# Patient Record
Sex: FEMALE | Race: BLACK OR AFRICAN AMERICAN | NOT HISPANIC OR LATINO | URBAN - METROPOLITAN AREA
[De-identification: names, ages, dates, MRNs, and addresses within clinical notes are randomized per-mention and may not be internally consistent; named-entity substitution may affect disease eponyms.]

---

## 2019-12-11 ENCOUNTER — INPATIENT (INPATIENT)
Facility: HOSPITAL | Age: 74
LOS: 4 days | Discharge: HOME | End: 2019-12-16
Attending: LEGAL MEDICINE | Admitting: LEGAL MEDICINE
Payer: MEDICARE

## 2019-12-11 VITALS
RESPIRATION RATE: 16 BRPM | TEMPERATURE: 98 F | OXYGEN SATURATION: 100 % | SYSTOLIC BLOOD PRESSURE: 197 MMHG | DIASTOLIC BLOOD PRESSURE: 82 MMHG | HEART RATE: 95 BPM

## 2019-12-11 LAB
ALBUMIN SERPL ELPH-MCNC: 3.8 G/DL — SIGNIFICANT CHANGE UP (ref 3.3–5)
ALP SERPL-CCNC: 62 U/L — SIGNIFICANT CHANGE UP (ref 40–120)
ALT FLD-CCNC: 15 U/L — SIGNIFICANT CHANGE UP (ref 4–33)
ANION GAP SERPL CALC-SCNC: 14 MMO/L — SIGNIFICANT CHANGE UP (ref 7–14)
ANION GAP SERPL CALC-SCNC: 17 MMO/L — HIGH (ref 7–14)
APPEARANCE UR: SIGNIFICANT CHANGE UP
AST SERPL-CCNC: 16 U/L — SIGNIFICANT CHANGE UP (ref 4–32)
BACTERIA # UR AUTO: SIGNIFICANT CHANGE UP
BASOPHILS # BLD AUTO: 0.02 K/UL — SIGNIFICANT CHANGE UP (ref 0–0.2)
BASOPHILS NFR BLD AUTO: 0.3 % — SIGNIFICANT CHANGE UP (ref 0–2)
BILIRUB SERPL-MCNC: 0.3 MG/DL — SIGNIFICANT CHANGE UP (ref 0.2–1.2)
BILIRUB UR-MCNC: NEGATIVE — SIGNIFICANT CHANGE UP
BLOOD UR QL VISUAL: SIGNIFICANT CHANGE UP
BUN SERPL-MCNC: 16 MG/DL — SIGNIFICANT CHANGE UP (ref 7–23)
BUN SERPL-MCNC: 16 MG/DL — SIGNIFICANT CHANGE UP (ref 7–23)
CALCIUM SERPL-MCNC: 9.2 MG/DL — SIGNIFICANT CHANGE UP (ref 8.4–10.5)
CALCIUM SERPL-MCNC: 9.3 MG/DL — SIGNIFICANT CHANGE UP (ref 8.4–10.5)
CHLORIDE SERPL-SCNC: 76 MMOL/L — LOW (ref 98–107)
CHLORIDE SERPL-SCNC: 77 MMOL/L — LOW (ref 98–107)
CO2 SERPL-SCNC: 20 MMOL/L — LOW (ref 22–31)
CO2 SERPL-SCNC: 21 MMOL/L — LOW (ref 22–31)
COLOR SPEC: SIGNIFICANT CHANGE UP
CREAT ?TM UR-MCNC: 49.7 MG/DL — SIGNIFICANT CHANGE UP
CREAT SERPL-MCNC: 0.98 MG/DL — SIGNIFICANT CHANGE UP (ref 0.5–1.3)
CREAT SERPL-MCNC: 0.99 MG/DL — SIGNIFICANT CHANGE UP (ref 0.5–1.3)
EOSINOPHIL # BLD AUTO: 0.2 K/UL — SIGNIFICANT CHANGE UP (ref 0–0.5)
EOSINOPHIL NFR BLD AUTO: 3.1 % — SIGNIFICANT CHANGE UP (ref 0–6)
GLUCOSE SERPL-MCNC: 221 MG/DL — HIGH (ref 70–99)
GLUCOSE SERPL-MCNC: 224 MG/DL — HIGH (ref 70–99)
GLUCOSE UR-MCNC: 200 — HIGH
HCT VFR BLD CALC: 36 % — SIGNIFICANT CHANGE UP (ref 34.5–45)
HGB BLD-MCNC: 12.1 G/DL — SIGNIFICANT CHANGE UP (ref 11.5–15.5)
HYALINE CASTS # UR AUTO: NEGATIVE — SIGNIFICANT CHANGE UP
IMM GRANULOCYTES NFR BLD AUTO: 0.5 % — SIGNIFICANT CHANGE UP (ref 0–1.5)
KETONES UR-MCNC: SIGNIFICANT CHANGE UP
LEUKOCYTE ESTERASE UR-ACNC: NEGATIVE — SIGNIFICANT CHANGE UP
LYMPHOCYTES # BLD AUTO: 0.94 K/UL — LOW (ref 1–3.3)
LYMPHOCYTES # BLD AUTO: 14.5 % — SIGNIFICANT CHANGE UP (ref 13–44)
MCHC RBC-ENTMCNC: 26.2 PG — LOW (ref 27–34)
MCHC RBC-ENTMCNC: 33.6 % — SIGNIFICANT CHANGE UP (ref 32–36)
MCV RBC AUTO: 77.9 FL — LOW (ref 80–100)
MONOCYTES # BLD AUTO: 0.6 K/UL — SIGNIFICANT CHANGE UP (ref 0–0.9)
MONOCYTES NFR BLD AUTO: 9.3 % — SIGNIFICANT CHANGE UP (ref 2–14)
NEUTROPHILS # BLD AUTO: 4.69 K/UL — SIGNIFICANT CHANGE UP (ref 1.8–7.4)
NEUTROPHILS NFR BLD AUTO: 72.3 % — SIGNIFICANT CHANGE UP (ref 43–77)
NITRITE UR-MCNC: NEGATIVE — SIGNIFICANT CHANGE UP
NRBC # FLD: 0 K/UL — SIGNIFICANT CHANGE UP (ref 0–0)
OSMOLALITY SERPL: 251 MOSMO/KG — LOW (ref 275–295)
OSMOLALITY UR: 265 MOSMO/KG — SIGNIFICANT CHANGE UP (ref 50–1200)
PH UR: 6 — SIGNIFICANT CHANGE UP (ref 5–8)
PLATELET # BLD AUTO: 304 K/UL — SIGNIFICANT CHANGE UP (ref 150–400)
PMV BLD: 9.1 FL — SIGNIFICANT CHANGE UP (ref 7–13)
POTASSIUM SERPL-MCNC: 5 MMOL/L — SIGNIFICANT CHANGE UP (ref 3.5–5.3)
POTASSIUM SERPL-MCNC: SIGNIFICANT CHANGE UP MMOL/L (ref 3.5–5.3)
POTASSIUM SERPL-SCNC: 5 MMOL/L — SIGNIFICANT CHANGE UP (ref 3.5–5.3)
POTASSIUM SERPL-SCNC: SIGNIFICANT CHANGE UP MMOL/L (ref 3.5–5.3)
PROT SERPL-MCNC: 7.8 G/DL — SIGNIFICANT CHANGE UP (ref 6–8.3)
PROT UR-MCNC: 200 — HIGH
RBC # BLD: 4.62 M/UL — SIGNIFICANT CHANGE UP (ref 3.8–5.2)
RBC # FLD: 12 % — SIGNIFICANT CHANGE UP (ref 10.3–14.5)
RBC CASTS # UR COMP ASSIST: HIGH (ref 0–?)
SODIUM SERPL-SCNC: 111 MMOL/L — CRITICAL LOW (ref 135–145)
SODIUM SERPL-SCNC: 114 MMOL/L — CRITICAL LOW (ref 135–145)
SODIUM UR-SCNC: 47 MMOL/L — SIGNIFICANT CHANGE UP
SP GR SPEC: 1.01 — SIGNIFICANT CHANGE UP (ref 1–1.04)
SQUAMOUS # UR AUTO: SIGNIFICANT CHANGE UP
TROPONIN T, HIGH SENSITIVITY: 11 NG/L — SIGNIFICANT CHANGE UP (ref ?–14)
TROPONIN T, HIGH SENSITIVITY: 13 NG/L — SIGNIFICANT CHANGE UP (ref ?–14)
TSH SERPL-MCNC: 2.05 UIU/ML — SIGNIFICANT CHANGE UP (ref 0.27–4.2)
UROBILINOGEN FLD QL: NORMAL — SIGNIFICANT CHANGE UP
WBC # BLD: 6.48 K/UL — SIGNIFICANT CHANGE UP (ref 3.8–10.5)
WBC # FLD AUTO: 6.48 K/UL — SIGNIFICANT CHANGE UP (ref 3.8–10.5)
WBC UR QL: SIGNIFICANT CHANGE UP (ref 0–?)

## 2019-12-11 PROCEDURE — 71045 X-RAY EXAM CHEST 1 VIEW: CPT | Mod: 26

## 2019-12-11 RX ORDER — SODIUM CHLORIDE 9 MG/ML
1000 INJECTION, SOLUTION INTRAVENOUS ONCE
Refills: 0 | Status: DISCONTINUED | OUTPATIENT
Start: 2019-12-11 | End: 2019-12-11

## 2019-12-11 RX ORDER — LOSARTAN POTASSIUM 100 MG/1
100 TABLET, FILM COATED ORAL ONCE
Refills: 0 | Status: COMPLETED | OUTPATIENT
Start: 2019-12-11 | End: 2019-12-11

## 2019-12-11 RX ORDER — LABETALOL HCL 100 MG
10 TABLET ORAL ONCE
Refills: 0 | Status: COMPLETED | OUTPATIENT
Start: 2019-12-11 | End: 2019-12-11

## 2019-12-11 RX ORDER — LOSARTAN POTASSIUM 100 MG/1
100 TABLET, FILM COATED ORAL DAILY
Refills: 0 | Status: DISCONTINUED | OUTPATIENT
Start: 2019-12-11 | End: 2019-12-11

## 2019-12-11 RX ORDER — SODIUM CHLORIDE 9 MG/ML
500 INJECTION, SOLUTION INTRAVENOUS ONCE
Refills: 0 | Status: COMPLETED | OUTPATIENT
Start: 2019-12-11 | End: 2019-12-11

## 2019-12-11 RX ORDER — SODIUM CHLORIDE 5 G/100ML
100 INJECTION, SOLUTION INTRAVENOUS
Refills: 0 | Status: DISCONTINUED | OUTPATIENT
Start: 2019-12-11 | End: 2019-12-12

## 2019-12-11 RX ADMIN — SODIUM CHLORIDE 500 MILLILITER(S): 9 INJECTION, SOLUTION INTRAVENOUS at 00:00

## 2019-12-11 RX ADMIN — SODIUM CHLORIDE 25 MILLILITER(S): 5 INJECTION, SOLUTION INTRAVENOUS at 23:32

## 2019-12-11 RX ADMIN — LOSARTAN POTASSIUM 100 MILLIGRAM(S): 100 TABLET, FILM COATED ORAL at 19:22

## 2019-12-11 RX ADMIN — SODIUM CHLORIDE 500 MILLILITER(S): 9 INJECTION, SOLUTION INTRAVENOUS at 20:40

## 2019-12-11 RX ADMIN — Medication 10 MILLIGRAM(S): at 22:57

## 2019-12-11 NOTE — ED PROVIDER NOTE - PHYSICAL EXAMINATION
GENERAL: A&Ox3, non-toxic appearing, no acute distress  HEENT: NCAT, EOMI, oral mucosa moist, normal conjunctiva  RESP: CTAB, no respiratory distress, no wheezes/rhonchi/rales, speaking in full sentences  CV: tachycardic, irregularly irregular, no murmurs/rubs/gallops, no peripheral edema  ABDOMEN: soft, non-tender, non-distended, no guarding, no CVA tenderness  MSK: no visible deformities  NEURO: no focal sensory or motor deficits, normal CN exam, BARAJAS, 5/5 strength in all extremities  SKIN: warm, normal color, well perfused, no rash  PSYCH: normal affect    -Sukh Stout, PGY-1

## 2019-12-11 NOTE — ED PROVIDER NOTE - OBJECTIVE STATEMENT
74 year old female PMH HTN, HLD, NIDDM, BIBEMS w/ daughter for syncopal episode. Daughter states patient was standing in the kitchen, felt lightheaded and called out for help. Daughter ran in and caught patient, lowered her to the ground, no trauma. Patient had approx 5 minute loss of consciousness, no convulsions, no tongue biting, no urinary incontinence. Patient had spontaneous return to baseline, followed by episodes of NBNB vomiting. Patient currently denies any complaints. Episode not a/w cp, sob, or diaphoresis. Patient states she felt well prior to episode. Patient does not have cardiologist, last stress test "many years ago" per daughter. Patient denies fever, chills, headache, visual changes, cp, sob, abd pain, nausea, vomiting, diarrhea, constipation, dysuria, weakness, or numbness/tingling.

## 2019-12-11 NOTE — ED PROVIDER NOTE - NS ED ROS FT
GENERAL: no fever, no chills  EYES: no change in vision  HEENT: no trouble swallowing or speaking  CARDIAC: no chest pain, no palpitations   PULMONARY: no cough, no shortness of breath, no wheezing  GI: no abdominal pain, +nausea (resolved), +vomiting, no diarrhea, no constipation  : no changes in urination, no dysuria, no frequency  SKIN: no rashes  NEURO: no headache, no numbness, no weakness, +syncope  MSK: no joint pain, no muscle pain, no back pain, no calf pain     -Sukh Stout, PGY-1

## 2019-12-11 NOTE — ED PROVIDER NOTE - CLINICAL SUMMARY MEDICAL DECISION MAKING FREE TEXT BOX
Sukh Stout, PGY1: 74 year old female p/w daughter s/p witnessed syncopal episode lasting ~5 min, no head trauma, spontaneous return to baseline w/ vomiting (resolved). Patient hypertensive and tachycardic in ER, NAD, appears comfortable. EKG showing 1st deg AV block, unsure if new. DDx: arrhythmia, ACS, electrolyte, vasovagal vs cardiogenic syncope, doubt infection, doubt dissection. Plan for labs w/ trop, CXR, reassess. Patient TBA to tele for syncope evaluation.

## 2019-12-11 NOTE — ED PROVIDER NOTE - CARE PLAN
Principal Discharge DX:	Syncope  Secondary Diagnosis:	1st degree AV block Principal Discharge DX:	Syncope  Secondary Diagnosis:	1st degree AV block  Secondary Diagnosis:	Hyponatremia  Secondary Diagnosis:	Diarrhea

## 2019-12-11 NOTE — CONSULT NOTE ADULT - SUBJECTIVE AND OBJECTIVE BOX
HPI: 75 YO F with MHx of HTN, HLD, DMII who presents after syncopal episodes. CHIEF COMPLAINT: Syncope    HPI:73 YO F with Mhx of HTN, HLD, and DMII who presents after a syncopal episode. Per the daughters at bedside, pt is A&Ox3 at baseline and had been living alone until one month ago when she started to have some short term memory difficulty, family could not further elaborate. For the past month, the pt has been staying at the daughters house who states that she has been having intermittent diarrhea for the past 2 weeks, but denied any fever, chills, abd pain, or recent antibiotics. She has also had decreased appetitive a/w 15-20 lbs weight loss over the past month, and for the past week she has only eaten oatmeal and lettuce. This morning, she was walking in the house and started to feel light headed, and suddenly lost consciousness. She was caught by the daughter who states that the pt did not hit her head, but she was unresponsive for 5 minutes. During that time, she called 911,and a neighbor who administered orange juice (pt was still unconscious) and started CPR (unknown is pt ever lost a pulse). Daughter denies any shaking during the episode but states that the pt was extremely confused for 30 minutes after the event, but states that she is better now, although still not baseline. Pt denies any chest pain, palpitations, shortness of breath, headache, anxiety. She did feel nauseous earlier, which is now improved. Pt denies any sick contacts, cough, recent travel, or similar episodes.      PAST MEDICAL & SURGICAL HISTORY:  Hypercholesterolemia  Hypertension  Diabetes mellitus  No significant past surgical history      FAMILY HISTORY:  DMII  CVA    SOCIAL HISTORY:  Smoking: Denies  EtOH Use: Denies  Marital Status:     Allergies:   PCN  Peanuts          HOME MEDICATIONS:  Losartan  Aspirin  Glimepiride  Metformin       REVIEW OF SYSTEMS:  Constitutional: 15-20lbs weight loss  Eyes: denies visual complaints  ENT: denies rhinorrhea, change in vision, or change in hearing  CV: denies chest pain, palpitations, PND, ROS[place]HAL  Resp: denies shortness of breath or cough  GI:intermittent diarrhea without fever or abd pain  :denies  MSK:denies complaints  Integumentary:deneis abnormal rashes    OBJECTIVE:  ICU Vital Signs Last 24 Hrs  T(C): 36.6 (11 Dec 2019 23:24), Max: 36.8 (11 Dec 2019 16:46)  T(F): 97.9 (11 Dec 2019 23:24), Max: 98.2 (11 Dec 2019 16:46)  HR: 86 (11 Dec 2019 23:24) (86 - 99)  BP: 169/77 (11 Dec 2019 23:24) (169/77 - 229/112)  BP(mean): --  ABP: --  ABP(mean): --  RR: 15 (11 Dec 2019 23:24) (13 - 19)  SpO2: 100% (11 Dec 2019 23:24) (99% - 100%)        CAPILLARY BLOOD GLUCOSE      POCT Blood Glucose.: 235 mg/dL (11 Dec 2019 16:49)      PHYSICAL EXAM:  General: laying supine, no acute distress  HEENT: NCAT, PERRL  Lymph Nodes: NO LAD  Neck: Supple, no rigidity.   Respiratory: CTAB, no wheezing, rales,or rhonchi  Cardiovascular: normal S1, S2 RRR, no M/R/G  Abdomen: Soft, nontender, nondistended.   Extremities: Trace LE edema. no tremors  Skin: No abnormal rashes  Neurological:A&Ox2 (person and place, but not time). no focal deficits  Psychiatry: calm appearing    HOSPITAL MEDICATIONS:  MEDICATIONS  (STANDING):  sodium chloride 3%. 100 milliLiter(s) (25 mL/Hr) IV Continuous <Continuous>    MEDICATIONS  (PRN):      LABS:                        12.1   6.48  )-----------( 304      ( 11 Dec 2019 18:24 )             36.0     12-11    111<LL>  |  76<L>  |  16  ----------------------------<  221<H>  Test not performed   SPECIMEN SEVERELY HEMOLYZED   |  21<L>  |  0.99    Ca    9.3      11 Dec 2019 19:29    TPro  7.8  /  Alb  3.8  /  TBili  0.3  /  DBili  x   /  AST  16  /  ALT  15  /  AlkPhos  62  12-      Urinalysis Basic - ( 11 Dec 2019 19:29 )    Color: LIGHT YELLOW / Appearance: Lt TURBID / S.010 / pH: 6.0  Gluc: 200 / Ketone: SMALL  / Bili: NEGATIVE / Urobili: NORMAL   Blood: SMALL / Protein: 200 / Nitrite: NEGATIVE   Leuk Esterase: NEGATIVE / RBC: 6-10 / WBC 3-5   Sq Epi: FEW / Non Sq Epi: x / Bacteria: FEW            MICROBIOLOGY:     RADIOLOGY:  [ ] Reviewed and interpreted by me    EKG: Sinus rhythm with 1st degree AV block

## 2019-12-11 NOTE — ED PROVIDER NOTE - ATTENDING CONTRIBUTION TO CARE
I have personally performed a face to face bedside history and physical examination of this patient. I have discussed the history, examination, review of systems, assessment and plan of management with the resident. I have reviewed the electronic medical record and amended it to reflect my history, review of systems, physical exam, assessment and plan.    74 year old female PMH HTN, HLD, NIDDM, BIBEMS w/ daughter for syncopal episode.  VSS AF.  Exam non-toxic appearing, well perfused extremities, lungs clear, heart sounds nl, no le swelling.  Plan for ekg, cxr, labs, likely admit.

## 2019-12-11 NOTE — ED ADULT NURSE NOTE - OBJECTIVE STATEMENT
Received pt in room 12, pt A&Ox2 to self and location, respirations even and unlabored b/l. Pt brought in by daughter for syncopal episode approx for 5 min, reports pt did not fall, she caught pt before falling. Per daughter, pt was vomiting en route on hospital on ambulance, 4mg of zofran given by EMS. Per daughter, pt appears confused since the syncopal episode. Pt c/o lightheadedness. Denies h/a, n/v at this time. IVL 18g Angiocath noted on left AC placed by EMS, no infiltration noted, +blood return. Sinus rhythm on cardiac monitor. Pt hypertensive, per daughter, believes pt took BP meds today. Daughter at bedside. Awaiting MD moreira. Will continue to monitor.

## 2019-12-11 NOTE — CONSULT NOTE ADULT - ATTENDING COMMENTS
Syncope in the setting of diarrhea and hyponatremia, likely hypovolemic  serum and urine osm, urine lytes, uric acid, TSH  serial BMPq4h  consider ct head

## 2019-12-11 NOTE — CONSULT NOTE ADULT - ASSESSMENT
73 YO F With MHx of HTN, HLD, DMII, who presents with syncope found to be hyponatremia with encephalopathy.    #Neuro  -A&Ox2 likely in the setting of hyponatremia, no FND  -Baseline A&Ox3, has been having short term memory loss but fully oriented until today, prior to her syncopal episode.  -hyponatremia as below      #CV  -Hypertensive s/p losartan 100mg in the ED  -labetalol 10mg IVP now.   -monitor BP    #resp  -No issues    #Endo  -Check AM cortisol  -Check TSh    #Renal  Electrolyte abnormalities  --Hyponatremia likely 2/2 Dehydration Vs Tea and Toast Phenomenon Vs SIADH  -3% NaCl now, then repeat BMP  -monitor q4h BMP    #GI  -Intermittent Diarrhea  -No other active issues    # ID  -Low suspicion for infection    Miah Fernandez, Internal Medicine  PGY-2, [phone], [phone] 75 YO F With MHx of HTN, HLD, DMII, who presents with syncope found to be hyponatremic with encephalopathy.    #Neuro  -syncope, can be 2/2 orthostasis Vs vasovagal Vs seizure in the setting of hyponatremia  -A&Ox2 likely in the setting of hyponatremia, no FND  -Baseline A&Ox3, has been having short term memory loss but fully oriented until today, prior to her syncopal episode.  -hyponatremia management as below  -If does not improve with correction of sodium,  would pursue imaging and neuro consult      #CV  -syncope can be 2/2 orthostatics Vs other cardiac cause  -orthostatics  -monitor on tele  -TTE  --HTN  -Hypertensive s/p losartan 100mg in the ED  -labetalol 10mg IVP now.   -monitor BP    #resp  -No issues    #Endo  -Check AM cortisol  -Check TSh    #Renal  Electrolyte abnormalities  --Hyponatremia likely 2/2 hypovolemia 2/2 Dehydration 2/2 diarrhea + decreased PO intake Vs Tea and Toast Phenomenon Vs less likely SIADH  -3% NaCl now, then repeat BMP  -monitor q4h BMP    #GI  -Nonspecific Intermittent Diarrhea  -Weight loss of 15-20 lbs in the past month likely in the setting of dehydration and decreased PO in take. Pt UTD with colonoscopy, stopped mammograms and pap smear 3 years, but prior were wnl.     # ID  -Low suspicion for infection    Miah Fernandez, Internal Medicine  PGY-2, [phone], [phone]

## 2019-12-11 NOTE — ED ADULT TRIAGE NOTE - CHIEF COMPLAINT QUOTE
A&Ox2 person place, from home for syncopal episode as per daughter pt became light headed and dizzy nausea and multiple episodes of vomit, pt was given 4mg of zofran by EmS, 18 g right AC daughter caught her, as per daughter pt has been confused since PMH HTN, DM FS in ambay 235 Fit MD Mendez called, no code stroke called

## 2019-12-11 NOTE — ED PROVIDER NOTE - PROGRESS NOTE DETAILS
JONATHON (FIT DOCTOR)- called by triage RN for stroke eval for this patient.  75yo F with h/o HTN, DM, no prior Sz, takes no etoh or blood thinners, BIBEMS for syncopal episode after which pt vomited multiple times and was mute, confused, staring into space, has progressively been more vocal and oriented.  Had mild shaking prior to episode per daughter, no tongue bitting or bowel/bladder incontinence.  At this time patient is AAOx3, glucose not low, extremities 5/5 motor, symmetric smile.  Will not call stroke note. ?sz with post ictal confusion vs syncope. Will go to main ED for further Eval Patient resting comfortable. Sodium s/p hypertonic improved to 120. Discussed with MICU with recs to saline lock and medicine admission

## 2019-12-12 DIAGNOSIS — R19.7 DIARRHEA, UNSPECIFIED: ICD-10-CM

## 2019-12-12 DIAGNOSIS — E11.9 TYPE 2 DIABETES MELLITUS WITHOUT COMPLICATIONS: ICD-10-CM

## 2019-12-12 DIAGNOSIS — R55 SYNCOPE AND COLLAPSE: ICD-10-CM

## 2019-12-12 DIAGNOSIS — E87.1 HYPO-OSMOLALITY AND HYPONATREMIA: ICD-10-CM

## 2019-12-12 DIAGNOSIS — Z29.9 ENCOUNTER FOR PROPHYLACTIC MEASURES, UNSPECIFIED: ICD-10-CM

## 2019-12-12 DIAGNOSIS — I10 ESSENTIAL (PRIMARY) HYPERTENSION: ICD-10-CM

## 2019-12-12 DIAGNOSIS — Z02.9 ENCOUNTER FOR ADMINISTRATIVE EXAMINATIONS, UNSPECIFIED: ICD-10-CM

## 2019-12-12 LAB
ANION GAP SERPL CALC-SCNC: 13 MMO/L — SIGNIFICANT CHANGE UP (ref 7–14)
ANION GAP SERPL CALC-SCNC: 14 MMO/L — SIGNIFICANT CHANGE UP (ref 7–14)
ANION GAP SERPL CALC-SCNC: 15 MMO/L — HIGH (ref 7–14)
BUN SERPL-MCNC: 13 MG/DL — SIGNIFICANT CHANGE UP (ref 7–23)
BUN SERPL-MCNC: 14 MG/DL — SIGNIFICANT CHANGE UP (ref 7–23)
BUN SERPL-MCNC: 17 MG/DL — SIGNIFICANT CHANGE UP (ref 7–23)
CALCIUM SERPL-MCNC: 9 MG/DL — SIGNIFICANT CHANGE UP (ref 8.4–10.5)
CALCIUM SERPL-MCNC: 9.4 MG/DL — SIGNIFICANT CHANGE UP (ref 8.4–10.5)
CALCIUM SERPL-MCNC: 9.4 MG/DL — SIGNIFICANT CHANGE UP (ref 8.4–10.5)
CHLORIDE SERPL-SCNC: 85 MMOL/L — LOW (ref 98–107)
CHLORIDE SERPL-SCNC: 86 MMOL/L — LOW (ref 98–107)
CHLORIDE SERPL-SCNC: 88 MMOL/L — LOW (ref 98–107)
CHLORIDE UR-SCNC: 28 MMOL/L — SIGNIFICANT CHANGE UP
CO2 SERPL-SCNC: 20 MMOL/L — LOW (ref 22–31)
CO2 SERPL-SCNC: 23 MMOL/L — SIGNIFICANT CHANGE UP (ref 22–31)
CO2 SERPL-SCNC: 24 MMOL/L — SIGNIFICANT CHANGE UP (ref 22–31)
CORTIS SERPL-MCNC: 13.1 UG/DL — SIGNIFICANT CHANGE UP (ref 2.7–18.4)
CREAT SERPL-MCNC: 0.94 MG/DL — SIGNIFICANT CHANGE UP (ref 0.5–1.3)
CREAT SERPL-MCNC: 0.99 MG/DL — SIGNIFICANT CHANGE UP (ref 0.5–1.3)
CREAT SERPL-MCNC: 1 MG/DL — SIGNIFICANT CHANGE UP (ref 0.5–1.3)
GLUCOSE BLDC GLUCOMTR-MCNC: 364 MG/DL — HIGH (ref 70–99)
GLUCOSE BLDC GLUCOMTR-MCNC: 396 MG/DL — HIGH (ref 70–99)
GLUCOSE SERPL-MCNC: 125 MG/DL — HIGH (ref 70–99)
GLUCOSE SERPL-MCNC: 146 MG/DL — HIGH (ref 70–99)
GLUCOSE SERPL-MCNC: 297 MG/DL — HIGH (ref 70–99)
MAGNESIUM SERPL-MCNC: 1.7 MG/DL — SIGNIFICANT CHANGE UP (ref 1.6–2.6)
MAGNESIUM SERPL-MCNC: 1.8 MG/DL — SIGNIFICANT CHANGE UP (ref 1.6–2.6)
OSMOLALITY UR: 158 MOSMO/KG — SIGNIFICANT CHANGE UP (ref 50–1200)
OSMOLALITY UR: 273 MOSMO/KG — SIGNIFICANT CHANGE UP (ref 50–1200)
PHOSPHATE SERPL-MCNC: 3.6 MG/DL — SIGNIFICANT CHANGE UP (ref 2.5–4.5)
PHOSPHATE SERPL-MCNC: 3.9 MG/DL — SIGNIFICANT CHANGE UP (ref 2.5–4.5)
POTASSIUM SERPL-MCNC: 4.4 MMOL/L — SIGNIFICANT CHANGE UP (ref 3.5–5.3)
POTASSIUM SERPL-MCNC: 4.4 MMOL/L — SIGNIFICANT CHANGE UP (ref 3.5–5.3)
POTASSIUM SERPL-MCNC: 4.6 MMOL/L — SIGNIFICANT CHANGE UP (ref 3.5–5.3)
POTASSIUM SERPL-SCNC: 4.4 MMOL/L — SIGNIFICANT CHANGE UP (ref 3.5–5.3)
POTASSIUM SERPL-SCNC: 4.4 MMOL/L — SIGNIFICANT CHANGE UP (ref 3.5–5.3)
POTASSIUM SERPL-SCNC: 4.6 MMOL/L — SIGNIFICANT CHANGE UP (ref 3.5–5.3)
SODIUM SERPL-SCNC: 120 MMOL/L — CRITICAL LOW (ref 135–145)
SODIUM SERPL-SCNC: 123 MMOL/L — LOW (ref 135–145)
SODIUM SERPL-SCNC: 125 MMOL/L — LOW (ref 135–145)
SODIUM UR-SCNC: 36 MMOL/L — SIGNIFICANT CHANGE UP
SODIUM UR-SCNC: 38 MMOL/L — SIGNIFICANT CHANGE UP
T4 FREE SERPL-MCNC: 1.53 NG/DL — SIGNIFICANT CHANGE UP (ref 0.9–1.8)
TSH SERPL-MCNC: 2.22 UIU/ML — SIGNIFICANT CHANGE UP (ref 0.27–4.2)
TSH SERPL-MCNC: 2.69 UIU/ML — SIGNIFICANT CHANGE UP (ref 0.27–4.2)

## 2019-12-12 PROCEDURE — 99223 1ST HOSP IP/OBS HIGH 75: CPT

## 2019-12-12 PROCEDURE — 70450 CT HEAD/BRAIN W/O DYE: CPT | Mod: 26

## 2019-12-12 RX ORDER — DEXTROSE 50 % IN WATER 50 %
25 SYRINGE (ML) INTRAVENOUS ONCE
Refills: 0 | Status: DISCONTINUED | OUTPATIENT
Start: 2019-12-12 | End: 2019-12-16

## 2019-12-12 RX ORDER — SODIUM CHLORIDE 9 MG/ML
1000 INJECTION, SOLUTION INTRAVENOUS
Refills: 0 | Status: DISCONTINUED | OUTPATIENT
Start: 2019-12-12 | End: 2019-12-16

## 2019-12-12 RX ORDER — GLUCAGON INJECTION, SOLUTION 0.5 MG/.1ML
1 INJECTION, SOLUTION SUBCUTANEOUS ONCE
Refills: 0 | Status: DISCONTINUED | OUTPATIENT
Start: 2019-12-12 | End: 2019-12-16

## 2019-12-12 RX ORDER — DEXTROSE 50 % IN WATER 50 %
15 SYRINGE (ML) INTRAVENOUS ONCE
Refills: 0 | Status: DISCONTINUED | OUTPATIENT
Start: 2019-12-12 | End: 2019-12-16

## 2019-12-12 RX ORDER — DEXTROSE 50 % IN WATER 50 %
12.5 SYRINGE (ML) INTRAVENOUS ONCE
Refills: 0 | Status: DISCONTINUED | OUTPATIENT
Start: 2019-12-12 | End: 2019-12-16

## 2019-12-12 RX ORDER — LABETALOL HCL 100 MG
5 TABLET ORAL ONCE
Refills: 0 | Status: COMPLETED | OUTPATIENT
Start: 2019-12-12 | End: 2019-12-12

## 2019-12-12 RX ORDER — SODIUM CHLORIDE 9 MG/ML
1000 INJECTION, SOLUTION INTRAVENOUS
Refills: 0 | Status: DISCONTINUED | OUTPATIENT
Start: 2019-12-12 | End: 2019-12-13

## 2019-12-12 RX ORDER — INSULIN LISPRO 100/ML
VIAL (ML) SUBCUTANEOUS
Refills: 0 | Status: DISCONTINUED | OUTPATIENT
Start: 2019-12-12 | End: 2019-12-16

## 2019-12-12 RX ORDER — INSULIN LISPRO 100/ML
VIAL (ML) SUBCUTANEOUS AT BEDTIME
Refills: 0 | Status: DISCONTINUED | OUTPATIENT
Start: 2019-12-12 | End: 2019-12-16

## 2019-12-12 RX ADMIN — Medication 3: at 22:31

## 2019-12-12 RX ADMIN — SODIUM CHLORIDE 20 MILLILITER(S): 9 INJECTION, SOLUTION INTRAVENOUS at 19:33

## 2019-12-12 RX ADMIN — Medication 5: at 19:45

## 2019-12-12 RX ADMIN — Medication 0: at 07:43

## 2019-12-12 RX ADMIN — Medication 2: at 13:43

## 2019-12-12 RX ADMIN — SODIUM CHLORIDE 100 MILLILITER(S): 5 INJECTION, SOLUTION INTRAVENOUS at 03:20

## 2019-12-12 RX ADMIN — Medication 5 MILLIGRAM(S): at 05:30

## 2019-12-12 NOTE — ED ADULT NURSE REASSESSMENT NOTE - NS ED NURSE REASSESS COMMENT FT1
Patient resting, cardiac monitoring continues, HR in 80's - appears to be in a regular rhythm, sodium chloride 3% infusing, repeat BMP due after infusion is complete, respirations even unlabored

## 2019-12-12 NOTE — H&P ADULT - NSHPLABSRESULTS_GEN_ALL_CORE
(11 @ 18:24)                      12.1  6.48 )-----------( 304                 36.0    Neutrophils = 4.69 (72.3%)  Lymphocytes = 0.94 (14.5%)  Eosinophils = 0.20 (3.1%)  Basophils = 0.02 (0.3%)  Monocytes = 0.60 (9.3%)  Bands = --%        120<LL>  |  85<L>  |  14  ----------------------------<  146<H>  4.4   |  20<L>  |  0.94    Ca    9.0      12 Dec 2019 03:30    TPro  7.8  /  Alb  3.8  /  TBili  0.3  /  DBili  x   /  AST  16  /  ALT  15  /  AlkPhos  62  12-11    Urinalysis Basic - ( 11 Dec 2019 19:29 )    Color: LIGHT YELLOW / Appearance: Lt TURBID / S.010 / pH: 6.0  Gluc: 200 / Ketone: SMALL  / Bili: NEGATIVE / Urobili: NORMAL   Blood: SMALL / Protein: 200 / Nitrite: NEGATIVE   Leuk Esterase: NEGATIVE / RBC: 6-10 / WBC 3-5   Sq Epi: FEW / Non Sq Epi: x / Bacteria: FEW

## 2019-12-12 NOTE — H&P ADULT - PROBLEM SELECTOR PLAN 6
- Awaiting head CT. If no acute pathology, will start HSQ.  - Fall precautions, aspiration precautions.

## 2019-12-12 NOTE — CONSULT NOTE ADULT - SUBJECTIVE AND OBJECTIVE BOX
[facility]   MD Jessica Wilson D.O. Fatima Sheikh, D.O. Ruoru Wong, PA    From 7 AM - 5 PM:  OFFICE: [phone]  Dr. Schulz cell: [phone]  Dr. Escalona cell: [phone]  Dr. Leal cell: [phone]  IVY Christy cell: [phone]    From 5 PM - 7 AM: Answering Service: [phone]      -- INITIAL RENAL CONSULT NOTE  --------------------------------------------------------------------------------  HPI:75 YO F with Mhx of HTN, HLD, and DMII who admitted after a syncopal episode. Pt admitted with seizure/ syncope. Nephrology consulted for hyponatremia. Pt admitted with low serum sodium to 114. Pt given LR 500cc and hypertonic saline 100cc x 1 with rising serum sodium at present. Pt seen and examined in the ER. Awake, alert and responsive. States she has been eating normally. Denies any nausea/vomiting/diarrhea/ new medications. States she drinks 4 bottles of water daily but has not in the last two days (today and yesterday).       PAST HISTORY  --------------------------------------------------------------------------------  PAST MEDICAL & SURGICAL HISTORY:  Hypercholesterolemia  Hypertension  Diabetes mellitus  No significant past surgical history    FAMILY HISTORY:  No pertinent family history in first degree relatives    PAST SOCIAL HISTORY:    ALLERGIES & MEDICATIONS  --------------------------------------------------------------------------------  Allergies    peanuts (Short breath; Hives)  penicillin (Short breath; Hives)    Intolerances      Standing Inpatient Medications  dextrose 5%. 1000 milliLiter(s) IV Continuous <Continuous>  dextrose 50% Injectable 12.5 Gram(s) IV Push once  dextrose 50% Injectable 25 Gram(s) IV Push once  dextrose 50% Injectable 25 Gram(s) IV Push once  insulin lispro (HumaLOG) corrective regimen sliding scale   SubCutaneous three times a day before meals  insulin lispro (HumaLOG) corrective regimen sliding scale   SubCutaneous at bedtime    PRN Inpatient Medications  dextrose 40% Gel 15 Gram(s) Oral once PRN  glucagon  Injectable 1 milliGRAM(s) IntraMuscular once PRN      REVIEW OF SYSTEMS  --------------------------------------------------------------------------------  Gen: No fevers/chills  Skin: No rashes  Respiratory: No dyspnea, cough  CV: No chest pain  GI: No abdominal pain, diarrhea, constipation, nausea, vomiting  : No dysuria, hematuria  MSK: No  edema    All other systems were reviewed and are negative, except as noted.    VITALS/PHYSICAL EXAM  --------------------------------------------------------------------------------  T(C): 36.6 (12-12-19 @ 15:05), Max: 37 (12-12-19 @ 11:40)  HR: 91 (12-12-19 @ 15:05) (75 - 99)  BP: 160/54 (12-12-19 @ 15:05) (153/69 - 229/112)  RR: 16 (12-12-19 @ 15:05) (13 - 19)  SpO2: 100% (12-12-19 @ 15:05) (98% - 100%)  Wt(kg): --    Physical Exam:  	Gen: NAD  	HEENT: MMM  	Pulm: CTA B/L  	CV: S1S2  	Abd: Soft, +BS   	Ext: No LE edema B/L  	Neuro: Awake, no tremor  	Skin: Warm and dry    LABS/STUDIES  --------------------------------------------------------------------------------              12.1   6.48  >-----------<  304      [12-11-19 @ 18:24]              36.0     123  |  86  |  13  ----------------------------<  125      [12-12-19 @ 06:45]  4.4   |  23  |  0.99        Ca     9.4     [12-12-19 @ 06:45]      Mg     1.7     [12-12-19 @ 06:45]      Phos  3.9     [12-12-19 @ 06:45]    TPro  7.8  /  Alb  3.8  /  TBili  0.3  /  DBili  x   /  AST  16  /  ALT  15  /  AlkPhos  62  [12-11-19 @ 18:24]    Serum Osmolality 251      [12-11-19 @ 19:29]    Creatinine Trend:  SCr 0.99 [12-12 @ 06:45]  SCr 0.94 [12-12 @ 03:30]  SCr 0.99 [12-11 @ 19:29]  SCr 0.98 [12-11 @ 18:24]    Urinalysis - [12-11-19 @ 19:29]      Color LIGHT YELLOW / Appearance Lt TURBID / SG 1.010 / pH 6.0      Gluc 200 / Ketone SMALL  / Bili NEGATIVE / Urobili NORMAL       Blood SMALL / Protein 200 / Leuk Est NEGATIVE / Nitrite NEGATIVE      RBC 6-10 / WBC 3-5 / Hyaline NEGATIVE / Gran  / Sq Epi FEW / Non Sq Epi  / Bacteria FEW    Urine Creatinine 49.70      [12-11-19 @ 19:29]  Urine Sodium 38      [12-12-19 @ 09:15]  Urine Chloride 28      [12-12-19 @ 09:15]  Urine Osmolality 158      [12-12-19 @ 09:15]    TSH 2.69      [12-12-19 @ 06:45]

## 2019-12-12 NOTE — H&P ADULT - PROBLEM SELECTOR PLAN 7
1.  Name of PCP: Dr. Middleton  2.  PCP Contacted on Admission: [ ] Y    [x] N    3.  PCP contacted at Discharge: [ ] Y    [ ] N    [ ] N/A  4.  Post-Discharge Appointment Date and Location:  5.  Summary of Handoff given to PCP:

## 2019-12-12 NOTE — H&P ADULT - PROBLEM SELECTOR PROBLEM 4
Type 2 diabetes mellitus without complication, without long-term current use of insulin Diarrhea, unspecified type

## 2019-12-12 NOTE — H&P ADULT - HISTORY OF PRESENT ILLNESS
73yo F with Mhx of HTN, HLD, and DMII who presents after a syncopal episode. Per the daughters at bedside, pt is A&Ox3 at baseline and had been living alone until one month ago when she started to have some short term memory difficulty, family could not further elaborate. For the past month, the pt has been staying at the daughters house who states that she has been having intermittent diarrhea for the past 2 weeks, but denied any fever, chills, abd pain, or recent antibiotics. She has also had decreased appetitive a/w 15-20 lbs weight loss over the past month, and for the past week she has only eaten oatmeal and lettuce. This morning, she was walking in the house and started to feel light headed, and suddenly lost consciousness. She was caught by the daughter who states that the pt did not hit her head, but she was unresponsive for 5 minutes. During that time, she called 911,and a neighbor who administered orange juice (pt was still unconscious) and started CPR (unknown is pt ever lost a pulse). Daughter denies any shaking during the episode but states that the pt was extremely confused for 30 minutes after the event, but states that she is better now, although still not baseline. Pt denies any chest pain, palpitations, shortness of breath, headache, anxiety. She did feel nauseous earlier, which is now improved. Pt denies any sick contacts, cough, recent travel, or similar episodes.

## 2019-12-12 NOTE — CONSULT NOTE ADULT - ASSESSMENT
73 YO F with Mhx of HTN, HLD, and DMII who admitted after a syncopal episode. Pt admitted with seizure/ syncope. Nephrology consulted for hyponatremia.     Hyponatremia  Severe--likely Acute (however unknown), Symptomatic with seizure/ syncope   Urine osm consistent with polydipsia  Pt admitted with low serum sodium to 114. Pt given LR 500cc x1 in ER.  Serum sodium dropped to 111  and hypertonic saline 100cc x 1 was administered   -->now with rising serum sodium at present.   Last serum sodium at 6am was 123  Awaiting current serum sodium repeat   Maximum rise of 10meq in 24 hours is advised given we do not known the acuity of this hyponatremia   If serum sodium continues to rise on repeat, can start D5W @ 20cc x 5 hours.   Please call with any questions   Must continue BMP Q4-6 hours today  MICU eval noted.   TSH at goal  Check AM cortisol. Pt states shes had colonoscopy in the past, no mammogram. Non-smoker. Malignancy work up as per age.   Check serum uric acid in AM and repeat urine osm and urine Na   Monitor intake and output  Avoid overcorrection

## 2019-12-12 NOTE — H&P ADULT - ASSESSMENT
73yo F with Mhx of HTN, HLD, and DMII who presents after a syncopal episode. Likely 2/2 to hyponatremia.

## 2019-12-12 NOTE — PROGRESS NOTE ADULT - SUBJECTIVE AND OBJECTIVE BOX
MD note-Patient seen by Hospitalist today.H and P,MICU Eval and Renal consult noted.I will f/u from tomorrow.

## 2019-12-12 NOTE — H&P ADULT - PROBLEM SELECTOR PLAN 1
- Patient presented with LOC, also with confusion afterwards, currently slightly encephalopathic with LE weakness bilaterally (symmetrical). Likely had neurological event, constellation of sx suggestive of seizure. Also need to r/o cardiac event and CVA.  - Ordered for head CT stat. Will need to consult neuro in the am.  - Monitor on tele. Unlikely cardiac event as patient has neg sx, neg trop and baseline EKG.  - Check orthostatic BP.  - Dysphagia screening.

## 2019-12-12 NOTE — H&P ADULT - PROBLEM SELECTOR PLAN 3
- BP elevated on admission.  - Awaiting head CT. If no CVA suspected per neuro and negative CT, will resume home meds and control BP. Goal SBP~150.

## 2019-12-12 NOTE — H&P ADULT - NSHPPHYSICALEXAM_GEN_ALL_CORE
T(C): 36.8 (12-12-19 @ 03:40), Max: 36.8 (12-11-19 @ 16:46)  HR: 77 (12-12-19 @ 03:40) (77 - 99)  BP: 194/62 (12-12-19 @ 03:40) (169/77 - 229/112)  RR: 15 (12-12-19 @ 03:40) (13 - 19)  SpO2: 100% (12-12-19 @ 03:40) (99% - 100%)  Wt(kg): --  GENERAL: NAD, well-developed  HEAD:  Atraumatic, Normocephalic  EYES: EOMI, PERRLA, conjunctiva and sclera clear  NECK: Supple, No JVD  CHEST/LUNG: Clear to auscultation bilaterally; No wheeze  HEART: Regular rate and rhythm; No murmurs, rubs, or gallops  ABDOMEN: Soft, Nontender, Nondistended; Bowel sounds present  EXTREMITIES:  2+ Peripheral Pulses, No clubbing, cyanosis, or edema  PSYCH: AAOx2-3  NEUROLOGY: non-focal  SKIN: No rashes or lesions T(C): 36.8 (12-12-19 @ 03:40), Max: 36.8 (12-11-19 @ 16:46)  HR: 77 (12-12-19 @ 03:40) (77 - 99)  BP: 194/62 (12-12-19 @ 03:40) (169/77 - 229/112)  RR: 15 (12-12-19 @ 03:40) (13 - 19)  SpO2: 100% (12-12-19 @ 03:40) (99% - 100%)  Wt(kg): --  GENERAL: NAD, well-developed  HEAD:  Atraumatic, Normocephalic  EYES: EOMI, PERRLA, conjunctiva and sclera clear  NECK: Supple, No JVD  CHEST/LUNG: Clear to auscultation bilaterally; No wheeze  HEART: Regular rate and rhythm; No murmurs, rubs, or gallops  ABDOMEN: Soft, Nontender, Nondistended; Bowel sounds present  EXTREMITIES:  2+ Peripheral Pulses, No clubbing, cyanosis, or edema  PSYCH: AAOx2-3  NEUROLOGY: LE 4/5 strength bilaterally symmetrical.  SKIN: No rashes or lesions

## 2019-12-12 NOTE — H&P ADULT - NSHPREVIEWOFSYSTEMS_GEN_ALL_CORE
CONSTITUTIONAL: weakness, no f/c  EYES/ENT: No visual changes;  No vertigo or throat pain   NECK: No pain or stiffness  RESPIRATORY: No cough, wheezing, hemoptysis; No shortness of breath  CARDIOVASCULAR: No chest pain or palpitations  GASTROINTESTINAL: No abdominal or epigastric pain. No nausea, vomiting, or hematemesis; No diarrhea or constipation. No melena or hematochezia.  GENITOURINARY: No dysuria, frequency or hematuria  NEUROLOGICAL: No numbness or weakness  SKIN: No itching, burning, rashes, or lesions   PSYCH: No Depression, no anxiety  All other review of systems is negative unless indicated above.

## 2019-12-12 NOTE — CONSULT NOTE ADULT - SUBJECTIVE AND OBJECTIVE BOX
[facility] Neurological [facility]        Patient is a 74y old  Female who presents with a chief complaint of LOC, hyponatremia (12 Dec 2019 19:09)    Excerpt from H&P,"  73yo F with Mhx of HTN, HLD, and DMII who presents after a syncopal episode. Per the daughters at bedside, pt is A&Ox3 at baseline and had been living alone until one month ago when she started to have some short term memory difficulty, family could not further elaborate. For the past month, the pt has been staying at the daughters house who states that she has been having intermittent diarrhea for the past 2 weeks, but denied any fever, chills, abd pain, or recent antibiotics. She has also had decreased appetitive a/w 15-20 lbs weight loss over the past month, and for the past week she has only eaten oatmeal and lettuce. This morning, she was walking in the house and started to feel light headed, and suddenly lost consciousness. She was caught by the daughter who states that the pt did not hit her head, but she was unresponsive for 5 minutes. During that time, she called 911,and a neighbor who administered orange juice (pt was still unconscious) and started CPR (unknown is pt ever lost a pulse). Daughter denies any shaking during the episode but states that the pt was extremely confused for 30 minutes after the event, but states that she is better now, although still not baseline. Pt denies any chest pain, palpitations, shortness of breath, headache, anxiety. She did feel nauseous earlier, which is now improved. Pt denies any sick contacts, cough, recent travel, or similar episodes.  Pt denies any prolonged post ictal period or tongue laceration.            *****PAST MEDICAL / Surgical  HISTORY:  PAST MEDICAL & SURGICAL HISTORY:  Hypercholesterolemia  Hypertension  Diabetes mellitus  No significant past surgical history           *****FAMILY HISTORY:  FAMILY HISTORY:  No pertinent family history in first degree relatives           *****SOCIAL HISTORY:  Alcohol: None  Smoking: None         *****ALLERGIES:   Allergies    peanuts (Short breath; Hives)  penicillin (Short breath; Hives)    Intolerances             *****MEDICATIONS: current medication reviewed and documented.   MEDICATIONS  (STANDING):  dextrose 5%. 1000 milliLiter(s) (50 mL/Hr) IV Continuous <Continuous>  dextrose 50% Injectable 12.5 Gram(s) IV Push once  dextrose 50% Injectable 25 Gram(s) IV Push once  dextrose 50% Injectable 25 Gram(s) IV Push once  insulin lispro (HumaLOG) corrective regimen sliding scale   SubCutaneous three times a day before meals  insulin lispro (HumaLOG) corrective regimen sliding scale   SubCutaneous at bedtime    MEDICATIONS  (PRN):  dextrose 40% Gel 15 Gram(s) Oral once PRN Blood Glucose LESS THAN 70 milliGRAM(s)/deciliter  glucagon  Injectable 1 milliGRAM(s) IntraMuscular once PRN Glucose LESS THAN 70 milligrams/deciliter           *****REVIEW OF SYSTEM:  GEN: no fever, no chills, no pain  RESP: no SOB, no cough, no sputum  CVS: no chest pain, no palpitations, no edema  GI: no abdominal pain, no nausea, no vomiting, no constipation, no diarrhea  : no dysurea, no frequency, no hematurea  Neuro: no headache, no dizziness  PSYCH: no anxiety, no depression  Derm : no itching, no rash         *****VITAL SIGNS:  T(C): 36.8 (19 @ 06:00), Max: 37 (19 @ 11:40)  HR: 82 (19 @ 06:00) (75 - 110)  BP: 166/77 (19 @ 06:00) (142/78 - 166/77)  RR: 16 (19 @ 06:00) (14 - 18)  SpO2: 100% (19 @ 06:00) (99% - 100%)  Wt(kg): --           *****PHYSICAL EXAM:   Alert oriented x 3   Attention comprehension are fair. Able to name, repeat, read without any difficulty.   Able to follow 3 step commands.     EOMI fundi not visualized,  VFF to confrontration  No facial asymmetry   Tongue is midline   Palate elevates symmetrically   Moving all 4 ext symmetrically no pronator drift   Reflexes are symmetric throughout   sensation is grossly symmetric  Gait : not assessed.  B/L down going toes               *****LAB AND IMAGIN.4   4.19  )-----------( 306      ( 13 Dec 2019 07:50 )             34.3               12-13    126<L>  |  95<L>  |  24<H>  ----------------------------<  225<H>  4.8   |  20<L>  |  0.98    Ca    9.0      13 Dec 2019 07:50  Phos  3.5     12-13  Mg     1.7     -    Hemoglobin A1C, Whole Blood (..19 @ 06:00)    Hemoglobin A1C, Whole Blood: 8.8: High Risk (prediabetic)    5.7 - 6.4 %  Diabetic, diagnostic           > 6.5 %  ADA diabetic treatment goal    < 7.0 %    HbA1C values may not accurately reflect mean blood glucose  in patients with Hb variants.  Suggest clinical correlation. %    TPro  7.8  /  Alb  3.8  /  TBili  0.3  /  DBili  x   /  AST  16  /  ALT  15  /  AlkPhos  62  12-                            Urinalysis Basic - ( 11 Dec 2019 19:29 )    Color: LIGHT YELLOW / Appearance: Lt TURBID / S.010 / pH: 6.0  Gluc: 200 / Ketone: SMALL  / Bili: NEGATIVE / Urobili: NORMAL   Blood: SMALL / Protein: 200 / Nitrite: NEGATIVE   Leuk Esterase: NEGATIVE / RBC: 6-10 / WBC 3-5   Sq Epi: FEW / Non Sq Epi: x / Bacteria: FEW        [All pertinent recent Imaging reports reviewed]         *****A S S E S S M E N T   A N D   P L A N :   73yo F with Mhx of HTN, HLD, and DMII who presents after a syncopal episode. Per the daughters at bedside, pt is A&Ox3 at baseline and had been living alone until one month ago when she started to have some short term memory difficulty, family could not further elaborate. For the past month, the pt has been staying at the [place] house who states that she has been having intermittent diarrhea for the past 2 weeks, but denied any fever, chills, abd pain, or recent antibiotics. She has also had decreased appetitive a/w 15-20 lbs weight loss over the past month, and for the past week she has only eaten oatmeal and lettuce. This morning, she was walking in the house and started to feel light headed, and suddenly lost consciousness. She was caught by the daughter who states that the pt did not hit her head, but she was unresponsive for 5 minutes. During that time, she called 911,and a neighbor who administered orange juice (pt was still unconscious) and started CPR (unknown is pt ever lost a pulse). Daughter denies any shaking during the episode but states that the pt was extremely confused for 30 minutes after the event, but states that she is better now, although still not baseline. Pt denies any chest pain, palpitations, shortness of breath, headache, anxiety. She did feel nauseous earlier, which is now improved. Pt denies any sick contacts, cough, recent travel, or similar episodes.  Pt denies any prolonged post ictal period or tongue laceration.     Problem/Recommendations 1: syncope of unclear etiology   since the syncope was brief and there was no prolonged post ictal period doubt seizures   hyponatremia   ck am cortisol         Problem/Recommendations 2: cognitive impairment underlying dementia.        ___________________________  Will follow with you.  Thank you,  Alice Kerr MD  Diplomate of the American Board of Neurology and Psychiatry.  Diplomate of the American Board of Vascular Neurology.   [facility] ([facility]), [facility]   Ph: [phone]    Differential diagnosis and plan of care discussed with patient after the evaluation.   Advanced care planning options discussed.   Pain assessed and judicious use of narcotics when appropriate was discussed.  Importance of Fall prevention discussed.  Counseling on Smoking and Alcohol cessation was offered when appropriate.  Counseling on Diet, exercise, and medication compliance was done.   83 minutes spent on the total encounter;  more than 50 % of the visit was spent on counseling  and or coordinating care by the attending physician.    Thank you for allowing me to participate in the care of this edel patient. Please do not hesitate to call me if you have any questions.     This and subsequent notes were partially created using voice recognition software and will  inherently be subject to errors including those of syntax and sound alike substitutions which may escape proofreading. In such instances original meaning may be extrapolated by contextual derivation.

## 2019-12-12 NOTE — H&P ADULT - PROBLEM SELECTOR PLAN 2
- Likely 2/2 poor po intake and diarrhea.  - S/p hypertonic saline and currently Na 120 from 111.  - Will only trend Na level in the next 24h. Goal correction rate ~ 0.5/hour.

## 2019-12-12 NOTE — H&P ADULT - PROBLEM SELECTOR PLAN 4
- Unclear etiology. No recent abx use. No f/c.  - check urine culture. C. diff pcr. - Chronic diarrhea with nclear etiology. Likely contributing to patient's reported weight loss. No recent abx use. No f/c. Unlikely C. diff.  - check stool culture. GI pcr.

## 2019-12-13 DIAGNOSIS — E78.00 PURE HYPERCHOLESTEROLEMIA, UNSPECIFIED: ICD-10-CM

## 2019-12-13 DIAGNOSIS — I10 ESSENTIAL (PRIMARY) HYPERTENSION: ICD-10-CM

## 2019-12-13 LAB
ANION GAP SERPL CALC-SCNC: 11 MMO/L — SIGNIFICANT CHANGE UP (ref 7–14)
ANION GAP SERPL CALC-SCNC: 12 MMO/L — SIGNIFICANT CHANGE UP (ref 7–14)
ANION GAP SERPL CALC-SCNC: 13 MMO/L — SIGNIFICANT CHANGE UP (ref 7–14)
ANION GAP SERPL CALC-SCNC: 16 MMO/L — HIGH (ref 7–14)
B-OH-BUTYR SERPL-SCNC: < 0 MMOL/L — LOW (ref 0–0.4)
BUN SERPL-MCNC: 22 MG/DL — SIGNIFICANT CHANGE UP (ref 7–23)
BUN SERPL-MCNC: 24 MG/DL — HIGH (ref 7–23)
BUN SERPL-MCNC: 27 MG/DL — HIGH (ref 7–23)
BUN SERPL-MCNC: 36 MG/DL — HIGH (ref 7–23)
CALCIUM SERPL-MCNC: 8.9 MG/DL — SIGNIFICANT CHANGE UP (ref 8.4–10.5)
CALCIUM SERPL-MCNC: 9 MG/DL — SIGNIFICANT CHANGE UP (ref 8.4–10.5)
CALCIUM SERPL-MCNC: 9.2 MG/DL — SIGNIFICANT CHANGE UP (ref 8.4–10.5)
CALCIUM SERPL-MCNC: 9.5 MG/DL — SIGNIFICANT CHANGE UP (ref 8.4–10.5)
CHLORIDE SERPL-SCNC: 90 MMOL/L — LOW (ref 98–107)
CHLORIDE SERPL-SCNC: 92 MMOL/L — LOW (ref 98–107)
CHLORIDE SERPL-SCNC: 94 MMOL/L — LOW (ref 98–107)
CHLORIDE SERPL-SCNC: 95 MMOL/L — LOW (ref 98–107)
CK MB BLD-MCNC: 3.12 NG/ML — SIGNIFICANT CHANGE UP (ref 1–4.7)
CK MB BLD-MCNC: SIGNIFICANT CHANGE UP (ref 0–2.5)
CK SERPL-CCNC: 124 U/L — SIGNIFICANT CHANGE UP (ref 25–170)
CO2 SERPL-SCNC: 19 MMOL/L — LOW (ref 22–31)
CO2 SERPL-SCNC: 20 MMOL/L — LOW (ref 22–31)
CO2 SERPL-SCNC: 22 MMOL/L — SIGNIFICANT CHANGE UP (ref 22–31)
CO2 SERPL-SCNC: 25 MMOL/L — SIGNIFICANT CHANGE UP (ref 22–31)
CORTIS SERPL-MCNC: 13.3 UG/DL — SIGNIFICANT CHANGE UP (ref 2.7–18.4)
CREAT SERPL-MCNC: 0.98 MG/DL — SIGNIFICANT CHANGE UP (ref 0.5–1.3)
CREAT SERPL-MCNC: 1.18 MG/DL — SIGNIFICANT CHANGE UP (ref 0.5–1.3)
CREAT SERPL-MCNC: 1.2 MG/DL — SIGNIFICANT CHANGE UP (ref 0.5–1.3)
CREAT SERPL-MCNC: 1.45 MG/DL — HIGH (ref 0.5–1.3)
GLUCOSE BLDC GLUCOMTR-MCNC: 164 MG/DL — HIGH (ref 70–99)
GLUCOSE BLDC GLUCOMTR-MCNC: 195 MG/DL — HIGH (ref 70–99)
GLUCOSE BLDC GLUCOMTR-MCNC: 250 MG/DL — HIGH (ref 70–99)
GLUCOSE BLDC GLUCOMTR-MCNC: 366 MG/DL — HIGH (ref 70–99)
GLUCOSE SERPL-MCNC: 225 MG/DL — HIGH (ref 70–99)
GLUCOSE SERPL-MCNC: 262 MG/DL — HIGH (ref 70–99)
GLUCOSE SERPL-MCNC: 320 MG/DL — HIGH (ref 70–99)
GLUCOSE SERPL-MCNC: 350 MG/DL — HIGH (ref 70–99)
HBA1C BLD-MCNC: 8.8 % — HIGH (ref 4–5.6)
HCT VFR BLD CALC: 34.3 % — LOW (ref 34.5–45)
HGB BLD-MCNC: 11.4 G/DL — LOW (ref 11.5–15.5)
LACTATE SERPL-SCNC: 2.4 MMOL/L — HIGH (ref 0.5–2)
MAGNESIUM SERPL-MCNC: 1.7 MG/DL — SIGNIFICANT CHANGE UP (ref 1.6–2.6)
MAGNESIUM SERPL-MCNC: 1.8 MG/DL — SIGNIFICANT CHANGE UP (ref 1.6–2.6)
MCHC RBC-ENTMCNC: 27 PG — SIGNIFICANT CHANGE UP (ref 27–34)
MCHC RBC-ENTMCNC: 33.2 % — SIGNIFICANT CHANGE UP (ref 32–36)
MCV RBC AUTO: 81.3 FL — SIGNIFICANT CHANGE UP (ref 80–100)
NRBC # FLD: 0 K/UL — SIGNIFICANT CHANGE UP (ref 0–0)
PHOSPHATE SERPL-MCNC: 3.3 MG/DL — SIGNIFICANT CHANGE UP (ref 2.5–4.5)
PHOSPHATE SERPL-MCNC: 3.5 MG/DL — SIGNIFICANT CHANGE UP (ref 2.5–4.5)
PHOSPHATE SERPL-MCNC: 4 MG/DL — SIGNIFICANT CHANGE UP (ref 2.5–4.5)
PHOSPHATE SERPL-MCNC: 4.1 MG/DL — SIGNIFICANT CHANGE UP (ref 2.5–4.5)
PLATELET # BLD AUTO: 306 K/UL — SIGNIFICANT CHANGE UP (ref 150–400)
PMV BLD: 9.6 FL — SIGNIFICANT CHANGE UP (ref 7–13)
POTASSIUM SERPL-MCNC: 4.8 MMOL/L — SIGNIFICANT CHANGE UP (ref 3.5–5.3)
POTASSIUM SERPL-MCNC: 4.9 MMOL/L — SIGNIFICANT CHANGE UP (ref 3.5–5.3)
POTASSIUM SERPL-SCNC: 4.8 MMOL/L — SIGNIFICANT CHANGE UP (ref 3.5–5.3)
POTASSIUM SERPL-SCNC: 4.9 MMOL/L — SIGNIFICANT CHANGE UP (ref 3.5–5.3)
RBC # BLD: 4.22 M/UL — SIGNIFICANT CHANGE UP (ref 3.8–5.2)
RBC # FLD: 12.4 % — SIGNIFICANT CHANGE UP (ref 10.3–14.5)
SODIUM SERPL-SCNC: 125 MMOL/L — LOW (ref 135–145)
SODIUM SERPL-SCNC: 126 MMOL/L — LOW (ref 135–145)
SODIUM SERPL-SCNC: 128 MMOL/L — LOW (ref 135–145)
SODIUM SERPL-SCNC: 130 MMOL/L — LOW (ref 135–145)
URATE SERPL-MCNC: 5.6 MG/DL — SIGNIFICANT CHANGE UP (ref 2.5–7)
WBC # BLD: 4.19 K/UL — SIGNIFICANT CHANGE UP (ref 3.8–10.5)
WBC # FLD AUTO: 4.19 K/UL — SIGNIFICANT CHANGE UP (ref 3.8–10.5)

## 2019-12-13 PROCEDURE — 99223 1ST HOSP IP/OBS HIGH 75: CPT

## 2019-12-13 RX ORDER — LOSARTAN POTASSIUM 100 MG/1
1 TABLET, FILM COATED ORAL
Qty: 0 | Refills: 0 | DISCHARGE

## 2019-12-13 RX ORDER — INSULIN LISPRO 100/ML
2 VIAL (ML) SUBCUTANEOUS
Refills: 0 | Status: DISCONTINUED | OUTPATIENT
Start: 2019-12-13 | End: 2019-12-13

## 2019-12-13 RX ORDER — SODIUM CHLORIDE 9 MG/ML
1000 INJECTION, SOLUTION INTRAVENOUS
Refills: 0 | Status: DISCONTINUED | OUTPATIENT
Start: 2019-12-13 | End: 2019-12-16

## 2019-12-13 RX ORDER — INSULIN LISPRO 100/ML
2 VIAL (ML) SUBCUTANEOUS
Refills: 0 | Status: DISCONTINUED | OUTPATIENT
Start: 2019-12-13 | End: 2019-12-15

## 2019-12-13 RX ORDER — LOSARTAN POTASSIUM 100 MG/1
100 TABLET, FILM COATED ORAL DAILY
Refills: 0 | Status: DISCONTINUED | OUTPATIENT
Start: 2019-12-13 | End: 2019-12-16

## 2019-12-13 RX ORDER — RISPERIDONE 4 MG/1
1 TABLET ORAL DAILY
Refills: 0 | Status: DISCONTINUED | OUTPATIENT
Start: 2019-12-13 | End: 2019-12-16

## 2019-12-13 RX ORDER — INSULIN GLARGINE 100 [IU]/ML
7 INJECTION, SOLUTION SUBCUTANEOUS AT BEDTIME
Refills: 0 | Status: DISCONTINUED | OUTPATIENT
Start: 2019-12-13 | End: 2019-12-13

## 2019-12-13 RX ADMIN — LOSARTAN POTASSIUM 100 MILLIGRAM(S): 100 TABLET, FILM COATED ORAL at 17:27

## 2019-12-13 RX ADMIN — Medication 5: at 13:08

## 2019-12-13 RX ADMIN — Medication 1: at 09:16

## 2019-12-13 RX ADMIN — RISPERIDONE 1 MILLIGRAM(S): 4 TABLET ORAL at 17:27

## 2019-12-13 RX ADMIN — Medication 1: at 17:28

## 2019-12-13 NOTE — CHART NOTE - NSCHARTNOTEFT_GEN_A_CORE
Patient noted with persistent hyperglycemia.  Endocrinology's recommendations appreciated.  Patient started on consistent carbohydrate diet, however fingersticks still elevated.  Will start patient on low dose lantus 7 units SQ qhs.  Will continue ISS.  Will continue to monitor fingersticks.  Discussed with attending MD Dr. Hahn. Patient noted with persistent hyperglycemia.  Endocrinology's recommendations appreciated.  Patient started on consistent carbohydrate diet, however fingersticks still elevated.  Will start patient on low dose lantus 7 units SQ qhs.  Will continue ISS.  Will continue to monitor fingersticks.  Discussed with attending MD Dr. Hahn.    PA addendum 17:25: pre-dinner fingerstick checked and is 164.  Discussed with endocrine Dr. Rosenberg on call.  Recommended to start patient on humalog 2 units TID premeals and to continue ISS as ordered.  No need for lantus at this time.  Will continue to monitor fingersticks.

## 2019-12-13 NOTE — PROGRESS NOTE ADULT - SUBJECTIVE AND OBJECTIVE BOX
[facility]      Seen earlier today, and examined.  - Today, patient is without complaints.           *****MEDICATIONS: Current medication reviewed and documented.    MEDICATIONS  (STANDING):  dextrose 5%. 1000 milliLiter(s) (50 mL/Hr) IV Continuous <Continuous>  dextrose 50% Injectable 12.5 Gram(s) IV Push once  dextrose 50% Injectable 25 Gram(s) IV Push once  dextrose 50% Injectable 25 Gram(s) IV Push once  insulin lispro (HumaLOG) corrective regimen sliding scale   SubCutaneous three times a day before meals  insulin lispro (HumaLOG) corrective regimen sliding scale   SubCutaneous at bedtime  losartan 100 milliGRAM(s) Oral daily  risperiDONE   Tablet 1 milliGRAM(s) Oral daily    MEDICATIONS  (PRN):  dextrose 40% Gel 15 Gram(s) Oral once PRN Blood Glucose LESS THAN 70 milliGRAM(s)/deciliter  glucagon  Injectable 1 milliGRAM(s) IntraMuscular once PRN Glucose LESS THAN 70 milligrams/deciliter          ***** VITAL SIGNS:  T(F): 98.2 (19 @ 06:00), Max: 98.2 (19 @ 06:00)  HR: 82 (19 @ 06:00) (80 - 110)  BP: 166/77 (19 @ 06:00) (142/78 - 166/77)  RR: 16 (19 @ 06:00) (16 - 18)  SpO2: 100% (19 @ 06:00) (100% - 100%)  Wt(kg): --  ,   I&O's Summary           *****PHYSICAL EXAM:   alert oriented x 2 did not know the date    attention comprehension are limited.   couldnt tell me the name of the current president. Did know the previous president   able to follow 1 step commands   unable to give me her address      EOmi fundi not visualized   no nystagmus VFF to confrontation  Tongue is midline  Palate elevates symmetrically   Moving all 4 ext spontaneously no drift appreciated    Gait not assessed.            *****LAB AND IMAGIN.4   4.19  )-----------( 306      ( 13 Dec 2019 07:50 )             34.3               12-13    126<L>  |  95<L>  |  24<H>  ----------------------------<  225<H>  4.8   |  20<L>  |  0.98    Ca    9.0      13 Dec 2019 07:50  Phos  3.5     12-13  Mg     1.7     12-13    TPro  7.8  /  Alb  3.8  /  TBili  0.3  /  DBili  x   /  AST  16  /  ALT  15  /  AlkPhos  62  12-11           CARDIAC MARKERS ( 11 Dec 2019 18:24 )  x     / x     / 124 u/L / 3.12 ng/mL / x                  Urinalysis Basic - ( 11 Dec 2019 19:29 )    Color: LIGHT YELLOW / Appearance: Lt TURBID / S.010 / pH: 6.0  Gluc: 200 / Ketone: SMALL  / Bili: NEGATIVE / Urobili: NORMAL   Blood: SMALL / Protein: 200 / Nitrite: NEGATIVE   Leuk Esterase: NEGATIVE / RBC: 6-10 / WBC 3-5   Sq Epi: FEW / Non Sq Epi: x / Bacteria: FEW      [All pertinent recent Imaging/Reports reviewed]           *****A S S E S S M E N T   A N D   P L A N :   73yo F with Mhx of HTN, HLD, and DMII who presents after a syncopal episode. Per the daughters at bedside, pt is A&Ox3 at baseline and had been living alone until one month ago when she started to have some short term memory difficulty, family could not further elaborate. For the past month, the pt has been staying at the daughters house who states that she has been having intermittent diarrhea for the past 2 weeks, but denied any fever, chills, abd pain, or recent antibiotics. She has also had decreased appetitive a/w 15-20 lbs weight loss over the past month, and for the past week she has only eaten oatmeal and lettuce. This morning, she was walking in the house and started to feel light headed, and suddenly lost consciousness. She was caught by the daughter who states that the pt did not hit her head, but she was unresponsive for 5 minutes. During that time, she called 911,and a neighbor who administered orange juice (pt was still unconscious) and started CPR (unknown is pt ever lost a pulse). Daughter denies any shaking during the episode but states that the pt was extremely confused for 30 minutes after the event, but states that she is better now, although still not baseline. Pt denies any chest pain, palpitations, shortness of breath, headache, anxiety. She did feel nauseous earlier, which is now improved. Pt denies any sick contacts, cough, recent travel, or similar episodes.  Pt denies any prolonged post ictal period or tongue laceration.     Problem/Recommendations 1: syncope of unclear etiology   since the syncope was brief and there was no prolonged post ictal period doubt seizures   hyponatremia of unclear etiology   am cortisol wnl  cardiac w/u underway   ck orthostatics         Problem/Recommendations 2: cognitive impairment underlying dementia per daughter evident over the last few months.   continue reorient  avoid sleep cycle dysregulation                Thank you for allowing me to participate in the care of this patient. Please do not hesitate to call me if you have any  questions.        ________________  Alice Kerr MD  [facility]  [phone]      33 minutes spent on total encounter; more than 50 % of the visit was  spent counseling about plan of care, compliance to diet/exercise and medication regimen and or  coordinating care by the attending physician.      It is advised that stroke patients follow up with SATHISH Silveira @ [phone] in 1- 2 weeks.   Others please follow up with Dr. Michael Nissenbaum [phone]

## 2019-12-13 NOTE — CONSULT NOTE ADULT - ASSESSMENT
Patient is a 75 yo woman with uncontrolled T2DM, HTN, HLD here for syncopal episode. Endocrine asked to consult for type 2 diabetes

## 2019-12-13 NOTE — CHART NOTE - NSCHARTNOTEFT_GEN_A_CORE
FS in 300s at lunch,   -humalog 2 units tidac and continue humalog low dose sliding scale tidac and qhs.

## 2019-12-13 NOTE — PROGRESS NOTE ADULT - PROBLEM SELECTOR PLAN 4
- Chronic diarrhea with nclear etiology. Likely contributing to patient's reported weight loss. No recent abx use. No f/c. Unlikely C. diff.  - check stool culture. GI pcr.

## 2019-12-13 NOTE — CONSULT NOTE ADULT - SUBJECTIVE AND OBJECTIVE BOX
Patient is a 73 yo woman with uncontrolled T2DM (A1c of 8.8%), HTN, HLD admitted for a syncopal episode.  Daughter Katie is at bedside with patient's permission providing collateral history. The patient has a history of "borderline" diabetes for about 3 years.  Recently, September 2019, A1c got a little bit worse.  She was initially on metformin and then in September, metformin was optimized to a dose of 1000 mg BID, started on Januvia 25 mg and glimepiride 1 mg.  The patient checks sugars only once a day, usually in the morning.  In the morning, sugars range from about 80-90.  On occasion, she will check in the afternoon and values range from 140 but there are sometimes 200s. States she gets symptoms of hypoglycemia when sugars are in the 80s. She will treat this with orange juice.   Diabetes is managed by primary care doctor, she does not have an endocrinologist   Breakfast: oatmeal or cheerios  Lunch: protein and some vegetables  Dinner: same  Over the last few days, her appetite has been low  Goes for a dilated eye exam annually with no known complications. Has an appointment coming up in January  +self reported neuropathy  Lives with daughter    PAST MEDICAL & SURGICAL HISTORY:  Hypercholesterolemia  Hypertension  Diabetes mellitus  ? mild cognitive impairment    No significant past surgical history    FAMILY HISTORY:  No pertinent family history in first degree relatives    Social History:  Never a smoker  No alcohol  No recreational drugs    Outpatient Medications:  Metformin 1000 mg BID  Januvia 25 mg  Glimepiride 1 mg  Losartan-HCTZ  Risperidone  Aspirin 81 mg    MEDICATIONS  (STANDING):  dextrose 5%. 1000 milliLiter(s) (50 mL/Hr) IV Continuous <Continuous>  dextrose 50% Injectable 12.5 Gram(s) IV Push once  dextrose 50% Injectable 25 Gram(s) IV Push once  dextrose 50% Injectable 25 Gram(s) IV Push once  insulin lispro (HumaLOG) corrective regimen sliding scale   SubCutaneous three times a day before meals  insulin lispro (HumaLOG) corrective regimen sliding scale   SubCutaneous at bedtime    MEDICATIONS  (PRN):  dextrose 40% Gel 15 Gram(s) Oral once PRN Blood Glucose LESS THAN 70 milliGRAM(s)/deciliter  glucagon  Injectable 1 milliGRAM(s) IntraMuscular once PRN Glucose LESS THAN 70 milligrams/deciliter      Allergies  peanuts (Short breath; Hives)  penicillin (Short breath; Hives)    Review of Systems:  Constitutional: No fever  Eyes: No blurry vision  Neuro: No tremors; + syncope (see HPI above)  HEENT: No pain  Cardiovascular: No chest pain, palpitations  Respiratory: No SOB, no cough  GI: No nausea, vomiting, abdominal pain  : No dysuria  Skin: no rash; dry feet  Psych: no depression  Endocrine: no polyuria, polydipsia  ALL OTHER SYSTEMS REVIEWED AND NEGATIVE  UNABLE TO OBTAIN    PHYSICAL EXAM:  VITALS: T(C): 36.8 (12-13-19 @ 06:00)  T(F): 98.2 (12-13-19 @ 06:00), Max: 98.6 (12-12-19 @ 11:40)  HR: 82 (12-13-19 @ 06:00) (75 - 110)  BP: 166/77 (12-13-19 @ 06:00) (142/78 - 166/77)  RR:  (14 - 18)  SpO2:  (99% - 100%)  Wt(kg): --  GENERAL: frail, NAD, well-groomed, well-developed  EYES: No proptosis, no lid lag, anicteric  HEENT:  Atraumatic, Normocephalic, moist mucous membranes  THYROID: Normal size, no palpable nodules  RESPIRATORY: Clear to auscultation bilaterally; No rales, rhonchi, wheezing, or rubs  CARDIOVASCULAR: Regular rate and rhythm; No murmurs; no peripheral edema  GI: Soft, nontender, non distended, normal bowel sounds  SKIN: Dry, intact, No rashes or lesions; no foot ulcers  MUSCULOSKELETAL: Moves all extremities  PSYCH: reactive affect, normal mood      POCT Blood Glucose.: 195 mg/dL (12-13-19 @ 08:57)  Humalog 1  POCT Blood Glucose.: 364 mg/dL (12-12-19 @ 22:07)  Humalog 3  POCT Blood Glucose.: 396 mg/dL (12-12-19 @ 19:37)  Humalog 5  POCT Blood Glucose.: 235 mg/dL (12-12-19 @ 13:36)  Humalog 2  POCT Blood Glucose.: 113 mg/dL (12-12-19 @ 07:27)  POCT Blood Glucose.: 235 mg/dL (12-11-19 @ 16:49)                        11.4   4.19  )-----------( 306      ( 13 Dec 2019 07:50 )             34.3       12-13    126<L>  |  95<L>  |  24<H>  ----------------------------<  225<H>  4.8   |  20<L>  |  0.98    EGFR if : 65  EGFR if non : 56    Ca    9.0      12-13  Mg     1.7     12-13  Phos  3.5     12-13    TPro  7.8  /  Alb  3.8  /  TBili  0.3  /  DBili  x   /  AST  16  /  ALT  15  /  AlkPhos  62  12-11      Thyroid Function Tests:  12-12 @ 06:45 TSH 2.69   12-12 @ 03:30 TSH 2.22 FreeT4 1.53 T3 -- Anti TPO -- Anti Thyroglobulin Ab -- TSI --    Hemoglobin A1C, Whole Blood: 8.8 % <H> [4.0 - 5.6] (12-13-19 @ 06:00)      Radiology:     PROCEDURE DATE:  Dec 12 2019     INTERPRETATION:  History: Syncope, seizure.    Description: A noncontrast head CT was performed. Axial images were   performed from the skull base to the vertex with coronal/sagittal   reconstructions.    There is no evidence for acute infarct, acute hemorrhage, mass effect,   calvarial fracture, or hydrocephalus.    Mild patchy hypodensity without mass effect is noted in the   periventricular white matter which most likely represents chronic   microvascular ischemic changes given the patient's age.    Cerebral volume loss is present with secondary proportional prominence of   the sulci and ventricles.    The right frontal sinus and right sphenoid sinus are extensively   opacified consistent with sinusitis. The paranasal sinuses are only   partially covered on this study. Consider eventual follow-up sinus CT for   further evaluation.    IMPRESSION:    No acute intracranial pathology is noted. If the patient has new and   persistent symptoms, consider short interval follow-up head CT or brain   MRI follow-up if there are no MRI contraindications.    The right frontal sinus and right sphenoid sinus are extensively   opacified consistent with sinusitis. The paranasal sinuses are only   partially covered on this study. Consider eventual follow-up sinus CT for   further evaluation.        OLIVIER GRAY M.D., ATTENDING RADIOLOGIST  This document has been electronically signed. Dec 12 2019  9:08AM

## 2019-12-13 NOTE — PROGRESS NOTE ADULT - SUBJECTIVE AND OBJECTIVE BOX
JOSE SAGASTUME  74y  Female      Patient is a 74y old  Female who presents with a chief complaint of LOC, hyponatremia (13 Dec 2019 17:18)  Patient seen and examined.chart reviewed.Admitted with Hyponatremia,syncope.feels better.no sob,no cp,no fever,no cough    REVIEW OF SYSTEMS:  neg      INTERVAL HPI/OVERNIGHT EVENTS:  T(C): 36.7 (19 @ 15:24), Max: 36.8 (19 @ 06:00)  HR: 87 (19 @ 17:25) (78 - 96)  BP: 163/72 (19 @ 17:25) (141/76 - 166/77)  RR: 17 (19 @ 17:25) (16 - 18)  SpO2: 100% (19 @ 17:25) (99% - 100%)  Wt(kg): --  I&O's Summary    13 Dec 2019 07:01  -  13 Dec 2019 19:09  --------------------------------------------------------  IN: 960 mL / OUT: 0 mL / NET: 960 mL      T(C): 36.7 (19 @ 15:24), Max: 36.8 (19 @ 06:00)  HR: 87 (19 @ 17:25) (78 - 96)  BP: 163/72 (19 @ 17:25) (141/76 - 166/77)  RR: 17 (19 @ 17:25) (16 - 18)  SpO2: 100% (19 @ 17:25) (99% - 100%)  Wt(kg): --Vital Signs Last 24 Hrs  T(C): 36.7 (13 Dec 2019 15:24), Max: 36.8 (13 Dec 2019 06:00)  T(F): 98.1 (13 Dec 2019 15:24), Max: 98.2 (13 Dec 2019 06:00)  HR: 87 (13 Dec 2019 17:25) (78 - 96)  BP: 163/72 (13 Dec 2019 17:25) (141/76 - 166/77)  BP(mean): --  RR: 17 (13 Dec 2019 17:25) (16 - 18)  SpO2: 100% (13 Dec 2019 17:25) (99% - 100%)    LABS:                        11.4   4.19  )-----------( 306      ( 13 Dec 2019 07:50 )             34.3     12-13    130<L>  |  92<L>  |  22  ----------------------------<  350<H>  4.8   |  25  |  1.18    Ca    9.5      13 Dec 2019 14:44  Phos  4.0       Mg     1.7             Urinalysis Basic - ( 11 Dec 2019 19:29 )    Color: LIGHT YELLOW / Appearance: Lt TURBID / S.010 / pH: 6.0  Gluc: 200 / Ketone: SMALL  / Bili: NEGATIVE / Urobili: NORMAL   Blood: SMALL / Protein: 200 / Nitrite: NEGATIVE   Leuk Esterase: NEGATIVE / RBC: 6-10 / WBC 3-5   Sq Epi: FEW / Non Sq Epi: x / Bacteria: FEW      CAPILLARY BLOOD GLUCOSE      POCT Blood Glucose.: 164 mg/dL (13 Dec 2019 17:22)  POCT Blood Glucose.: 366 mg/dL (13 Dec 2019 12:45)  POCT Blood Glucose.: 195 mg/dL (13 Dec 2019 08:57)  POCT Blood Glucose.: 364 mg/dL (12 Dec 2019 22:07)  POCT Blood Glucose.: 396 mg/dL (12 Dec 2019 19:37)        Urinalysis Basic - ( 11 Dec 2019 19:29 )    Color: LIGHT YELLOW / Appearance: Lt TURBID / S.010 / pH: 6.0  Gluc: 200 / Ketone: SMALL  / Bili: NEGATIVE / Urobili: NORMAL   Blood: SMALL / Protein: 200 / Nitrite: NEGATIVE   Leuk Esterase: NEGATIVE / RBC: 6-10 / WBC 3-5   Sq Epi: FEW / Non Sq Epi: x / Bacteria: FEW        PAST MEDICAL & SURGICAL HISTORY:  Hypercholesterolemia  Hypertension  Diabetes mellitus  No significant past surgical history      MEDICATIONS  (STANDING):  dextrose 5%. 1000 milliLiter(s) (50 mL/Hr) IV Continuous <Continuous>  dextrose 5%. 1000 milliLiter(s) (30 mL/Hr) IV Continuous <Continuous>  dextrose 50% Injectable 12.5 Gram(s) IV Push once  dextrose 50% Injectable 25 Gram(s) IV Push once  dextrose 50% Injectable 25 Gram(s) IV Push once  insulin lispro (HumaLOG) corrective regimen sliding scale   SubCutaneous three times a day before meals  insulin lispro (HumaLOG) corrective regimen sliding scale   SubCutaneous at bedtime  insulin lispro Injectable (HumaLOG) 2 Unit(s) SubCutaneous three times a day before meals  losartan 100 milliGRAM(s) Oral daily  risperiDONE   Tablet 1 milliGRAM(s) Oral daily    MEDICATIONS  (PRN):  dextrose 40% Gel 15 Gram(s) Oral once PRN Blood Glucose LESS THAN 70 milliGRAM(s)/deciliter  glucagon  Injectable 1 milliGRAM(s) IntraMuscular once PRN Glucose LESS THAN 70 milligrams/deciliter        RADIOLOGY & ADDITIONAL TESTS:    Imaging Personally Reviewed:  [ ] YES  [ ] NO    Consultant(s) Notes Reviewed:  [x ] YES  [ ] NO    PHYSICAL EXAM:  GENERAL: NAD, well-groomed, well-developed  HEAD:  Atraumatic, Normocephalic  EYES: EOMI, PERRLA, conjunctiva and sclera clear  ENMT: No tonsillar erythema, exudates, or enlargement; Moist mucous membranes, Good dentition, No lesions  NECK: Supple, No JVD, Normal thyroid  NERVOUS SYSTEM:  Alert & Oriented X3, Good concentration; Motor Strength 5/5 B/L upper and lower extremities; DTRs 2+ intact and symmetric  CHEST/LUNG: Clear to percussion bilaterally; No rales, rhonchi, wheezing, or rubs  HEART: Regular rate and rhythm; No murmurs, rubs, or gallops  ABDOMEN: Soft, Nontender, Nondistended; Bowel sounds present  EXTREMITIES:  2+ Peripheral Pulses, No clubbing, cyanosis, or edema  LYMPH: No lymphadenopathy noted  SKIN: No rashes or lesions    Care Discussed with Consultants/Other Providers [ ] YES  [ ] NO      Code Status: [] Full Code [] DNR [] DNI [] Goals of Care:   Disposition: [] ICU [] Stroke Unit [] RCU []PCU []Floor [] Discharge Home         KITA HahnFACP

## 2019-12-13 NOTE — PROGRESS NOTE ADULT - SUBJECTIVE AND OBJECTIVE BOX
[facility] [place] [facility]   MD Jessica Wilson D.O. Fatima Sheikh, D.O. Ruoru Wong, PA    From 7 AM - 5 PM:  OFFICE: [phone]  Dr. Schulz cell: [phone]  Dr. Escalona cell: [phone]  Dr. Leal cell: [phone]  IVY Christy cell: [phone]    From 5 PM - 7 AM: Answering Service: [phone]      RENAL FOLLOW UP NOTE  --------------------------------------------------------------------------------  HPI: Pt seen and examined at bedside.   Denies SOB, chest pain     PAST HISTORY  --------------------------------------------------------------------------------  No significant changes to PMH, PSH, FHx, SHx, unless otherwise noted    ALLERGIES & MEDICATIONS  --------------------------------------------------------------------------------  Allergies    peanuts (Short breath; Hives)  penicillin (Short breath; Hives)    Intolerances      Standing Inpatient Medications  dextrose 5%. 1000 milliLiter(s) IV Continuous <Continuous>  dextrose 5%. 1000 milliLiter(s) IV Continuous <Continuous>  dextrose 50% Injectable 12.5 Gram(s) IV Push once  dextrose 50% Injectable 25 Gram(s) IV Push once  dextrose 50% Injectable 25 Gram(s) IV Push once  insulin glargine Injectable (LANTUS) 7 Unit(s) SubCutaneous at bedtime  insulin lispro (HumaLOG) corrective regimen sliding scale   SubCutaneous three times a day before meals  insulin lispro (HumaLOG) corrective regimen sliding scale   SubCutaneous at bedtime  losartan 100 milliGRAM(s) Oral daily  risperiDONE   Tablet 1 milliGRAM(s) Oral daily    PRN Inpatient Medications  dextrose 40% Gel 15 Gram(s) Oral once PRN  glucagon  Injectable 1 milliGRAM(s) IntraMuscular once PRN      REVIEW OF SYSTEMS  --------------------------------------------------------------------------------  General: no fever  CVS: no chest pain  RESP: no sob, no cough  ABD: no abdominal pain  : no dysuria,  MSK: no edema     VITALS/PHYSICAL EXAM  --------------------------------------------------------------------------------  T(C): 36.7 (12-13-19 @ 15:24), Max: 36.8 (12-13-19 @ 06:00)  HR: 78 (12-13-19 @ 15:24) (78 - 110)  BP: 141/76 (12-13-19 @ 15:24) (141/76 - 166/77)  RR: 17 (12-13-19 @ 15:24) (16 - 18)  SpO2: 99% (12-13-19 @ 15:24) (99% - 100%)  Wt(kg): --  Height (cm): 152.4 (12-12-19 @ 18:18)  Weight (kg): 52.4 (12-12-19 @ 18:18)  BMI (kg/m2): 22.6 (12-12-19 @ 18:18)  BSA (m2): 1.48 (12-12-19 @ 18:18)      Physical Exam:  	Gen: NAD  	HEENT: MMM  	Pulm: CTA B/L  	CV: S1S2  	Abd: Soft, +BS  	Ext: No LE edema B/L                      Neuro: Awake   	Skin: Warm and Dry   	Vascular access:    LABS/STUDIES  --------------------------------------------------------------------------------              11.4   4.19  >-----------<  306      [12-13-19 @ 07:50]              34.3     130  |  92  |  22  ----------------------------<  350      [12-13-19 @ 14:44]  4.8   |  25  |  1.18        Ca     9.5     [12-13-19 @ 14:44]      Mg     1.7     [12-13-19 @ 14:44]      Phos  4.0     [12-13-19 @ 14:44]    TPro  7.8  /  Alb  3.8  /  TBili  0.3  /  DBili  x   /  AST  16  /  ALT  15  /  AlkPhos  62  [12-11-19 @ 18:24]      Uric acid 5.6      [12-13-19 @ 06:00]        [12-11-19 @ 18:24]  Serum Osmolality 251      [12-11-19 @ 19:29]    Creatinine Trend:  SCr 1.18 [12-13 @ 14:44]  SCr 0.98 [12-13 @ 07:50]  SCr 1.20 [12-13 @ 00:20]  SCr 1.00 [12-12 @ 15:26]  SCr 0.99 [12-12 @ 06:45]    Urinalysis - [12-11-19 @ 19:29]      Color LIGHT YELLOW / Appearance Lt TURBID / SG 1.010 / pH 6.0      Gluc 200 / Ketone SMALL  / Bili NEGATIVE / Urobili NORMAL       Blood SMALL / Protein 200 / Leuk Est NEGATIVE / Nitrite NEGATIVE      RBC 6-10 / WBC 3-5 / Hyaline NEGATIVE / Gran  / Sq Epi FEW / Non Sq Epi  / Bacteria FEW    Urine Creatinine 49.70      [12-11-19 @ 19:29]  Urine Sodium 36      [12-12-19 @ 20:45]  Urine Chloride 28      [12-12-19 @ 09:15]  Urine Osmolality 273      [12-12-19 @ 20:45]    HbA1c 8.8      [12-13-19 @ 06:00]  TSH 2.69      [12-12-19 @ 06:45]

## 2019-12-13 NOTE — PROGRESS NOTE ADULT - PROBLEM SELECTOR PLAN 1
-likely sec.to Hyponatremia.transient.no postictal findings/seen by Neurology.check  orthostatic BP.

## 2019-12-13 NOTE — PROGRESS NOTE ADULT - ASSESSMENT
73 YO F with Mhx of HTN, HLD, and DMII who admitted after a syncopal episode. Pt admitted with seizure/ syncope. Nephrology consulted for hyponatremia.     Hyponatremia  Severe--likely Acute (however unknown), Symptomatic with seizure/ syncope   Urine osm consistent with polydipsia and in the setting of HCTZ   Pt admitted with low serum sodium to 114. Pt given LR 500cc x1 in ER then  Serum sodium dropped to 111  and hypertonic saline 100cc x 1 was administered   -->SNa gradually rising  Administered D5W at low rate to maintain at 125 overnight.   Last serum sodium was 130   D5W @ 30cc x 5 hours overnight as pt will likely continue to self correct given improved urine osm today   Maximum rise of 6-10meq in 24 hours is advised given we do not known the acuity of this hyponatremia   Please call with any questions   Must continue BMP Q6 hours today  MICU eval noted.   TSH at goal  AM cortisol at goal. Pt states shes had colonoscopy in the past, no mammogram. Non-smoker. Malignancy work up as per age.   Uric acid not low   Monitor intake and output  Continue to HOLD HCTZ going forward   Avoid overcorrection     HTN  Hold HCTZ  Can restart losartan   BP improving today

## 2019-12-14 DIAGNOSIS — E11.65 TYPE 2 DIABETES MELLITUS WITH HYPERGLYCEMIA: ICD-10-CM

## 2019-12-14 LAB
ANION GAP SERPL CALC-SCNC: 12 MMO/L — SIGNIFICANT CHANGE UP (ref 7–14)
ANION GAP SERPL CALC-SCNC: 15 MMO/L — HIGH (ref 7–14)
BUN SERPL-MCNC: 33 MG/DL — HIGH (ref 7–23)
BUN SERPL-MCNC: 35 MG/DL — HIGH (ref 7–23)
CALCIUM SERPL-MCNC: 9 MG/DL — SIGNIFICANT CHANGE UP (ref 8.4–10.5)
CALCIUM SERPL-MCNC: 9.3 MG/DL — SIGNIFICANT CHANGE UP (ref 8.4–10.5)
CHLORIDE SERPL-SCNC: 96 MMOL/L — LOW (ref 98–107)
CHLORIDE SERPL-SCNC: 97 MMOL/L — LOW (ref 98–107)
CHOLEST SERPL-MCNC: 197 MG/DL — SIGNIFICANT CHANGE UP (ref 120–199)
CO2 SERPL-SCNC: 20 MMOL/L — LOW (ref 22–31)
CO2 SERPL-SCNC: 21 MMOL/L — LOW (ref 22–31)
CREAT SERPL-MCNC: 1.16 MG/DL — SIGNIFICANT CHANGE UP (ref 0.5–1.3)
CREAT SERPL-MCNC: 1.19 MG/DL — SIGNIFICANT CHANGE UP (ref 0.5–1.3)
GLUCOSE BLDC GLUCOMTR-MCNC: 222 MG/DL — HIGH (ref 70–99)
GLUCOSE BLDC GLUCOMTR-MCNC: 247 MG/DL — HIGH (ref 70–99)
GLUCOSE BLDC GLUCOMTR-MCNC: 254 MG/DL — HIGH (ref 70–99)
GLUCOSE BLDC GLUCOMTR-MCNC: 278 MG/DL — HIGH (ref 70–99)
GLUCOSE SERPL-MCNC: 225 MG/DL — HIGH (ref 70–99)
GLUCOSE SERPL-MCNC: 306 MG/DL — HIGH (ref 70–99)
HCT VFR BLD CALC: 32.8 % — LOW (ref 34.5–45)
HDLC SERPL-MCNC: 41 MG/DL — LOW (ref 45–65)
HGB BLD-MCNC: 10.5 G/DL — LOW (ref 11.5–15.5)
LACTATE SERPL-SCNC: 1.8 MMOL/L — SIGNIFICANT CHANGE UP (ref 0.5–2)
LIPID PNL WITH DIRECT LDL SERPL: 143 MG/DL — SIGNIFICANT CHANGE UP
MAGNESIUM SERPL-MCNC: 1.8 MG/DL — SIGNIFICANT CHANGE UP (ref 1.6–2.6)
MCHC RBC-ENTMCNC: 25.9 PG — LOW (ref 27–34)
MCHC RBC-ENTMCNC: 32 % — SIGNIFICANT CHANGE UP (ref 32–36)
MCV RBC AUTO: 80.8 FL — SIGNIFICANT CHANGE UP (ref 80–100)
NRBC # FLD: 0 K/UL — SIGNIFICANT CHANGE UP (ref 0–0)
PLATELET # BLD AUTO: 310 K/UL — SIGNIFICANT CHANGE UP (ref 150–400)
PMV BLD: 9.3 FL — SIGNIFICANT CHANGE UP (ref 7–13)
POTASSIUM SERPL-MCNC: 4.8 MMOL/L — SIGNIFICANT CHANGE UP (ref 3.5–5.3)
POTASSIUM SERPL-MCNC: 5.2 MMOL/L — SIGNIFICANT CHANGE UP (ref 3.5–5.3)
POTASSIUM SERPL-SCNC: 4.8 MMOL/L — SIGNIFICANT CHANGE UP (ref 3.5–5.3)
POTASSIUM SERPL-SCNC: 5.2 MMOL/L — SIGNIFICANT CHANGE UP (ref 3.5–5.3)
RBC # BLD: 4.06 M/UL — SIGNIFICANT CHANGE UP (ref 3.8–5.2)
RBC # FLD: 12.5 % — SIGNIFICANT CHANGE UP (ref 10.3–14.5)
SODIUM SERPL-SCNC: 129 MMOL/L — LOW (ref 135–145)
SODIUM SERPL-SCNC: 132 MMOL/L — LOW (ref 135–145)
TRIGL SERPL-MCNC: 130 MG/DL — SIGNIFICANT CHANGE UP (ref 10–149)
WBC # BLD: 5.29 K/UL — SIGNIFICANT CHANGE UP (ref 3.8–10.5)
WBC # FLD AUTO: 5.29 K/UL — SIGNIFICANT CHANGE UP (ref 3.8–10.5)

## 2019-12-14 PROCEDURE — 93010 ELECTROCARDIOGRAM REPORT: CPT

## 2019-12-14 PROCEDURE — 99232 SBSQ HOSP IP/OBS MODERATE 35: CPT

## 2019-12-14 RX ORDER — INSULIN GLARGINE 100 [IU]/ML
5 INJECTION, SOLUTION SUBCUTANEOUS AT BEDTIME
Refills: 0 | Status: DISCONTINUED | OUTPATIENT
Start: 2019-12-14 | End: 2019-12-15

## 2019-12-14 RX ORDER — AMLODIPINE BESYLATE 2.5 MG/1
5 TABLET ORAL DAILY
Refills: 0 | Status: DISCONTINUED | OUTPATIENT
Start: 2019-12-14 | End: 2019-12-16

## 2019-12-14 RX ADMIN — Medication 2 UNIT(S): at 12:30

## 2019-12-14 RX ADMIN — Medication 2 UNIT(S): at 08:41

## 2019-12-14 RX ADMIN — INSULIN GLARGINE 5 UNIT(S): 100 INJECTION, SOLUTION SUBCUTANEOUS at 23:04

## 2019-12-14 RX ADMIN — Medication 0: at 22:42

## 2019-12-14 RX ADMIN — Medication 3: at 17:52

## 2019-12-14 RX ADMIN — RISPERIDONE 1 MILLIGRAM(S): 4 TABLET ORAL at 12:23

## 2019-12-14 RX ADMIN — Medication 3: at 12:30

## 2019-12-14 RX ADMIN — Medication 2 UNIT(S): at 17:53

## 2019-12-14 RX ADMIN — LOSARTAN POTASSIUM 100 MILLIGRAM(S): 100 TABLET, FILM COATED ORAL at 05:36

## 2019-12-14 RX ADMIN — Medication 2: at 08:41

## 2019-12-14 NOTE — PROGRESS NOTE ADULT - ASSESSMENT
Patient is a 73 yo woman with uncontrolled T2DM, HTN, HLD here for syncopal episode. Endocrine asked to consult for type 2 diabetes    1. DM 2 with hyperglycemia without long term current use of insulin   -Patient's A1c is 8.8%. Though we are not aiming for tight glycemic control in elderly patients, the A1c goal is 7-8% without hypoglycemic or hyperglycemic excursions  -BG target while inpatient approximately 100-180 mg/dl, currently above target. Patient now off dextrose IVF (stopped yesterday), however fasting BG still elevated to 225 mg/dl on serum, 222 mg/dl before breakfast  -Recommend initiating low dose basal insulin, Lantus 5 units SQ qHS (starting tonight)  -Continue Humalog 2 units SQ TID before meals (Hold if NPO/not eating meal)  -Continue Humalog LOW dose correctional scale before meals, Humalog LOW dose correction at bedtime   -Continue consistent carbohydrate diet; registered dietitian consult pending  -Check BG before meals and bedtime     Discharge Plan:  -Anticipate resuming Metformin 1000 mg PO BID (if GFR/LFT stable), and resume Januvia 25 mg PO daily. STOP Glimepiride. Can consider adding Prandin 0.5-1 mg PO TID with meals (based on fingersticks)   -Per patient daughter, patient has endocrinologist outpatient, cannot remember name at this time. Can follow with previous endocrine or if desired can follow up with [facility], [address] [place] [address], [address], [facility] [address], [phone].

## 2019-12-14 NOTE — PROGRESS NOTE ADULT - SUBJECTIVE AND OBJECTIVE BOX
JOSE SAGASTUME  74y  Female      Patient is a 74y old  Female who presents with a chief complaint of LOC, hyponatremia (14 Dec 2019 13:56)  Patient feels better.no sob,no cp,no cough,no fever,no headaches.eating.Na is improving    REVIEW OF SYSTEMS:  neg  INTERVAL HPI/OVERNIGHT EVENTS:  T(C): 36.9 (12-14-19 @ 15:18), Max: 37.1 (12-14-19 @ 05:32)  HR: 91 (12-14-19 @ 15:18) (68 - 98)  BP: 140/80 (12-14-19 @ 15:18) (140/80 - 181/81)  RR: 19 (12-14-19 @ 15:18) (17 - 20)  SpO2: 100% (12-14-19 @ 15:18) (98% - 100%)  Wt(kg): --  I&O's Summary    13 Dec 2019 07:01  -  14 Dec 2019 07:00  --------------------------------------------------------  IN: 960 mL / OUT: 400 mL / NET: 560 mL    14 Dec 2019 07:01  -  14 Dec 2019 16:36  --------------------------------------------------------  IN: 0 mL / OUT: 800 mL / NET: -800 mL      T(C): 36.9 (12-14-19 @ 15:18), Max: 37.1 (12-14-19 @ 05:32)  HR: 91 (12-14-19 @ 15:18) (68 - 98)  BP: 140/80 (12-14-19 @ 15:18) (140/80 - 181/81)  RR: 19 (12-14-19 @ 15:18) (17 - 20)  SpO2: 100% (12-14-19 @ 15:18) (98% - 100%)  Wt(kg): --Vital Signs Last 24 Hrs  T(C): 36.9 (14 Dec 2019 15:18), Max: 37.1 (14 Dec 2019 05:32)  T(F): 98.5 (14 Dec 2019 15:18), Max: 98.8 (14 Dec 2019 05:32)  HR: 91 (14 Dec 2019 15:18) (68 - 98)  BP: 140/80 (14 Dec 2019 15:18) (140/80 - 181/81)  BP(mean): --  RR: 19 (14 Dec 2019 15:18) (17 - 20)  SpO2: 100% (14 Dec 2019 15:18) (98% - 100%)    LABS:                        10.5   5.29  )-----------( 310      ( 14 Dec 2019 03:50 )             32.8     12-14    129<L>  |  97<L>  |  35<H>  ----------------------------<  225<H>  4.8   |  20<L>  |  1.19    Ca    9.0      14 Dec 2019 03:50  Phos  4.1     12-13  Mg     1.8     12-14          CAPILLARY BLOOD GLUCOSE      POCT Blood Glucose.: 278 mg/dL (14 Dec 2019 12:19)  POCT Blood Glucose.: 222 mg/dL (14 Dec 2019 08:28)  POCT Blood Glucose.: 250 mg/dL (13 Dec 2019 22:14)  POCT Blood Glucose.: 164 mg/dL (13 Dec 2019 17:22)            PAST MEDICAL & SURGICAL HISTORY:  Hypercholesterolemia  Hypertension  Diabetes mellitus  No significant past surgical history      MEDICATIONS  (STANDING):  dextrose 5%. 1000 milliLiter(s) (50 mL/Hr) IV Continuous <Continuous>  dextrose 5%. 1000 milliLiter(s) (30 mL/Hr) IV Continuous <Continuous>  dextrose 50% Injectable 12.5 Gram(s) IV Push once  dextrose 50% Injectable 25 Gram(s) IV Push once  dextrose 50% Injectable 25 Gram(s) IV Push once  insulin glargine Injectable (LANTUS) 5 Unit(s) SubCutaneous at bedtime  insulin lispro (HumaLOG) corrective regimen sliding scale   SubCutaneous three times a day before meals  insulin lispro (HumaLOG) corrective regimen sliding scale   SubCutaneous at bedtime  insulin lispro Injectable (HumaLOG) 2 Unit(s) SubCutaneous three times a day before meals  losartan 100 milliGRAM(s) Oral daily  risperiDONE   Tablet 1 milliGRAM(s) Oral daily    MEDICATIONS  (PRN):  dextrose 40% Gel 15 Gram(s) Oral once PRN Blood Glucose LESS THAN 70 milliGRAM(s)/deciliter  glucagon  Injectable 1 milliGRAM(s) IntraMuscular once PRN Glucose LESS THAN 70 milligrams/deciliter        RADIOLOGY & ADDITIONAL TESTS:    Imaging Personally Reviewed:  [ ] YES  [ ] NO    Consultant(s) Notes Reviewed:  [x ] YES  [ ] NO    PHYSICAL EXAM:  GENERAL: NAD, well-groomed, well-developed  HEAD:  Atraumatic, Normocephalic  EYES: EOMI, PERRLA, conjunctiva and sclera clear  ENMT: No tonsillar erythema, exudates, or enlargement; Moist mucous membranes, Good dentition, No lesions  NECK: Supple, No JVD, Normal thyroid  NERVOUS SYSTEM:  Alert & Oriented X3, nonfocal  CHEST/LUNG: Clear to percussion bilaterally; No rales, rhonchi, wheezing, or rubs  HEART: Regular rate and rhythm; No murmurs, rubs, or gallops  ABDOMEN: Soft, Nontender, Nondistended; Bowel sounds present  EXTREMITIES:  2+ Peripheral Pulses, No clubbing, cyanosis, or edema  LYMPH: No lymphadenopathy noted  SKIN: No rashes or lesions    Care Discussed with Consultants/Other Providers [ ] YES  [ ] NO      Code Status: [] Full Code [] DNR [] DNI [] Goals of Care:   Disposition: [] ICU [] Stroke Unit [] RCU []PCU []Floor [] Discharge Home         SYLVIA HahnP

## 2019-12-14 NOTE — PROGRESS NOTE ADULT - PROBLEM SELECTOR PLAN 5
- Hold po agent.  - SSI  - FSG QID.STARTED ON lANTUS 5 units HS plus coverage  -FS improving.hold off on Lantus

## 2019-12-14 NOTE — PROGRESS NOTE ADULT - SUBJECTIVE AND OBJECTIVE BOX
Chief Complaint: DM 2    History: Patient seen at bedside with daughter present, who provided collateral information and some interpretation to Filipino Creole. Patient communicating in both English and Creole. Patient reports she is feeling well, eating meals. Denies complaints. BG and insulin use reviewed, patient trending above target today with last  mg/dl.     MEDICATIONS  (STANDING):  dextrose 5%. 1000 milliLiter(s) (50 mL/Hr) IV Continuous <Continuous>  dextrose 5%. 1000 milliLiter(s) (30 mL/Hr) IV Continuous <Continuous>  dextrose 50% Injectable 12.5 Gram(s) IV Push once  dextrose 50% Injectable 25 Gram(s) IV Push once  dextrose 50% Injectable 25 Gram(s) IV Push once  insulin lispro (HumaLOG) corrective regimen sliding scale   SubCutaneous three times a day before meals  insulin lispro (HumaLOG) corrective regimen sliding scale   SubCutaneous at bedtime  insulin lispro Injectable (HumaLOG) 2 Unit(s) SubCutaneous three times a day before meals  losartan 100 milliGRAM(s) Oral daily  risperiDONE   Tablet 1 milliGRAM(s) Oral daily    MEDICATIONS  (PRN):  dextrose 40% Gel 15 Gram(s) Oral once PRN Blood Glucose LESS THAN 70 milliGRAM(s)/deciliter  glucagon  Injectable 1 milliGRAM(s) IntraMuscular once PRN Glucose LESS THAN 70 milligrams/deciliter    Allergies  peanuts (Short breath; Hives)  penicillin (Short breath; Hives)    Review of Systems:  HEENT: No pain  Cardiovascular: No chest pain  Respiratory: No SOB  GI: No nausea, vomiting    PHYSICAL EXAM:  VITALS: T(C): 36.5 (12-14-19 @ 06:25)  T(F): 97.7 (12-14-19 @ 06:25), Max: 98.8 (12-14-19 @ 05:32)  HR: 68 (12-14-19 @ 06:25) (68 - 98)  BP: 173/77 (12-14-19 @ 06:25) (141/76 - 181/81)  RR:  (17 - 20)  SpO2:  (98% - 100%)  Wt(kg): --  GENERAL: NAD  EYES: No proptosis, anicteric  HEENT:  Atraumatic, Normocephalic, moist mucous membranes  RESPIRATORY: unlabored respirations     CAPILLARY BLOOD GLUCOSE    POCT Blood Glucose.: 278 mg/dL (14 Dec 2019 12:19)  POCT Blood Glucose.: 222 mg/dL (14 Dec 2019 08:28)  POCT Blood Glucose.: 250 mg/dL (13 Dec 2019 22:14)  POCT Blood Glucose.: 164 mg/dL (13 Dec 2019 17:22)      12-14    129<L>  |  97<L>  |  35<H>  ----------------------------<  225<H>  4.8   |  20<L>  |  1.19    EGFR if : 52  EGFR if non : 45    Ca    9.0      12-14  Mg     1.8     12-14  Phos  4.1     12-13    TPro  7.8  /  Alb  3.8  /  TBili  0.3  /  DBili  x   /  AST  16  /  ALT  15  /  AlkPhos  62  12-11        Thyroid Function Tests:  12-12 @ 06:45 TSH 2.69 FreeT4 -- T3 -- Anti TPO -- Anti Thyroglobulin Ab -- TSI --  12-12 @ 03:30 TSH 2.22 FreeT4 1.53 T3 -- Anti TPO -- Anti Thyroglobulin Ab -- TSI --      Hemoglobin A1C, Whole Blood: 8.8 % <H> [4.0 - 5.6] (12-13-19 @ 06:00)

## 2019-12-14 NOTE — PROGRESS NOTE ADULT - SUBJECTIVE AND OBJECTIVE BOX
[facility] [place] [facility]   MD Jessica Wilson D.O. Fatima Sheikh, D.O. Ruoru Wong, PA    From 7 AM - 5 PM:  OFFICE: [phone]  Dr. Schulz cell: [phone]  Dr. Escalona cell: [phone]  Dr. Leal cell: [phone]  IVY Christy cell: [phone]    From 5 PM - 7 AM: Answering Service: [phone]        RENAL FOLLOW UP NOTE  --------------------------------------------------------------------------------  HPI: Pt seen and examined. Has no complaints today. Denies any cough, SOB, F, chills, N/V, abd pain, urinary symptoms.        PAST HISTORY  --------------------------------------------------------------------------------  No significant changes to PMH, PSH, FHx, SHx, unless otherwise noted    ALLERGIES & MEDICATIONS  --------------------------------------------------------------------------------  Allergies    peanuts (Short breath; Hives)  penicillin (Short breath; Hives)    Intolerances      Standing Inpatient Medications  dextrose 5%. 1000 milliLiter(s) IV Continuous <Continuous>  dextrose 5%. 1000 milliLiter(s) IV Continuous <Continuous>  dextrose 50% Injectable 12.5 Gram(s) IV Push once  dextrose 50% Injectable 25 Gram(s) IV Push once  dextrose 50% Injectable 25 Gram(s) IV Push once  insulin glargine Injectable (LANTUS) 5 Unit(s) SubCutaneous at bedtime  insulin lispro (HumaLOG) corrective regimen sliding scale   SubCutaneous three times a day before meals  insulin lispro (HumaLOG) corrective regimen sliding scale   SubCutaneous at bedtime  insulin lispro Injectable (HumaLOG) 2 Unit(s) SubCutaneous three times a day before meals  losartan 100 milliGRAM(s) Oral daily  risperiDONE   Tablet 1 milliGRAM(s) Oral daily    PRN Inpatient Medications  dextrose 40% Gel 15 Gram(s) Oral once PRN  glucagon  Injectable 1 milliGRAM(s) IntraMuscular once PRN      REVIEW OF SYSTEMS  --------------------------------------------------------------------------------  General: no fever  CVS: no chest pain  RESP: no sob, no cough  ABD: no abdominal pain  : no dysuria,  MSK: no edema     VITALS/PHYSICAL EXAM  --------------------------------------------------------------------------------  T(C): 36.9 (12-14-19 @ 15:18), Max: 37.1 (12-14-19 @ 05:32)  HR: 91 (12-14-19 @ 15:18) (68 - 98)  BP: 140/80 (12-14-19 @ 15:18) (140/80 - 181/81)  RR: 19 (12-14-19 @ 15:18) (17 - 20)  SpO2: 100% (12-14-19 @ 15:18) (98% - 100%)  Wt(kg): --        12-13-19 @ 07:01  -  12-14-19 @ 07:00  --------------------------------------------------------  IN: 960 mL / OUT: 400 mL / NET: 560 mL    12-14-19 @ 07:01  -  12-14-19 @ 18:42  --------------------------------------------------------  IN: 0 mL / OUT: 800 mL / NET: -800 mL      Physical Exam:  	Gen: NAD  	HEENT: MMM  	Pulm: CTA B/L  	CV: S1S2  	Abd: Soft, +BS  	Ext: No LE edema B/L              Neuro: Awake   	Skin: Warm and Dry       LABS/STUDIES  --------------------------------------------------------------------------------              10.5   5.29  >-----------<  310      [12-14-19 @ 03:50]              32.8     132  |  96  |  33  ----------------------------<  306      [12-14-19 @ 16:35]  5.2   |  21  |  1.16        Ca     9.3     [12-14-19 @ 16:35]      Mg     1.8     [12-14-19 @ 03:50]      Phos  4.1     [12-13-19 @ 22:40]          Uric acid 5.6      [12-13-19 @ 06:00]    Creatinine Trend:  SCr 1.16 [12-14 @ 16:35]  SCr 1.19 [12-14 @ 03:50]  SCr 1.45 [12-13 @ 22:40]  SCr 1.18 [12-13 @ 14:44]  SCr 0.98 [12-13 @ 07:50]    Urinalysis - [12-11-19 @ 19:29]      Color LIGHT YELLOW / Appearance Lt TURBID / SG 1.010 / pH 6.0      Gluc 200 / Ketone SMALL  / Bili NEGATIVE / Urobili NORMAL       Blood SMALL / Protein 200 / Leuk Est NEGATIVE / Nitrite NEGATIVE      RBC 6-10 / WBC 3-5 / Hyaline NEGATIVE / Gran  / Sq Epi FEW / Non Sq Epi  / Bacteria FEW    Urine Creatinine 49.70      [12-11-19 @ 19:29]  Urine Sodium 36      [12-12-19 @ 20:45]  Urine Chloride 28      [12-12-19 @ 09:15]  Urine Osmolality 273      [12-12-19 @ 20:45]    HbA1c 8.8      [12-13-19 @ 06:00]  TSH 2.69      [12-12-19 @ 06:45]  Lipid: chol 197, , HDL 41,       [12-14-19 @ 03:50]

## 2019-12-14 NOTE — PROGRESS NOTE ADULT - ASSESSMENT
75 YO F with Mhx of HTN, HLD, and DMII who admitted after a syncopal episode. Pt admitted with seizure/ syncope. Nephrology consulted for hyponatremia.     Hyponatremia  Severe--likely Acute (however unknown), Symptomatic with seizure/ syncope   Urine osm consistent with polydipsia and in the setting of HCTZ   TSH at goal  AM cortisol at goal. Pt states shes had colonoscopy in the past, no mammogram. Non-smoker. Malignancy work up as per age.   Uric acid not low     Pt admitted with low serum sodium to 114. Pt given LR 500cc x1 in ER then  Serum sodium dropped to 111  and hypertonic saline 100cc x 1 was administered   -->SNa gradually rising    corrected Na today is 132  no need for D5W drip anymore    Maximum rise of 6-8 meq in 24 hours    continue BMP Q8 hours today  Monitor intake and output  Continue to HOLD HCTZ going forward   Avoid overcorrection     HTN  Hold HCTZ  losartan restarted yesterday  BP improving today     Acidosis  - likely from hyperglycemia  - tighter glycemic control  - repeat BMP and VBG shannon AM  - if persistent may need w/u to r/o RTA

## 2019-12-14 NOTE — CONSULT NOTE ADULT - SUBJECTIVE AND OBJECTIVE BOX
James Coker MD  Interventional Cardiology / Endovascular Specialist  [place] Office : [address] [place] [address] [place] [address]  Tel:   [place] Office : [address] [facility] [place] [address]  Tel: [phone]  Cell : [phone]      HISTORY OF PRESENTING ILLNESS:    75yo F with Mhx of HTN, HLD, and DMII who presents after a syncopal episode. Per the daughters at bedside, pt is A&Ox3 at baseline and had been living alone until one month ago when she started to have some short term memory difficulty, family could not further elaborate. For the past month, the pt has been staying at the daughters house who states that she has been having intermittent diarrhea for the past 2 weeks, but denied any fever, chills, abd pain, or recent antibiotics. She has also had decreased appetitive a/w 15-20 lbs weight loss over the past month, and for the past week she has only eaten oatmeal and lettuce. This morning, she was walking in the house and started to feel light headed, and suddenly lost consciousness. She was caught by the daughter who states that the pt did not hit her head, but she was unresponsive for 5 minutes. During that time, she called 911,and a neighbor who administered orange juice (pt was still unconscious) and started CPR (unknown is pt ever lost a pulse). Daughter denies any shaking during the episode but states that the pt was extremely confused for 30 minutes after the event, but states that she is better now, although still not baseline. Pt denies any chest pain, palpitations, shortness of breath, headache, anxiety. She did feel nauseous earlier, which is now improved. Pt denies any sick contacts, cough, recent travel, or similar episodes.    PAST MEDICAL & SURGICAL HISTORY:  Hypercholesterolemia  Hypertension  Diabetes mellitus  No significant past surgical history      SOCIAL HISTORY: Substance Use (street drugs): ( x ) never used  (  ) other:    FAMILY HISTORY:  No pertinent family history in first degree relatives      REVIEW OF SYSTEMS:  CONSTITUTIONAL: No fever, weight loss, or fatigue  EYES: No eye pain, visual disturbances, or discharge  ENMT:  No difficulty hearing, tinnitus, vertigo; No sinus or throat pain  BREASTS: No pain, masses, or nipple discharge  GASTROINTESTINAL: No abdominal or epigastric pain. No nausea, vomiting, or hematemesis; No diarrhea or constipation. No melena or hematochezia.  GENITOURINARY: No dysuria, frequency, hematuria, or incontinence  NEUROLOGICAL: No headaches, memory loss, loss of strength, numbness, or tremors  ENDOCRINE: No heat or cold intolerance; No hair loss  MUSCULOSKELETAL: No joint pain or swelling; No muscle, back, or extremity pain  PSYCHIATRIC: No depression, anxiety, mood swings, or difficulty sleeping  HEME/LYMPH: No easy bruising, or bleeding gums  All others negative    MEDICATIONS:  losartan 100 milliGRAM(s) Oral daily        risperiDONE   Tablet 1 milliGRAM(s) Oral daily      dextrose 40% Gel 15 Gram(s) Oral once PRN  dextrose 50% Injectable 12.5 Gram(s) IV Push once  dextrose 50% Injectable 25 Gram(s) IV Push once  dextrose 50% Injectable 25 Gram(s) IV Push once  glucagon  Injectable 1 milliGRAM(s) IntraMuscular once PRN  insulin glargine Injectable (LANTUS) 5 Unit(s) SubCutaneous at bedtime  insulin lispro (HumaLOG) corrective regimen sliding scale   SubCutaneous three times a day before meals  insulin lispro (HumaLOG) corrective regimen sliding scale   SubCutaneous at bedtime  insulin lispro Injectable (HumaLOG) 2 Unit(s) SubCutaneous three times a day before meals    dextrose 5%. 1000 milliLiter(s) IV Continuous <Continuous>  dextrose 5%. 1000 milliLiter(s) IV Continuous <Continuous>      FAMILY HISTORY:  No pertinent family history in first degree relatives        Allergies    peanuts (Short breath; Hives)  penicillin (Short breath; Hives)    Intolerances    	      PHYSICAL EXAM:  T(C): 36.9 (12-14-19 @ 15:18), Max: 37.1 (12-14-19 @ 05:32)  HR: 91 (12-14-19 @ 15:18) (68 - 98)  BP: 140/80 (12-14-19 @ 15:18) (140/80 - 181/81)  RR: 19 (12-14-19 @ 15:18) (17 - 20)  SpO2: 100% (12-14-19 @ 15:18) (98% - 100%)  Wt(kg): --  I&O's Summary    13 Dec 2019 07:01  -  14 Dec 2019 07:00  --------------------------------------------------------  IN: 960 mL / OUT: 400 mL / NET: 560 mL    14 Dec 2019 07:01  -  14 Dec 2019 16:22  --------------------------------------------------------  IN: 0 mL / OUT: 800 mL / NET: -800 mL        GENERAL: NAD, well-groomed, well-developed  EYES: EOMI, PERRLA, conjunctiva and sclera clear  ENMT: No tonsillar erythema, exudates, or enlargement; Moist mucous membranes, Good dentition, No lesions  Cardiovascular: Normal S1 S2, No JVD, No murmurs, No edema  Respiratory: Lungs clear to auscultation	  Gastrointestinal:  Soft, Non-tender, + BS	  Extremities: Normal range of motion, No clubbing, cyanosis or edema  LYMPH: No lymphadenopathy noted  NERVOUS SYSTEM:  Alert & Oriented X3, Good concentration; Motor Strength 5/5 B/L upper and lower extremities; DTRs 2+ intact and symmetric      LABS:	 	    CARDIAC MARKERS:                                  10.5   5.29  )-----------( 310      ( 14 Dec 2019 03:50 )             32.8     12-14    129<L>  |  97<L>  |  35<H>  ----------------------------<  225<H>  4.8   |  20<L>  |  1.19    Ca    9.0      14 Dec 2019 03:50  Phos  4.1     12-13  Mg     1.8     12-14      proBNP:   Lipid Profile:   HgA1c:   TSH:     Consultant(s) Notes Reviewed:  [x ] YES  [ ] NO    Care Discussed with Consultants/Other Providers [ x] YES  [ ] NO    Imaging Personally Reviewed independently:  [x] YES  [ ] NO    All labs, radiologic studies, vitals, orders and medications list reviewed. Patient is seen and examined at bedside. Case discussed with medical team.    ASSESSMENT/PLAN:

## 2019-12-14 NOTE — CONSULT NOTE ADULT - ASSESSMENT
EKG SR 1st degree AV block (long) (might have gotten labetalol in the ED)    Tele : SR Blocked PAC;s     Echo pending     Assessment and Plan     1) Syncope CT head ok, Echo pending repeat 12 lead today ,     2) Hyponatremia :improving renal on board    3) HTN: C/w Losartan and add amlodipine 5 mg po daily

## 2019-12-14 NOTE — PROGRESS NOTE ADULT - PROBLEM SELECTOR PLAN 1
-likely sec.to Hyponatremia.transient.no postictal findings/seen by Neurology.check  orthostatic BP.  Cardiology consult.ECHO

## 2019-12-15 LAB
ANION GAP SERPL CALC-SCNC: 11 MMO/L — SIGNIFICANT CHANGE UP (ref 7–14)
ANION GAP SERPL CALC-SCNC: 12 MMO/L — SIGNIFICANT CHANGE UP (ref 7–14)
ANION GAP SERPL CALC-SCNC: 14 MMO/L — SIGNIFICANT CHANGE UP (ref 7–14)
BASE EXCESS BLDV CALC-SCNC: -0.7 MMOL/L — SIGNIFICANT CHANGE UP
BUN SERPL-MCNC: 29 MG/DL — HIGH (ref 7–23)
BUN SERPL-MCNC: 31 MG/DL — HIGH (ref 7–23)
BUN SERPL-MCNC: 38 MG/DL — HIGH (ref 7–23)
CALCIUM SERPL-MCNC: 10.2 MG/DL — SIGNIFICANT CHANGE UP (ref 8.4–10.5)
CALCIUM SERPL-MCNC: 9.5 MG/DL — SIGNIFICANT CHANGE UP (ref 8.4–10.5)
CALCIUM SERPL-MCNC: 9.6 MG/DL — SIGNIFICANT CHANGE UP (ref 8.4–10.5)
CHLORIDE SERPL-SCNC: 96 MMOL/L — LOW (ref 98–107)
CHLORIDE SERPL-SCNC: 96 MMOL/L — LOW (ref 98–107)
CHLORIDE SERPL-SCNC: 99 MMOL/L — SIGNIFICANT CHANGE UP (ref 98–107)
CO2 SERPL-SCNC: 21 MMOL/L — LOW (ref 22–31)
CO2 SERPL-SCNC: 22 MMOL/L — SIGNIFICANT CHANGE UP (ref 22–31)
CO2 SERPL-SCNC: 27 MMOL/L — SIGNIFICANT CHANGE UP (ref 22–31)
CREAT SERPL-MCNC: 1 MG/DL — SIGNIFICANT CHANGE UP (ref 0.5–1.3)
CREAT SERPL-MCNC: 1.17 MG/DL — SIGNIFICANT CHANGE UP (ref 0.5–1.3)
CREAT SERPL-MCNC: 1.18 MG/DL — SIGNIFICANT CHANGE UP (ref 0.5–1.3)
GAS PNL BLDV: 136 MMOL/L — SIGNIFICANT CHANGE UP (ref 136–146)
GLUCOSE BLDC GLUCOMTR-MCNC: 202 MG/DL — HIGH (ref 70–99)
GLUCOSE BLDC GLUCOMTR-MCNC: 237 MG/DL — HIGH (ref 70–99)
GLUCOSE BLDC GLUCOMTR-MCNC: 250 MG/DL — HIGH (ref 70–99)
GLUCOSE BLDC GLUCOMTR-MCNC: 277 MG/DL — HIGH (ref 70–99)
GLUCOSE BLDC GLUCOMTR-MCNC: 292 MG/DL — HIGH (ref 70–99)
GLUCOSE BLDV-MCNC: 200 MG/DL — HIGH (ref 70–99)
GLUCOSE SERPL-MCNC: 227 MG/DL — HIGH (ref 70–99)
GLUCOSE SERPL-MCNC: 228 MG/DL — HIGH (ref 70–99)
GLUCOSE SERPL-MCNC: 236 MG/DL — HIGH (ref 70–99)
HCO3 BLDV-SCNC: 24 MMOL/L — SIGNIFICANT CHANGE UP (ref 20–27)
HCT VFR BLD CALC: 36.5 % — SIGNIFICANT CHANGE UP (ref 34.5–45)
HCT VFR BLDV CALC: 35.9 % — SIGNIFICANT CHANGE UP (ref 34.5–45)
HGB BLD-MCNC: 11.7 G/DL — SIGNIFICANT CHANGE UP (ref 11.5–15.5)
HGB BLDV-MCNC: 11.7 G/DL — SIGNIFICANT CHANGE UP (ref 11.5–15.5)
MAGNESIUM SERPL-MCNC: 1.9 MG/DL — SIGNIFICANT CHANGE UP (ref 1.6–2.6)
MAGNESIUM SERPL-MCNC: 2 MG/DL — SIGNIFICANT CHANGE UP (ref 1.6–2.6)
MCHC RBC-ENTMCNC: 26.8 PG — LOW (ref 27–34)
MCHC RBC-ENTMCNC: 32.1 % — SIGNIFICANT CHANGE UP (ref 32–36)
MCV RBC AUTO: 83.7 FL — SIGNIFICANT CHANGE UP (ref 80–100)
NRBC # FLD: 0 K/UL — SIGNIFICANT CHANGE UP (ref 0–0)
PCO2 BLDV: 44 MMHG — SIGNIFICANT CHANGE UP (ref 41–51)
PH BLDV: 7.36 PH — SIGNIFICANT CHANGE UP (ref 7.32–7.43)
PLATELET # BLD AUTO: 325 K/UL — SIGNIFICANT CHANGE UP (ref 150–400)
PMV BLD: 9.7 FL — SIGNIFICANT CHANGE UP (ref 7–13)
PO2 BLDV: 117 MMHG — HIGH (ref 35–40)
POTASSIUM BLDV-SCNC: 4.2 MMOL/L — SIGNIFICANT CHANGE UP (ref 3.4–4.5)
POTASSIUM SERPL-MCNC: 4.7 MMOL/L — SIGNIFICANT CHANGE UP (ref 3.5–5.3)
POTASSIUM SERPL-MCNC: 5 MMOL/L — SIGNIFICANT CHANGE UP (ref 3.5–5.3)
POTASSIUM SERPL-MCNC: 5 MMOL/L — SIGNIFICANT CHANGE UP (ref 3.5–5.3)
POTASSIUM SERPL-SCNC: 4.7 MMOL/L — SIGNIFICANT CHANGE UP (ref 3.5–5.3)
POTASSIUM SERPL-SCNC: 5 MMOL/L — SIGNIFICANT CHANGE UP (ref 3.5–5.3)
POTASSIUM SERPL-SCNC: 5 MMOL/L — SIGNIFICANT CHANGE UP (ref 3.5–5.3)
RBC # BLD: 4.36 M/UL — SIGNIFICANT CHANGE UP (ref 3.8–5.2)
RBC # FLD: 12.7 % — SIGNIFICANT CHANGE UP (ref 10.3–14.5)
SAO2 % BLDV: 98.2 % — HIGH (ref 60–85)
SODIUM SERPL-SCNC: 129 MMOL/L — LOW (ref 135–145)
SODIUM SERPL-SCNC: 134 MMOL/L — LOW (ref 135–145)
SODIUM SERPL-SCNC: 135 MMOL/L — SIGNIFICANT CHANGE UP (ref 135–145)
WBC # BLD: 5.45 K/UL — SIGNIFICANT CHANGE UP (ref 3.8–10.5)
WBC # FLD AUTO: 5.45 K/UL — SIGNIFICANT CHANGE UP (ref 3.8–10.5)

## 2019-12-15 PROCEDURE — 99232 SBSQ HOSP IP/OBS MODERATE 35: CPT

## 2019-12-15 RX ORDER — INSULIN LISPRO 100/ML
4 VIAL (ML) SUBCUTANEOUS
Refills: 0 | Status: DISCONTINUED | OUTPATIENT
Start: 2019-12-15 | End: 2019-12-16

## 2019-12-15 RX ORDER — HEPARIN SODIUM 5000 [USP'U]/ML
5000 INJECTION INTRAVENOUS; SUBCUTANEOUS EVERY 12 HOURS
Refills: 0 | Status: DISCONTINUED | OUTPATIENT
Start: 2019-12-15 | End: 2019-12-16

## 2019-12-15 RX ORDER — INSULIN GLARGINE 100 [IU]/ML
10 INJECTION, SOLUTION SUBCUTANEOUS AT BEDTIME
Refills: 0 | Status: DISCONTINUED | OUTPATIENT
Start: 2019-12-15 | End: 2019-12-16

## 2019-12-15 RX ADMIN — Medication 2: at 09:03

## 2019-12-15 RX ADMIN — INSULIN GLARGINE 10 UNIT(S): 100 INJECTION, SOLUTION SUBCUTANEOUS at 22:15

## 2019-12-15 RX ADMIN — Medication 1: at 22:15

## 2019-12-15 RX ADMIN — LOSARTAN POTASSIUM 100 MILLIGRAM(S): 100 TABLET, FILM COATED ORAL at 06:08

## 2019-12-15 RX ADMIN — AMLODIPINE BESYLATE 5 MILLIGRAM(S): 2.5 TABLET ORAL at 21:29

## 2019-12-15 RX ADMIN — Medication 3: at 13:02

## 2019-12-15 RX ADMIN — RISPERIDONE 1 MILLIGRAM(S): 4 TABLET ORAL at 13:04

## 2019-12-15 RX ADMIN — Medication 2 UNIT(S): at 13:03

## 2019-12-15 RX ADMIN — Medication 2: at 18:17

## 2019-12-15 RX ADMIN — Medication 2 UNIT(S): at 09:03

## 2019-12-15 RX ADMIN — Medication 4 UNIT(S): at 18:17

## 2019-12-15 NOTE — PROGRESS NOTE ADULT - ASSESSMENT
EKG SR 1st degree AV block     Tele : SR Blocked PAC;s     Echo pending     Assessment and Plan     1) Syncope CT head ok, Echo pending     2) Hyponatremia :improving renal on board    3) HTN: C/w Losartan and amlodipine increase to 10 mg po daily

## 2019-12-15 NOTE — PROGRESS NOTE ADULT - SUBJECTIVE AND OBJECTIVE BOX
Chief Complaint: DM 2 with hyperglycemia    History: Patient seen at bedside with son present. Son provided collateral information and translation to [place] Creole. Patient communicating in English and Creole at time of visit. Reports she is feeling fine, eating meals. Denies s/s of hypoglycemia. BG and insulin use reviewed, patient trending above target today in spite of insulin increases yesterday.    MEDICATIONS  (STANDING):  amLODIPine   Tablet 5 milliGRAM(s) Oral daily  dextrose 5%. 1000 milliLiter(s) (50 mL/Hr) IV Continuous <Continuous>  dextrose 5%. 1000 milliLiter(s) (30 mL/Hr) IV Continuous <Continuous>  dextrose 50% Injectable 12.5 Gram(s) IV Push once  dextrose 50% Injectable 25 Gram(s) IV Push once  dextrose 50% Injectable 25 Gram(s) IV Push once  insulin glargine Injectable (LANTUS) 5 Unit(s) SubCutaneous at bedtime  insulin lispro (HumaLOG) corrective regimen sliding scale   SubCutaneous three times a day before meals  insulin lispro (HumaLOG) corrective regimen sliding scale   SubCutaneous at bedtime  insulin lispro Injectable (HumaLOG) 2 Unit(s) SubCutaneous three times a day before meals  losartan 100 milliGRAM(s) Oral daily  risperiDONE   Tablet 1 milliGRAM(s) Oral daily    MEDICATIONS  (PRN):  dextrose 40% Gel 15 Gram(s) Oral once PRN Blood Glucose LESS THAN 70 milliGRAM(s)/deciliter  glucagon  Injectable 1 milliGRAM(s) IntraMuscular once PRN Glucose LESS THAN 70 milligrams/deciliter      Allergies  peanuts (Short breath; Hives)  penicillin (Short breath; Hives)      Review of Systems:  HEENT: No pain  Cardiovascular: No chest pain  Respiratory: No SOB  GI: No nausea, vomiting    PHYSICAL EXAM:  VITALS: T(C): 36.8 (12-15-19 @ 06:07)  T(F): 98.2 (12-15-19 @ 06:07), Max: 98.5 (12-14-19 @ 15:18)  HR: 79 (12-15-19 @ 06:07) (79 - 101)  BP: 159/60 (12-15-19 @ 06:07) (140/80 - 167/69)  RR:  (18 - 20)  SpO2:  (100% - 100%)  Wt(kg): --  GENERAL: NAD  EYES: No proptosis, anicteric  HEENT:  Atraumatic, Normocephalic, moist mucous membranes  RESPIRATORY: unlabored respirations     CAPILLARY BLOOD GLUCOSE    POCT Blood Glucose.: 292 mg/dL (15 Dec 2019 12:42)  POCT Blood Glucose.: 250 mg/dL (15 Dec 2019 08:33)  POCT Blood Glucose.: 247 mg/dL (14 Dec 2019 22:12)  POCT Blood Glucose.: 254 mg/dL (14 Dec 2019 17:43)      12-15    134<L>  |  99  |  31<H>  ----------------------------<  236<H>  5.0   |  21<L>  |  1.00    EGFR if : 64  EGFR if non : 55    Ca    9.5      12-15  Mg     1.9     12-15  Phos  4.1     12-13      Thyroid Function Tests:  12-12 @ 06:45 TSH 2.69 FreeT4 -- T3 -- Anti TPO -- Anti Thyroglobulin Ab -- TSI --  12-12 @ 03:30 TSH 2.22 FreeT4 1.53 T3 -- Anti TPO -- Anti Thyroglobulin Ab -- TSI --      Hemoglobin A1C, Whole Blood: 8.8 % <H> [4.0 - 5.6] (12-13-19 @ 06:00)

## 2019-12-15 NOTE — PROGRESS NOTE ADULT - PROBLEM SELECTOR PLAN 1
-likely sec.to Hyponatremia.transient.no postictal findings/seen by Neurology.check  orthostatic BP.  Cardiology consult appreciated..ECHO

## 2019-12-15 NOTE — DIETITIAN INITIAL EVALUATION ADULT. - PERTINENT MEDS FT
MEDICATIONS  (STANDING):  amLODIPine   Tablet 5 milliGRAM(s) Oral daily  dextrose 5%. 1000 milliLiter(s) (50 mL/Hr) IV Continuous <Continuous>  dextrose 5%. 1000 milliLiter(s) (30 mL/Hr) IV Continuous <Continuous>  dextrose 50% Injectable 12.5 Gram(s) IV Push once  dextrose 50% Injectable 25 Gram(s) IV Push once  dextrose 50% Injectable 25 Gram(s) IV Push once  insulin glargine Injectable (LANTUS) 5 Unit(s) SubCutaneous at bedtime  insulin lispro (HumaLOG) corrective regimen sliding scale   SubCutaneous three times a day before meals  insulin lispro (HumaLOG) corrective regimen sliding scale   SubCutaneous at bedtime  insulin lispro Injectable (HumaLOG) 2 Unit(s) SubCutaneous three times a day before meals  losartan 100 milliGRAM(s) Oral daily  risperiDONE   Tablet 1 milliGRAM(s) Oral daily    MEDICATIONS  (PRN):  dextrose 40% Gel 15 Gram(s) Oral once PRN Blood Glucose LESS THAN 70 milliGRAM(s)/deciliter  glucagon  Injectable 1 milliGRAM(s) IntraMuscular once PRN Glucose LESS THAN 70 milligrams/deciliter

## 2019-12-15 NOTE — DIETITIAN INITIAL EVALUATION ADULT. - ADD RECOMMEND
1. Monitor weights, labs, BM's, skin integrity, PO intake/tolerance. 2. Please Encourage po intake, assist with meals and menu selections, provide alternatives PRN. 3. May consider Glucerna Therapeutic Nutrition Shake if PO trend decreased <75% at meals.  4. Reinforce diet education as needed.

## 2019-12-15 NOTE — PROGRESS NOTE ADULT - ATTENDING COMMENTS
-d/w son at bedside -severe protein caloreic malnutrition-dietary f/u supplement.  -d/w son at bedside

## 2019-12-15 NOTE — PROGRESS NOTE ADULT - SUBJECTIVE AND OBJECTIVE BOX
JOSE SAGASTUME  74y  Female      Patient is a 74y old  Female who presents with a chief complaint of LOC, hyponatremia (15 Dec 2019 13:06)  Patient feels better.FS still running high.no sob,no cp,no fever,no cough    REVIEW OF SYSTEMS:  as above      INTERVAL HPI/OVERNIGHT EVENTS:  T(C): 36.8 (12-15-19 @ 06:07), Max: 36.8 (12-15-19 @ 06:07)  HR: 79 (12-15-19 @ 06:07) (79 - 101)  BP: 159/60 (12-15-19 @ 06:07) (154/69 - 167/69)  RR: 18 (12-15-19 @ 06:07) (18 - 20)  SpO2: 100% (12-15-19 @ 06:07) (100% - 100%)  Wt(kg): --  I&O's Summary    14 Dec 2019 07:01  -  15 Dec 2019 07:00  --------------------------------------------------------  IN: 100 mL / OUT: 1000 mL / NET: -900 mL      T(C): 36.8 (12-15-19 @ 06:07), Max: 36.8 (12-15-19 @ 06:07)  HR: 79 (12-15-19 @ 06:07) (79 - 101)  BP: 159/60 (12-15-19 @ 06:07) (154/69 - 167/69)  RR: 18 (12-15-19 @ 06:07) (18 - 20)  SpO2: 100% (12-15-19 @ 06:07) (100% - 100%)  Wt(kg): --Vital Signs Last 24 Hrs  T(C): 36.8 (15 Dec 2019 06:07), Max: 36.8 (15 Dec 2019 06:07)  T(F): 98.2 (15 Dec 2019 06:07), Max: 98.2 (15 Dec 2019 06:07)  HR: 79 (15 Dec 2019 06:07) (79 - 101)  BP: 159/60 (15 Dec 2019 06:07) (154/69 - 167/69)  BP(mean): --  RR: 18 (15 Dec 2019 06:07) (18 - 20)  SpO2: 100% (15 Dec 2019 06:07) (100% - 100%)    LABS:                        11.7   5.45  )-----------( 325      ( 15 Dec 2019 05:30 )             36.5     12-15    134<L>  |  99  |  31<H>  ----------------------------<  236<H>  5.0   |  21<L>  |  1.00    Ca    9.5      15 Dec 2019 05:30  Phos  4.1     12-13  Mg     1.9     12-15          CAPILLARY BLOOD GLUCOSE      POCT Blood Glucose.: 292 mg/dL (15 Dec 2019 12:42)  POCT Blood Glucose.: 250 mg/dL (15 Dec 2019 08:33)  POCT Blood Glucose.: 247 mg/dL (14 Dec 2019 22:12)  POCT Blood Glucose.: 254 mg/dL (14 Dec 2019 17:43)            PAST MEDICAL & SURGICAL HISTORY:  Hypercholesterolemia  Hypertension  Diabetes mellitus  No significant past surgical history      MEDICATIONS  (STANDING):  amLODIPine   Tablet 5 milliGRAM(s) Oral daily  dextrose 5%. 1000 milliLiter(s) (50 mL/Hr) IV Continuous <Continuous>  dextrose 5%. 1000 milliLiter(s) (30 mL/Hr) IV Continuous <Continuous>  dextrose 50% Injectable 12.5 Gram(s) IV Push once  dextrose 50% Injectable 25 Gram(s) IV Push once  dextrose 50% Injectable 25 Gram(s) IV Push once  insulin glargine Injectable (LANTUS) 10 Unit(s) SubCutaneous at bedtime  insulin lispro (HumaLOG) corrective regimen sliding scale   SubCutaneous three times a day before meals  insulin lispro (HumaLOG) corrective regimen sliding scale   SubCutaneous at bedtime  insulin lispro Injectable (HumaLOG) 4 Unit(s) SubCutaneous three times a day before meals  losartan 100 milliGRAM(s) Oral daily  risperiDONE   Tablet 1 milliGRAM(s) Oral daily    MEDICATIONS  (PRN):  dextrose 40% Gel 15 Gram(s) Oral once PRN Blood Glucose LESS THAN 70 milliGRAM(s)/deciliter  glucagon  Injectable 1 milliGRAM(s) IntraMuscular once PRN Glucose LESS THAN 70 milligrams/deciliter        RADIOLOGY & ADDITIONAL TESTS:    Imaging Personally Reviewed:  [ ] YES  [ ] NO    Consultant(s) Notes Reviewed:  [x ] YES  [ ] NO    PHYSICAL EXAM:  GENERAL: NAD, well-groomed, well-developed  HEAD:  Atraumatic, Normocephalic  EYES: EOMI, PERRLA, conjunctiva and sclera clear  ENMT: No tonsillar erythema, exudates, or enlargement; Moist mucous membranes, Good dentition, No lesions  NECK: Supple, No JVD, Normal thyroid  NERVOUS SYSTEM:  Alert & Oriented X3, Good concentration; Motor Strength 5/5 B/L upper and lower extremities; DTRs 2+ intact and symmetric  CHEST/LUNG: Clear to percussion bilaterally; No rales, rhonchi, wheezing, or rubs  HEART: Regular rate and rhythm; No murmurs, rubs, or gallops  ABDOMEN: Soft, Nontender, Nondistended; Bowel sounds present  EXTREMITIES:  2+ Peripheral Pulses, No clubbing, cyanosis, or edema  LYMPH: No lymphadenopathy noted  SKIN: No rashes or lesions    Care Discussed with Consultants/Other Providers [x ] YES  [ ] NO      Code Status: [] Full Code [] DNR [] DNI [] Goals of Care:   Disposition: [] ICU [] Stroke Unit [] RCU []PCU []Floor [] Discharge Home         SYLVIA HahnP

## 2019-12-15 NOTE — PROGRESS NOTE ADULT - SUBJECTIVE AND OBJECTIVE BOX
James Coker MD  Interventional Cardiology / Endovascular Specialist  [place] Office : [address] [place] [address] [place] [address]  Tel:   [place] Office : [address] [facility] [place] [address]  Tel: [phone]  Cell : [phone]    HISTORY OF PRESENTING ILLNESS:    75yo F with Mhx of HTN, HLD, and DMII who presents after a syncopal episode  	  MEDICATIONS:  amLODIPine   Tablet 5 milliGRAM(s) Oral daily  heparin  Injectable 5000 Unit(s) SubCutaneous every 12 hours  losartan 100 milliGRAM(s) Oral daily  risperiDONE   Tablet 1 milliGRAM(s) Oral daily  dextrose 40% Gel 15 Gram(s) Oral once PRN  dextrose 50% Injectable 12.5 Gram(s) IV Push once  dextrose 50% Injectable 25 Gram(s) IV Push once  dextrose 50% Injectable 25 Gram(s) IV Push once  glucagon  Injectable 1 milliGRAM(s) IntraMuscular once PRN  insulin glargine Injectable (LANTUS) 10 Unit(s) SubCutaneous at bedtime  insulin lispro (HumaLOG) corrective regimen sliding scale   SubCutaneous three times a day before meals  insulin lispro (HumaLOG) corrective regimen sliding scale   SubCutaneous at bedtime  insulin lispro Injectable (HumaLOG) 4 Unit(s) SubCutaneous three times a day before meals  dextrose 5%. 1000 milliLiter(s) IV Continuous <Continuous>  dextrose 5%. 1000 milliLiter(s) IV Continuous <Continuous>      PAST MEDICAL/SURGICAL HISTORY  PAST MEDICAL & SURGICAL HISTORY:  Hypercholesterolemia  Hypertension  Diabetes mellitus  No significant past surgical history      SOCIAL HISTORY: Substance Use (street drugs): ( x ) never used  (  ) other:    FAMILY HISTORY:  No pertinent family history in first degree relatives      REVIEW OF SYSTEMS:  CONSTITUTIONAL: No fever, weight loss, or fatigue  EYES: No eye pain, visual disturbances, or discharge  ENMT:  No difficulty hearing, tinnitus, vertigo; No sinus or throat pain  BREASTS: No pain, masses, or nipple discharge  GASTROINTESTINAL: No abdominal or epigastric pain. No nausea, vomiting, or hematemesis; No diarrhea or constipation. No melena or hematochezia.  GENITOURINARY: No dysuria, frequency, hematuria, or incontinence  NEUROLOGICAL: No headaches, memory loss, loss of strength, numbness, or tremors  ENDOCRINE: No heat or cold intolerance; No hair loss  MUSCULOSKELETAL: No joint pain or swelling; No muscle, back, or extremity pain  PSYCHIATRIC: No depression, anxiety, mood swings, or difficulty sleeping  HEME/LYMPH: No easy bruising, or bleeding gums  All others negative    PHYSICAL EXAM:  T(C): 36.7 (12-15-19 @ 11:59), Max: 36.8 (12-15-19 @ 06:07)  HR: 80 (12-15-19 @ 11:59) (79 - 98)  BP: 151/65 (12-15-19 @ 11:59) (151/65 - 166/68)  RR: 17 (12-15-19 @ 11:59) (17 - 18)  SpO2: 99% (12-15-19 @ 11:59) (99% - 100%)  Wt(kg): --  I&O's Summary    14 Dec 2019 07:01  -  15 Dec 2019 07:00  --------------------------------------------------------  IN: 100 mL / OUT: 1000 mL / NET: -900 mL      GENERAL: NAD, well-groomed, well-developed  EYES: EOMI, PERRLA, conjunctiva and sclera clear  ENMT: No tonsillar erythema, exudates, or enlargement; Moist mucous membranes, Good dentition, No lesions  Cardiovascular: Normal S1 S2, No JVD, No murmurs, No edema  Respiratory: Lungs clear to auscultation	  Gastrointestinal:  Soft, Non-tender, + BS	  Extremities: Normal range of motion, No clubbing, cyanosis or edema  LYMPH: No lymphadenopathy noted  NERVOUS SYSTEM:  Alert & Oriented X3, Good concentration; Motor Strength 5/5 B/L upper and lower extremities; DTRs 2+ intact and symmetric                        11.7   5.45  )-----------( 325      ( 15 Dec 2019 05:30 )             36.5     12-15    135  |  96<L>  |  29<H>  ----------------------------<  228<H>  4.7   |  27  |  1.18    Ca    10.2      15 Dec 2019 15:31  Phos  4.1     12-13  Mg     1.9     12-15      proBNP:   Lipid Profile:   HgA1c:   TSH:     Consultant(s) Notes Reviewed:  [x ] YES  [ ] NO    Care Discussed with Consultants/Other Providers [ x] YES  [ ] NO    Imaging Personally Reviewed independently:  [x] YES  [ ] NO    All labs, radiologic studies, vitals, orders and medications list reviewed. Patient is seen and examined at bedside. Case discussed with medical team.

## 2019-12-15 NOTE — DIETITIAN INITIAL EVALUATION ADULT. - OTHER INFO
Patient is a 73 y/o female with a past medical Hx inclusive of HTN, HLD, and DM2 who presents after a syncopal episode, likely 2/2 to hyponatremia. Patient's daughter reports that patient has been having intermittent diarrhea for the past 2 weeks prior to admission. Daughter also reports that patient had decreased appetite a/w 15-20 lbs weight loss over the past month, and for the past week patient has only eaten oatmeal and lettuce.     RD met with patient today. Patient speaks both English and Creole. Per patient, appetite has been fair-poor for the past month, but current appetite has been improved with good PO intake today at breakfast. Patient has not been drinking any oral nutrition supplement before. Patient states that no more diarrhea at this time, no N/V, no chewing/swallowing problems at meals. Patient is allergic to peanuts. RD discussed diet education with patient on DASH diet, Consistent Carbohydrate diet, and Fluid restriction of 1000ml/d, patient verbalized understanding. RD to reinforce diet education with patient as needed.     Reported wt loss of 15-20# from UBW ~130#. Current wt 115#, severe wt loss of 15#/11.5% over the past month likely related to decreased po intake. No physical signs of body fat loss or muscle mass loss at this time. Patient declined oral nutrition supplement Glucerna Shake at this time as she "has a better appetite now". Will monitor PO intake and reassess.

## 2019-12-15 NOTE — PROGRESS NOTE ADULT - SUBJECTIVE AND OBJECTIVE BOX
[facility]   MD Jessica Wilson D.O. Fatima Sheikh, D.O. Ruoru Wong, PA    From 7 AM - 5 PM:  OFFICE: [phone]  Dr. Schulz cell: [phone]  Dr. Escalona cell: [phone]  Dr. Leal cell: [phone]  IVY Christy cell: [phone]    From 5 PM - 7 AM: Answering Service: [phone]        RENAL FOLLOW UP NOTE  --------------------------------------------------------------------------------  HPI: Pt seen and examined. Has no complaints today. Denies any cough, SOB, F, chills, N/V, abd pain, urinary symptoms.        PAST HISTORY  --------------------------------------------------------------------------------  No significant changes to PMH, PSH, FHx, SHx, unless otherwise noted    ALLERGIES & MEDICATIONS  --------------------------------------------------------------------------------  Allergies    peanuts (Short breath; Hives)  penicillin (Short breath; Hives)    Intolerances      Standing Inpatient Medications  amLODIPine   Tablet 5 milliGRAM(s) Oral daily  dextrose 5%. 1000 milliLiter(s) IV Continuous <Continuous>  dextrose 5%. 1000 milliLiter(s) IV Continuous <Continuous>  dextrose 50% Injectable 12.5 Gram(s) IV Push once  dextrose 50% Injectable 25 Gram(s) IV Push once  dextrose 50% Injectable 25 Gram(s) IV Push once  heparin  Injectable 5000 Unit(s) SubCutaneous every 12 hours  insulin glargine Injectable (LANTUS) 10 Unit(s) SubCutaneous at bedtime  insulin lispro (HumaLOG) corrective regimen sliding scale   SubCutaneous three times a day before meals  insulin lispro (HumaLOG) corrective regimen sliding scale   SubCutaneous at bedtime  insulin lispro Injectable (HumaLOG) 4 Unit(s) SubCutaneous three times a day before meals  losartan 100 milliGRAM(s) Oral daily  risperiDONE   Tablet 1 milliGRAM(s) Oral daily    PRN Inpatient Medications  dextrose 40% Gel 15 Gram(s) Oral once PRN  glucagon  Injectable 1 milliGRAM(s) IntraMuscular once PRN      REVIEW OF SYSTEMS  --------------------------------------------------------------------------------  General: no fever  CVS: no chest pain  RESP: no sob, no cough  ABD: no abdominal pain  : no dysuria,  MSK: no edema     VITALS/PHYSICAL EXAM  --------------------------------------------------------------------------------  T(C): 36.8 (12-15-19 @ 06:07), Max: 36.8 (12-15-19 @ 06:07)  HR: 79 (12-15-19 @ 06:07) (79 - 101)  BP: 159/60 (12-15-19 @ 06:07) (154/69 - 167/69)  RR: 18 (12-15-19 @ 06:07) (18 - 20)  SpO2: 100% (12-15-19 @ 06:07) (100% - 100%)  Wt(kg): --        12-14-19 @ 07:01  -  12-15-19 @ 07:00  --------------------------------------------------------  IN: 100 mL / OUT: 1000 mL / NET: -900 mL      Physical Exam:  	Gen: NAD  	HEENT: MMM  	Pulm: CTA B/L  	CV: S1S2  	Abd: Soft, +BS  	Ext: No LE edema B/L              Neuro: Awake   	Skin: Warm and Dry   	    LABS/STUDIES  --------------------------------------------------------------------------------              11.7   5.45  >-----------<  325      [12-15-19 @ 05:30]              36.5     135  |  96  |  29  ----------------------------<  228      [12-15-19 @ 15:31]  4.7   |  27  |  1.18        Ca     10.2     [12-15-19 @ 15:31]      Mg     1.9     [12-15-19 @ 05:30]      Phos  4.1     [12-13-19 @ 22:40]            Creatinine Trend:  SCr 1.18 [12-15 @ 15:31]  SCr 1.00 [12-15 @ 05:30]  SCr 1.17 [12-15 @ 00:30]  SCr 1.16 [12-14 @ 16:35]  SCr 1.19 [12-14 @ 03:50]    Urinalysis - [12-11-19 @ 19:29]      Color LIGHT YELLOW / Appearance Lt TURBID / SG 1.010 / pH 6.0      Gluc 200 / Ketone SMALL  / Bili NEGATIVE / Urobili NORMAL       Blood SMALL / Protein 200 / Leuk Est NEGATIVE / Nitrite NEGATIVE      RBC 6-10 / WBC 3-5 / Hyaline NEGATIVE / Gran  / Sq Epi FEW / Non Sq Epi  / Bacteria FEW    Urine Creatinine 49.70      [12-11-19 @ 19:29]  Urine Sodium 36      [12-12-19 @ 20:45]  Urine Chloride 28      [12-12-19 @ 09:15]  Urine Osmolality 273      [12-12-19 @ 20:45]    HbA1c 8.8      [12-13-19 @ 06:00]  TSH 2.69      [12-12-19 @ 06:45]  Lipid: chol 197, , HDL 41,       [12-14-19 @ 03:50]

## 2019-12-15 NOTE — PROGRESS NOTE ADULT - ASSESSMENT
75 YO F with Mhx of HTN, HLD, and DMII who admitted after a syncopal episode. Pt admitted with seizure/ syncope. Nephrology consulted for hyponatremia.     Hyponatremia  Severe--likely Acute (however unknown), Symptomatic with seizure/ syncope   Urine osm consistent with polydipsia and in the setting of HCTZ   TSH at goal  AM cortisol at goal. Pt states shes had colonoscopy in the past, no mammogram. Non-smoker. Malignancy work up as per age.   Uric acid not low     Pt admitted with low serum sodium to 114. Pt given LR 500cc x1 in ER then  Serum sodium dropped to 111  and hypertonic saline 100cc x 1 was administered   -->SNa gradually rising    Na continues to improve today    tighter glycemic control    Maximum rise of 6-8 meq in 24 hours    continue BMP Q8-12 hours today  Monitor intake and output  Continue to HOLD HCTZ going forward   Avoid overcorrection     HTN  Hold HCTZ  losartan restarted 12/13; amlodipine started 12/14  BP improving today     Acidosis  - likely from hyperglycemia  - tighter glycemic control  - resolved w/ improved glycemic control

## 2019-12-15 NOTE — DIETITIAN INITIAL EVALUATION ADULT. - PERTINENT LABORATORY DATA
12-15 Na134 mmol/L<L> Glu 236 mg/dL<H> K+ 5.0 mmol/L Cr  1.00 mg/dL BUN 31 mg/dL<H> 12-13 Phos 4.1 mg/dL 12-11 Alb 3.8 g/dL 12-13 [id number] 8.8 %<H> 12-14 Chol 197 mg/dL  mg/dL HDL 41 mg/dL<L> Trig 130 mg/dL

## 2019-12-16 ENCOUNTER — TRANSCRIPTION ENCOUNTER (OUTPATIENT)
Age: 74
End: 2019-12-16

## 2019-12-16 VITALS
RESPIRATION RATE: 16 BRPM | TEMPERATURE: 99 F | DIASTOLIC BLOOD PRESSURE: 62 MMHG | SYSTOLIC BLOOD PRESSURE: 141 MMHG | OXYGEN SATURATION: 98 % | HEART RATE: 98 BPM

## 2019-12-16 LAB
ANION GAP SERPL CALC-SCNC: 11 MMO/L — SIGNIFICANT CHANGE UP (ref 7–14)
BUN SERPL-MCNC: 32 MG/DL — HIGH (ref 7–23)
CALCIUM SERPL-MCNC: 9.6 MG/DL — SIGNIFICANT CHANGE UP (ref 8.4–10.5)
CHLORIDE SERPL-SCNC: 100 MMOL/L — SIGNIFICANT CHANGE UP (ref 98–107)
CO2 SERPL-SCNC: 24 MMOL/L — SIGNIFICANT CHANGE UP (ref 22–31)
CREAT SERPL-MCNC: 1.05 MG/DL — SIGNIFICANT CHANGE UP (ref 0.5–1.3)
GLUCOSE BLDC GLUCOMTR-MCNC: 220 MG/DL — HIGH (ref 70–99)
GLUCOSE BLDC GLUCOMTR-MCNC: 222 MG/DL — HIGH (ref 70–99)
GLUCOSE BLDC GLUCOMTR-MCNC: 425 MG/DL — HIGH (ref 70–99)
GLUCOSE BLDC GLUCOMTR-MCNC: 425 MG/DL — HIGH (ref 70–99)
GLUCOSE BLDC GLUCOMTR-MCNC: 428 MG/DL — HIGH (ref 70–99)
GLUCOSE SERPL-MCNC: 259 MG/DL — HIGH (ref 70–99)
HCT VFR BLD CALC: 35.5 % — SIGNIFICANT CHANGE UP (ref 34.5–45)
HGB BLD-MCNC: 11.2 G/DL — LOW (ref 11.5–15.5)
MAGNESIUM SERPL-MCNC: 1.9 MG/DL — SIGNIFICANT CHANGE UP (ref 1.6–2.6)
MCHC RBC-ENTMCNC: 26.2 PG — LOW (ref 27–34)
MCHC RBC-ENTMCNC: 31.5 % — LOW (ref 32–36)
MCV RBC AUTO: 83.1 FL — SIGNIFICANT CHANGE UP (ref 80–100)
NRBC # FLD: 0 K/UL — SIGNIFICANT CHANGE UP (ref 0–0)
PLATELET # BLD AUTO: 350 K/UL — SIGNIFICANT CHANGE UP (ref 150–400)
PMV BLD: 9.8 FL — SIGNIFICANT CHANGE UP (ref 7–13)
POTASSIUM SERPL-MCNC: 4.5 MMOL/L — SIGNIFICANT CHANGE UP (ref 3.5–5.3)
POTASSIUM SERPL-SCNC: 4.5 MMOL/L — SIGNIFICANT CHANGE UP (ref 3.5–5.3)
RBC # BLD: 4.27 M/UL — SIGNIFICANT CHANGE UP (ref 3.8–5.2)
RBC # FLD: 13 % — SIGNIFICANT CHANGE UP (ref 10.3–14.5)
SODIUM SERPL-SCNC: 135 MMOL/L — SIGNIFICANT CHANGE UP (ref 135–145)
WBC # BLD: 7.18 K/UL — SIGNIFICANT CHANGE UP (ref 3.8–10.5)
WBC # FLD AUTO: 7.18 K/UL — SIGNIFICANT CHANGE UP (ref 3.8–10.5)

## 2019-12-16 PROCEDURE — 99232 SBSQ HOSP IP/OBS MODERATE 35: CPT

## 2019-12-16 PROCEDURE — 93306 TTE W/DOPPLER COMPLETE: CPT | Mod: 26

## 2019-12-16 RX ORDER — LOSARTAN/HYDROCHLOROTHIAZIDE 100MG-25MG
1 TABLET ORAL
Qty: 0 | Refills: 0 | DISCHARGE

## 2019-12-16 RX ORDER — INSULIN LISPRO 100/ML
7 VIAL (ML) SUBCUTANEOUS
Refills: 0 | Status: DISCONTINUED | OUTPATIENT
Start: 2019-12-16 | End: 2019-12-16

## 2019-12-16 RX ORDER — GLIMEPIRIDE 1 MG
1 TABLET ORAL
Qty: 0 | Refills: 0 | DISCHARGE

## 2019-12-16 RX ORDER — AMLODIPINE BESYLATE 2.5 MG/1
1 TABLET ORAL
Qty: 30 | Refills: 0
Start: 2019-12-16 | End: 2020-01-14

## 2019-12-16 RX ORDER — LOSARTAN POTASSIUM 100 MG/1
1 TABLET, FILM COATED ORAL
Qty: 30 | Refills: 0
Start: 2019-12-16 | End: 2020-01-14

## 2019-12-16 RX ORDER — REPAGLINIDE 1 MG/1
1 TABLET ORAL
Qty: 90 | Refills: 0
Start: 2019-12-16 | End: 2020-01-14

## 2019-12-16 RX ORDER — INSULIN GLARGINE 100 [IU]/ML
15 INJECTION, SOLUTION SUBCUTANEOUS AT BEDTIME
Refills: 0 | Status: DISCONTINUED | OUTPATIENT
Start: 2019-12-16 | End: 2019-12-16

## 2019-12-16 RX ADMIN — HEPARIN SODIUM 5000 UNIT(S): 5000 INJECTION INTRAVENOUS; SUBCUTANEOUS at 06:20

## 2019-12-16 RX ADMIN — Medication 2: at 08:56

## 2019-12-16 RX ADMIN — Medication 4 UNIT(S): at 08:55

## 2019-12-16 RX ADMIN — Medication 2: at 17:47

## 2019-12-16 RX ADMIN — Medication 4 UNIT(S): at 13:13

## 2019-12-16 RX ADMIN — LOSARTAN POTASSIUM 100 MILLIGRAM(S): 100 TABLET, FILM COATED ORAL at 06:20

## 2019-12-16 RX ADMIN — Medication 7 UNIT(S): at 17:47

## 2019-12-16 RX ADMIN — Medication 6: at 13:13

## 2019-12-16 RX ADMIN — RISPERIDONE 1 MILLIGRAM(S): 4 TABLET ORAL at 13:45

## 2019-12-16 NOTE — PROGRESS NOTE ADULT - SUBJECTIVE AND OBJECTIVE BOX
Chief Complaint: f/u DM2    History:  Patient seen at bedside with her daughter present. Eating well now with increased appetite, no abdominal pain, nausea, vomiting. Ate all of her breakfast this morning, and BG sina to > 400 at lunch today.     At home, she is on Metformin 1 gram BID, Januvia 100 mg daily, and Glimepiride 1 mg daily.     MEDICATIONS  (STANDING):  amLODIPine   Tablet 5 milliGRAM(s) Oral daily  dextrose 5%. 1000 milliLiter(s) (50 mL/Hr) IV Continuous <Continuous>  dextrose 5%. 1000 milliLiter(s) (30 mL/Hr) IV Continuous <Continuous>  dextrose 50% Injectable 12.5 Gram(s) IV Push once  dextrose 50% Injectable 25 Gram(s) IV Push once  dextrose 50% Injectable 25 Gram(s) IV Push once  heparin  Injectable 5000 Unit(s) SubCutaneous every 12 hours  insulin glargine Injectable (LANTUS) 10 Unit(s) SubCutaneous at bedtime  insulin lispro (HumaLOG) corrective regimen sliding scale   SubCutaneous three times a day before meals  insulin lispro (HumaLOG) corrective regimen sliding scale   SubCutaneous at bedtime  insulin lispro Injectable (HumaLOG) 4 Unit(s) SubCutaneous three times a day before meals  losartan 100 milliGRAM(s) Oral daily  risperiDONE   Tablet 1 milliGRAM(s) Oral daily    MEDICATIONS  (PRN):  dextrose 40% Gel 15 Gram(s) Oral once PRN Blood Glucose LESS THAN 70 milliGRAM(s)/deciliter  glucagon  Injectable 1 milliGRAM(s) IntraMuscular once PRN Glucose LESS THAN 70 milligrams/deciliter      Allergies  peanuts (Short breath; Hives)  penicillin (Short breath; Hives)      PHYSICAL EXAM:  VITALS: T(C): 37.2 (12-16-19 @ 15:23)  T(F): 98.9 (12-16-19 @ 15:23), Max: 99 (12-16-19 @ 06:18)  HR: 98 (12-16-19 @ 15:23) (93 - 100)  BP: 141/62 (12-16-19 @ 15:23) (141/62 - 174/55)  RR:  (16 - 18)  SpO2:  (98% - 99%)  Wt(kg): --  GENERAL: NAD, well-developed  RESPIRATORY: Clear to auscultation bilaterally  CARDIOVASCULAR: Regular rate and rhythm; No murmurs  GI: Soft, nontender, non distended  SKIN: Dry, intact    POCT Blood Glucose.: 428 mg/dL (12-16-19 @ 13:11) H 4, H 6  POCT Blood Glucose.: 425 mg/dL (12-16-19 @ 12:53)   POCT Blood Glucose.: 425 mg/dL (12-16-19 @ 12:49)  POCT Blood Glucose.: 220 mg/dL (12-16-19 @ 08:50) H 4, H 2  POCT Blood Glucose.: 237 mg/dL (12-15-19 @ 23:27) L 10, H 1  POCT Blood Glucose.: 277 mg/dL (12-15-19 @ 21:57)  POCT Blood Glucose.: 202 mg/dL (12-15-19 @ 17:46) H 4, H 2  POCT Blood Glucose.: 292 mg/dL (12-15-19 @ 12:42)  POCT Blood Glucose.: 250 mg/dL (12-15-19 @ 08:33)  POCT Blood Glucose.: 247 mg/dL (12-14-19 @ 22:12)  POCT Blood Glucose.: 254 mg/dL (12-14-19 @ 17:43)  POCT Blood Glucose.: 278 mg/dL (12-14-19 @ 12:19)  POCT Blood Glucose.: 222 mg/dL (12-14-19 @ 08:28)  POCT Blood Glucose.: 250 mg/dL (12-13-19 @ 22:14)  POCT Blood Glucose.: 164 mg/dL (12-13-19 @ 17:22)      12-16    135  |  100  |  32<H>  ----------------------------<  259<H>  4.5   |  24  |  1.05    EGFR if : 60  EGFR if non : 52    Ca    9.6      12-16  Mg     1.9     12-16  Phos  4.1     12-13            Thyroid Function Tests:  12-12 @ 06:45 TSH 2.69 FreeT4 -- T3 -- Anti TPO -- Anti Thyroglobulin Ab -- TSI --  12-12 @ 03:30 TSH 2.22 FreeT4 1.53 T3 -- Anti TPO -- Anti Thyroglobulin Ab -- TSI --      Hemoglobin A1C, Whole Blood: 8.8 % <H> [4.0 - 5.6] (12-13-19 @ 06:00)

## 2019-12-16 NOTE — PROGRESS NOTE ADULT - REASON FOR ADMISSION
LOC, hyponatremia

## 2019-12-16 NOTE — PROGRESS NOTE ADULT - ATTENDING COMMENTS
-severe protein caloreic malnutrition-dietary f/u supplement.  -d/w Daughter at bedside  -stable for d/c home today.f/u with PCP in one week.

## 2019-12-16 NOTE — PROGRESS NOTE ADULT - PROBLEM SELECTOR PROBLEM 1
Syncope
Type 2 diabetes mellitus with hyperglycemia, without long-term current use of insulin

## 2019-12-16 NOTE — DISCHARGE NOTE PROVIDER - NSDCCPCAREPLAN_GEN_ALL_CORE_FT
PRINCIPAL DISCHARGE DIAGNOSIS  Diagnosis: Syncope  Assessment and Plan of Treatment: Your syncopal episode likely happened because of your low sodium levels which is now normalized.      SECONDARY DISCHARGE DIAGNOSES  Diagnosis: DM (diabetes mellitus)  Assessment and Plan of Treatment: Stop taking glimepiridel. Continue metformin and januvia. Prandin was added before meals. You can follow up with the [facility], [address] [place] [address], [address], [place] [address], [phone]    Diagnosis: HTN (hypertension)  Assessment and Plan of Treatment: Your Hydrochlorathiazide was stopped due to your low sodium levels. This has been a combination medication with losartan. Losartan has now been prescribed separately. You were also started amlodipine for tighter blood pressure control.    Diagnosis: Hyponatremia  Assessment and Plan of Treatment: Your sodium level on admission was very low and it has slowly increased and it is now within normal limits. Please follow up with your PCP within one week of discharge.    Diagnosis: Diarrhea  Assessment and Plan of Treatment:     Diagnosis: 1st degree AV block  Assessment and Plan of Treatment:

## 2019-12-16 NOTE — PROGRESS NOTE ADULT - SUBJECTIVE AND OBJECTIVE BOX
James Coker MD  Interventional Cardiology / Endovascular Specialist  [place] Office : [address] [place] [address] [place]. [address]  Tel:   [place] Office : [address] [facility] [place] [address]  Tel: [phone]  Cell : [phone]    HISTORY OF PRESENTING ILLNESS:    73yo F with Mhx of HTN, HLD, and DMII who presents after a syncopal episode	    MEDICATIONS:  amLODIPine   Tablet 5 milliGRAM(s) Oral daily  heparin  Injectable 5000 Unit(s) SubCutaneous every 12 hours  losartan 100 milliGRAM(s) Oral daily  risperiDONE   Tablet 1 milliGRAM(s) Oral daily  dextrose 40% Gel 15 Gram(s) Oral once PRN  dextrose 50% Injectable 12.5 Gram(s) IV Push once  dextrose 50% Injectable 25 Gram(s) IV Push once  dextrose 50% Injectable 25 Gram(s) IV Push once  glucagon  Injectable 1 milliGRAM(s) IntraMuscular once PRN  insulin glargine Injectable (LANTUS) 15 Unit(s) SubCutaneous at bedtime  insulin lispro (HumaLOG) corrective regimen sliding scale   SubCutaneous three times a day before meals  insulin lispro (HumaLOG) corrective regimen sliding scale   SubCutaneous at bedtime  insulin lispro Injectable (HumaLOG) 7 Unit(s) SubCutaneous three times a day before meals  dextrose 5%. 1000 milliLiter(s) IV Continuous <Continuous>  dextrose 5%. 1000 milliLiter(s) IV Continuous <Continuous>      PAST MEDICAL/SURGICAL HISTORY  PAST MEDICAL & SURGICAL HISTORY:  Hypercholesterolemia  Hypertension  Diabetes mellitus  No significant past surgical history      SOCIAL HISTORY: Substance Use (street drugs): ( x ) never used  (  ) other:    FAMILY HISTORY:  No pertinent family history in first degree relatives      REVIEW OF SYSTEMS:  CONSTITUTIONAL: No fever, weight loss, or fatigue  EYES: No eye pain, visual disturbances, or discharge  ENMT:  No difficulty hearing, tinnitus, vertigo; No sinus or throat pain  BREASTS: No pain, masses, or nipple discharge  GASTROINTESTINAL: No abdominal or epigastric pain. No nausea, vomiting, or hematemesis; No diarrhea or constipation. No melena or hematochezia.  GENITOURINARY: No dysuria, frequency, hematuria, or incontinence  NEUROLOGICAL: No headaches, memory loss, loss of strength, numbness, or tremors  ENDOCRINE: No heat or cold intolerance; No hair loss  MUSCULOSKELETAL: No joint pain or swelling; No muscle, back, or extremity pain  PSYCHIATRIC: No depression, anxiety, mood swings, or difficulty sleeping  HEME/LYMPH: No easy bruising, or bleeding gums  All others negative    PHYSICAL EXAM:  T(C): 37.2 (12-16-19 @ 15:23), Max: 37.2 (12-16-19 @ 06:18)  HR: 98 (12-16-19 @ 15:23) (98 - 100)  BP: 141/62 (12-16-19 @ 15:23) (141/62 - 146/61)  RR: 16 (12-16-19 @ 15:23) (16 - 16)  SpO2: 98% (12-16-19 @ 15:23) (98% - 99%)  Wt(kg): --  I&O's Summary    16 Dec 2019 07:01  -  16 Dec 2019 23:50  --------------------------------------------------------  IN: 620 mL / OUT: 600 mL / NET: 20 mL      GENERAL: NAD, well-groomed, well-developed  EYES: EOMI, PERRLA, conjunctiva and sclera clear  ENMT: No tonsillar erythema, exudates, or enlargement; Moist mucous membranes, Good dentition, No lesions  Cardiovascular: Normal S1 S2, No JVD, No murmurs, No edema  Respiratory: Lungs clear to auscultation	  Gastrointestinal:  Soft, Non-tender, + BS	  Extremities: Normal range of motion, No clubbing, cyanosis or edema  LYMPH: No lymphadenopathy noted                                    11.2   7.18  )-----------( 350      ( 16 Dec 2019 07:20 )             35.5     12-16    135  |  100  |  32<H>  ----------------------------<  259<H>  4.5   |  24  |  1.05    Ca    9.6      16 Dec 2019 07:20  Mg     1.9     12-16      proBNP:   Lipid Profile:   HgA1c:   TSH:     Consultant(s) Notes Reviewed:  [x ] YES  [ ] NO    Care Discussed with Consultants/Other Providers [ x] YES  [ ] NO    Imaging Personally Reviewed independently:  [x] YES  [ ] NO    All labs, radiologic studies, vitals, orders and medications list reviewed. Patient is seen and examined at bedside. Case discussed with medical team.

## 2019-12-16 NOTE — PROGRESS NOTE ADULT - SUBJECTIVE AND OBJECTIVE BOX
James Coker MD  Interventional Cardiology / Endovascular Specialist  [place] Office : [address] [place] [address] [place]. [address]  Tel:   [place] Office : [address] [facility] [place] [address]  Tel: [phone]  Cell : [phone]    HISTORY OF PRESENTING ILLNESS:    73yo F with Mhx of HTN, HLD, and DMII who presents after a syncopal episode  	  MEDICATIONS:  amLODIPine   Tablet 5 milliGRAM(s) Oral daily  heparin  Injectable 5000 Unit(s) SubCutaneous every 12 hours  losartan 100 milliGRAM(s) Oral daily  risperiDONE   Tablet 1 milliGRAM(s) Oral daily  dextrose 40% Gel 15 Gram(s) Oral once PRN  dextrose 50% Injectable 12.5 Gram(s) IV Push once  dextrose 50% Injectable 25 Gram(s) IV Push once  dextrose 50% Injectable 25 Gram(s) IV Push once  glucagon  Injectable 1 milliGRAM(s) IntraMuscular once PRN  insulin glargine Injectable (LANTUS) 15 Unit(s) SubCutaneous at bedtime  insulin lispro (HumaLOG) corrective regimen sliding scale   SubCutaneous three times a day before meals  insulin lispro (HumaLOG) corrective regimen sliding scale   SubCutaneous at bedtime  insulin lispro Injectable (HumaLOG) 7 Unit(s) SubCutaneous three times a day before meals    dextrose 5%. 1000 milliLiter(s) IV Continuous <Continuous>  dextrose 5%. 1000 milliLiter(s) IV Continuous <Continuous>      PAST MEDICAL/SURGICAL HISTORY  PAST MEDICAL & SURGICAL HISTORY:  Hypercholesterolemia  Hypertension  Diabetes mellitus  No significant past surgical history      SOCIAL HISTORY: Substance Use (street drugs): ( x ) never used  (  ) other:    FAMILY HISTORY:  No pertinent family history in first degree relatives      REVIEW OF SYSTEMS:  CONSTITUTIONAL: No fever, weight loss, or fatigue  EYES: No eye pain, visual disturbances, or discharge  ENMT:  No difficulty hearing, tinnitus, vertigo; No sinus or throat pain  BREASTS: No pain, masses, or nipple discharge  GASTROINTESTINAL: No abdominal or epigastric pain. No nausea, vomiting, or hematemesis; No diarrhea or constipation. No melena or hematochezia.  GENITOURINARY: No dysuria, frequency, hematuria, or incontinence  NEUROLOGICAL: No headaches, memory loss, loss of strength, numbness, or tremors  ENDOCRINE: No heat or cold intolerance; No hair loss  MUSCULOSKELETAL: No joint pain or swelling; No muscle, back, or extremity pain  PSYCHIATRIC: No depression, anxiety, mood swings, or difficulty sleeping  HEME/LYMPH: No easy bruising, or bleeding gums  All others negative    PHYSICAL EXAM:  T(C): 37.2 (12-16-19 @ 15:23), Max: 37.2 (12-16-19 @ 06:18)  HR: 98 (12-16-19 @ 15:23) (93 - 100)  BP: 141/62 (12-16-19 @ 15:23) (141/62 - 174/55)  RR: 16 (12-16-19 @ 15:23) (16 - 18)  SpO2: 98% (12-16-19 @ 15:23) (98% - 99%)  Wt(kg): --  I&O's Summary    16 Dec 2019 07:01  -  16 Dec 2019 15:46  --------------------------------------------------------  IN: 620 mL / OUT: 600 mL / NET: 20 mL        GENERAL: NAD, well-groomed, well-developed  EYES: EOMI, PERRLA, conjunctiva and sclera clear  ENMT: No tonsillar erythema, exudates, or enlargement; Moist mucous membranes, Good dentition, No lesions  Cardiovascular: Normal S1 S2, No JVD, No murmurs, No edema  Respiratory: Lungs clear to auscultation	  Gastrointestinal:  Soft, Non-tender, + BS	  Extremities: Normal range of motion, No clubbing, cyanosis or edema  LYMPH: No lymphadenopathy noted  NERVOUS SYSTEM:  Alert & Oriented X3, Good concentration; Motor Strength 5/5 B/L upper and lower extremities; DTRs 2+ intact and symmetric                              11.2   7.18  )-----------( 350      ( 16 Dec 2019 07:20 )             35.5     12-16    135  |  100  |  32<H>  ----------------------------<  259<H>  4.5   |  24  |  1.05    Ca    9.6      16 Dec 2019 07:20  Mg     1.9     12-16      proBNP:   Lipid Profile:   HgA1c:   TSH:     Consultant(s) Notes Reviewed:  [x ] YES  [ ] NO    Care Discussed with Consultants/Other Providers [ x] YES  [ ] NO    Imaging Personally Reviewed independently:  [x] YES  [ ] NO    All labs, radiologic studies, vitals, orders and medications list reviewed. Patient is seen and examined at bedside. Case discussed with medical team.

## 2019-12-16 NOTE — PROGRESS NOTE ADULT - SUBJECTIVE AND OBJECTIVE BOX
[facility]   MD MICHAEL STOUT, DO BRENDA ARROYO, IVY ARGUELLES    TEL:  OFFICE: [phone]  DR FLORES CELL: [phone]  DR. HUSSEIN CELL: [phone]  DR. ARROYO CELL: [phone]  WESTLEY DAVE CELL: [phone]        Patient is a 74y old  Female who presents with a chief complaint of LOC, hyponatremia (15 Dec 2019 21:19)      Patient seen and examined at bedside. No chest pain/sob    VITALS:  T(F): 99 (12-16-19 @ 06:18), Max: 99 (12-16-19 @ 06:18)  HR: 100 (12-16-19 @ 06:18)  BP: 146/61 (12-16-19 @ 06:18)  RR: 16 (12-16-19 @ 06:18)  SpO2: 99% (12-16-19 @ 06:18)  Wt(kg): --    12-16 @ 07:01  -  12-16 @ 10:55  --------------------------------------------------------  IN: 0 mL / OUT: 600 mL / NET: -600 mL          PHYSICAL EXAM:  Constitutional: NAD  Neck: No JVD  Respiratory: CTAB, no wheezes, rales or rhonchi  Cardiovascular: S1, S2, RRR  Gastrointestinal: BS+, soft, NT/ND  Extremities: No peripheral edema    Hospital Medications:   MEDICATIONS  (STANDING):  amLODIPine   Tablet 5 milliGRAM(s) Oral daily  dextrose 5%. 1000 milliLiter(s) (50 mL/Hr) IV Continuous <Continuous>  dextrose 5%. 1000 milliLiter(s) (30 mL/Hr) IV Continuous <Continuous>  dextrose 50% Injectable 12.5 Gram(s) IV Push once  dextrose 50% Injectable 25 Gram(s) IV Push once  dextrose 50% Injectable 25 Gram(s) IV Push once  heparin  Injectable 5000 Unit(s) SubCutaneous every 12 hours  insulin glargine Injectable (LANTUS) 10 Unit(s) SubCutaneous at bedtime  insulin lispro (HumaLOG) corrective regimen sliding scale   SubCutaneous three times a day before meals  insulin lispro (HumaLOG) corrective regimen sliding scale   SubCutaneous at bedtime  insulin lispro Injectable (HumaLOG) 4 Unit(s) SubCutaneous three times a day before meals  losartan 100 milliGRAM(s) Oral daily  risperiDONE   Tablet 1 milliGRAM(s) Oral daily      LABS:  12-16    135  |  100  |  32<H>  ----------------------------<  259<H>  4.5   |  24  |  1.05    Ca    9.6      16 Dec 2019 07:20  Mg     1.9     12-16      Creatinine Trend: 1.05 <--, 1.18 <--, 1.00 <--, 1.17 <--, 1.16 <--, 1.19 <--, 1.45 <--, 1.18 <--, 0.98 <--, 1.20 <--, 1.00 <--, 0.99 <--, 0.94 <--, 0.99 <--, 0.98 <--                                11.2   7.18  )-----------( 350      ( 16 Dec 2019 07:20 )             35.5     Urine Studies:  Urinalysis - [12-11-19 @ 19:29]      Color LIGHT YELLOW / Appearance Lt TURBID / SG 1.010 / pH 6.0      Gluc 200 / Ketone SMALL  / Bili NEGATIVE / Urobili NORMAL       Blood SMALL / Protein 200 / Leuk Est NEGATIVE / Nitrite NEGATIVE      RBC 6-10 / WBC 3-5 / Hyaline NEGATIVE / Gran  / Sq Epi FEW / Non Sq Epi  / Bacteria FEW    Urine Creatinine 49.70      [12-11-19 @ 19:29]  Urine Sodium 36      [12-12-19 @ 20:45]  Urine Chloride 28      [12-12-19 @ 09:15]  Urine Osmolality 273      [12-12-19 @ 20:45]    HbA1c 8.8      [12-13-19 @ 06:00]  TSH 2.69      [12-12-19 @ 06:45]  Lipid: chol 197, , HDL 41,       [12-14-19 @ 03:50]        RADIOLOGY & ADDITIONAL STUDIES:

## 2019-12-16 NOTE — PROGRESS NOTE ADULT - ASSESSMENT
73 YO F with Mhx of HTN, HLD, and DMII who admitted after a syncopal episode. Pt admitted with seizure/ syncope. Nephrology consulted for hyponatremia.     Hyponatremia  Severe--likely Acute, Symptomatic with seizure/ syncope   Urine osm consistent with polydipsia and in the setting of HCTZ   TSH at goal  AM cortisol at goal. Pt states shes had colonoscopy in the past, no mammogram. Non-smoker. Malignancy work up as per age.   Uric acid not low     Pt admitted with low serum sodium to 114. Pt given LR 500cc x1 in ER then  Serum sodium dropped to 111  and hypertonic saline 100cc x 1 was administered   -->SNa improved today  monitor na daily  Continue to HOLD HCTZ going forward   Avoid overcorrection     HTN  Hold HCTZ  losartan restarted 12/13; amlodipine started 12/14  BP improving today     Acidosis  - likely from hyperglycemia  - tighter glycemic control  - resolved w/ improved glycemic control 73 YO F with Mhx of HTN, HLD, and DMII who admitted after a syncopal episode. Pt admitted with seizure/ syncope. Nephrology consulted for hyponatremia.     Hyponatremia  Severe--likely Acute, Symptomatic with seizure/ syncope   Urine osm consistent with polydipsia and in the setting of HCTZ   TSH at goal  AM cortisol at goal. Pt states shes had colonoscopy in the past, no mammogram. Non-smoker. Malignancy work up as per age.   Uric acid not low     Pt admitted with low serum sodium to 114. Pt given LR 500cc x1 in ER then  Serum sodium dropped to 111  and hypertonic saline 100cc x 1 was administered   -->SNa improved daily  monitor na daily  Continue to HOLD HCTZ going forward     HTN  Hold HCTZ  losartan restarted 12/13; amlodipine started 12/14  BP improving today     Acidosis  - likely from hyperglycemia  - tighter glycemic control  - resolved w/ improved glycemic control

## 2019-12-16 NOTE — DISCHARGE NOTE PROVIDER - NSDCMRMEDTOKEN_GEN_ALL_CORE_FT
aspirin 81 mg oral delayed release tablet: 1 tab(s) orally once a day  Januvia 25 mg oral tablet: 1 tab(s) orally once a day  losartan 100 mg oral tablet: 1 tab(s) orally once a day  metFORMIN 1000 mg oral tablet: 1 tab(s) orally 2 times a day  Norvasc 10 mg oral tablet: 1 tab(s) orally once a day   Prandin 1 mg oral tablet: 1 tab(s) orally 3 times a day (before meals)   risperiDONE 1 mg oral tablet: 1 milligram(s) orally once a day

## 2019-12-16 NOTE — PROGRESS NOTE ADULT - ASSESSMENT
75yo F with Mhx of HTN, HLD, and DMII who presents after a syncopal episode. Likely 2/2 to hyponatremia.

## 2019-12-16 NOTE — PROGRESS NOTE ADULT - ASSESSMENT
EKG SR 1st degree AV block     Tele : SR Blocked PAC;s     Echo: < from: TTE with Doppler (w/Cont) (12.16.19 @ 10:39) >  1.Mitral annular calcification, otherwise normal mitral  valve. Minimal mitral regurgitation.  2. Normal left ventricular internal dimensions and wall  thicknesses.  3. Endocardium not well visualized; grossly normal left  ventricular systolic function.  Endocardial visualization  enhanced with intravenous injection of echo contrast  (Definity).  4. The right ventricle is not well visualized; grossly  normal right ventricular systolic function.    < end of copied text >      Assessment and Plan     1) Syncope CT head ok, Echo with normal LV , syncope likely 2/2 Hyponatremia     2) Hyponatremia :improving renal on board    3) HTN: C/w Losartan and amlodipine increase to 10 mg po daily

## 2019-12-16 NOTE — DISCHARGE NOTE PROVIDER - HOSPITAL COURSE
73yo F with Mhx of HTN, HLD, and DMII who presents after a syncopal episode. Likely 2/2 to hyponatremia.    Sodium level on admission was 111 pt s/p hypertonic saline, sodium increased to 120. Urine osm consistent with polydipsia. Throughout the hospital course sodium level closely monitored and slowly increased till sodium level normalized. A CT head and echocardiogram was obtained which showed grossly unremarkable studies. 73yo F with Mhx of HTN, HLD, and DMII who presents after a syncopal episode. Likely 2/2 to hyponatremia.    Sodium level on admission was 111 pt s/p hypertonic saline, sodium increased to 120. Urine osm consistent with polydipsia. Throughout the hospital course sodium level closely monitored and slowly increased till sodium level normalized. A CT head and echocardiogram was obtained which showed grossly unremarkable studies. Discussed with Dr. Hahn-pt is medically stable for discharge home.

## 2019-12-16 NOTE — PROGRESS NOTE ADULT - PROBLEM SELECTOR PLAN 1
-likely sec.to Hyponatremia.transient.no postictal findings/seen by Neurology.check  orthostatic BP.  Cardiology consult appreciated..ECHO-nl LV Function

## 2019-12-16 NOTE — DISCHARGE NOTE NURSING/CASE MANAGEMENT/SOCIAL WORK - PATIENT PORTAL LINK FT
You can access the FollowMyHealth Patient Portal offered by [facility] by registering at the following website: [email or web address]. By joining [facility]’s FollowMyHealth portal, you will also be able to view your health information using other applications (apps) compatible with our system.

## 2019-12-16 NOTE — PROGRESS NOTE ADULT - ASSESSMENT
Patient is a 75 yo woman with uncontrolled T2DM, HTN, HLD here for syncopal episode. Endocrine asked to consult for type 2 diabetes    1. DM 2 with hyperglycemia without long term current use of insulin   -Patient's A1c is 8.8%. Though we are not aiming for tight glycemic control in elderly patients, the A1c goal is <8% without hypoglycemic or hyperglycemic excursions  - BG now in the 400's  -Will increase Lantus to 15 units SQ qHS  -Increase Humalog to 7 units SQ TID before meals (Hold if NPO/not eating meal)   -Continue Humalog LOW dose correctional scale before meals, Humalog LOW dose correction at bedtime   -Continue consistent carbohydrate diet; registered dietitian consult done  -Check BG before meals and bedtime     Discharge Plan:  -Anticipate resuming Metformin 1000 mg PO BID (if GFR/LFT stable), and resume Januvia 100 mg PO daily. STOP Glimepiride. Can also discharge on Prandin 1-2 mg before meals, final dose to be determined.  -Ensure patient has prescription for glucometer (per insurance), glucose test strips, lancets and alcohol swabs  -Daughter and son request follow up with [facility], [address] [place] [address], [address], [facility] [address], [phone]. Appt to follow.

## 2019-12-16 NOTE — PROGRESS NOTE ADULT - SUBJECTIVE AND OBJECTIVE BOX
JOSE SAGASTUME  74y  Female      Patient is a 74y old  Female who presents with a chief complaint of LOC, hyponatremia (16 Dec 2019 15:59)  feels fine.no sob,no cp,no fever,no cough    REVIEW OF SYSTEMS:  neg    INTERVAL HPI/OVERNIGHT EVENTS:  T(C): 37.2 (12-16-19 @ 15:23), Max: 37.2 (12-16-19 @ 06:18)  HR: 98 (12-16-19 @ 15:23) (93 - 100)  BP: 141/62 (12-16-19 @ 15:23) (141/62 - 174/55)  RR: 16 (12-16-19 @ 15:23) (16 - 18)  SpO2: 98% (12-16-19 @ 15:23) (98% - 99%)  Wt(kg): --  I&O's Summary    16 Dec 2019 07:01  -  16 Dec 2019 20:18  --------------------------------------------------------  IN: 620 mL / OUT: 600 mL / NET: 20 mL      T(C): 37.2 (12-16-19 @ 15:23), Max: 37.2 (12-16-19 @ 06:18)  HR: 98 (12-16-19 @ 15:23) (93 - 100)  BP: 141/62 (12-16-19 @ 15:23) (141/62 - 174/55)  RR: 16 (12-16-19 @ 15:23) (16 - 18)  SpO2: 98% (12-16-19 @ 15:23) (98% - 99%)  Wt(kg): --Vital Signs Last 24 Hrs  T(C): 37.2 (16 Dec 2019 15:23), Max: 37.2 (16 Dec 2019 06:18)  T(F): 98.9 (16 Dec 2019 15:23), Max: 99 (16 Dec 2019 06:18)  HR: 98 (16 Dec 2019 15:23) (93 - 100)  BP: 141/62 (16 Dec 2019 15:23) (141/62 - 174/55)  BP(mean): --  RR: 16 (16 Dec 2019 15:23) (16 - 18)  SpO2: 98% (16 Dec 2019 15:23) (98% - 99%)    LABS:                        11.2   7.18  )-----------( 350      ( 16 Dec 2019 07:20 )             35.5     12-16    135  |  100  |  32<H>  ----------------------------<  259<H>  4.5   |  24  |  1.05    Ca    9.6      16 Dec 2019 07:20  Mg     1.9     12-16          CAPILLARY BLOOD GLUCOSE      POCT Blood Glucose.: 222 mg/dL (16 Dec 2019 17:43)  POCT Blood Glucose.: 428 mg/dL (16 Dec 2019 13:11)  POCT Blood Glucose.: 425 mg/dL (16 Dec 2019 12:53)  POCT Blood Glucose.: 425 mg/dL (16 Dec 2019 12:49)  POCT Blood Glucose.: 220 mg/dL (16 Dec 2019 08:50)  POCT Blood Glucose.: 237 mg/dL (15 Dec 2019 23:27)  POCT Blood Glucose.: 277 mg/dL (15 Dec 2019 21:57)            PAST MEDICAL & SURGICAL HISTORY:  Hypercholesterolemia  Hypertension  Diabetes mellitus  No significant past surgical history      MEDICATIONS  (STANDING):  amLODIPine   Tablet 5 milliGRAM(s) Oral daily  dextrose 5%. 1000 milliLiter(s) (50 mL/Hr) IV Continuous <Continuous>  dextrose 5%. 1000 milliLiter(s) (30 mL/Hr) IV Continuous <Continuous>  dextrose 50% Injectable 12.5 Gram(s) IV Push once  dextrose 50% Injectable 25 Gram(s) IV Push once  dextrose 50% Injectable 25 Gram(s) IV Push once  heparin  Injectable 5000 Unit(s) SubCutaneous every 12 hours  insulin glargine Injectable (LANTUS) 15 Unit(s) SubCutaneous at bedtime  insulin lispro (HumaLOG) corrective regimen sliding scale   SubCutaneous three times a day before meals  insulin lispro (HumaLOG) corrective regimen sliding scale   SubCutaneous at bedtime  insulin lispro Injectable (HumaLOG) 7 Unit(s) SubCutaneous three times a day before meals  losartan 100 milliGRAM(s) Oral daily  risperiDONE   Tablet 1 milliGRAM(s) Oral daily    MEDICATIONS  (PRN):  dextrose 40% Gel 15 Gram(s) Oral once PRN Blood Glucose LESS THAN 70 milliGRAM(s)/deciliter  glucagon  Injectable 1 milliGRAM(s) IntraMuscular once PRN Glucose LESS THAN 70 milligrams/deciliter        RADIOLOGY & ADDITIONAL TESTS:    Imaging Personally Reviewed:  [ ] YES  [ ] NO    Consultant(s) Notes Reviewed:  [x ] YES  [ ] NO    PHYSICAL EXAM:  GENERAL: NAD, well-groomed, well-developed  HEAD:  Atraumatic, Normocephalic  EYES: EOMI, PERRLA, conjunctiva and sclera clear  ENMT: No tonsillar erythema, exudates, or enlargement; Moist mucous membranes, Good dentition, No lesions  NECK: Supple, No JVD, Normal thyroid  NERVOUS SYSTEM:  Alert & Oriented X3, Good concentration; Motor Strength 5/5 B/L upper and lower extremities; DTRs 2+ intact and symmetric  CHEST/LUNG: Clear to percussion bilaterally; No rales, rhonchi, wheezing, or rubs  HEART: Regular rate and rhythm; No murmurs, rubs, or gallops  ABDOMEN: Soft, Nontender, Nondistended; Bowel sounds present  EXTREMITIES:  2+ Peripheral Pulses, No clubbing, cyanosis, or edema  LYMPH: No lymphadenopathy noted  SKIN: No rashes or lesions    Care Discussed with Consultants/Other Providers [ ] YES  [ ] NO      Code Status: [] Full Code [] DNR [] DNI [] Goals of Care:   Disposition: [] ICU [] Stroke Unit [] RCU []PCU []Floor [] Discharge Home         KITA HahnFACP

## 2020-04-14 ENCOUNTER — INPATIENT (INPATIENT)
Facility: HOSPITAL | Age: 75
LOS: 6 days | Discharge: HOME HEALTH | End: 2020-04-21
Attending: LEGAL MEDICINE | Admitting: LEGAL MEDICINE
Payer: MEDICARE

## 2020-04-14 VITALS
DIASTOLIC BLOOD PRESSURE: 78 MMHG | SYSTOLIC BLOOD PRESSURE: 178 MMHG | RESPIRATION RATE: 25 BRPM | TEMPERATURE: 100 F | HEART RATE: 125 BPM | OXYGEN SATURATION: 70 %

## 2020-04-14 DIAGNOSIS — E11.9 TYPE 2 DIABETES MELLITUS WITHOUT COMPLICATIONS: ICD-10-CM

## 2020-04-14 DIAGNOSIS — E78.00 PURE HYPERCHOLESTEROLEMIA, UNSPECIFIED: ICD-10-CM

## 2020-04-14 DIAGNOSIS — F32.9 MAJOR DEPRESSIVE DISORDER, SINGLE EPISODE, UNSPECIFIED: ICD-10-CM

## 2020-04-14 DIAGNOSIS — A41.9 SEPSIS, UNSPECIFIED ORGANISM: ICD-10-CM

## 2020-04-14 DIAGNOSIS — N17.9 ACUTE KIDNEY FAILURE, UNSPECIFIED: ICD-10-CM

## 2020-04-14 DIAGNOSIS — E87.1 HYPO-OSMOLALITY AND HYPONATREMIA: ICD-10-CM

## 2020-04-14 DIAGNOSIS — Z71.89 OTHER SPECIFIED COUNSELING: ICD-10-CM

## 2020-04-14 DIAGNOSIS — Z29.9 ENCOUNTER FOR PROPHYLACTIC MEASURES, UNSPECIFIED: ICD-10-CM

## 2020-04-14 DIAGNOSIS — J96.91 RESPIRATORY FAILURE, UNSPECIFIED WITH HYPOXIA: ICD-10-CM

## 2020-04-14 DIAGNOSIS — R68.89 OTHER GENERAL SYMPTOMS AND SIGNS: ICD-10-CM

## 2020-04-14 DIAGNOSIS — I10 ESSENTIAL (PRIMARY) HYPERTENSION: ICD-10-CM

## 2020-04-14 LAB
ALBUMIN SERPL ELPH-MCNC: 3.1 G/DL — LOW (ref 3.3–5)
ALP SERPL-CCNC: 51 U/L — SIGNIFICANT CHANGE UP (ref 40–120)
ALT FLD-CCNC: 26 U/L — SIGNIFICANT CHANGE UP (ref 4–33)
ANION GAP SERPL CALC-SCNC: 15 MMO/L — HIGH (ref 7–14)
ANION GAP SERPL CALC-SCNC: 17 MMO/L — HIGH (ref 7–14)
ANISOCYTOSIS BLD QL: SLIGHT — SIGNIFICANT CHANGE UP
AST SERPL-CCNC: 54 U/L — HIGH (ref 4–32)
BASE EXCESS BLDV CALC-SCNC: -2 MMOL/L — SIGNIFICANT CHANGE UP
BASOPHILS # BLD AUTO: 0.01 K/UL — SIGNIFICANT CHANGE UP (ref 0–0.2)
BASOPHILS NFR BLD AUTO: 0.1 % — SIGNIFICANT CHANGE UP (ref 0–2)
BASOPHILS NFR SPEC: 0 % — SIGNIFICANT CHANGE UP (ref 0–2)
BILIRUB SERPL-MCNC: 0.3 MG/DL — SIGNIFICANT CHANGE UP (ref 0.2–1.2)
BLASTS # FLD: 0 % — SIGNIFICANT CHANGE UP (ref 0–0)
BLOOD GAS VENOUS - CREATININE: 1.84 MG/DL — HIGH (ref 0.5–1.3)
BUN SERPL-MCNC: 21 MG/DL — SIGNIFICANT CHANGE UP (ref 7–23)
BUN SERPL-MCNC: 22 MG/DL — SIGNIFICANT CHANGE UP (ref 7–23)
CALCIUM SERPL-MCNC: 8.3 MG/DL — LOW (ref 8.4–10.5)
CALCIUM SERPL-MCNC: 8.7 MG/DL — SIGNIFICANT CHANGE UP (ref 8.4–10.5)
CHLORIDE BLDV-SCNC: 97 MMOL/L — SIGNIFICANT CHANGE UP (ref 96–108)
CHLORIDE SERPL-SCNC: 90 MMOL/L — LOW (ref 98–107)
CHLORIDE SERPL-SCNC: 92 MMOL/L — LOW (ref 98–107)
CO2 SERPL-SCNC: 19 MMOL/L — LOW (ref 22–31)
CO2 SERPL-SCNC: 21 MMOL/L — LOW (ref 22–31)
CREAT SERPL-MCNC: 1.64 MG/DL — HIGH (ref 0.5–1.3)
CREAT SERPL-MCNC: 1.69 MG/DL — HIGH (ref 0.5–1.3)
CRP SERPL-MCNC: 201 MG/L — HIGH
D DIMER BLD IA.RAPID-MCNC: 662 NG/ML — SIGNIFICANT CHANGE UP
EOSINOPHIL # BLD AUTO: 0 K/UL — SIGNIFICANT CHANGE UP (ref 0–0.5)
EOSINOPHIL NFR BLD AUTO: 0 % — SIGNIFICANT CHANGE UP (ref 0–6)
EOSINOPHIL NFR FLD: 0 % — SIGNIFICANT CHANGE UP (ref 0–6)
FERRITIN SERPL-MCNC: 351.1 NG/ML — HIGH (ref 15–150)
GAS PNL BLDV: 129 MMOL/L — LOW (ref 136–146)
GIANT PLATELETS BLD QL SMEAR: PRESENT — SIGNIFICANT CHANGE UP
GLUCOSE BLDC GLUCOMTR-MCNC: 188 MG/DL — HIGH (ref 70–99)
GLUCOSE BLDC GLUCOMTR-MCNC: 193 MG/DL — HIGH (ref 70–99)
GLUCOSE BLDV-MCNC: 252 MG/DL — HIGH (ref 70–99)
GLUCOSE SERPL-MCNC: 190 MG/DL — HIGH (ref 70–99)
GLUCOSE SERPL-MCNC: 248 MG/DL — HIGH (ref 70–99)
HCO3 BLDV-SCNC: 22 MMOL/L — SIGNIFICANT CHANGE UP (ref 20–27)
HCT VFR BLD CALC: 30.1 % — LOW (ref 34.5–45)
HCT VFR BLDV CALC: 33.2 % — LOW (ref 34.5–45)
HGB BLD-MCNC: 10.1 G/DL — LOW (ref 11.5–15.5)
HGB BLDV-MCNC: 10.8 G/DL — LOW (ref 11.5–15.5)
IMM GRANULOCYTES NFR BLD AUTO: 0.8 % — SIGNIFICANT CHANGE UP (ref 0–1.5)
LACTATE BLDV-MCNC: 2.8 MMOL/L — HIGH (ref 0.5–2)
LDH SERPL L TO P-CCNC: 481 U/L — HIGH (ref 135–225)
LYMPHOCYTES # BLD AUTO: 0.88 K/UL — LOW (ref 1–3.3)
LYMPHOCYTES # BLD AUTO: 11.3 % — LOW (ref 13–44)
LYMPHOCYTES NFR SPEC AUTO: 7.8 % — LOW (ref 13–44)
MAGNESIUM SERPL-MCNC: 1.4 MG/DL — LOW (ref 1.6–2.6)
MCHC RBC-ENTMCNC: 26.6 PG — LOW (ref 27–34)
MCHC RBC-ENTMCNC: 33.6 % — SIGNIFICANT CHANGE UP (ref 32–36)
MCV RBC AUTO: 79.2 FL — LOW (ref 80–100)
METAMYELOCYTES # FLD: 0 % — SIGNIFICANT CHANGE UP (ref 0–1)
MICROCYTES BLD QL: SLIGHT — SIGNIFICANT CHANGE UP
MONOCYTES # BLD AUTO: 0.36 K/UL — SIGNIFICANT CHANGE UP (ref 0–0.9)
MONOCYTES NFR BLD AUTO: 4.6 % — SIGNIFICANT CHANGE UP (ref 2–14)
MONOCYTES NFR BLD: 0.9 % — LOW (ref 2–9)
MYELOCYTES NFR BLD: 0 % — SIGNIFICANT CHANGE UP (ref 0–0)
NEUTROPHIL AB SER-ACNC: 86.9 % — HIGH (ref 43–77)
NEUTROPHILS # BLD AUTO: 6.47 K/UL — SIGNIFICANT CHANGE UP (ref 1.8–7.4)
NEUTROPHILS NFR BLD AUTO: 83.2 % — HIGH (ref 43–77)
NEUTS BAND # BLD: 3.5 % — SIGNIFICANT CHANGE UP (ref 0–6)
NRBC # FLD: 0 K/UL — SIGNIFICANT CHANGE UP (ref 0–0)
OSMOLALITY SERPL: 278 MOSMO/KG — SIGNIFICANT CHANGE UP (ref 275–295)
OTHER - HEMATOLOGY %: 0 — SIGNIFICANT CHANGE UP
OVALOCYTES BLD QL SMEAR: SLIGHT — SIGNIFICANT CHANGE UP
PCO2 BLDV: 34 MMHG — LOW (ref 41–51)
PH BLDV: 7.42 PH — SIGNIFICANT CHANGE UP (ref 7.32–7.43)
PHOSPHATE SERPL-MCNC: 3.1 MG/DL — SIGNIFICANT CHANGE UP (ref 2.5–4.5)
PLATELET # BLD AUTO: 303 K/UL — SIGNIFICANT CHANGE UP (ref 150–400)
PLATELET COUNT - ESTIMATE: NORMAL — SIGNIFICANT CHANGE UP
PMV BLD: 10.9 FL — SIGNIFICANT CHANGE UP (ref 7–13)
PO2 BLDV: 34 MMHG — LOW (ref 35–40)
POIKILOCYTOSIS BLD QL AUTO: SLIGHT — SIGNIFICANT CHANGE UP
POLYCHROMASIA BLD QL SMEAR: SLIGHT — SIGNIFICANT CHANGE UP
POTASSIUM BLDV-SCNC: 4.3 MMOL/L — SIGNIFICANT CHANGE UP (ref 3.4–4.5)
POTASSIUM SERPL-MCNC: 4.5 MMOL/L — SIGNIFICANT CHANGE UP (ref 3.5–5.3)
POTASSIUM SERPL-MCNC: 4.6 MMOL/L — SIGNIFICANT CHANGE UP (ref 3.5–5.3)
POTASSIUM SERPL-SCNC: 4.5 MMOL/L — SIGNIFICANT CHANGE UP (ref 3.5–5.3)
POTASSIUM SERPL-SCNC: 4.6 MMOL/L — SIGNIFICANT CHANGE UP (ref 3.5–5.3)
PROCALCITONIN SERPL-MCNC: 0.62 NG/ML — HIGH (ref 0.02–0.1)
PROMYELOCYTES # FLD: 0 % — SIGNIFICANT CHANGE UP (ref 0–0)
PROT SERPL-MCNC: 7.4 G/DL — SIGNIFICANT CHANGE UP (ref 6–8.3)
RBC # BLD: 3.8 M/UL — SIGNIFICANT CHANGE UP (ref 3.8–5.2)
RBC # FLD: 14 % — SIGNIFICANT CHANGE UP (ref 10.3–14.5)
REVIEW TO FOLLOW: YES — SIGNIFICANT CHANGE UP
SAO2 % BLDV: 61 % — SIGNIFICANT CHANGE UP (ref 60–85)
SARS-COV-2 RNA SPEC QL NAA+PROBE: DETECTED
SODIUM SERPL-SCNC: 126 MMOL/L — LOW (ref 135–145)
SODIUM SERPL-SCNC: 128 MMOL/L — LOW (ref 135–145)
TARGETS BLD QL SMEAR: SLIGHT — SIGNIFICANT CHANGE UP
VARIANT LYMPHS # BLD: 0.9 % — SIGNIFICANT CHANGE UP
WBC # BLD: 7.78 K/UL — SIGNIFICANT CHANGE UP (ref 3.8–10.5)
WBC # FLD AUTO: 7.78 K/UL — SIGNIFICANT CHANGE UP (ref 3.8–10.5)

## 2020-04-14 PROCEDURE — 71045 X-RAY EXAM CHEST 1 VIEW: CPT | Mod: 26

## 2020-04-14 PROCEDURE — 99222 1ST HOSP IP/OBS MODERATE 55: CPT | Mod: AI

## 2020-04-14 RX ORDER — ACETAMINOPHEN 500 MG
650 TABLET ORAL ONCE
Refills: 0 | Status: COMPLETED | OUTPATIENT
Start: 2020-04-14 | End: 2020-04-14

## 2020-04-14 RX ORDER — HYDROXYCHLOROQUINE SULFATE 200 MG
800 TABLET ORAL EVERY 24 HOURS
Refills: 0 | Status: COMPLETED | OUTPATIENT
Start: 2020-04-14 | End: 2020-04-14

## 2020-04-14 RX ORDER — DEXTROSE 50 % IN WATER 50 %
15 SYRINGE (ML) INTRAVENOUS ONCE
Refills: 0 | Status: DISCONTINUED | OUTPATIENT
Start: 2020-04-14 | End: 2020-04-21

## 2020-04-14 RX ORDER — RISPERIDONE 4 MG/1
1 TABLET ORAL DAILY
Refills: 0 | Status: DISCONTINUED | OUTPATIENT
Start: 2020-04-14 | End: 2020-04-21

## 2020-04-14 RX ORDER — HYDROXYCHLOROQUINE SULFATE 200 MG
TABLET ORAL
Refills: 0 | Status: COMPLETED | OUTPATIENT
Start: 2020-04-14 | End: 2020-04-18

## 2020-04-14 RX ORDER — AMLODIPINE BESYLATE 2.5 MG/1
10 TABLET ORAL DAILY
Refills: 0 | Status: DISCONTINUED | OUTPATIENT
Start: 2020-04-14 | End: 2020-04-21

## 2020-04-14 RX ORDER — DEXTROSE 50 % IN WATER 50 %
25 SYRINGE (ML) INTRAVENOUS ONCE
Refills: 0 | Status: DISCONTINUED | OUTPATIENT
Start: 2020-04-14 | End: 2020-04-21

## 2020-04-14 RX ORDER — PANTOPRAZOLE SODIUM 20 MG/1
40 TABLET, DELAYED RELEASE ORAL
Refills: 0 | Status: DISCONTINUED | OUTPATIENT
Start: 2020-04-14 | End: 2020-04-21

## 2020-04-14 RX ORDER — ENOXAPARIN SODIUM 100 MG/ML
40 INJECTION SUBCUTANEOUS DAILY
Refills: 0 | Status: DISCONTINUED | OUTPATIENT
Start: 2020-04-14 | End: 2020-04-21

## 2020-04-14 RX ORDER — HYDROXYCHLOROQUINE SULFATE 200 MG
400 TABLET ORAL EVERY 24 HOURS
Refills: 0 | Status: COMPLETED | OUTPATIENT
Start: 2020-04-15 | End: 2020-04-18

## 2020-04-14 RX ORDER — ASPIRIN/CALCIUM CARB/MAGNESIUM 324 MG
81 TABLET ORAL DAILY
Refills: 0 | Status: DISCONTINUED | OUTPATIENT
Start: 2020-04-14 | End: 2020-04-21

## 2020-04-14 RX ORDER — DEXTROSE 50 % IN WATER 50 %
12.5 SYRINGE (ML) INTRAVENOUS ONCE
Refills: 0 | Status: DISCONTINUED | OUTPATIENT
Start: 2020-04-14 | End: 2020-04-21

## 2020-04-14 RX ORDER — MAGNESIUM SULFATE 500 MG/ML
1 VIAL (ML) INJECTION ONCE
Refills: 0 | Status: COMPLETED | OUTPATIENT
Start: 2020-04-14 | End: 2020-04-14

## 2020-04-14 RX ORDER — GLUCAGON INJECTION, SOLUTION 0.5 MG/.1ML
1 INJECTION, SOLUTION SUBCUTANEOUS ONCE
Refills: 0 | Status: DISCONTINUED | OUTPATIENT
Start: 2020-04-14 | End: 2020-04-21

## 2020-04-14 RX ORDER — INSULIN LISPRO 100/ML
VIAL (ML) SUBCUTANEOUS
Refills: 0 | Status: DISCONTINUED | OUTPATIENT
Start: 2020-04-14 | End: 2020-04-16

## 2020-04-14 RX ORDER — SODIUM CHLORIDE 9 MG/ML
1000 INJECTION, SOLUTION INTRAVENOUS
Refills: 0 | Status: DISCONTINUED | OUTPATIENT
Start: 2020-04-14 | End: 2020-04-21

## 2020-04-14 RX ORDER — ACETAMINOPHEN 500 MG
650 TABLET ORAL EVERY 6 HOURS
Refills: 0 | Status: DISCONTINUED | OUTPATIENT
Start: 2020-04-14 | End: 2020-04-21

## 2020-04-14 RX ADMIN — Medication 81 MILLIGRAM(S): at 21:02

## 2020-04-14 RX ADMIN — Medication 650 MILLIGRAM(S): at 12:40

## 2020-04-14 RX ADMIN — Medication 100 GRAM(S): at 22:33

## 2020-04-14 RX ADMIN — Medication 40 MILLIGRAM(S): at 18:40

## 2020-04-14 RX ADMIN — Medication 1: at 18:10

## 2020-04-14 RX ADMIN — RISPERIDONE 1 MILLIGRAM(S): 4 TABLET ORAL at 21:02

## 2020-04-14 RX ADMIN — Medication 800 MILLIGRAM(S): at 18:09

## 2020-04-14 RX ADMIN — Medication 650 MILLIGRAM(S): at 18:09

## 2020-04-14 NOTE — ED ADULT NURSE NOTE - OBJECTIVE STATEMENT
74-year-old female oriented to self presents to ED from home complaining of SOB and diarrhea.  Per EMS, patient's 02 sat was 70% on RA when they arrived.  In ED, patient placed on nasal cannula, saturation 90% and breathing labored, change to NRB 15L 02 sat 98%, RR 26.  Brown diarrhea noted, skin intact.  Bilateral pitting edema also noted. 74-year-old female oriented to self presents to ED from home complaining of SOB and diarrhea.  Per EMS, patient's 02 sat was 70% on RA when they arrived.  In ED, patient placed on nasal cannula, saturation 90% and breathing labored, change to NRB 15L 02 sat 98%, RR 26.  Brown diarrhea noted, skin intact.  Bilateral pitting edema also noted.  Daughter Beba [phone]

## 2020-04-14 NOTE — H&P ADULT - PROBLEM SELECTOR PLAN 10
Discussed pts advance directives including CODE STATUS  with pts daughter and son Avinash   As per son, they never had this  discussion before and would want to think about it

## 2020-04-14 NOTE — ED PROVIDER NOTE - OBJECTIVE STATEMENT
74F PMH HTN, DM2 presenting to the ED with low oxygen sats upon triage. Pt confused on exam, spoke to pts daughter on the phone (Nelsy Witt [phone]) who states that pt had some nonbloody diarrhea on Thursday and was feverish on Sunday/Monday, pt woke up confused this morning. Pt lives with daughter. Daughter denies hx of HF, denies sxs cough/sob/cp/abd pain/ n/v. 74F PMH HTN, DM2 presenting to the ED with low oxygen sats upon triage. Pt confused on exam, spoke to pts daughter on the phone (Nelsy Witt [phone]) who states that pt had some nonbloody diarrhea on Thursday and was feverish on Sunday/Monday, pt woke up confused this morning. Pt lives with daughter. Daughter denies hx of HF, denies sxs cough/sob/cp/abd pain/ n/v.  Pt speaks creole, daughter states she does understand english and is normally interactive and independent, walks with walker at baseline.

## 2020-04-14 NOTE — H&P ADULT - PROBLEM SELECTOR PLAN 3
suspect hypovolemic hyponatremia due to poor PO intake and episode of diarrhea  Encourage PO   holding off IVF as pt with hypoxia  FU Serum/urine Osm/Urine lytes

## 2020-04-14 NOTE — H&P ADULT - PROBLEM SELECTOR PLAN 1
likely secondary to COVID   Chest Xray with Bilateral pneumonia most compatible atypical viral/COVID 19 pneumonia  Inflammatory markers elevated   Will start  Solumedrol 40 mg BID   Pt was saturating 70%  on room air, 99% on  NRB   Titrate supplemental oxygen to maintain sats of 94% and above likely secondary to COVID   Chest Xray with Bilateral pneumonia most compatible atypical viral/COVID 19 pneumonia  Inflammatory markers elevated   Will start  Solumedrol 40 mg BID   Will start  Plaquenil 800mg daily, followed by 400mg daily x 5 days ( QTC- 387ms)   Pt was saturating 70%  on room air, 99% on  NRB   Titrate supplemental oxygen to maintain sats of 94% and above

## 2020-04-14 NOTE — ED PROVIDER NOTE - CLINICAL SUMMARY MEDICAL DECISION MAKING FREE TEXT BOX
74F p/w confusion and low sats in the context of infx sxs for the past 5 days. On 98% sats on NRB, 90% on 5L NC tachypnic to low 30s. Suspicious for Covid with possible HF element given lower ext fely pitting edema. Will do labs, CXR, EKG. Admit. 74F p/w confusion and low sats in the context of infx sxs for the past 5 days. On 98% sats on NRB, 90% on 5L NC tachypnic to low 30s. Suspicious for Covid with possible HF element given lower ext fely pitting edema. Will do labs, CXR, EKG. Admit.    Nik: pt presents with hypoxia and confusion, chest film c/w covid.  pt tachypneic, lungs with crackles, mild edema lower extremites

## 2020-04-14 NOTE — H&P ADULT - NSHPLABSRESULTS_GEN_ALL_CORE
Chest Xray with Bilateral pneumonia most compatible atypical viral/COVID 19 pneumonia Chest Xray with Bilateral pneumonia most compatible atypical viral/COVID 19 pneumonia  EKG- 1st degree HB , PA interval of 240ms, QTC - 387ms

## 2020-04-14 NOTE — ED PROVIDER NOTE - PHYSICAL EXAMINATION
GENERAL: not toxic appearing, belly breathing  HEENT: normal nonicteric conjunctiva  CARDIAC: tachy to 110s rate and regular rhythm, normal S1 and S2, no appreciable murmurs  PULM: bilateral diffuse crackles worse in lower lung fields, sats 90% on 5L NC up to 98% on NRB, tachypnic to low 30s  GI: abdomen nondistended, soft, nontender  NEURO: alert and oriented x 2, moving all extremities  MSK: mild bilateral pitting edema to mid tib  SKIN: no visible rashes, dry, well-perfused

## 2020-04-14 NOTE — ED PROVIDER NOTE - ATTENDING CONTRIBUTION TO CARE
I have supervised and agree with the above resident history, physical and plan with the noted differences.    I performed a history and physical exam of the patient and discussed their management with the resident and /or advanced care provider. I reviewed the resident and /or ACP's note and agree with the documented findings and plan of care. My medical decison making and observations are found above.    Nik: pt presents with hypoxia and confusion, chest film c/w covid.  pt tachypneic, lungs with crackles, mild edema lower extremites

## 2020-04-14 NOTE — H&P ADULT - HISTORY OF PRESENT ILLNESS
Per current Hospital medicine emergency protocol, in an effort to reduce COVID exposures and conserve PPE for necessary encounters, H&P above is obtained from chart review without direct patient contact.    75yo F with Mhx of HTN, HLD, and DMII, Depression, presenting to the ED with SOB.   Collateral obtained from daughter Nelsy- As per daughter, pt had an episode of diarrhea  4 days back which resolved spontaneously   Fever of 100.2 last night, broke with Tylenol. Pt continued to have another episode of fever during the night   This AM, pt was very weak, was not able to walk and, was  dehydrated and confused. Decreased PO appetite  Daughter reports symptoms similar to previous admission in December 2019  when she was admitted for low sodium  Functional status- lives with daughter. Walks without device.Speaks creole, daughter states she does understand English and is normally interactive and independent, walks with  walker at baseline.  In the ED, vitals were significant for  oxygen satruation of 70% on room air and pt was placed on NRB with improvement in O2 sats to 98% 73yo F with Mhx of HTN, HLD, and DMII, Depression, presenting to the ED with SOB.   Collateral obtained from daughter Nelsy- As per daughter, pt had an episode of diarrhea  4 days back which resolved spontaneously   Fever of 100.2 last night, broke with Tylenol. Pt continued to have another episode of fever during the night   This AM, pt was very weak, was not able to walk and, was  dehydrated and confused. Decreased PO appetite  Daughter reports symptoms similar to previous admission in December 2019  when she was admitted for low sodium  Functional status- lives with daughter. Walks without device. Speaks creole, daughter states she  does understand English and is normally interactive and independent, walks with walker at baseline.  In the ED, vitals were significant for  oxygen satruation of 70% on room air and pt was placed on NRB with improvement in O2 sats to 98%

## 2020-04-14 NOTE — ED ADULT NURSE REASSESSMENT NOTE - NS ED NURSE REASSESS COMMENT FT1
Daughter Beba called provided information. Last night Pt was at baseline ambulating and A&Ox4. This morning she was confused and unable to ambulate. Reports the Pt has hx DM and HTN. States in December the patient had a similar episode and was hyponatremic and dehydrated

## 2020-04-14 NOTE — H&P ADULT - PROBLEM SELECTOR PLAN 4
suspect hypovolemic hyponatremia due to poor PO intake and episode of diarrhea  Encourage PO   holding off IVF as pt with hypoxia  Avoid nephrotoxins  Hold Losartan till creatinine stabilizes

## 2020-04-14 NOTE — H&P ADULT - PROBLEM SELECTOR PLAN 2
with AMS   pt met sepsis criteria on admission  ( RR of 26, HR >100)   Suspect secondary to COVID   continue to monitor

## 2020-04-14 NOTE — H&P ADULT - ASSESSMENT
73yo F with PMH  of HTN, HLD, and DMII, Depression, presenting to the ED with SOB , concerning for COVID

## 2020-04-14 NOTE — H&P ADULT - NSICDXPASTMEDICALHX_GEN_ALL_CORE_FT
PAST MEDICAL HISTORY:  Anxiety and depression     Diabetes mellitus     Hypercholesterolemia     Hypertension

## 2020-04-15 LAB
ANION GAP SERPL CALC-SCNC: 16 MMO/L — HIGH (ref 7–14)
APPEARANCE UR: SIGNIFICANT CHANGE UP
BACTERIA # UR AUTO: SIGNIFICANT CHANGE UP
BILIRUB UR-MCNC: NEGATIVE — SIGNIFICANT CHANGE UP
BLOOD UR QL VISUAL: SIGNIFICANT CHANGE UP
BUN SERPL-MCNC: 28 MG/DL — HIGH (ref 7–23)
CALCIUM SERPL-MCNC: 8.6 MG/DL — SIGNIFICANT CHANGE UP (ref 8.4–10.5)
CHLORIDE SERPL-SCNC: 92 MMOL/L — LOW (ref 98–107)
CHLORIDE UR-SCNC: 28 MMOL/L — SIGNIFICANT CHANGE UP
CO2 SERPL-SCNC: 19 MMOL/L — LOW (ref 22–31)
COLOR SPEC: YELLOW — SIGNIFICANT CHANGE UP
CREAT ?TM UR-MCNC: 116.7 MG/DL — SIGNIFICANT CHANGE UP
CREAT SERPL-MCNC: 1.98 MG/DL — HIGH (ref 0.5–1.3)
FERRITIN SERPL-MCNC: 440.2 NG/ML — HIGH (ref 15–150)
GLUCOSE BLDC GLUCOMTR-MCNC: 327 MG/DL — HIGH (ref 70–99)
GLUCOSE BLDC GLUCOMTR-MCNC: 340 MG/DL — HIGH (ref 70–99)
GLUCOSE BLDC GLUCOMTR-MCNC: 355 MG/DL — HIGH (ref 70–99)
GLUCOSE BLDC GLUCOMTR-MCNC: 370 MG/DL — HIGH (ref 70–99)
GLUCOSE SERPL-MCNC: 344 MG/DL — HIGH (ref 70–99)
GLUCOSE UR-MCNC: 300 — HIGH
HBA1C BLD-MCNC: 9.7 % — HIGH (ref 4–5.6)
HCT VFR BLD CALC: 25.6 % — LOW (ref 34.5–45)
HGB BLD-MCNC: 8.3 G/DL — LOW (ref 11.5–15.5)
HYALINE CASTS # UR AUTO: NEGATIVE — SIGNIFICANT CHANGE UP
KETONES UR-MCNC: NEGATIVE — SIGNIFICANT CHANGE UP
LEUKOCYTE ESTERASE UR-ACNC: NEGATIVE — SIGNIFICANT CHANGE UP
MAGNESIUM SERPL-MCNC: 1.9 MG/DL — SIGNIFICANT CHANGE UP (ref 1.6–2.6)
MCHC RBC-ENTMCNC: 26.3 PG — LOW (ref 27–34)
MCHC RBC-ENTMCNC: 32.4 % — SIGNIFICANT CHANGE UP (ref 32–36)
MCV RBC AUTO: 81.3 FL — SIGNIFICANT CHANGE UP (ref 80–100)
NITRITE UR-MCNC: NEGATIVE — SIGNIFICANT CHANGE UP
NRBC # FLD: 0 K/UL — SIGNIFICANT CHANGE UP (ref 0–0)
OSMOLALITY SERPL: 302 MOSMO/KG — HIGH (ref 275–295)
OSMOLALITY UR: 311 MOSMO/KG — SIGNIFICANT CHANGE UP (ref 50–1200)
PH UR: 6 — SIGNIFICANT CHANGE UP (ref 5–8)
PHOSPHATE SERPL-MCNC: 4.4 MG/DL — SIGNIFICANT CHANGE UP (ref 2.5–4.5)
PLATELET # BLD AUTO: 167 K/UL — SIGNIFICANT CHANGE UP (ref 150–400)
PMV BLD: 11.8 FL — SIGNIFICANT CHANGE UP (ref 7–13)
POTASSIUM SERPL-MCNC: 5.1 MMOL/L — SIGNIFICANT CHANGE UP (ref 3.5–5.3)
POTASSIUM SERPL-SCNC: 5.1 MMOL/L — SIGNIFICANT CHANGE UP (ref 3.5–5.3)
POTASSIUM UR-SCNC: 36.2 MMOL/L — SIGNIFICANT CHANGE UP
PROT UR-MCNC: 200 — HIGH
RBC # BLD: 3.15 M/UL — LOW (ref 3.8–5.2)
RBC # FLD: 14.3 % — SIGNIFICANT CHANGE UP (ref 10.3–14.5)
RBC CASTS # UR COMP ASSIST: SIGNIFICANT CHANGE UP (ref 0–?)
SODIUM SERPL-SCNC: 127 MMOL/L — LOW (ref 135–145)
SODIUM UR-SCNC: < 20 MMOL/L — SIGNIFICANT CHANGE UP
SODIUM UR-SCNC: < 20 MMOL/L — SIGNIFICANT CHANGE UP
SP GR SPEC: 1.02 — SIGNIFICANT CHANGE UP (ref 1–1.04)
SQUAMOUS # UR AUTO: SIGNIFICANT CHANGE UP
UROBILINOGEN FLD QL: NORMAL — SIGNIFICANT CHANGE UP
WBC # BLD: 7.49 K/UL — SIGNIFICANT CHANGE UP (ref 3.8–10.5)
WBC # FLD AUTO: 7.49 K/UL — SIGNIFICANT CHANGE UP (ref 3.8–10.5)
WBC UR QL: SIGNIFICANT CHANGE UP (ref 0–?)

## 2020-04-15 RX ORDER — INSULIN LISPRO 100/ML
VIAL (ML) SUBCUTANEOUS AT BEDTIME
Refills: 0 | Status: DISCONTINUED | OUTPATIENT
Start: 2020-04-15 | End: 2020-04-16

## 2020-04-15 RX ORDER — INSULIN GLARGINE 100 [IU]/ML
8 INJECTION, SOLUTION SUBCUTANEOUS AT BEDTIME
Refills: 0 | Status: DISCONTINUED | OUTPATIENT
Start: 2020-04-15 | End: 2020-04-16

## 2020-04-15 RX ADMIN — Medication 4: at 08:39

## 2020-04-15 RX ADMIN — Medication 3: at 21:27

## 2020-04-15 RX ADMIN — Medication 40 MILLIGRAM(S): at 05:01

## 2020-04-15 RX ADMIN — Medication 40 MILLIGRAM(S): at 17:26

## 2020-04-15 RX ADMIN — AMLODIPINE BESYLATE 10 MILLIGRAM(S): 2.5 TABLET ORAL at 05:01

## 2020-04-15 RX ADMIN — ENOXAPARIN SODIUM 40 MILLIGRAM(S): 100 INJECTION SUBCUTANEOUS at 12:26

## 2020-04-15 RX ADMIN — Medication 81 MILLIGRAM(S): at 12:25

## 2020-04-15 RX ADMIN — PANTOPRAZOLE SODIUM 40 MILLIGRAM(S): 20 TABLET, DELAYED RELEASE ORAL at 05:01

## 2020-04-15 RX ADMIN — Medication 4: at 17:27

## 2020-04-15 RX ADMIN — INSULIN GLARGINE 8 UNIT(S): 100 INJECTION, SOLUTION SUBCUTANEOUS at 21:27

## 2020-04-15 RX ADMIN — RISPERIDONE 1 MILLIGRAM(S): 4 TABLET ORAL at 12:25

## 2020-04-15 RX ADMIN — Medication 5: at 12:26

## 2020-04-15 RX ADMIN — Medication 400 MILLIGRAM(S): at 17:34

## 2020-04-15 NOTE — CONSULT NOTE ADULT - SUBJECTIVE AND OBJECTIVE BOX
[facility]   MD Rip Wilson MD Fatima Sheikh, D.O. Ruoru Wong, PA    From 7 AM - 5 PM:  OFFICE: [phone]  Dr. Schulz cell: [phone]  Dr. Garcia Cell: [phone]  Dr. Leal cell: [phone]  IVY Christy cell: [phone]    From 5 PM - 7 AM: Answering Service: [phone]      -- INITIAL RENAL CONSULT NOTE  --------------------------------------------------------------------------------  HPI: Information obtained via chart review as pt is covid +.  74F PMH HTN, DM2 admitted with low oxygen sats upon triage found to be covid +. Pt confused on exam, spoke to pts daughter on the phone (Nelsy Witt [phone]) who states that pt had some nonbloody diarrhea on Thursday and was feverish on Sunday/Monday, pt woke up confused this morning. Pt lives with daughter. Daughter denies hx of HF, denies sxs cough/sob/cp/abd pain/ n/v.  Nephrology consulted for hyponatremia.    PAST HISTORY  --------------------------------------------------------------------------------  PAST MEDICAL & SURGICAL HISTORY:  Anxiety and depression  Hypercholesterolemia  Hypertension  Diabetes mellitus  No significant past surgical history    FAMILY HISTORY:  No pertinent family history in first degree relatives    PAST SOCIAL HISTORY:    ALLERGIES & MEDICATIONS  --------------------------------------------------------------------------------  Allergies    peanuts (Short breath; Hives)  penicillin (Short breath; Hives)    Intolerances      Standing Inpatient Medications  amLODIPine   Tablet 10 milliGRAM(s) Oral daily  aspirin enteric coated 81 milliGRAM(s) Oral daily  dextrose 5%. 1000 milliLiter(s) IV Continuous <Continuous>  dextrose 50% Injectable 12.5 Gram(s) IV Push once  dextrose 50% Injectable 25 Gram(s) IV Push once  dextrose 50% Injectable 25 Gram(s) IV Push once  enoxaparin Injectable 40 milliGRAM(s) SubCutaneous daily  hydroxychloroquine   Oral   hydroxychloroquine 400 milliGRAM(s) Oral every 24 hours  insulin lispro (HumaLOG) corrective regimen sliding scale   SubCutaneous three times a day before meals  methylPREDNISolone sodium succinate Injectable 40 milliGRAM(s) IV Push two times a day  pantoprazole    Tablet 40 milliGRAM(s) Oral before breakfast  risperiDONE   Tablet 1 milliGRAM(s) Oral daily    PRN Inpatient Medications  acetaminophen   Tablet .. 650 milliGRAM(s) Oral every 6 hours PRN  dextrose 40% Gel 15 Gram(s) Oral once PRN  glucagon  Injectable 1 milliGRAM(s) IntraMuscular once PRN      REVIEW OF SYSTEMS  --------------------------------------------------------------------------------  Unable to obtain     VITALS/PHYSICAL EXAM  --------------------------------------------------------------------------------  T(C): 36.9 (04-15-20 @ 13:36), Max: 38.1 (04-14-20 @ 17:08)  HR: 100 (04-15-20 @ 13:36) (94 - 106)  BP: 131/62 (04-15-20 @ 13:36) (101/55 - 143/71)  RR: 20 (04-15-20 @ 13:36) (20 - 20)  SpO2: 94% (04-15-20 @ 13:36) (94% - 100%)  Wt(kg): --  Height (cm): 160 (04-14-20 @ 17:08)  Weight (kg): 67.4 (04-14-20 @ 17:08)  BMI (kg/m2): 26.3 (04-14-20 @ 17:08)  BSA (m2): 1.7 (04-14-20 @ 17:08)    LABS/STUDIES  --------------------------------------------------------------------------------              8.3    7.49  >-----------<  167      [04-15-20 @ 06:00]              25.6     127  |  92  |  28  ----------------------------<  344      [04-15-20 @ 06:00]  5.1   |  19  |  1.98        Ca     8.6     [04-15-20 @ 06:00]      Mg     1.9     [04-15-20 @ 06:00]      Phos  4.4     [04-15-20 @ 06:00]    TPro  7.4  /  Alb  3.1  /  TBili  0.3  /  DBili  x   /  AST  54  /  ALT  26  /  AlkPhos  51  [04-14-20 @ 11:51]          [04-14-20 @ 11:51]  Serum Osmolality 302      [04-15-20 @ 06:00]    Creatinine Trend:  SCr 1.98 [04-15 @ 06:00]  SCr 1.64 [04-14 @ 18:45]  SCr 1.69 [04-14 @ 11:51]    Urinalysis - [12-11-19 @ 19:29]      Color LIGHT YELLOW / Appearance Lt TURBID / SG 1.010 / pH 6.0      Gluc 200 / Ketone SMALL  / Bili NEGATIVE / Urobili NORMAL       Blood SMALL / Protein 200 / Leuk Est NEGATIVE / Nitrite NEGATIVE      RBC 6-10 / WBC 3-5 / Hyaline NEGATIVE / Gran  / Sq Epi FEW / Non Sq Epi  / Bacteria FEW      Ferritin 440.2      [04-15-20 @ 06:00]  HbA1c 8.8      [12-13-19 @ 06:00]  TSH 2.69      [12-12-19 @ 06:45]  Lipid: chol 197, , HDL 41,       [12-14-19 @ 03:50]

## 2020-04-15 NOTE — PROGRESS NOTE ADULT - PROBLEM SELECTOR PLAN 4
suspect hypovolemic hyponatremia due to poor PO intake and episode of diarrhea  Encourage PO   holding off IVF as pt with hypoxia  Avoid nephrotoxins  Hold Losartan till creatinine stabilizes with ckd 3,h/o polydypsia,/HCTZ  -FLUID RESTRICTION   holding off IVF as pt with hypoxia  Avoid nephrotoxins  Hold Losartan till creatinine stabilizes  rENAL CONSULT APPRECIATED

## 2020-04-15 NOTE — PROGRESS NOTE ADULT - PROBLEM SELECTOR PLAN 3
suspect hypovolemic hyponatremia due to poor PO intake and episode of diarrhea  Encourage PO   holding off IVF as pt with hypoxia  FU Serum/urine Osm/Urine lytes  Renal consult appreciated. -SEC.HYPERGLYCEMIA?-monitor.fluid restriction

## 2020-04-15 NOTE — PROGRESS NOTE ADULT - SUBJECTIVE AND OBJECTIVE BOX
JOSE SAGASTUME  74y  Female      Patient is a 74y old  Female who presents with a chief complaint of Covid 19 (15 Apr 2020 15:57)  Patient seen and examined.chart reviewed.Agree with H and P  COMFORTABLE ON NRB.no acute sob,no cp,no fever,no vomiting,no diarrhoea.FS is running high  REVIEW OF SYSTEMS:  as above        INTERVAL HPI/OVERNIGHT EVENTS:  T(C): 36.9 (04-15-20 @ 13:36), Max: 38.1 (04-14-20 @ 17:08)  HR: 100 (04-15-20 @ 13:36) (94 - 106)  BP: 131/62 (04-15-20 @ 13:36) (101/55 - 143/71)  RR: 20 (04-15-20 @ 13:36) (20 - 20)  SpO2: 94% (04-15-20 @ 13:36) (94% - 100%)  Wt(kg): --  I&O's Summary    T(C): 36.9 (04-15-20 @ 13:36), Max: 38.1 (04-14-20 @ 17:08)  HR: 100 (04-15-20 @ 13:36) (94 - 106)  BP: 131/62 (04-15-20 @ 13:36) (101/55 - 143/71)  RR: 20 (04-15-20 @ 13:36) (20 - 20)  SpO2: 94% (04-15-20 @ 13:36) (94% - 100%)  Wt(kg): --Vital Signs Last 24 Hrs  T(C): 36.9 (15 Apr 2020 13:36), Max: 38.1 (14 Apr 2020 17:08)  T(F): 98.4 (15 Apr 2020 13:36), Max: 100.5 (14 Apr 2020 17:08)  HR: 100 (15 Apr 2020 13:36) (94 - 106)  BP: 131/62 (15 Apr 2020 13:36) (101/55 - 143/71)  BP(mean): --  RR: 20 (15 Apr 2020 13:36) (20 - 20)  SpO2: 94% (15 Apr 2020 13:36) (94% - 100%)    LABS:                        8.3    7.49  )-----------( 167      ( 15 Apr 2020 06:00 )             25.6     04-15    127<L>  |  92<L>  |  28<H>  ----------------------------<  344<H>  5.1   |  19<L>  |  1.98<H>    Ca    8.6      15 Apr 2020 06:00  Phos  4.4     04-15  Mg     1.9     04-15    TPro  7.4  /  Alb  3.1<L>  /  TBili  0.3  /  DBili  x   /  AST  54<H>  /  ALT  26  /  AlkPhos  51  04-14        CAPILLARY BLOOD GLUCOSE      POCT Blood Glucose.: 370 mg/dL (15 Apr 2020 11:56)  POCT Blood Glucose.: 340 mg/dL (15 Apr 2020 08:03)  POCT Blood Glucose.: 193 mg/dL (14 Apr 2020 21:23)  POCT Blood Glucose.: 188 mg/dL (14 Apr 2020 17:08)            PAST MEDICAL & SURGICAL HISTORY:  Anxiety and depression  Hypercholesterolemia  Hypertension  Diabetes mellitus  No significant past surgical history      MEDICATIONS  (STANDING):  amLODIPine   Tablet 10 milliGRAM(s) Oral daily  aspirin enteric coated 81 milliGRAM(s) Oral daily  dextrose 5%. 1000 milliLiter(s) (50 mL/Hr) IV Continuous <Continuous>  dextrose 50% Injectable 12.5 Gram(s) IV Push once  dextrose 50% Injectable 25 Gram(s) IV Push once  dextrose 50% Injectable 25 Gram(s) IV Push once  enoxaparin Injectable 40 milliGRAM(s) SubCutaneous daily  hydroxychloroquine   Oral   hydroxychloroquine 400 milliGRAM(s) Oral every 24 hours  insulin lispro (HumaLOG) corrective regimen sliding scale   SubCutaneous three times a day before meals  methylPREDNISolone sodium succinate Injectable 40 milliGRAM(s) IV Push two times a day  pantoprazole    Tablet 40 milliGRAM(s) Oral before breakfast  risperiDONE   Tablet 1 milliGRAM(s) Oral daily    MEDICATIONS  (PRN):  acetaminophen   Tablet .. 650 milliGRAM(s) Oral every 6 hours PRN Temp greater or equal to 38C (100.4F), Mild Pain (1 - 3), Moderate Pain (4 - 6), Severe Pain (7 - 10)  dextrose 40% Gel 15 Gram(s) Oral once PRN Blood Glucose LESS THAN 70 milliGRAM(s)/deciliter  glucagon  Injectable 1 milliGRAM(s) IntraMuscular once PRN Glucose LESS THAN 70 milligrams/deciliter        RADIOLOGY & ADDITIONAL TESTS:    Imaging Personally Reviewed:  [ x] YES  [ ] NO    Consultant(s) Notes Reviewed:  [ ] YES  [ ] NO    PHYSICAL EXAM:  GENERAL: Alert and awake lying in bed in no distress  HEAD:  Atraumatic, Normocephalic  EYES: EOMI, MATEUS, conjunctiva and sclera clear  NECK: Supple, No JVD, Normal thyroid  NERVOUS SYSTEM:  Alert & Oriented X3, Motor and sensory systems are intact,   CHEST/LUNG: Bilateral clear breath sounds, no rhochi, no wheezing, no crepitations,  HEART: Regular rate and rhythm; No murmurs, rubs, or gallops  ABDOMEN: Soft, Nontender, Nondistended; Bowel sounds present  EXTREMITIES:   Peripheral Pulses are palpable, no  edema        Care Discussed with Consultants/Other Providers [ ] YES  [ ] NO      Code Status: [] Full Code [] DNR [] DNI [] Goals of Care:   Disposition: [] ICU [] Stroke Unit [] RCU []PCU []Floor [] Discharge Home         KITA HahnFranciscan HealthP

## 2020-04-15 NOTE — PROGRESS NOTE ADULT - SUBJECTIVE AND OBJECTIVE BOX
Pulmonary Consult Note     HPI:  75yo woman with cough and dyspnea.    PMH HTN, HLD, and DMII, Depression      In the ED, vitals were significant for  oxygen saturation of 70% on room air and pt was placed on NRB with improvement in O2 sats to 98% (14 Apr 2020 16:38)    SH non smoker    CXR    < from: Xray Chest 1 View AP/PA (04.14.20 @ 12:19) >    Clinical History: Cough, shortness of breath, fever.      Comparison: None.      Overlying EKG leads are noted. The heart is normal in size. Fullness in the superior mediastinum is most likely artifactually related to the AP /oblique positioning.. Peripheral RIGHT lung and LEFT base consolidation is observed. No pleural effusion or pneumothorax.    IMPRESSION: Bilateral pneumonia most compatible atypical viral/COVID 19 pneumonia.    < end of copied text >    MEDICATIONS  (STANDING):  amLODIPine   Tablet 10 milliGRAM(s) Oral daily  aspirin enteric coated 81 milliGRAM(s) Oral daily  dextrose 5%. 1000 milliLiter(s) (50 mL/Hr) IV Continuous <Continuous>  dextrose 50% Injectable 12.5 Gram(s) IV Push once  dextrose 50% Injectable 25 Gram(s) IV Push once  dextrose 50% Injectable 25 Gram(s) IV Push once  enoxaparin Injectable 40 milliGRAM(s) SubCutaneous daily  hydroxychloroquine   Oral   hydroxychloroquine 400 milliGRAM(s) Oral every 24 hours  insulin glargine Injectable (LANTUS) 8 Unit(s) SubCutaneous at bedtime  insulin lispro (HumaLOG) corrective regimen sliding scale   SubCutaneous three times a day before meals  methylPREDNISolone sodium succinate Injectable 40 milliGRAM(s) IV Push two times a day  pantoprazole    Tablet 40 milliGRAM(s) Oral before breakfast  risperiDONE   Tablet 1 milliGRAM(s) Oral daily      Vital Signs Last 24 Hrs  T(C): 36.9 (15 Apr 2020 13:36), Max: 37.7 (15 Apr 2020 04:59)  T(F): 98.4 (15 Apr 2020 13:36), Max: 99.8 (15 Apr 2020 04:59)  HR: 100 (15 Apr 2020 13:36) (94 - 100)  BP: 131/62 (15 Apr 2020 13:36) (101/55 - 132/82)  BP(mean): --  RR: 20 (15 Apr 2020 13:36) (20 - 20)  SpO2: 94% (15 Apr 2020 13:36) (94% - 95%)    lungs equal diminished unlabored, no wheeze  cor rrr  abd nt soft no hsm  extr no edema     DATA                         8.3    7.49  )-----------( 167      ( 15 Apr 2020 06:00 )             25.6     04-15    127<L>  |  92<L>  |  28<H>  ----------------------------<  344<H>  5.1   |  19<L>  |  1.98<H>    Ca    8.6      15 Apr 2020 06:00  Phos  4.4     04-15  Mg     1.9     04-15    TPro  7.4  /  Alb  3.1<L>  /  TBili  0.3  /  DBili  x   /  AST  54<H>  /  ALT  26  /  AlkPhos  51  04-14    D-Dimer Assay, Quantitative: 662:  (04.14.20 @ 11:51)    C-Reactive Protein, Serum: 201.0 mg/L (04.14.20 @ 11:51)    Ferritin, Serum: 440.2 ng/mL (04.15.20 @ 06:0          	    IMPRESSION     Pneumonia and Respiratory  failure due to COVID 19   Elevated parameters of systemic inflammation and abnormal coagulation consistent with increased cytokine release     hyponatremia    CXR bilateral pulmonary infiltrates     renal failure    fever    on 100% NRB O2     PLAN     started on hydroxychloroquine, systemic steroid, supplemental oxygen, DVT prophylaxis with enoxaparin     agree with ID consult to consider IL 6 RA, or IL 1RA    Continue to monitor respiratory status and parameters of systemic inflammation and abnormal coagulation     follow CRP Ddimer to determine need for full dose anticoagulation  with enoxaparin or interleukin dimer      Labs, meds radiographic studies reviewed    Norm Barlow MD [phone]    PULMONARY    MD Herson Willis MD George Haralambou, MD    [phone]

## 2020-04-15 NOTE — PROGRESS NOTE ADULT - PROBLEM SELECTOR PLAN 5
Monitor BS  Will start SS   Monitor BS while on steroids  -a1c-is high.will start lantus 8 units HS.Endocrine consult

## 2020-04-15 NOTE — PROGRESS NOTE ADULT - ASSESSMENT
75yo F with PMH  of HTN, HLD, and DMII, Depression, presenting to the ED with SOB , concerning for COVID

## 2020-04-15 NOTE — CONSULT NOTE ADULT - ASSESSMENT
74F PMH HTN, DM2 admitted with low oxygen sats upon triage found to be covid +. Nephrology consulted for Hyponatremia     Hyponatremia   Pt with acute on chronic hyponatremia  hx of recent admission with hyponatremia in the past secondary to polydipsia/ HCTZ   also in the setting of elevated glucose   Recommend free water restriction 1L per day   Check urine osm and urine Na  serum sodium stable at present   Advise to optimize DM control (given high serum osm)   Pt hypoxic, poor resp status. Avoid excessive IVF. Monitor at present    HTN  BP controlled at present     SUNIL on CKD III  Baseline Scr 1 with GFR 45 12/2019   Pt now admitted with SUNIL in the setting of covid 19 infection vs. ATN   Scr worsening.   Check UA with urine electrolytes   Monitor urine output   Avoid Nephrotoxins     Acidosis  serum bicarb low  Monitor at present     Anemia  Hb low  Monitor at present

## 2020-04-16 DIAGNOSIS — I10 ESSENTIAL (PRIMARY) HYPERTENSION: ICD-10-CM

## 2020-04-16 DIAGNOSIS — E11.49 TYPE 2 DIABETES MELLITUS WITH OTHER DIABETIC NEUROLOGICAL COMPLICATION: ICD-10-CM

## 2020-04-16 LAB
ALBUMIN SERPL ELPH-MCNC: 2.9 G/DL — LOW (ref 3.3–5)
ALP SERPL-CCNC: 48 U/L — SIGNIFICANT CHANGE UP (ref 40–120)
ALT FLD-CCNC: 25 U/L — SIGNIFICANT CHANGE UP (ref 4–33)
ANION GAP SERPL CALC-SCNC: 16 MMO/L — HIGH (ref 7–14)
ANION GAP SERPL CALC-SCNC: 16 MMO/L — HIGH (ref 7–14)
AST SERPL-CCNC: 36 U/L — HIGH (ref 4–32)
BILIRUB SERPL-MCNC: < 0.2 MG/DL — LOW (ref 0.2–1.2)
BUN SERPL-MCNC: 42 MG/DL — HIGH (ref 7–23)
BUN SERPL-MCNC: 45 MG/DL — HIGH (ref 7–23)
CALCIUM SERPL-MCNC: 8.6 MG/DL — SIGNIFICANT CHANGE UP (ref 8.4–10.5)
CALCIUM SERPL-MCNC: 8.7 MG/DL — SIGNIFICANT CHANGE UP (ref 8.4–10.5)
CHLORIDE SERPL-SCNC: 91 MMOL/L — LOW (ref 98–107)
CHLORIDE SERPL-SCNC: 93 MMOL/L — LOW (ref 98–107)
CO2 SERPL-SCNC: 18 MMOL/L — LOW (ref 22–31)
CO2 SERPL-SCNC: 19 MMOL/L — LOW (ref 22–31)
CREAT SERPL-MCNC: 1.87 MG/DL — HIGH (ref 0.5–1.3)
CREAT SERPL-MCNC: 1.9 MG/DL — HIGH (ref 0.5–1.3)
GLUCOSE BLDC GLUCOMTR-MCNC: 231 MG/DL — HIGH (ref 70–99)
GLUCOSE BLDC GLUCOMTR-MCNC: 309 MG/DL — HIGH (ref 70–99)
GLUCOSE BLDC GLUCOMTR-MCNC: 360 MG/DL — HIGH (ref 70–99)
GLUCOSE BLDC GLUCOMTR-MCNC: 404 MG/DL — HIGH (ref 70–99)
GLUCOSE BLDC GLUCOMTR-MCNC: 419 MG/DL — HIGH (ref 70–99)
GLUCOSE SERPL-MCNC: 319 MG/DL — HIGH (ref 70–99)
GLUCOSE SERPL-MCNC: 379 MG/DL — HIGH (ref 70–99)
HCT VFR BLD CALC: 27.7 % — LOW (ref 34.5–45)
HGB BLD-MCNC: 9.3 G/DL — LOW (ref 11.5–15.5)
MCHC RBC-ENTMCNC: 26.1 PG — LOW (ref 27–34)
MCHC RBC-ENTMCNC: 33.6 % — SIGNIFICANT CHANGE UP (ref 32–36)
MCV RBC AUTO: 77.8 FL — LOW (ref 80–100)
NRBC # FLD: 0 K/UL — SIGNIFICANT CHANGE UP (ref 0–0)
PLATELET # BLD AUTO: 368 K/UL — SIGNIFICANT CHANGE UP (ref 150–400)
PMV BLD: 10.8 FL — SIGNIFICANT CHANGE UP (ref 7–13)
POTASSIUM SERPL-MCNC: 4.2 MMOL/L — SIGNIFICANT CHANGE UP (ref 3.5–5.3)
POTASSIUM SERPL-MCNC: 4.8 MMOL/L — SIGNIFICANT CHANGE UP (ref 3.5–5.3)
POTASSIUM SERPL-SCNC: 4.2 MMOL/L — SIGNIFICANT CHANGE UP (ref 3.5–5.3)
POTASSIUM SERPL-SCNC: 4.8 MMOL/L — SIGNIFICANT CHANGE UP (ref 3.5–5.3)
PROT SERPL-MCNC: 6.8 G/DL — SIGNIFICANT CHANGE UP (ref 6–8.3)
RBC # BLD: 3.56 M/UL — LOW (ref 3.8–5.2)
RBC # FLD: 13.7 % — SIGNIFICANT CHANGE UP (ref 10.3–14.5)
SODIUM SERPL-SCNC: 126 MMOL/L — LOW (ref 135–145)
SODIUM SERPL-SCNC: 127 MMOL/L — LOW (ref 135–145)
WBC # BLD: 12.85 K/UL — HIGH (ref 3.8–10.5)
WBC # FLD AUTO: 12.85 K/UL — HIGH (ref 3.8–10.5)

## 2020-04-16 PROCEDURE — 99222 1ST HOSP IP/OBS MODERATE 55: CPT

## 2020-04-16 PROCEDURE — 93010 ELECTROCARDIOGRAM REPORT: CPT

## 2020-04-16 RX ORDER — INSULIN LISPRO 100/ML
VIAL (ML) SUBCUTANEOUS
Refills: 0 | Status: DISCONTINUED | OUTPATIENT
Start: 2020-04-16 | End: 2020-04-19

## 2020-04-16 RX ORDER — SODIUM CHLORIDE 9 MG/ML
1000 INJECTION INTRAMUSCULAR; INTRAVENOUS; SUBCUTANEOUS
Refills: 0 | Status: DISCONTINUED | OUTPATIENT
Start: 2020-04-16 | End: 2020-04-20

## 2020-04-16 RX ORDER — INSULIN GLARGINE 100 [IU]/ML
22 INJECTION, SOLUTION SUBCUTANEOUS AT BEDTIME
Refills: 0 | Status: DISCONTINUED | OUTPATIENT
Start: 2020-04-16 | End: 2020-04-16

## 2020-04-16 RX ORDER — INSULIN LISPRO 100/ML
VIAL (ML) SUBCUTANEOUS AT BEDTIME
Refills: 0 | Status: DISCONTINUED | OUTPATIENT
Start: 2020-04-16 | End: 2020-04-19

## 2020-04-16 RX ORDER — HUMAN INSULIN 100 [IU]/ML
15 INJECTION, SUSPENSION SUBCUTANEOUS ONCE
Refills: 0 | Status: COMPLETED | OUTPATIENT
Start: 2020-04-16 | End: 2020-04-16

## 2020-04-16 RX ORDER — INSULIN GLARGINE 100 [IU]/ML
26 INJECTION, SOLUTION SUBCUTANEOUS AT BEDTIME
Refills: 0 | Status: DISCONTINUED | OUTPATIENT
Start: 2020-04-16 | End: 2020-04-17

## 2020-04-16 RX ORDER — INSULIN LISPRO 100/ML
8 VIAL (ML) SUBCUTANEOUS
Refills: 0 | Status: DISCONTINUED | OUTPATIENT
Start: 2020-04-16 | End: 2020-04-17

## 2020-04-16 RX ADMIN — Medication 8 UNIT(S): at 17:06

## 2020-04-16 RX ADMIN — SODIUM CHLORIDE 60 MILLILITER(S): 9 INJECTION INTRAMUSCULAR; INTRAVENOUS; SUBCUTANEOUS at 12:09

## 2020-04-16 RX ADMIN — PANTOPRAZOLE SODIUM 40 MILLIGRAM(S): 20 TABLET, DELAYED RELEASE ORAL at 06:14

## 2020-04-16 RX ADMIN — Medication 8: at 17:06

## 2020-04-16 RX ADMIN — Medication 8 UNIT(S): at 11:53

## 2020-04-16 RX ADMIN — Medication 81 MILLIGRAM(S): at 11:51

## 2020-04-16 RX ADMIN — AMLODIPINE BESYLATE 10 MILLIGRAM(S): 2.5 TABLET ORAL at 06:14

## 2020-04-16 RX ADMIN — RISPERIDONE 1 MILLIGRAM(S): 4 TABLET ORAL at 11:51

## 2020-04-16 RX ADMIN — Medication 10: at 11:53

## 2020-04-16 RX ADMIN — Medication 40 MILLIGRAM(S): at 08:04

## 2020-04-16 RX ADMIN — Medication 6: at 08:09

## 2020-04-16 RX ADMIN — Medication 40 MILLIGRAM(S): at 17:06

## 2020-04-16 RX ADMIN — Medication 400 MILLIGRAM(S): at 17:06

## 2020-04-16 RX ADMIN — HUMAN INSULIN 15 UNIT(S): 100 INJECTION, SUSPENSION SUBCUTANEOUS at 11:52

## 2020-04-16 RX ADMIN — INSULIN GLARGINE 26 UNIT(S): 100 INJECTION, SOLUTION SUBCUTANEOUS at 21:29

## 2020-04-16 RX ADMIN — ENOXAPARIN SODIUM 40 MILLIGRAM(S): 100 INJECTION SUBCUTANEOUS at 11:52

## 2020-04-16 NOTE — PROGRESS NOTE ADULT - SUBJECTIVE AND OBJECTIVE BOX
[facility]   MD MICHAEL STOUT DO ANAM SIDDIQUI ANGELA WONG, PA    TEL:  OFFICE: [phone]  DR FLORES CELL: [phone]  DR. HUSSEIN CELL: [phone]  DR. HODGES CELL: [phone]  WESTLEY DAVE CELL: [phone]        Patient is a 74y old  Female who presents with a chief complaint of COVID (16 Apr 2020 11:20)      Patient +covid    VITALS:  T(F): 99.1 (04-16-20 @ 11:30), Max: 99.3 (04-15-20 @ 20:30)  HR: 100 (04-16-20 @ 11:30)  BP: 141/58 (04-16-20 @ 11:30)  RR: 20 (04-16-20 @ 11:30)  SpO2: 95% (04-16-20 @ 11:30)  Wt(kg): --    04-15 @ 07:01  -  04-16 @ 07:00  --------------------------------------------------------  IN: 720 mL / OUT: 650 mL / NET: 70 mL          Hospital Medications:   MEDICATIONS  (STANDING):  amLODIPine   Tablet 10 milliGRAM(s) Oral daily  aspirin enteric coated 81 milliGRAM(s) Oral daily  dextrose 5%. 1000 milliLiter(s) (50 mL/Hr) IV Continuous <Continuous>  dextrose 50% Injectable 12.5 Gram(s) IV Push once  dextrose 50% Injectable 25 Gram(s) IV Push once  dextrose 50% Injectable 25 Gram(s) IV Push once  enoxaparin Injectable 40 milliGRAM(s) SubCutaneous daily  hydroxychloroquine   Oral   hydroxychloroquine 400 milliGRAM(s) Oral every 24 hours  insulin glargine Injectable (LANTUS) 22 Unit(s) SubCutaneous at bedtime  insulin lispro (HumaLOG) corrective regimen sliding scale   SubCutaneous three times a day before meals  insulin lispro (HumaLOG) corrective regimen sliding scale   SubCutaneous at bedtime  insulin lispro Injectable (HumaLOG) 8 Unit(s) SubCutaneous three times a day before meals  methylPREDNISolone sodium succinate Injectable 40 milliGRAM(s) IV Push two times a day  pantoprazole    Tablet 40 milliGRAM(s) Oral before breakfast  risperiDONE   Tablet 1 milliGRAM(s) Oral daily  sodium chloride 0.9%. 1000 milliLiter(s) (60 mL/Hr) IV Continuous <Continuous>      LABS:  04-16    126<L>  |  91<L>  |  42<H>  ----------------------------<  379<H>  4.8   |  19<L>  |  1.87<H>    Ca    8.6      16 Apr 2020 08:00  Phos  4.4     04-15  Mg     1.9     04-15    TPro  6.8  /  Alb  2.9<L>  /  TBili  < 0.2<L>  /  DBili      /  AST  36<H>  /  ALT  25  /  AlkPhos  48  04-16    Creatinine Trend: 1.87 <--, 1.98 <--, 1.64 <--, 1.69 <--    Albumin, Serum: 2.9 g/dL (04-16 @ 08:00)                              9.3    12.85 )-----------( 368      ( 16 Apr 2020 08:00 )             27.7     Urine Studies:  Urinalysis - [04-15-20 @ 17:35]      Color YELLOW / Appearance Lt TURBID / SG 1.016 / pH 6.0      Gluc 300 / Ketone NEGATIVE  / Bili NEGATIVE / Urobili NORMAL       Blood TRACE / Protein 200 / Leuk Est NEGATIVE / Nitrite NEGATIVE      RBC 3-5 / WBC 3-5 / Hyaline NEGATIVE / Gran  / Sq Epi OCC / Non Sq Epi  / Bacteria FEW    Urine Creatinine 116.70      [04-15-20 @ 17:35]  Urine Sodium < 20      [04-15-20 @ 17:35]  Urine Potassium 36.2      [04-15-20 @ 17:35]  Urine Chloride 28      [04-15-20 @ 17:35]  Urine Osmolality 311      [04-15-20 @ 17:35]    Ferritin 440.2      [04-15-20 @ 06:00]  HbA1c 8.8      [12-13-19 @ 06:00]  TSH 2.69      [12-12-19 @ 06:45]  Lipid: chol 197, , HDL 41,       [12-14-19 @ 03:50]        RADIOLOGY & ADDITIONAL STUDIES:

## 2020-04-16 NOTE — CONSULT NOTE ADULT - ATTENDING COMMENTS
Luis Cristobal MD  Please page [phone] with 10 digit call back number if any questions.    Temporary Work Cell ( via google voice) [phone]  For after hours or weekends please call [phone] or page fellow on call.

## 2020-04-16 NOTE — DIETITIAN INITIAL EVALUATION ADULT. - OTHER INFO
Pt 74 y female with PMHx of HTN, HLD, and DM2, Depression, presented with SOB; Pt COVID (+) - per chart review. Unable to conduct a face to face interview or nutrition-focused physical exam due to limited contact restrictions related to Pt's medical condition and isolation precautions. Collateral obtained from chart review. Prone position encouraged throughout shift reported. Unable to assess PO intake at present. No report of nausea, vomiting @ this time. +BM, large, loose (4/14); +Stool x 1 (4/15) per flow sheet documentation. No report of chewing or swallowing difficulties at this time. Unable to assess diet or weight history at present. Of note Pt's HbA1c level 9.7% (4/15). Unable to provide diet education on Consistent Carbohydrate diet @ present. Will recommend to follow up with appropriate RDN upon discharge for the purposes of long-term nutrition evaluation and diet education.

## 2020-04-16 NOTE — DIETITIAN INITIAL EVALUATION ADULT. - PERTINENT LABORATORY DATA
(4/16) WBC 12.85 H, H/H 9.3/27.7 L, Na 126 L, Cl 91 l, BUN 42 H, Creat 1.87 H, Glu 379 H, Albumin 2.9 L, AST 36 H;      (4/15) HbA1c 9.7% H

## 2020-04-16 NOTE — DIETITIAN INITIAL EVALUATION ADULT. - ADD RECOMMEND
1. Encourage & assist Pt with meals; Monitor PO diet tolerance; Honor food preferences;        2. Monitor labs, hydration status;       3. Suggest outpatient follow up with appropriate RDN for the purposes of long-term nutrition evaluation and diet education;

## 2020-04-16 NOTE — CONSULT NOTE ADULT - SUBJECTIVE AND OBJECTIVE BOX
HPI:  73yo F with Mhx of HTN, HLD, and DMII, Depression, presenting to the ED with SOB.   Collateral obtained from daughter Nelsy- As per daughter, pt had an episode of diarrhea  4 days back which resolved spontaneously   Fever of 100.2 last night, broke with Tylenol. Pt continued to have another episode of fever during the night   This AM, pt was very weak, was not able to walk and, was  dehydrated and confused. Decreased PO appetite  Daughter reports symptoms similar to previous admission in 2019  when she was admitted for low sodium  Functional status- lives with daughter. Walks without device. Speaks creole, daughter states she  does understand English and is normally interactive and independent, walks with walker at baseline.  In the ED, vitals were significant for  oxygen satruation of 70% on room air and pt was placed on NRB with improvement in O2 sats to 98% (2020 16:38)      PAST MEDICAL & SURGICAL HISTORY:  Anxiety and depression  Hypercholesterolemia  Hypertension  Diabetes mellitus  No significant past surgical history      Allergies    peanuts (Short breath; Hives)  penicillin (Short breath; Hives)    Intolerances        ANTIMICROBIALS:  hydroxychloroquine    hydroxychloroquine 400 every 24 hours      OTHER MEDS:  acetaminophen   Tablet .. 650 milliGRAM(s) Oral every 6 hours PRN  amLODIPine   Tablet 10 milliGRAM(s) Oral daily  aspirin enteric coated 81 milliGRAM(s) Oral daily  dextrose 40% Gel 15 Gram(s) Oral once PRN  dextrose 5%. 1000 milliLiter(s) IV Continuous <Continuous>  dextrose 50% Injectable 12.5 Gram(s) IV Push once  dextrose 50% Injectable 25 Gram(s) IV Push once  dextrose 50% Injectable 25 Gram(s) IV Push once  enoxaparin Injectable 40 milliGRAM(s) SubCutaneous daily  glucagon  Injectable 1 milliGRAM(s) IntraMuscular once PRN  insulin glargine Injectable (LANTUS) 22 Unit(s) SubCutaneous at bedtime  insulin lispro (HumaLOG) corrective regimen sliding scale   SubCutaneous three times a day before meals  insulin lispro (HumaLOG) corrective regimen sliding scale   SubCutaneous at bedtime  insulin lispro Injectable (HumaLOG) 8 Unit(s) SubCutaneous three times a day before meals  insulin NPH human recombinant 15 Unit(s) SubCutaneous once  methylPREDNISolone sodium succinate Injectable 40 milliGRAM(s) IV Push two times a day  pantoprazole    Tablet 40 milliGRAM(s) Oral before breakfast  risperiDONE   Tablet 1 milliGRAM(s) Oral daily  sodium chloride 0.9%. 1000 milliLiter(s) IV Continuous <Continuous>      SOCIAL HISTORY:    FAMILY HISTORY:  No pertinent family history in first degree relatives      REVIEW OF SYSTEMS  [  ] ROS unobtainable because:    [  ] All other systems negative except as noted below:	    Constitutional:  [ ] fever [ ] chills  [ ] weight loss  [ ] weakness  Skin:  [ ] rash [ ] phlebitis	  Eyes: [ ] icterus [ ] pain  [ ] discharge	  ENMT: [ ] sore throat  [ ] thrush [ ] ulcers [ ] exudates  Respiratory: [ ] dyspnea [ ] hemoptysis [ ] cough [ ] sputum	  Cardiovascular:  [ ] chest pain [ ] palpitations [ ] edema	  Gastrointestinal:  [ ] nausea [ ] vomiting [ ] diarrhea [ ] constipation [ ] pain	  Genitourinary:  [ ] dysuria [ ] frequency [ ] hematuria [ ] discharge [ ] flank pain  [ ] incontinence  Musculoskeletal:  [ ] myalgias [ ] arthralgias [ ] arthritis  [ ] back pain  Neurological:  [ ] headache [ ] seizures  [ ] confusion/altered mental status  Psychiatric:  [ ] anxiety [ ] depression	  Hematology/Lymphatics:  [ ] lymphadenopathy  Endocrine:  [ ] adrenal [ ] thyroid  Allergic/Immunologic:	 [ ] transplant [ ] seasonal    PHYSICAL EXAM:  General: [ ] non-toxic  HEAD/EYES: [ ] PERRL [ ] white sclera [ ] icterus  ENT:  [ ] normal [ ] supple [ ] thrush [ ] pharyngeal exudate  Cardiovascular:   [ ] murmur [ ] normal [ ] PPM/AICD  Respiratory:  [ ] clear to ausculation bilaterally  GI:  [ ] soft, non-tender, normal bowel sounds  :  [ ] michaels [ ] no CVA tenderness   Musculoskeletal:  [ ] no synovitis  Neurologic:  [ ] non-focal exam   Skin:  [ ] no rash  Lymph: [ ] no lymphadenopathy  Psychiatric:  [ ] appropriate affect [ ] alert & oriented  Lines:  [ ] no phlebitis [ ] central line          Drug Dosing Weight  Height (cm): 160 (2020 17:08)  Weight (kg): 67.4 (2020 17:08)  BMI (kg/m2): 26.3 (2020 17:08)  BSA (m2): 1.7 (2020 17:08)    Vital Signs Last 24 Hrs  T(F): 99.3 (04-15-20 @ 20:30), Max: 100.5 (20 @ 17:08)    Vital Signs Last 24 Hrs  HR: 98 (20 @ 06:13) (98 - 109)  BP: 139/71 (20 @ 06:13) (131/62 - 139/71)  RR: 20 (04-15-20 @ 13:36)  SpO2: 96% (04-15-20 @ 20:30) (94% - 96%)  Wt(kg): --                          9.3    12.85 )-----------( 368      ( 2020 08:00 )             27.7       -    126<L>  |  91<L>  |  42<H>  ----------------------------<  379<H>  4.8   |  19<L>  |  1.87<H>    Ca    8.6      2020 08:00  Phos  4.4     04-15  Mg     1.9     04-15    TPro  6.8  /  Alb  2.9<L>  /  TBili  < 0.2<L>  /  DBili  x   /  AST  36<H>  /  ALT  25  /  AlkPhos  48  -16      Urinalysis Basic - ( 15 Apr 2020 17:35 )    Color: YELLOW / Appearance: Lt TURBID / S.016 / pH: 6.0  Gluc: 300 / Ketone: NEGATIVE  / Bili: NEGATIVE / Urobili: NORMAL   Blood: TRACE / Protein: 200 / Nitrite: NEGATIVE   Leuk Esterase: NEGATIVE / RBC: 3-5 / WBC 3-5   Sq Epi: OCC / Non Sq Epi: x / Bacteria: FEW        MICROBIOLOGY:  COVID-19 PCR . (20 @ 11:55)    COVID-19 PCR: Detected: This test has been validated by [employer] to be accurate;  though it has not been FDA cleared/approved by the usual pathway.  As with all laboratory tests, results should be correlated with clinical  findings.  https://www.fda.gov/media/481285/download  https://www.fda.gov/media/183186/download      RADIOLOGY:    < from: Xray Chest 1 View AP/PA (20 @ 12:19) >  IMPRESSION: Bilateral pneumonia most compatible atypical viral/COVID 19 pneumonia.    < end of copied text >

## 2020-04-16 NOTE — PROGRESS NOTE ADULT - SUBJECTIVE AND OBJECTIVE BOX
JOSE SAGASTUME  74y  Female      Patient is a 74y old  Female who presents with a chief complaint of COVID (2020 11:45)  patient is cmfortable,nad,on NRB,maintaining good saturation..FS runs high.no fever,no cp,n diarrhoea     REVIEW OF SYSTEMS:  as above          INTERVAL HPI/OVERNIGHT EVENTS:  T(C): 37.3 (20 @ 11:30), Max: 37.4 (04-15-20 @ 20:30)  HR: 100 (20 @ 11:30) (98 - 109)  BP: 141/58 (20 @ 11:30) (134/54 - 141/58)  RR: 20 (20 @ 12:30) (20 - 20)  SpO2: 91% (20 @ 12:30) (91% - 96%)  Wt(kg): --  I&O's Summary    15 Apr 2020 07:01  -  2020 07:00  --------------------------------------------------------  IN: 720 mL / OUT: 650 mL / NET: 70 mL      T(C): 37.3 (20 @ 11:30), Max: 37.4 (04-15-20 @ 20:30)  HR: 100 (20 @ 11:30) (98 - 109)  BP: 141/58 (20 @ 11:30) (134/54 - 141/58)  RR: 20 (20 @ 12:30) (20 - 20)  SpO2: 91% (20 @ 12:30) (91% - 96%)  Wt(kg): --Vital Signs Last 24 Hrs  T(C): 37.3 (2020 11:30), Max: 37.4 (15 Apr 2020 20:30)  T(F): 99.1 (2020 11:30), Max: 99.3 (15 Apr 2020 20:30)  HR: 100 (2020 11:30) (98 - 109)  BP: 141/58 (2020 11:30) (134/54 - 141/58)  BP(mean): --  RR: 20 (2020 12:30) (20 - 20)  SpO2: 91% (2020 12:30) (91% - 96%)    LABS:                        9.3    12.85 )-----------( 368      ( 2020 08:00 )             27.7     04-16    126<L>  |  91<L>  |  42<H>  ----------------------------<  379<H>  4.8   |  19<L>  |  1.87<H>    Ca    8.6      2020 08:00  Phos  4.4     04-15  Mg     1.9     04-15    TPro  6.8  /  Alb  2.9<L>  /  TBili  < 0.2<L>  /  DBili  x   /  AST  36<H>  /  ALT  25  /  AlkPhos  48  04-16      Urinalysis Basic - ( 15 Apr 2020 17:35 )    Color: YELLOW / Appearance: Lt TURBID / S.016 / pH: 6.0  Gluc: 300 / Ketone: NEGATIVE  / Bili: NEGATIVE / Urobili: NORMAL   Blood: TRACE / Protein: 200 / Nitrite: NEGATIVE   Leuk Esterase: NEGATIVE / RBC: 3-5 / WBC 3-5   Sq Epi: OCC / Non Sq Epi: x / Bacteria: FEW      CAPILLARY BLOOD GLUCOSE      POCT Blood Glucose.: 309 mg/dL (2020 16:37)  POCT Blood Glucose.: 360 mg/dL (2020 11:46)  POCT Blood Glucose.: 404 mg/dL (2020 08:09)  POCT Blood Glucose.: 419 mg/dL (2020 08:07)  POCT Blood Glucose.: 355 mg/dL (15 Apr 2020 20:52)        Urinalysis Basic - ( 15 Apr 2020 17:35 )    Color: YELLOW / Appearance: Lt TURBID / S.016 / pH: 6.0  Gluc: 300 / Ketone: NEGATIVE  / Bili: NEGATIVE / Urobili: NORMAL   Blood: TRACE / Protein: 200 / Nitrite: NEGATIVE   Leuk Esterase: NEGATIVE / RBC: 3-5 / WBC 3-5   Sq Epi: OCC / Non Sq Epi: x / Bacteria: FEW        PAST MEDICAL & SURGICAL HISTORY:  Anxiety and depression  Hypercholesterolemia  Hypertension  Diabetes mellitus  No significant past surgical history      MEDICATIONS  (STANDING):  amLODIPine   Tablet 10 milliGRAM(s) Oral daily  aspirin enteric coated 81 milliGRAM(s) Oral daily  dextrose 5%. 1000 milliLiter(s) (50 mL/Hr) IV Continuous <Continuous>  dextrose 50% Injectable 12.5 Gram(s) IV Push once  dextrose 50% Injectable 25 Gram(s) IV Push once  dextrose 50% Injectable 25 Gram(s) IV Push once  enoxaparin Injectable 40 milliGRAM(s) SubCutaneous daily  hydroxychloroquine   Oral   hydroxychloroquine 400 milliGRAM(s) Oral every 24 hours  insulin glargine Injectable (LANTUS) 26 Unit(s) SubCutaneous at bedtime  insulin lispro (HumaLOG) corrective regimen sliding scale   SubCutaneous three times a day before meals  insulin lispro (HumaLOG) corrective regimen sliding scale   SubCutaneous at bedtime  insulin lispro Injectable (HumaLOG) 8 Unit(s) SubCutaneous three times a day before meals  methylPREDNISolone sodium succinate Injectable 40 milliGRAM(s) IV Push two times a day  pantoprazole    Tablet 40 milliGRAM(s) Oral before breakfast  risperiDONE   Tablet 1 milliGRAM(s) Oral daily  sodium chloride 0.9%. 1000 milliLiter(s) (60 mL/Hr) IV Continuous <Continuous>    MEDICATIONS  (PRN):  acetaminophen   Tablet .. 650 milliGRAM(s) Oral every 6 hours PRN Temp greater or equal to 38C (100.4F), Mild Pain (1 - 3), Moderate Pain (4 - 6), Severe Pain (7 - 10)  dextrose 40% Gel 15 Gram(s) Oral once PRN Blood Glucose LESS THAN 70 milliGRAM(s)/deciliter  glucagon  Injectable 1 milliGRAM(s) IntraMuscular once PRN Glucose LESS THAN 70 milligrams/deciliter        RADIOLOGY & ADDITIONAL TESTS:    Imaging Personally Reviewed:  [x ] YES  [ ] NO    Consultant(s) Notes Reviewed:  [ ] YES  [ ] NO    PHYSICAL EXAM:  GENERAL: Alert and awake lying in bed in no distress  HEAD:  Atraumatic, Normocephalic  EYES: EOMI, MATEUS, conjunctiva and sclera clear  NECK: Supple, No JVD, Normal thyroid  NERVOUS SYSTEM:  Alert & Oriented X3, Motor and sensory systems are intact,   CHEST/LUNG: mild b/ rhonchi  HEART: Regular rate and rhythm; No murmurs, rubs, or gallops  ABDOMEN: Soft, Nontender, Nondistended; Bowel sounds present  EXTREMITIES:   Peripheral Pulses are palpable, no  edema        Care Discussed with Consultants/Other Providers x[ ] YES  [ ] NO      Code Status: [] Full Code [] DNR [] DNI [] Goals of Care:   Disposition: [] ICU [] Stroke Unit [] RCU []PCU []Floor [] Discharge Home         MOHAN Hahn.FACP

## 2020-04-16 NOTE — CONSULT NOTE ADULT - ASSESSMENT
Patient is a 74-year-old, Creole speaking female, with past medical history of hypertension, hyperlipidemia, mild cognitive impairment, depression, type 2 diabetes mellitus, presenting with fever and poor appetite, found to have COVID 19+ infection.    #1 type 2 diabetes mellitus, uncontrolled, with A1c of 9.7% at baseline, Exacerbated by Steroid. Patient was started on Solu-Medrol in 4/14/2020, with Solu-Medrol 40 mg twice daily.  Since then she had been having elevated glucose level.  On 4/15 2020, patient received low-dose sliding scale, a total of 13 units of Humalog, she also received Lantus 8 units at that time.  Glucose this morning on serum was 379 mg/dl.   On last admission, patient was on Lantus 15 units and Humalog 7 units 3 times daily with meals.  In light of the steroid dosage.  -We will give a stat dose of NPH 15 units right now.  Order for 11 AM 4/16/202  -We will increase Humalog to 8 units 3 times daily with meals  -We will increase Humalog to moderate dose sliding scale q. before meals and at bedtime  -Monitor glucose QA [facility].  -Once the steroids are off, will reduce the insulin to her prior admission dose which is Lantus 15 and Humalog 7 units 3 times daily with meals with low-dose sliding scale.    #2 Hypertension  Blood pressure goal 140/90  BP med currently being held due to current infectious state.  Continue to monitor.    #3 Hyperlipidemia  Consider statin therapy outpatient. Patient is a 74-year-old, Creole speaking female, with past medical history of hypertension, hyperlipidemia, mild cognitive impairment, depression, type 2 diabetes mellitus, presenting with fever and poor appetite, found to have COVID 19+ infection.    #1 type 2 diabetes mellitus, uncontrolled, with A1c of 9.7% at baseline, Exacerbated by Steroid. Patient was started on Solu-Medrol in 4/14/2020, with Solu-Medrol 40 mg twice daily.  Since then she had been having elevated glucose level.  On 4/15 2020, patient received low-dose sliding scale, a total of 13 units of Humalog, she also received Lantus 8 units at that time.  Glucose this morning on serum was 379 mg/dl.   On last admission, patient was on Lantus 15 units and Humalog 7 units 3 times daily with meals.  In light of the steroid dosage.  -We will give a stat dose of NPH 15 units right now.  Order for 11 AM 4/16/202  -We will increase Humalog to 8 units 3 times daily with meals  -Lantus 22 units tonight.   -We will increase Humalog to moderate dose sliding scale q. before meals and at bedtime  -Monitor glucose QA [facility].  -Once the steroids are off, will reduce the insulin to her prior admission dose which is Lantus 15 and Humalog 7 units 3 times daily with meals with low-dose sliding scale.    #2 Hypertension  Blood pressure goal 140/90  BP med currently being held due to current infectious state.  Continue to monitor.    #3 Hyperlipidemia  Consider statin therapy outpatient. Patient is a 74-year-old, Creole speaking female, with past medical history of hypertension, hyperlipidemia, mild cognitive impairment, depression, type 2 diabetes mellitus, presenting with fever and poor appetite, found to have COVID 19+ infection.    #1 type 2 diabetes mellitus, uncontrolled, with A1c of 9.7% at baseline, Exacerbated by Steroid. Patient was started on Solu-Medrol in 4/14/2020, with Solu-Medrol 40 mg twice daily.  Since then she had been having elevated glucose level.  On 4/15 2020, patient received low-dose sliding scale, a total of 13 units of Humalog, she also received Lantus 8 units at that time.  Glucose this morning on serum was 379 mg/dl.   On last admission, patient was on Lantus 15 units and Humalog 7 units 3 times daily with meals.  In light of the steroid dosage.  -We will give a stat dose of NPH 15 units right now.  Order for 11 AM 4/16/202  -We will increase Humalog to 8 units 3 times daily with meals  -Lantus 26 units tonight.   -We will increase Hum	alog to moderate dose sliding scale q. before meals and at bedtime  -Monitor glucose QA [facility].  -Once the steroids are off, will reduce the insulin to her prior admission dose which is Lantus 15 and Humalog 7 units 3 times daily with meals with low-dose sliding scale.    #2 Hypertension  Blood pressure goal 140/90  BP med currently being held due to current infectious state.  Continue to monitor.    #3 Hyperlipidemia  Consider statin therapy outpatient.

## 2020-04-16 NOTE — PROGRESS NOTE ADULT - SUBJECTIVE AND OBJECTIVE BOX
Pulmonary Consult Note     HPI:  75yo woman with cough and dyspnea.  She is diagnosed with Pneumonia due to COVID 19.  Her oxygen saturation of 70% on room air and pt was placed on NRB with improvement in O2 sats to 98%     PMH HTN, HLD, and DMII, Depression      She is comfortable on NC O2     on hydroxychloroquine and methylprednisolone     SH non smoker    CXR    (04.14.20 @ 12:19) >    Overlying EKG leads are noted. The heart is normal in size. Fullness in the superior mediastinum is most likely artifactually related to the AP /oblique positioning.. Peripheral RIGHT lung and LEFT base consolidation is observed. No pleural effusion or pneumothorax.    IMPRESSION: Bilateral pneumonia most compatible atypical viral/COVID 19 pneumonia.      MEDICATIONS  (STANDING):  amLODIPine   Tablet 10 milliGRAM(s) Oral daily  aspirin enteric coated 81 milliGRAM(s) Oral daily  dextrose 5%. 1000 milliLiter(s) (50 mL/Hr) IV Continuous <Continuous>  dextrose 50% Injectable 12.5 Gram(s) IV Push once  dextrose 50% Injectable 25 Gram(s) IV Push once  dextrose 50% Injectable 25 Gram(s) IV Push once  enoxaparin Injectable 40 milliGRAM(s) SubCutaneous daily  hydroxychloroquine   Oral   hydroxychloroquine 400 milliGRAM(s) Oral every 24 hours  insulin glargine Injectable (LANTUS) 22 Unit(s) SubCutaneous at bedtime  insulin lispro (HumaLOG) corrective regimen sliding scale   SubCutaneous three times a day before meals  insulin lispro (HumaLOG) corrective regimen sliding scale   SubCutaneous at bedtime  insulin lispro Injectable (HumaLOG) 8 Unit(s) SubCutaneous three times a day before meals  methylPREDNISolone sodium succinate Injectable 40 milliGRAM(s) IV Push two times a day  pantoprazole    Tablet 40 milliGRAM(s) Oral before breakfast  risperiDONE   Tablet 1 milliGRAM(s) Oral daily  sodium chloride 0.9%. 1000 milliLiter(s) (60 mL/Hr) IV Continuous <Continuous>    Vital Signs Last 24 Hrs  T(C): 37.3 (16 Apr 2020 11:30), Max: 37.4 (15 Apr 2020 20:30)  T(F): 99.1 (16 Apr 2020 11:30), Max: 99.3 (15 Apr 2020 20:30)  HR: 100 (16 Apr 2020 11:30) (98 - 109)  BP: 141/58 (16 Apr 2020 11:30) (134/54 - 141/58)  BP(mean): --  RR: 20 (16 Apr 2020 11:30) (20 - 20)  SpO2: 95% (16 Apr 2020 11:30) (95% - 96%)        DATA                           9.3    12.85 )-----------( 368      ( 16 Apr 2020 08:00 )             27.7       04-16    126<L>  |  91<L>  |  42<H>  ----------------------------<  379<H>  4.8   |  19<L>  |  1.87<H>    Ca    8.6      16 Apr 2020 08:00  Phos  4.4     04-15  Mg     1.9     04-15    TPro  6.8  /  Alb  2.9<L>  /  TBili  < 0.2<L>  /  DBili  x   /  AST  36<H>  /  ALT  25  /  AlkPhos  48  04-16      C-Reactive Protein, Serum: 201.0 mg/L (04.14.20 @ 11:51)      D-Dimer Assay, Quantitative: 662: A result less than 230 ng/mL DDU correlates with the absence  of thrombosis in a patient with low and moderate pre-test  probability of thrombosis.    Note: 1 ng/ml DDU   2 ng/ml FEU  If you have any questions, please contact Hematology Lab at  ext. [phone]. ng/mL (04.14.20 @ 11:51)          	    IMPRESSION     Pneumonia and Respiratory  failure due to COVID 19   Elevated parameters of systemic inflammation and abnormal coagulation consistent with increased cytokine release     on hydroxychloroquine and methylprednisolone, with O2 NC  stable respiratory status, off NRB, on NC    hyponatremia    CXR bilateral pulmonary infiltrates     renal failure persists, will monitor        PLAN     Continue  on hydroxychloroquine, systemic steroid, supplemental oxygen, DVT prophylaxis with enoxaparin       Continue to monitor respiratory status and parameters of systemic inflammation and abnormal coagulation     follow CRP Ddimer to determine need for full dose anticoagulation  with enoxaparin or interleukin dimer      Labs, meds radiographic studies reviewed    Norm Barlow MD [phone]    PULMONARY    MD Herson Willis MD George Haralambou, MD    [phone]

## 2020-04-16 NOTE — CHART NOTE - NSCHARTNOTEFT_GEN_A_CORE
ID consult called this AM with Dr. Valverde who will discuss case with primary attending/pulmonary.     Endocrine consult called this AM as patients FS elevated on steroids. Given 6u of sliding scale insulin. Will f/u endocrinology recommendations for optimal control.    Na decreased to 126 today, Ur Na < 20. Discussed with renal - hyponatremia likely in setting of hypovolemia - will hydrate with NS @ 60cc/hr x 5 hours then stop.     Above events, case and plan discussed with Dr. Hahn.

## 2020-04-16 NOTE — CONSULT NOTE ADULT - ASSESSMENT
76 year old with poorly controlled DM and hypertension presents with fever and diarrhea- found to have hypoxia.    COVID with multifocal viral pneumonia  Complicated by hypoxia.    There are no know treatments for COVID.    Care is supportive     Continue airborne / contact precautions.    There have been anecdotal reports of use of hydroxychloroquine.  In a patient with comorbidities that place them at higher risk for poor outcomes who is currently requring oxygen, a trial of hydroxychloroquine seems reasonable.    Il-1 and Il-6 inhibitors have been used in some patients to treat possible cytokine storm / secondary HLH due to COVID.    The risk of this therapy, is that these therapies are immunosuppresive and there has not been data/ studies to date on their efficacy.    There use is currently being considered in patients with poor prognosis who :   ---- have sustained oxygen saturation less than 92 % on Non-rebreather    -----With:                 1) ferritin over 700 or                  2) CRP over 35 mg/ DL equal to 350 mg/L                  3) Change in CRP of 15                  4) D dimer greater than 1000  	  In this case, although the patient has hypoxia, her sat is above 94 percent and her inflammatory markers are not clearly indicative of cytokine storm.    I would suggest continuing to monitor her oxygen requirement and repeat her crp/ferritin/ D dimer in 48 hours

## 2020-04-16 NOTE — CONSULT NOTE ADULT - SUBJECTIVE AND OBJECTIVE BOX
HPI:  73yo F with Mhx of HTN, HLD, and DMII, Depression, presenting to the ED with SOB.   Collateral obtained from daughter Nelsy- As per daughter, pt had an episode of diarrhea  4 days back which resolved spontaneously   Fever of 100.2 last night, broke with Tylenol. Pt continued to have another episode of fever during the night   This AM, pt was very weak, was not able to walk and, was  dehydrated and confused. Decreased PO appetite  Daughter reports symptoms similar to previous admission in December 2019  when she was admitted for low sodium  Functional status- lives with daughter. Walks without device. Speaks creole, daughter states she  does understand English and is normally interactive and independent, walks with walker at baseline.  In the ED, vitals were significant for  oxygen satruation of 70% on room air and pt was placed on NRB with improvement in O2 sats to 98% (14 Apr 2020 16:38)    Patient is a 74-year-old female with history of uncontrolled diabetes mellitus, A1c 9.7% during this admission, 8.8% in December 2019.  Hypertension, hyperlipidemia.  History is obtained from daughter on phone.  Patient was diagnosed with diabetes for about 3 years.  Last admission, she was discharged on metformin 1000 twice daily, Januvia 25 mg once daily and Prandin 3 times daily with meals.  Daughter reports that her mom checks her glucose at least once a day, glucose range has always been normal.  They were never above 200 mg/dL.  Daughter denies any recent episodes of hypoglycemia.    She follows up with her primary care doctor and does not see an endocrinologist.  Daughter and nursing staff reports that her appetite has been good since her admission.    Daughter reports there is no history of diabetic retinopathy, patient had a history of neuropathy.  No history of diabetic nephropathy.  She was living alone before but recently living with her daughter she has been taking care of her.  Regarding hypertension, patient is adherent with her blood pressure medications.  Regarding hyperlipidemia, patient is adherent with her cholesterol medications.  Patient was started on Solu-Medrol in 4/14/2020, with Solu-Medrol 40 mg twice daily.  Since then she had been having elevated glucose level.  On 4/15 2020, patient received low-dose sliding scale, a total of 13 units of Humalog, she also received Lantus 8 units at that time.  Glucose this morning on serum was 379 mg/dl.       PAST MEDICAL & SURGICAL HISTORY:  Anxiety and depression  Hypercholesterolemia  Hypertension  Diabetes mellitus  No significant past surgical history      FAMILY HISTORY:  No pertinent family history in first degree relatives      Social History:  NO SMOKING  NO ALCOHOL  NO DRUGS    Home Medications:  · 	Norvasc 10 mg oral tablet: 1 tab(s) orally once a day   · 	losartan 100 mg oral tablet: 1 tab(s) orally once a day  · 	Prandin 1 mg oral tablet: 1 tab(s) orally 3 times a day (before meals)   · 	aspirin 81 mg oral delayed release tablet: 1 tab(s) orally once a day  · 	metFORMIN 1000 mg oral tablet: 1 tab(s) orally 2 times a day  · 	Januvia 25 mg oral tablet: 1 tab(s) orally once a day  · 	risperiDONE 1 mg oral tablet: 1 milligram(s) orally once a day    MEDICATIONS  (STANDING):  amLODIPine   Tablet 10 milliGRAM(s) Oral daily  aspirin enteric coated 81 milliGRAM(s) Oral daily  dextrose 5%. 1000 milliLiter(s) (50 mL/Hr) IV Continuous <Continuous>  dextrose 50% Injectable 12.5 Gram(s) IV Push once  dextrose 50% Injectable 25 Gram(s) IV Push once  dextrose 50% Injectable 25 Gram(s) IV Push once  enoxaparin Injectable 40 milliGRAM(s) SubCutaneous daily  hydroxychloroquine   Oral   hydroxychloroquine 400 milliGRAM(s) Oral every 24 hours  insulin glargine Injectable (LANTUS) 22 Unit(s) SubCutaneous at bedtime  insulin lispro (HumaLOG) corrective regimen sliding scale   SubCutaneous three times a day before meals  insulin lispro (HumaLOG) corrective regimen sliding scale   SubCutaneous at bedtime  insulin lispro Injectable (HumaLOG) 8 Unit(s) SubCutaneous three times a day before meals  insulin NPH human recombinant 15 Unit(s) SubCutaneous once  methylPREDNISolone sodium succinate Injectable 40 milliGRAM(s) IV Push two times a day  pantoprazole    Tablet 40 milliGRAM(s) Oral before breakfast  risperiDONE   Tablet 1 milliGRAM(s) Oral daily  sodium chloride 0.9%. 1000 milliLiter(s) (60 mL/Hr) IV Continuous <Continuous>    MEDICATIONS  (PRN):  acetaminophen   Tablet .. 650 milliGRAM(s) Oral every 6 hours PRN Temp greater or equal to 38C (100.4F), Mild Pain (1 - 3), Moderate Pain (4 - 6), Severe Pain (7 - 10)  dextrose 40% Gel 15 Gram(s) Oral once PRN Blood Glucose LESS THAN 70 milliGRAM(s)/deciliter  glucagon  Injectable 1 milliGRAM(s) IntraMuscular once PRN Glucose LESS THAN 70 milligrams/deciliter      Allergies    peanuts (Short breath; Hives)  penicillin (Short breath; Hives)    Review of Systems:  UNABLE TO OBTAIN    PHYSICAL EXAM:  -----------------------------  VITALS: T(C): 37.4 (04-15-20 @ 20:30)  T(F): 99.3 (04-15-20 @ 20:30), Max: 99.3 (04-15-20 @ 20:30)  HR: 98 (04-16-20 @ 06:13) (98 - 109)  BP: 139/71 (04-16-20 @ 06:13) (131/62 - 139/71)  RR:  (20 - 20)  SpO2:  (94% - 96%)  Wt(kg): --    POCT Blood Glucose.: 404 mg/dL (04-16-20 @ 08:09)  POCT Blood Glucose.: 419 mg/dL (04-16-20 @ 08:07)  POCT Blood Glucose.: 355 mg/dL (04-15-20 @ 20:52)  POCT Blood Glucose.: 327 mg/dL (04-15-20 @ 16:59)  POCT Blood Glucose.: 370 mg/dL (04-15-20 @ 11:56)  POCT Blood Glucose.: 340 mg/dL (04-15-20 @ 08:03)  POCT Blood Glucose.: 193 mg/dL (04-14-20 @ 21:23)  POCT Blood Glucose.: 188 mg/dL (04-14-20 @ 17:08)                            9.3    12.85 )-----------( 368      ( 16 Apr 2020 08:00 )             27.7       04-16    126<L>  |  91<L>  |  42<H>  ----------------------------<  379<H>  4.8   |  19<L>  |  1.87<H>    EGFR if : 30  EGFR if non : 26    Ca    8.6      04-16  Mg     1.9     04-15  Phos  4.4     04-15    TPro  6.8  /  Alb  2.9<L>  /  TBili  < 0.2<L>  /  DBili  x   /  AST  36<H>  /  ALT  25  /  AlkPhos  48  04-16      Thyroid Function Tests:              Radiology:   ------------------------ HPI:  73yo F with Mhx of HTN, HLD, and DMII, Depression, presenting to the ED with SOB.   Collateral obtained from daughter Nelsy- As per daughter, pt had an episode of diarrhea  4 days back which resolved spontaneously   Fever of 100.2 last night, broke with Tylenol. Pt continued to have another episode of fever during the night   This AM, pt was very weak, was not able to walk and, was  dehydrated and confused. Decreased PO appetite  Daughter reports symptoms similar to previous admission in December 2019  when she was admitted for low sodium  Functional status- lives with daughter. Walks without device. Speaks creole, daughter states she  does understand English and is normally interactive and independent, walks with walker at baseline.  In the ED, vitals were significant for  oxygen satruation of 70% on room air and pt was placed on NRB with improvement in O2 sats to 98% (14 Apr 2020 16:38)    Patient is a 74-year-old female with history of uncontrolled diabetes mellitus, A1c 9.7% during this admission, 8.8% in December 2019.  Hypertension, hyperlipidemia.  History is obtained from daughter on phone.  Patient was diagnosed with diabetes for about 3 years.  Last admission, she was discharged on metformin 1000 twice daily, Januvia 25 mg once daily and Prandin 3 times daily with meals.  Daughter reports that her mom checks her glucose at least once a day, glucose range has always been normal.  They were never above 200 mg/dL.  Daughter denies any recent episodes of hypoglycemia.    She follows up with her primary care doctor and does not see an endocrinologist.  Daughter and nursing staff reports that her appetite has been good since her admission.    Daughter reports there is no history of diabetic retinopathy, patient had a history of neuropathy.  No history of diabetic nephropathy.  She was living alone before but recently living with her daughter she has been taking care of her.  Regarding hypertension, patient is adherent with her blood pressure medications.  Regarding hyperlipidemia, patient is adherent with her cholesterol medications.  Patient was started on Solu-Medrol in 4/14/2020, with Solu-Medrol 40 mg twice daily.  Since then she had been having elevated glucose level.  On 4/15 2020, patient received low-dose sliding scale, a total of 13 units of Humalog, she also received Lantus 8 units at that time.  Glucose this morning on serum was 379 mg/dl.       PAST MEDICAL & SURGICAL HISTORY:  Anxiety and depression  Hypercholesterolemia  Hypertension  Diabetes mellitus  No significant past surgical history      FAMILY HISTORY:  No pertinent family history in first degree relatives      Social History:  NO SMOKING  NO ALCOHOL  NO DRUGS    Home Medications:  · 	Norvasc 10 mg oral tablet: 1 tab(s) orally once a day   · 	losartan 100 mg oral tablet: 1 tab(s) orally once a day  · 	Prandin 1 mg oral tablet: 1 tab(s) orally 3 times a day (before meals)   · 	aspirin 81 mg oral delayed release tablet: 1 tab(s) orally once a day  · 	metFORMIN 1000 mg oral tablet: 1 tab(s) orally 2 times a day  · 	Januvia 25 mg oral tablet: 1 tab(s) orally once a day  · 	risperiDONE 1 mg oral tablet: 1 milligram(s) orally once a day    MEDICATIONS  (STANDING):  amLODIPine   Tablet 10 milliGRAM(s) Oral daily  aspirin enteric coated 81 milliGRAM(s) Oral daily  dextrose 5%. 1000 milliLiter(s) (50 mL/Hr) IV Continuous <Continuous>  dextrose 50% Injectable 12.5 Gram(s) IV Push once  dextrose 50% Injectable 25 Gram(s) IV Push once  dextrose 50% Injectable 25 Gram(s) IV Push once  enoxaparin Injectable 40 milliGRAM(s) SubCutaneous daily  hydroxychloroquine   Oral   hydroxychloroquine 400 milliGRAM(s) Oral every 24 hours  insulin glargine Injectable (LANTUS) 22 Unit(s) SubCutaneous at bedtime  insulin lispro (HumaLOG) corrective regimen sliding scale   SubCutaneous three times a day before meals  insulin lispro (HumaLOG) corrective regimen sliding scale   SubCutaneous at bedtime  insulin lispro Injectable (HumaLOG) 8 Unit(s) SubCutaneous three times a day before meals  insulin NPH human recombinant 15 Unit(s) SubCutaneous once  methylPREDNISolone sodium succinate Injectable 40 milliGRAM(s) IV Push two times a day  pantoprazole    Tablet 40 milliGRAM(s) Oral before breakfast  risperiDONE   Tablet 1 milliGRAM(s) Oral daily  sodium chloride 0.9%. 1000 milliLiter(s) (60 mL/Hr) IV Continuous <Continuous>    MEDICATIONS  (PRN):  acetaminophen   Tablet .. 650 milliGRAM(s) Oral every 6 hours PRN Temp greater or equal to 38C (100.4F), Mild Pain (1 - 3), Moderate Pain (4 - 6), Severe Pain (7 - 10)  dextrose 40% Gel 15 Gram(s) Oral once PRN Blood Glucose LESS THAN 70 milliGRAM(s)/deciliter  glucagon  Injectable 1 milliGRAM(s) IntraMuscular once PRN Glucose LESS THAN 70 milligrams/deciliter      Allergies    peanuts (Short breath; Hives)  penicillin (Short breath; Hives)    Review of Systems:  UNABLE TO OBTAIN    PHYSICAL EXAM:  -----------------------------  VITALS: T(C): 37.4 (04-15-20 @ 20:30)  T(F): 99.3 (04-15-20 @ 20:30), Max: 99.3 (04-15-20 @ 20:30)  HR: 98 (04-16-20 @ 06:13) (98 - 109)  BP: 139/71 (04-16-20 @ 06:13) (131/62 - 139/71)  RR:  (20 - 20)  SpO2:  (94% - 96%)  Wt(kg): --  In accordance with current stands limiting patient contact, inpatient physical exam referenced from today's Progress Note 4/16/2020 by Dr. Selma M.D.FACP  GENERAL: Alert and awake lying in bed in no distress  HEAD:  Atraumatic, Normocephalic  EYES: EOMI, MATEUS, conjunctiva and sclera clear  NECK: Supple, No JVD, Normal thyroid  NERVOUS SYSTEM:  Alert & Oriented X3, Motor and sensory systems are intact,   CHEST/LUNG: mild b/ rhonchi  HEART: Regular rate and rhythm; No murmurs, rubs, or gallops  ABDOMEN: Soft, Nontender, Nondistended; Bowel sounds present  EXTREMITIES:   Peripheral Pulses are palpable, no  edema      POCT Blood Glucose.: 404 mg/dL (04-16-20 @ 08:09)  POCT Blood Glucose.: 419 mg/dL (04-16-20 @ 08:07)  POCT Blood Glucose.: 355 mg/dL (04-15-20 @ 20:52)  POCT Blood Glucose.: 327 mg/dL (04-15-20 @ 16:59)  POCT Blood Glucose.: 370 mg/dL (04-15-20 @ 11:56)  POCT Blood Glucose.: 340 mg/dL (04-15-20 @ 08:03)  POCT Blood Glucose.: 193 mg/dL (04-14-20 @ 21:23)  POCT Blood Glucose.: 188 mg/dL (04-14-20 @ 17:08)                            9.3    12.85 )-----------( 368      ( 16 Apr 2020 08:00 )             27.7       04-16    126<L>  |  91<L>  |  42<H>  ----------------------------<  379<H>  4.8   |  19<L>  |  1.87<H>    EGFR if : 30  EGFR if non : 26    Ca    8.6      04-16  Mg     1.9     04-15  Phos  4.4     04-15    TPro  6.8  /  Alb  2.9<L>  /  TBili  < 0.2<L>  /  DBili  x   /  AST  36<H>  /  ALT  25  /  AlkPhos  48  04-16      Thyroid Function Tests:              Radiology:   ------------------------

## 2020-04-16 NOTE — PROGRESS NOTE ADULT - ASSESSMENT
74F PMH HTN, DM2 admitted with low oxygen sats upon triage found to be covid +. Nephrology consulted for Hyponatremia     Hyponatremia   Pt with acute on chronic hyponatremia  hx of recent admission with hyponatremia in the past secondary to polydipsia/ HCTZ   also in the setting of elevated glucose   urine work up suggested hypovolemia   Na slightly worsen today. recommended NS @ 60cc/hr x5hrs  Advise to optimize DM control (given high serum osm)  caution with IVF given hypoxia, poor resp status.    monitor Na     HTN  BP controlled at present     SUNIL on CKD III  Baseline Scr 1 with GFR 45 12/2019   Pt now admitted with SUNIL in the setting of covid 19 infection vs. ATN vs prerenal in setting of hyperglycemia  Scr slightly better today  IVF as above  optimize dm control  Monitor urine output   Avoid Nephrotoxins     Acidosis  serum bicarb low  Monitor at present     Anemia  Hb low  Monitor at present 74F PMH HTN, DM2 admitted with low oxygen sats upon triage found to be covid +. Nephrology consulted for Hyponatremia     Hyponatremia   Pt with acute on chronic hyponatremia  hx of recent admission with hyponatremia in the past secondary to polydipsia/ HCTZ   also in the setting of elevated glucose   urine work up suggested hypovolemia   Na slightly worsened today. recommended NS @ 60cc/hr x5hrs only  Advise to optimize DM control (given high serum osm)  caution with IVF given hypoxia, poor resp status.    monitor Na     HTN  BP controlled at present     SUNIL on CKD III  Baseline Scr 1 with GFR 45 12/2019   Pt now admitted with SUNIL in the setting of covid 19 infection vs. ATN vs prerenal in setting of hyperglycemia  Scr slightly improved today  IVF as above  optimize dm control  Monitor urine output   Avoid Nephrotoxins     Acidosis  serum bicarb low  Monitor at present     Anemia  Hb low  Monitor at present     Covid +

## 2020-04-17 LAB
ALBUMIN SERPL ELPH-MCNC: 3.1 G/DL — LOW (ref 3.3–5)
ALP SERPL-CCNC: 56 U/L — SIGNIFICANT CHANGE UP (ref 40–120)
ALT FLD-CCNC: 32 U/L — SIGNIFICANT CHANGE UP (ref 4–33)
ANION GAP SERPL CALC-SCNC: 17 MMO/L — HIGH (ref 7–14)
AST SERPL-CCNC: 40 U/L — HIGH (ref 4–32)
BILIRUB SERPL-MCNC: 0.2 MG/DL — SIGNIFICANT CHANGE UP (ref 0.2–1.2)
BUN SERPL-MCNC: 47 MG/DL — HIGH (ref 7–23)
CALCIUM SERPL-MCNC: 8.7 MG/DL — SIGNIFICANT CHANGE UP (ref 8.4–10.5)
CHLORIDE SERPL-SCNC: 93 MMOL/L — LOW (ref 98–107)
CO2 SERPL-SCNC: 21 MMOL/L — LOW (ref 22–31)
CREAT SERPL-MCNC: 1.76 MG/DL — HIGH (ref 0.5–1.3)
CRP SERPL-MCNC: 48.8 MG/L — HIGH
D DIMER BLD IA.RAPID-MCNC: 296 NG/ML — SIGNIFICANT CHANGE UP
FERRITIN SERPL-MCNC: 618 NG/ML — HIGH (ref 15–150)
GLUCOSE BLDC GLUCOMTR-MCNC: 230 MG/DL — HIGH (ref 70–99)
GLUCOSE BLDC GLUCOMTR-MCNC: 248 MG/DL — HIGH (ref 70–99)
GLUCOSE BLDC GLUCOMTR-MCNC: 253 MG/DL — HIGH (ref 70–99)
GLUCOSE BLDC GLUCOMTR-MCNC: 313 MG/DL — HIGH (ref 70–99)
GLUCOSE SERPL-MCNC: 242 MG/DL — HIGH (ref 70–99)
HCT VFR BLD CALC: 32.9 % — LOW (ref 34.5–45)
HGB BLD-MCNC: 10.7 G/DL — LOW (ref 11.5–15.5)
LDH SERPL L TO P-CCNC: 480 U/L — HIGH (ref 135–225)
MAGNESIUM SERPL-MCNC: 2 MG/DL — SIGNIFICANT CHANGE UP (ref 1.6–2.6)
MCHC RBC-ENTMCNC: 25.5 PG — LOW (ref 27–34)
MCHC RBC-ENTMCNC: 32.5 % — SIGNIFICANT CHANGE UP (ref 32–36)
MCV RBC AUTO: 78.5 FL — LOW (ref 80–100)
NRBC # FLD: 0 K/UL — SIGNIFICANT CHANGE UP (ref 0–0)
PHOSPHATE SERPL-MCNC: 3.6 MG/DL — SIGNIFICANT CHANGE UP (ref 2.5–4.5)
PLATELET # BLD AUTO: 462 K/UL — HIGH (ref 150–400)
PMV BLD: 10.4 FL — SIGNIFICANT CHANGE UP (ref 7–13)
POTASSIUM SERPL-MCNC: 4.5 MMOL/L — SIGNIFICANT CHANGE UP (ref 3.5–5.3)
POTASSIUM SERPL-SCNC: 4.5 MMOL/L — SIGNIFICANT CHANGE UP (ref 3.5–5.3)
PROCALCITONIN SERPL-MCNC: 0.54 NG/ML — HIGH (ref 0.02–0.1)
PROT SERPL-MCNC: 6.6 G/DL — SIGNIFICANT CHANGE UP (ref 6–8.3)
RBC # BLD: 4.19 M/UL — SIGNIFICANT CHANGE UP (ref 3.8–5.2)
RBC # FLD: 13.4 % — SIGNIFICANT CHANGE UP (ref 10.3–14.5)
SODIUM SERPL-SCNC: 131 MMOL/L — LOW (ref 135–145)
WBC # BLD: 12.08 K/UL — HIGH (ref 3.8–10.5)
WBC # FLD AUTO: 12.08 K/UL — HIGH (ref 3.8–10.5)

## 2020-04-17 PROCEDURE — 99223 1ST HOSP IP/OBS HIGH 75: CPT

## 2020-04-17 PROCEDURE — 93010 ELECTROCARDIOGRAM REPORT: CPT

## 2020-04-17 RX ORDER — INSULIN LISPRO 100/ML
12 VIAL (ML) SUBCUTANEOUS
Refills: 0 | Status: DISCONTINUED | OUTPATIENT
Start: 2020-04-17 | End: 2020-04-18

## 2020-04-17 RX ORDER — INSULIN GLARGINE 100 [IU]/ML
30 INJECTION, SOLUTION SUBCUTANEOUS AT BEDTIME
Refills: 0 | Status: DISCONTINUED | OUTPATIENT
Start: 2020-04-17 | End: 2020-04-18

## 2020-04-17 RX ADMIN — ENOXAPARIN SODIUM 40 MILLIGRAM(S): 100 INJECTION SUBCUTANEOUS at 12:37

## 2020-04-17 RX ADMIN — Medication 8 UNIT(S): at 08:15

## 2020-04-17 RX ADMIN — Medication 4: at 17:24

## 2020-04-17 RX ADMIN — Medication 12 UNIT(S): at 17:24

## 2020-04-17 RX ADMIN — Medication 40 MILLIGRAM(S): at 17:25

## 2020-04-17 RX ADMIN — Medication 40 MILLIGRAM(S): at 05:14

## 2020-04-17 RX ADMIN — PANTOPRAZOLE SODIUM 40 MILLIGRAM(S): 20 TABLET, DELAYED RELEASE ORAL at 05:14

## 2020-04-17 RX ADMIN — Medication 8 UNIT(S): at 12:38

## 2020-04-17 RX ADMIN — Medication 6: at 08:14

## 2020-04-17 RX ADMIN — INSULIN GLARGINE 30 UNIT(S): 100 INJECTION, SOLUTION SUBCUTANEOUS at 21:16

## 2020-04-17 RX ADMIN — Medication 400 MILLIGRAM(S): at 17:39

## 2020-04-17 RX ADMIN — RISPERIDONE 1 MILLIGRAM(S): 4 TABLET ORAL at 12:37

## 2020-04-17 RX ADMIN — AMLODIPINE BESYLATE 10 MILLIGRAM(S): 2.5 TABLET ORAL at 05:14

## 2020-04-17 RX ADMIN — Medication 81 MILLIGRAM(S): at 12:37

## 2020-04-17 RX ADMIN — Medication 8: at 12:38

## 2020-04-17 NOTE — CHART NOTE - NSCHARTNOTEFT_GEN_A_CORE
Per current hospital medicine emergency protocol, in an effort to reduce COVID exposures and also conserve PPE for necessary encounters, below information has been obtained from chart review, nursing/care team and providers without direct patient contact.    Patient is a 74-year-old, Creole speaking female, with past medical history of hypertension, hyperlipidemia, mild cognitive impairment, depression, type 2 diabetes mellitus, presenting with fever and poor appetite, found to have COVID 19+ infection.    MEDICATIONS  (STANDING):  amLODIPine   Tablet 10 milliGRAM(s) Oral daily  aspirin enteric coated 81 milliGRAM(s) Oral daily  dextrose 5%. 1000 milliLiter(s) (50 mL/Hr) IV Continuous <Continuous>  dextrose 50% Injectable 12.5 Gram(s) IV Push once  dextrose 50% Injectable 25 Gram(s) IV Push once  dextrose 50% Injectable 25 Gram(s) IV Push once  enoxaparin Injectable 40 milliGRAM(s) SubCutaneous daily  hydroxychloroquine   Oral   hydroxychloroquine 400 milliGRAM(s) Oral every 24 hours  insulin glargine Injectable (LANTUS) 26 Unit(s) SubCutaneous at bedtime  insulin lispro (HumaLOG) corrective regimen sliding scale   SubCutaneous three times a day before meals  insulin lispro (HumaLOG) corrective regimen sliding scale   SubCutaneous at bedtime  insulin lispro Injectable (HumaLOG) 8 Unit(s) SubCutaneous three times a day before meals  methylPREDNISolone sodium succinate Injectable 40 milliGRAM(s) IV Push two times a day  pantoprazole    Tablet 40 milliGRAM(s) Oral before breakfast  risperiDONE   Tablet 1 milliGRAM(s) Oral daily  sodium chloride 0.9%. 1000 milliLiter(s) (60 mL/Hr) IV Continuous <Continuous>    CAPILLARY BLOOD GLUCOSE  POCT Blood Glucose.: 313 mg/dL (17 Apr 2020 11:45)  POCT Blood Glucose.: 253 mg/dL (17 Apr 2020 07:56)  POCT Blood Glucose.: 231 mg/dL (16 Apr 2020 21:19)  POCT Blood Glucose.: 309 mg/dL (16 Apr 2020 16:37)    04-17    131<L>  |  93<L>  |  47<H>  ----------------------------<  242<H>  4.5   |  21<L>  |  1.76<H>    Ca    8.7      17 Apr 2020 04:58  Phos  3.6     04-17  Mg     2.0     04-17    TPro  6.6  /  Alb  3.1<L>  /  TBili  0.2  /  DBili  x   /  AST  40<H>  /  ALT  32  /  AlkPhos  56  04-17    Anion Gap, Serum: 17 mmo/L (04-17-20 @ 04:58)  Anion Gap, Serum: 16 mmo/L (04-16-20 @ 18:50)  Anion Gap, Serum: 16 mmo/L (04-16-20 @ 08:00)  Anion Gap, Serum: 16 mmo/L (04-15-20 @ 06:00)  Anion Gap, Serum: 15 mmo/L (04-14-20 @ 18:45)    Hemoglobin A1C, Whole Blood: 8.8 % (12-13-19 @ 06:00)      DM 2 follow up  Please refer to complete consult from 4/16/20 for available history and plan of care   Uncontrolled with hyperglycemia   Will increase Lantus to 30 units   Will increase pre-meal Humalog to 12 units TID before meals   Will continue MODERATE Humalog correctional scales before meals and bedtime   Consistent carbohydrate diet   FS before meals and bedtime   Target glucose 100-200 mg/dL    Discharge planning: Patient was on multiple oral medications, will likely resume a PO regimen pending GRF and Lactate closer to discharge   Will need to confirm doses she was taking   Endocrine  Per current hospital medicine emergency protocol, in an effort to reduce COVID exposures and also conserve PPE for necessary encounters, below information has been obtained from chart review, nursing/care team and providers without direct patient contact.    Patient is a 74-year-old, Creole speaking female, with past medical history of hypertension, hyperlipidemia, mild cognitive impairment, depression, type 2 diabetes mellitus, presenting with fever and poor appetite, found to have COVID 19+ infection.    MEDICATIONS  (STANDING):  amLODIPine   Tablet 10 milliGRAM(s) Oral daily  aspirin enteric coated 81 milliGRAM(s) Oral daily  dextrose 5%. 1000 milliLiter(s) (50 mL/Hr) IV Continuous <Continuous>  dextrose 50% Injectable 12.5 Gram(s) IV Push once  dextrose 50% Injectable 25 Gram(s) IV Push once  dextrose 50% Injectable 25 Gram(s) IV Push once  enoxaparin Injectable 40 milliGRAM(s) SubCutaneous daily  hydroxychloroquine   Oral   hydroxychloroquine 400 milliGRAM(s) Oral every 24 hours  insulin glargine Injectable (LANTUS) 26 Unit(s) SubCutaneous at bedtime  insulin lispro (HumaLOG) corrective regimen sliding scale   SubCutaneous three times a day before meals  insulin lispro (HumaLOG) corrective regimen sliding scale   SubCutaneous at bedtime  insulin lispro Injectable (HumaLOG) 8 Unit(s) SubCutaneous three times a day before meals  methylPREDNISolone sodium succinate Injectable 40 milliGRAM(s) IV Push two times a day  pantoprazole    Tablet 40 milliGRAM(s) Oral before breakfast  risperiDONE   Tablet 1 milliGRAM(s) Oral daily  sodium chloride 0.9%. 1000 milliLiter(s) (60 mL/Hr) IV Continuous <Continuous>    CAPILLARY BLOOD GLUCOSE  POCT Blood Glucose.: 313 mg/dL (17 Apr 2020 11:45)  POCT Blood Glucose.: 253 mg/dL (17 Apr 2020 07:56)  POCT Blood Glucose.: 231 mg/dL (16 Apr 2020 21:19)  POCT Blood Glucose.: 309 mg/dL (16 Apr 2020 16:37)    04-17    131<L>  |  93<L>  |  47<H>  ----------------------------<  242<H>  4.5   |  21<L>  |  1.76<H>    Ca    8.7      17 Apr 2020 04:58  Phos  3.6     04-17  Mg     2.0     04-17    TPro  6.6  /  Alb  3.1<L>  /  TBili  0.2  /  DBili  x   /  AST  40<H>  /  ALT  32  /  AlkPhos  56  04-17    Anion Gap, Serum: 17 mmo/L (04-17-20 @ 04:58)  Anion Gap, Serum: 16 mmo/L (04-16-20 @ 18:50)  Anion Gap, Serum: 16 mmo/L (04-16-20 @ 08:00)  Anion Gap, Serum: 16 mmo/L (04-15-20 @ 06:00)  Anion Gap, Serum: 15 mmo/L (04-14-20 @ 18:45)    Hemoglobin A1C, Whole Blood: 8.8 % (12-13-19 @ 06:00)      DM 2 follow up - hyperglycemia exacerbated by steroids   Please refer to complete consult from 4/16/20 for available history and plan of care   Uncontrolled with hyperglycemia   Steroids continue as Solumedrol twice daily   Will increase Lantus to 30 units   Will increase pre-meal Humalog to 12 units TID before meals   Will continue MODERATE Humalog correctional scales before meals and bedtime   Consistent carbohydrate diet   FS before meals and bedtime   Target glucose 100-200 mg/dL  *PLEASE contact endocrine when steroids are decreased or stopped as insulin will need to be decreased to prevent hypoglycemia     Discharge planning: Patient was on multiple oral medications, will likely resume a PO regimen pending GRF and Lactate closer to discharge   Will need to confirm doses she was taking   Endocrine

## 2020-04-17 NOTE — CONSULT NOTE ADULT - SUBJECTIVE AND OBJECTIVE BOX
HPI:  73yo F with Mhx of   HTN  HLD  DMII  Depression    She presented to the ED with SOB on .     Pt had an episode of diarrhea 4 days PTA.  The evening prior to presenting, the patient had fever to 100.2    She continued to have fever with associated fatigue.     required NRB yesterday.  Now tolerating 6L NC.      PAST MEDICAL & SURGICAL HISTORY:  Anxiety and depression  Hypercholesterolemia  Hypertension  Diabetes mellitus  No significant past surgical history      Allergies    peanuts (Short breath; Hives)  penicillin (Short breath; Hives)    Intolerances        ANTIMICROBIALS:  hydroxychloroquine    hydroxychloroquine 400 every 24 hours      OTHER MEDS:  acetaminophen   Tablet .. 650 milliGRAM(s) Oral every 6 hours PRN  amLODIPine   Tablet 10 milliGRAM(s) Oral daily  aspirin enteric coated 81 milliGRAM(s) Oral daily  dextrose 40% Gel 15 Gram(s) Oral once PRN  dextrose 5%. 1000 milliLiter(s) IV Continuous <Continuous>  dextrose 50% Injectable 12.5 Gram(s) IV Push once  dextrose 50% Injectable 25 Gram(s) IV Push once  dextrose 50% Injectable 25 Gram(s) IV Push once  enoxaparin Injectable 40 milliGRAM(s) SubCutaneous daily  glucagon  Injectable 1 milliGRAM(s) IntraMuscular once PRN  insulin glargine Injectable (LANTUS) 22 Unit(s) SubCutaneous at bedtime  insulin lispro (HumaLOG) corrective regimen sliding scale   SubCutaneous three times a day before meals  insulin lispro (HumaLOG) corrective regimen sliding scale   SubCutaneous at bedtime  insulin lispro Injectable (HumaLOG) 8 Unit(s) SubCutaneous three times a day before meals  insulin NPH human recombinant 15 Unit(s) SubCutaneous once  methylPREDNISolone sodium succinate Injectable 40 milliGRAM(s) IV Push two times a day  pantoprazole    Tablet 40 milliGRAM(s) Oral before breakfast  risperiDONE   Tablet 1 milliGRAM(s) Oral daily  sodium chloride 0.9%. 1000 milliLiter(s) IV Continuous <Continuous>      SOCIAL HISTORY:  Lives with daughter  creole speaking    FAMILY HISTORY:  No pertinent family history in first degree relatives      REVIEW OF SYSTEMS  [  ] ROS unobtainable because:    [  x] All other systems negative except as noted below:	    Constitutional:  x[ ] fever [ ] chills  [ ] weight loss  [x ] weakness  Skin:  [ ] rash [ ] phlebitis	  Eyes: [ ] icterus [ ] pain  [ ] discharge	  ENMT: [ ] sore throat  [ ] thrush [ ] ulcers [ ] exudates  Respiratory: [x ] dyspnea [ ] hemoptysis [ ] cough [ ] sputum	  Cardiovascular:  [ ] chest pain [ ] palpitations [ ] edema	  Gastrointestinal:  [ ] nausea [ ] vomiting [ ] diarrhea [ ] constipation [ ] pain	  Genitourinary:  [ ] dysuria [ ] frequency [ ] hematuria [ ] discharge [ ] flank pain  [ ] incontinence  Musculoskeletal:  [ ] myalgias [ ] arthralgias [ ] arthritis  [ ] back pain  Neurological:  [ ] headache [ ] seizures  [ ] confusion/altered mental status  Psychiatric:  [ ] anxiety [ ] depression	  Hematology/Lymphatics:  [ ] lymphadenopathy  Endocrine:  [ ] adrenal [ ] thyroid  Allergic/Immunologic:	 [ ] transplant [ ] seasonal    PHYSICAL EXAM:  General: [x ] non-toxic  HEAD/EYES: [ ] PERRL [ x] white sclera [ ] icterus  ENT:  [ ] normal [ ] supple [ ] thrush [ ] pharyngeal exudate  Cardiovascular:   [ ] murmur [ ] normal [ ] PPM/AICD  Respiratory:  [ x] course BS  GI:  [ x] soft, non-tender, normal bowel sounds  :  [ ] michaels [ x] no CVA tenderness   Musculoskeletal:  [ ] no synovitis  Neurologic:  [ ] non-focal exam   Skin:  [x ] no rash  Lymph: [ ] no lymphadenopathy  Psychiatric:  [ ] appropriate affect [ x] alert & oriented  Lines:  [ x] no phlebitis [ ] central line          Drug Dosing Weight  Height (cm): 160 (2020 17:08)  Weight (kg): 67.4 (2020 17:08)  BMI (kg/m2): 26.3 (2020 17:08)  BSA (m2): 1.7 (2020 17:08)    Vital Signs Last 24 Hrs  T(F): 99.3 (04-15-20 @ 20:30), Max: 100.5 (20 @ 17:08)    Vital Signs Last 24 Hrs  HR: 98 (20 @ 06:13) (98 - 109)  BP: 139/71 (20 @ 06:13) (131/62 - 139/71)  RR: 20 (04-15-20 @ 13:36)  SpO2: 96% (04-15-20 @ 20:30) (94% - 96%)  Wt(kg): --                          9.3    12.85 )-----------( 368      ( 2020 08:00 )             27.7       04-16    126<L>  |  91<L>  |  42<H>  ----------------------------<  379<H>  4.8   |  19<L>  |  1.87<H>    Ca    8.6      2020 08:00  Phos  4.4     04-15  Mg     1.9     04-15    TPro  6.8  /  Alb  2.9<L>  /  TBili  < 0.2<L>  /  DBili  x   /  AST  36<H>  /  ALT  25  /  AlkPhos  48  04-16      Urinalysis Basic - ( 15 Apr 2020 17:35 )    Color: YELLOW / Appearance: Lt TURBID / S.016 / pH: 6.0  Gluc: 300 / Ketone: NEGATIVE  / Bili: NEGATIVE / Urobili: NORMAL   Blood: TRACE / Protein: 200 / Nitrite: NEGATIVE   Leuk Esterase: NEGATIVE / RBC: 3-5 / WBC 3-5   Sq Epi: OCC / Non Sq Epi: x / Bacteria: FEW        MICROBIOLOGY:  COVID-19 PCR . (20 @ 11:55)    COVID-19 PCR: Detected: This test has been validated by [employer] to be accurate;  though it has not been FDA cleared/approved by the usual pathway.  As with all laboratory tests, results should be correlated with clinical  findings.  https://www.fda.gov/media/583530/download  https://www.fda.gov/media/353532/download      RADIOLOGY:    < from: Xray Chest 1 View AP/PA (20 @ 12:19) >  IMPRESSION: Bilateral pneumonia most compatible atypical viral/COVID 19 pneumonia.    < end of copied text >

## 2020-04-17 NOTE — PROGRESS NOTE ADULT - SUBJECTIVE AND OBJECTIVE BOX
[facility]   MD MICHAEL STOUT DO ANAM SIDDIQUI ANGELA WONG, PA    TEL:  OFFICE: [phone]  DR FLORES CELL: [phone]  DR. HUSSEIN CELL: [phone]  DR. HODGES CELL: [phone]  WESTLEY DAVE CELL: [phone]        Patient is a 74y old  Female who presents with a chief complaint of COVID (16 Apr 2020 11:45)      Patient seen and examined at bedside. No chest pain/sob    VITALS:  T(F): 98.4 (04-16-20 @ 21:23), Max: 98.4 (04-16-20 @ 21:23)  HR: 94 (04-17-20 @ 05:12)  BP: 141/71 (04-17-20 @ 05:12)  RR: 21 (04-16-20 @ 21:23)  SpO2: 91% (04-16-20 @ 21:23)  Wt(kg): --    04-16 @ 07:01  -  04-17 @ 07:00  --------------------------------------------------------  IN: 1020 mL / OUT: 1400 mL / NET: -380 mL          PHYSICAL EXAM:  Constitutional: NAD  Neck: No JVD  Respiratory: CTAB, no wheezes, rales or rhonchi  Cardiovascular: S1, S2, RRR  Gastrointestinal: BS+, soft, NT/ND  Extremities: No peripheral edema    Hospital Medications:   MEDICATIONS  (STANDING):  amLODIPine   Tablet 10 milliGRAM(s) Oral daily  aspirin enteric coated 81 milliGRAM(s) Oral daily  dextrose 5%. 1000 milliLiter(s) (50 mL/Hr) IV Continuous <Continuous>  dextrose 50% Injectable 12.5 Gram(s) IV Push once  dextrose 50% Injectable 25 Gram(s) IV Push once  dextrose 50% Injectable 25 Gram(s) IV Push once  enoxaparin Injectable 40 milliGRAM(s) SubCutaneous daily  hydroxychloroquine   Oral   hydroxychloroquine 400 milliGRAM(s) Oral every 24 hours  insulin glargine Injectable (LANTUS) 26 Unit(s) SubCutaneous at bedtime  insulin lispro (HumaLOG) corrective regimen sliding scale   SubCutaneous three times a day before meals  insulin lispro (HumaLOG) corrective regimen sliding scale   SubCutaneous at bedtime  insulin lispro Injectable (HumaLOG) 8 Unit(s) SubCutaneous three times a day before meals  methylPREDNISolone sodium succinate Injectable 40 milliGRAM(s) IV Push two times a day  pantoprazole    Tablet 40 milliGRAM(s) Oral before breakfast  risperiDONE   Tablet 1 milliGRAM(s) Oral daily  sodium chloride 0.9%. 1000 milliLiter(s) (60 mL/Hr) IV Continuous <Continuous>      LABS:  04-17    131<L>  |  93<L>  |  47<H>  ----------------------------<  242<H>  4.5   |  21<L>  |  1.76<H>    Ca    8.7      17 Apr 2020 04:58  Phos  3.6     04-17  Mg     2.0     04-17    TPro  6.6  /  Alb  3.1<L>  /  TBili  0.2  /  DBili      /  AST  40<H>  /  ALT  32  /  AlkPhos  56  04-17    Creatinine Trend: 1.76 <--, 1.90 <--, 1.87 <--, 1.98 <--, 1.64 <--, 1.69 <--    Phosphorus Level, Serum: 3.6 mg/dL (04-17 @ 04:58)  Albumin, Serum: 3.1 g/dL (04-17 @ 04:58)  Ferritin, Serum: 618.0 ng/mL (04-17 @ 04:58)                              10.7   12.08 )-----------( 462      ( 17 Apr 2020 04:58 )             32.9     Urine Studies:  Urinalysis - [04-15-20 @ 17:35]      Color YELLOW / Appearance Lt TURBID / SG 1.016 / pH 6.0      Gluc 300 / Ketone NEGATIVE  / Bili NEGATIVE / Urobili NORMAL       Blood TRACE / Protein 200 / Leuk Est NEGATIVE / Nitrite NEGATIVE      RBC 3-5 / WBC 3-5 / Hyaline NEGATIVE / Gran  / Sq Epi OCC / Non Sq Epi  / Bacteria FEW    Urine Creatinine 116.70      [04-15-20 @ 17:35]  Urine Sodium < 20      [04-15-20 @ 17:35]  Urine Potassium 36.2      [04-15-20 @ 17:35]  Urine Chloride 28      [04-15-20 @ 17:35]  Urine Osmolality 311      [04-15-20 @ 17:35]    Ferritin 618.0      [04-17-20 @ 04:58]  HbA1c 8.8      [12-13-19 @ 06:00]  TSH 2.69      [12-12-19 @ 06:45]  Lipid: chol 197, , HDL 41,       [12-14-19 @ 03:50]        RADIOLOGY & ADDITIONAL STUDIES:

## 2020-04-17 NOTE — PROGRESS NOTE ADULT - ASSESSMENT
74F PMH HTN, DM2 admitted with low oxygen sats upon triage found to be covid +. Nephrology consulted for Hyponatremia     Hyponatremia   Pt with acute on chronic hyponatremia  hx of recent admission with hyponatremia in the past secondary to polydipsia/ HCTZ   also in the setting of elevated glucose   urine work up suggested hypovolemia   s/p NS @ 60cc/hr x5hrs 4/16, Na improved today  Advise to optimize DM control (given high serum osm)  caution with IVF given hypoxia, poor resp status.    monitor Na     HTN  BP controlled at present     SUNIL on CKD III  Baseline Scr 1 with GFR 45 12/2019   Pt now admitted with SUNIL in the setting of covid 19 infection vs. ATN vs prerenal in setting of hyperglycemia  Scr slightly improved today  optimize dm control  Monitor urine output   Avoid Nephrotoxins     Acidosis  serum bicarb low  Monitor at present     Anemia  Hb low  Monitor at present     Covid +

## 2020-04-17 NOTE — CONSULT NOTE ADULT - ASSESSMENT
76 year old with poorly controlled DM and hypertension presents with fever and diarrhea- found to have hypoxia.    COVID with multifocal viral pneumonia  Complicated by hypoxia.    There are no know treatments for COVID.    Care is supportive     Continue airborne / contact precautions.    There have been anecdotal reports of use of hydroxychloroquine.  In a patient with comorbidities that place them at higher risk for poor outcomes who is currently requring oxygen, a trial of hydroxychloroquine seems reasonable.    Il-1 and Il-6 inhibitors have been used in some patients to treat possible cytokine storm / secondary HLH due to COVID.    The risk of this therapy, is that these therapies are immunosuppresive and there has not been data/ studies to date on their efficacy.    There use is currently being considered in patients with poor prognosis who :   ---- have sustained oxygen saturation less than 92 % on Non-rebreather    -----With:                 1) ferritin over 700 or                  2) CRP over 35 mg/ DL equal to 350 mg/L                  3) Change in CRP of 15                  4) D dimer greater than 1000  	  In this case, although the patient has hypoxia, her sat is above 94 percent and her inflammatory markers are not clearly indicative of cytokine storm.    I would suggest continuing to monitor her clinically. If she deteriorates, I would repeat CRP, d dimer, ferritin.     Pt with covid are at higher risk for thromboembolic disease.

## 2020-04-17 NOTE — PROGRESS NOTE ADULT - SUBJECTIVE AND OBJECTIVE BOX
Pulmonary Follow up Note     Pneumonia and Respiratory  failure due to COVID 19     improved oxygenation on NC o2    No headache, conjunctivitis, chest pain, sputum production, abdominal pain, nausea, diarrhea     MEDICATIONS  (STANDING):  amLODIPine   Tablet 10 milliGRAM(s) Oral daily  aspirin enteric coated 81 milliGRAM(s) Oral daily  dextrose 5%. 1000 milliLiter(s) (50 mL/Hr) IV Continuous <Continuous>  dextrose 50% Injectable 12.5 Gram(s) IV Push once  dextrose 50% Injectable 25 Gram(s) IV Push once  dextrose 50% Injectable 25 Gram(s) IV Push once  enoxaparin Injectable 40 milliGRAM(s) SubCutaneous daily  hydroxychloroquine   Oral   hydroxychloroquine 400 milliGRAM(s) Oral every 24 hours  insulin glargine Injectable (LANTUS) 30 Unit(s) SubCutaneous at bedtime  insulin lispro (HumaLOG) corrective regimen sliding scale   SubCutaneous three times a day before meals  insulin lispro (HumaLOG) corrective regimen sliding scale   SubCutaneous at bedtime  insulin lispro Injectable (HumaLOG) 12 Unit(s) SubCutaneous three times a day before meals  methylPREDNISolone sodium succinate Injectable 40 milliGRAM(s) IV Push two times a day  pantoprazole    Tablet 40 milliGRAM(s) Oral before breakfast  risperiDONE   Tablet 1 milliGRAM(s) Oral daily  sodium chloride 0.9%. 1000 milliLiter(s) (60 mL/Hr) IV Continuous <Continuous>      Vital Signs Last 24 Hrs  T(C): 36.3 (17 Apr 2020 12:46), Max: 36.9 (16 Apr 2020 21:23)  T(F): 97.4 (17 Apr 2020 12:46), Max: 98.4 (16 Apr 2020 21:23)  HR: 102 (17 Apr 2020 12:46) (94 - 103)  BP: 144/67 (17 Apr 2020 12:46) (140/68 - 144/67)  BP(mean): --  RR: 20 (17 Apr 2020 12:46) (20 - 21)  SpO2: 95% (17 Apr 2020 12:46) (91% - 95%)    respirations  unlabored, no dyspnea  pulses regular  abdomen soft NT  extr no edema    skin warm    alert conversant       DATA                             10.7   12.08 )-----------( 462      ( 17 Apr 2020 04:58 )             32.9       04-17    131<L>  |  93<L>  |  47<H>  ----------------------------<  242<H>  4.5   |  21<L>  |  1.76<H>    Ca    8.7      17 Apr 2020 04:58  Phos  3.6     04-17  Mg     2.0     04-17    TPro  6.6  /  Alb  3.1<L>  /  TBili  0.2  /  DBili  x   /  AST  40<H>  /  ALT  32  /  AlkPhos  56  04-17      D-Dimer Assay, Quantitative: 296: (04.17.20 @ 04:58)    C-Reactive Protein, Serum: 48.8 mg/L (04.17.20 @ 04:58)    CXR     Chest 1 View AP/PA (04.14.20 @ 12:19) >  Overlying EKG leads are noted. The heart is normal in size. Fullness in the superior mediastinum is most likely artifactually related to the AP /oblique positioning.. Peripheral RIGHT lung and LEFT base consolidation is observed. No pleural effusion or pneumothorax.    IMPRESSION: Bilateral pneumonia most compatible atypical viral/COVID 19 pneumonia.    < end of copied text >        IMPRESSION     Pneumonia and Respiratory  failure due to COVID 19     CXR with bilateral pulmonary infiltrates   stable respiratory status on NC    on hydroxychloroquine       PLAN     continue hydroxychloroquine  Continue to monitor respiratory status and parameters of systemic inflammation and abnormal coagulation       wean o2 as able        Labs, meds radiographic studies reviewed    Norm Barlow MD    PULMONARY    MD Herson Willis MD George Haralambou, MD    [phone]

## 2020-04-17 NOTE — PROGRESS NOTE ADULT - SUBJECTIVE AND OBJECTIVE BOX
JOSE SAGASTUME  74y  Female      Patient is a 74y old  Female who presents with a chief complaint of COVID (17 Apr 2020 16:25)  comfortable,resting,nad,o2 sat 95%  no vomiting or diarrhoea,slight cough  REVIEW OF SYSTEMS:  as above      INTERVAL HPI/OVERNIGHT EVENTS:  T(C): 36.3 (04-17-20 @ 12:46), Max: 36.9 (04-16-20 @ 21:23)  HR: 102 (04-17-20 @ 12:46) (94 - 103)  BP: 144/67 (04-17-20 @ 12:46) (140/68 - 144/67)  RR: 20 (04-17-20 @ 12:46) (20 - 21)  SpO2: 95% (04-17-20 @ 12:46) (91% - 95%)  Wt(kg): --  I&O's Summary    16 Apr 2020 07:01  -  17 Apr 2020 07:00  --------------------------------------------------------  IN: 1020 mL / OUT: 1400 mL / NET: -380 mL    17 Apr 2020 07:01  -  17 Apr 2020 18:44  --------------------------------------------------------  IN: 0 mL / OUT: 1200 mL / NET: -1200 mL      T(C): 36.3 (04-17-20 @ 12:46), Max: 36.9 (04-16-20 @ 21:23)  HR: 102 (04-17-20 @ 12:46) (94 - 103)  BP: 144/67 (04-17-20 @ 12:46) (140/68 - 144/67)  RR: 20 (04-17-20 @ 12:46) (20 - 21)  SpO2: 95% (04-17-20 @ 12:46) (91% - 95%)  Wt(kg): --Vital Signs Last 24 Hrs  T(C): 36.3 (17 Apr 2020 12:46), Max: 36.9 (16 Apr 2020 21:23)  T(F): 97.4 (17 Apr 2020 12:46), Max: 98.4 (16 Apr 2020 21:23)  HR: 102 (17 Apr 2020 12:46) (94 - 103)  BP: 144/67 (17 Apr 2020 12:46) (140/68 - 144/67)  BP(mean): --  RR: 20 (17 Apr 2020 12:46) (20 - 21)  SpO2: 95% (17 Apr 2020 12:46) (91% - 95%)    LABS:                        10.7   12.08 )-----------( 462      ( 17 Apr 2020 04:58 )             32.9     04-17    131<L>  |  93<L>  |  47<H>  ----------------------------<  242<H>  4.5   |  21<L>  |  1.76<H>    Ca    8.7      17 Apr 2020 04:58  Phos  3.6     04-17  Mg     2.0     04-17    TPro  6.6  /  Alb  3.1<L>  /  TBili  0.2  /  DBili  x   /  AST  40<H>  /  ALT  32  /  AlkPhos  56  04-17        CAPILLARY BLOOD GLUCOSE      POCT Blood Glucose.: 248 mg/dL (17 Apr 2020 16:57)  POCT Blood Glucose.: 313 mg/dL (17 Apr 2020 11:45)  POCT Blood Glucose.: 253 mg/dL (17 Apr 2020 07:56)  POCT Blood Glucose.: 231 mg/dL (16 Apr 2020 21:19)            PAST MEDICAL & SURGICAL HISTORY:  Anxiety and depression  Hypercholesterolemia  Hypertension  Diabetes mellitus  No significant past surgical history      MEDICATIONS  (STANDING):  amLODIPine   Tablet 10 milliGRAM(s) Oral daily  aspirin enteric coated 81 milliGRAM(s) Oral daily  dextrose 5%. 1000 milliLiter(s) (50 mL/Hr) IV Continuous <Continuous>  dextrose 50% Injectable 12.5 Gram(s) IV Push once  dextrose 50% Injectable 25 Gram(s) IV Push once  dextrose 50% Injectable 25 Gram(s) IV Push once  enoxaparin Injectable 40 milliGRAM(s) SubCutaneous daily  hydroxychloroquine   Oral   hydroxychloroquine 400 milliGRAM(s) Oral every 24 hours  insulin glargine Injectable (LANTUS) 30 Unit(s) SubCutaneous at bedtime  insulin lispro (HumaLOG) corrective regimen sliding scale   SubCutaneous three times a day before meals  insulin lispro (HumaLOG) corrective regimen sliding scale   SubCutaneous at bedtime  insulin lispro Injectable (HumaLOG) 12 Unit(s) SubCutaneous three times a day before meals  methylPREDNISolone sodium succinate Injectable 40 milliGRAM(s) IV Push two times a day  pantoprazole    Tablet 40 milliGRAM(s) Oral before breakfast  risperiDONE   Tablet 1 milliGRAM(s) Oral daily  sodium chloride 0.9%. 1000 milliLiter(s) (60 mL/Hr) IV Continuous <Continuous>    MEDICATIONS  (PRN):  acetaminophen   Tablet .. 650 milliGRAM(s) Oral every 6 hours PRN Temp greater or equal to 38C (100.4F), Mild Pain (1 - 3), Moderate Pain (4 - 6), Severe Pain (7 - 10)  dextrose 40% Gel 15 Gram(s) Oral once PRN Blood Glucose LESS THAN 70 milliGRAM(s)/deciliter  glucagon  Injectable 1 milliGRAM(s) IntraMuscular once PRN Glucose LESS THAN 70 milligrams/deciliter        RADIOLOGY & ADDITIONAL TESTS:    Imaging Personally Reviewed:  [ ] YES  [ ] NO    Consultant(s) Notes Reviewed:  [ x] YES  [ ] NO    PHYSICAL EXAM:  GENERAL: Alert and awake lying in bed in no distress  HEAD:  Atraumatic, Normocephalic  EYES: EOMI, MATEUS, conjunctiva and sclera clear  NECK: Supple, No JVD, Normal thyroid  NERVOUS SYSTEM:  Alert & Oriented X3, Motor and sensory systems are intact,   CHEST/LUNG: mild rhonchi b/l  HEART: Regular rate and rhythm; No murmurs, rubs, or gallops  ABDOMEN: Soft, Nontender, Nondistended; Bowel sounds present  EXTREMITIES:   Peripheral Pulses are palpable, no  edema        Care Discussed with Consultants/Other Providers [ ] YES  [ ] NO      Code Status: [] Full Code [] DNR [] DNI [] Goals of Care:   Disposition: [] ICU [] Stroke Unit [] RCU []PCU []Floor [] Discharge Home         KITA HahnFACP

## 2020-04-18 LAB
ALBUMIN SERPL ELPH-MCNC: 2.9 G/DL — LOW (ref 3.3–5)
ALP SERPL-CCNC: 52 U/L — SIGNIFICANT CHANGE UP (ref 40–120)
ALT FLD-CCNC: 35 U/L — HIGH (ref 4–33)
ANION GAP SERPL CALC-SCNC: 15 MMO/L — HIGH (ref 7–14)
AST SERPL-CCNC: 35 U/L — HIGH (ref 4–32)
BILIRUB SERPL-MCNC: 0.3 MG/DL — SIGNIFICANT CHANGE UP (ref 0.2–1.2)
BUN SERPL-MCNC: 40 MG/DL — HIGH (ref 7–23)
CALCIUM SERPL-MCNC: 8.5 MG/DL — SIGNIFICANT CHANGE UP (ref 8.4–10.5)
CHLORIDE SERPL-SCNC: 96 MMOL/L — LOW (ref 98–107)
CO2 SERPL-SCNC: 20 MMOL/L — LOW (ref 22–31)
CREAT SERPL-MCNC: 1.36 MG/DL — HIGH (ref 0.5–1.3)
GLUCOSE BLDC GLUCOMTR-MCNC: 158 MG/DL — HIGH (ref 70–99)
GLUCOSE BLDC GLUCOMTR-MCNC: 187 MG/DL — HIGH (ref 70–99)
GLUCOSE BLDC GLUCOMTR-MCNC: 188 MG/DL — HIGH (ref 70–99)
GLUCOSE BLDC GLUCOMTR-MCNC: 263 MG/DL — HIGH (ref 70–99)
GLUCOSE SERPL-MCNC: 259 MG/DL — HIGH (ref 70–99)
HCT VFR BLD CALC: 30.7 % — LOW (ref 34.5–45)
HGB BLD-MCNC: 10.3 G/DL — LOW (ref 11.5–15.5)
MAGNESIUM SERPL-MCNC: 2 MG/DL — SIGNIFICANT CHANGE UP (ref 1.6–2.6)
MCHC RBC-ENTMCNC: 26.1 PG — LOW (ref 27–34)
MCHC RBC-ENTMCNC: 33.6 % — SIGNIFICANT CHANGE UP (ref 32–36)
MCV RBC AUTO: 77.9 FL — LOW (ref 80–100)
NRBC # FLD: 0 K/UL — SIGNIFICANT CHANGE UP (ref 0–0)
PHOSPHATE SERPL-MCNC: 3.4 MG/DL — SIGNIFICANT CHANGE UP (ref 2.5–4.5)
PLATELET # BLD AUTO: 493 K/UL — HIGH (ref 150–400)
PMV BLD: 10.6 FL — SIGNIFICANT CHANGE UP (ref 7–13)
POTASSIUM SERPL-MCNC: 4.4 MMOL/L — SIGNIFICANT CHANGE UP (ref 3.5–5.3)
POTASSIUM SERPL-SCNC: 4.4 MMOL/L — SIGNIFICANT CHANGE UP (ref 3.5–5.3)
PROT SERPL-MCNC: 7.3 G/DL — SIGNIFICANT CHANGE UP (ref 6–8.3)
RBC # BLD: 3.94 M/UL — SIGNIFICANT CHANGE UP (ref 3.8–5.2)
RBC # FLD: 13.5 % — SIGNIFICANT CHANGE UP (ref 10.3–14.5)
SODIUM SERPL-SCNC: 131 MMOL/L — LOW (ref 135–145)
WBC # BLD: 11.16 K/UL — HIGH (ref 3.8–10.5)
WBC # FLD AUTO: 11.16 K/UL — HIGH (ref 3.8–10.5)

## 2020-04-18 RX ORDER — INSULIN LISPRO 100/ML
15 VIAL (ML) SUBCUTANEOUS
Refills: 0 | Status: DISCONTINUED | OUTPATIENT
Start: 2020-04-18 | End: 2020-04-19

## 2020-04-18 RX ORDER — INSULIN GLARGINE 100 [IU]/ML
32 INJECTION, SOLUTION SUBCUTANEOUS AT BEDTIME
Refills: 0 | Status: DISCONTINUED | OUTPATIENT
Start: 2020-04-18 | End: 2020-04-19

## 2020-04-18 RX ADMIN — PANTOPRAZOLE SODIUM 40 MILLIGRAM(S): 20 TABLET, DELAYED RELEASE ORAL at 04:14

## 2020-04-18 RX ADMIN — Medication 81 MILLIGRAM(S): at 11:57

## 2020-04-18 RX ADMIN — RISPERIDONE 1 MILLIGRAM(S): 4 TABLET ORAL at 11:57

## 2020-04-18 RX ADMIN — Medication 40 MILLIGRAM(S): at 17:25

## 2020-04-18 RX ADMIN — INSULIN GLARGINE 32 UNIT(S): 100 INJECTION, SOLUTION SUBCUTANEOUS at 21:41

## 2020-04-18 RX ADMIN — Medication 15 UNIT(S): at 17:23

## 2020-04-18 RX ADMIN — Medication 6: at 07:55

## 2020-04-18 RX ADMIN — Medication 12 UNIT(S): at 07:55

## 2020-04-18 RX ADMIN — Medication 40 MILLIGRAM(S): at 04:15

## 2020-04-18 RX ADMIN — ENOXAPARIN SODIUM 40 MILLIGRAM(S): 100 INJECTION SUBCUTANEOUS at 11:57

## 2020-04-18 RX ADMIN — Medication 2: at 17:23

## 2020-04-18 RX ADMIN — AMLODIPINE BESYLATE 10 MILLIGRAM(S): 2.5 TABLET ORAL at 04:15

## 2020-04-18 RX ADMIN — Medication 2: at 11:57

## 2020-04-18 RX ADMIN — Medication 400 MILLIGRAM(S): at 17:33

## 2020-04-18 RX ADMIN — Medication 15 UNIT(S): at 11:58

## 2020-04-18 NOTE — PROGRESS NOTE ADULT - PROBLEM SELECTOR PLAN 2
with AMS -improved  pt met sepsis criteria on admission  ( RR of 26, HR >100)   Suspect secondary to COVID   continue to monitor

## 2020-04-18 NOTE — PROGRESS NOTE ADULT - SUBJECTIVE AND OBJECTIVE BOX
JOSE SAGASTUME  74y  Female      Patient is a 74y old  Female who presents with a chief complaint of COVID (17 Apr 2020 16:25)  no sob,no cp,no fever,no cough,on nc 3 l    REVIEW OF SYSTEMS:  neg        INTERVAL HPI/OVERNIGHT EVENTS:  T(C): 36.8 (04-18-20 @ 12:02), Max: 36.8 (04-18-20 @ 04:13)  HR: 99 (04-18-20 @ 12:02) (96 - 99)  BP: 149/66 (04-18-20 @ 12:02) (138/72 - 149/66)  RR: 20 (04-18-20 @ 12:02) (19 - 20)  SpO2: 98% (04-18-20 @ 12:02) (98% - 98%)  Wt(kg): --  I&O's Summary    17 Apr 2020 07:01  -  18 Apr 2020 07:00  --------------------------------------------------------  IN: 0 mL / OUT: 1200 mL / NET: -1200 mL      T(C): 36.8 (04-18-20 @ 12:02), Max: 36.8 (04-18-20 @ 04:13)  HR: 99 (04-18-20 @ 12:02) (96 - 99)  BP: 149/66 (04-18-20 @ 12:02) (138/72 - 149/66)  RR: 20 (04-18-20 @ 12:02) (19 - 20)  SpO2: 98% (04-18-20 @ 12:02) (98% - 98%)  Wt(kg): --Vital Signs Last 24 Hrs  T(C): 36.8 (18 Apr 2020 12:02), Max: 36.8 (18 Apr 2020 04:13)  T(F): 98.2 (18 Apr 2020 12:02), Max: 98.3 (18 Apr 2020 04:13)  HR: 99 (18 Apr 2020 12:02) (96 - 99)  BP: 149/66 (18 Apr 2020 12:02) (138/72 - 149/66)  BP(mean): --  RR: 20 (18 Apr 2020 12:02) (19 - 20)  SpO2: 98% (18 Apr 2020 12:02) (98% - 98%)    LABS:                        10.3   11.16 )-----------( 493      ( 18 Apr 2020 05:30 )             30.7     04-18    131<L>  |  96<L>  |  40<H>  ----------------------------<  259<H>  4.4   |  20<L>  |  1.36<H>    Ca    8.5      18 Apr 2020 05:30  Phos  3.4     04-18  Mg     2.0     04-18    TPro  7.3  /  Alb  2.9<L>  /  TBili  0.3  /  DBili  x   /  AST  35<H>  /  ALT  35<H>  /  AlkPhos  52  04-18        CAPILLARY BLOOD GLUCOSE      POCT Blood Glucose.: 158 mg/dL (18 Apr 2020 16:57)  POCT Blood Glucose.: 187 mg/dL (18 Apr 2020 11:46)  POCT Blood Glucose.: 263 mg/dL (18 Apr 2020 07:51)  POCT Blood Glucose.: 230 mg/dL (17 Apr 2020 20:54)            PAST MEDICAL & SURGICAL HISTORY:  Anxiety and depression  Hypercholesterolemia  Hypertension  Diabetes mellitus  No significant past surgical history      MEDICATIONS  (STANDING):  amLODIPine   Tablet 10 milliGRAM(s) Oral daily  aspirin enteric coated 81 milliGRAM(s) Oral daily  dextrose 5%. 1000 milliLiter(s) (50 mL/Hr) IV Continuous <Continuous>  dextrose 50% Injectable 12.5 Gram(s) IV Push once  dextrose 50% Injectable 25 Gram(s) IV Push once  dextrose 50% Injectable 25 Gram(s) IV Push once  enoxaparin Injectable 40 milliGRAM(s) SubCutaneous daily  insulin glargine Injectable (LANTUS) 32 Unit(s) SubCutaneous at bedtime  insulin lispro (HumaLOG) corrective regimen sliding scale   SubCutaneous three times a day before meals  insulin lispro (HumaLOG) corrective regimen sliding scale   SubCutaneous at bedtime  insulin lispro Injectable (HumaLOG) 15 Unit(s) SubCutaneous three times a day before meals  methylPREDNISolone sodium succinate Injectable 40 milliGRAM(s) IV Push two times a day  pantoprazole    Tablet 40 milliGRAM(s) Oral before breakfast  risperiDONE   Tablet 1 milliGRAM(s) Oral daily  sodium chloride 0.9%. 1000 milliLiter(s) (60 mL/Hr) IV Continuous <Continuous>    MEDICATIONS  (PRN):  acetaminophen   Tablet .. 650 milliGRAM(s) Oral every 6 hours PRN Temp greater or equal to 38C (100.4F), Mild Pain (1 - 3), Moderate Pain (4 - 6), Severe Pain (7 - 10)  dextrose 40% Gel 15 Gram(s) Oral once PRN Blood Glucose LESS THAN 70 milliGRAM(s)/deciliter  glucagon  Injectable 1 milliGRAM(s) IntraMuscular once PRN Glucose LESS THAN 70 milligrams/deciliter        RADIOLOGY & ADDITIONAL TESTS:    Imaging Personally Reviewed:  [ ] YES  [ ] NO    Consultant(s) Notes Reviewed:  [x ] YES  [ ] NO    PHYSICAL EXAM:  GENERAL: Alert and awake lying in bed in no distress  HEAD:  Atraumatic, Normocephalic  EYES: EOMI, MATEUS, conjunctiva and sclera clear  NECK: Supple, No JVD, Normal thyroid  NERVOUS SYSTEM:  Alert & Oriented X3, Motor and sensory systems are intact,   CHEST/LUNG: Bilateral clear breath sounds, no rhochi, no wheezing, no crepitations,  HEART: Regular rate and rhythm; No murmurs, rubs, or gallops  ABDOMEN: Soft, Nontender, Nondistended; Bowel sounds present  EXTREMITIES:   Peripheral Pulses are palpable, no  edema        Care Discussed with Consultants/Other Providers [x ] YES  [ ] NO      Code Status: [] Full Code [] DNR [] DNI [] Goals of Care:   Disposition: [] ICU [] Stroke Unit [] RCU []PCU []Floor [] Discharge Home         KITA HahnFACP

## 2020-04-18 NOTE — CHART NOTE - NSCHARTNOTEFT_GEN_A_CORE
Per current hospital medicine emergency protocol, in an effort to reduce COVID exposures and also conserve PPE for necessary encounters, below information has been obtained from chart review, nursing/care team and providers without direct patient contact.    Patient is a 74-year-old, Creole speaking female, with past medical history of hypertension, hyperlipidemia, mild cognitive impairment, depression, type 2 diabetes mellitus, presenting with fever and poor appetite, found to have COVID 19+ infection.    MEDICATIONS  (STANDING):  amLODIPine   Tablet 10 milliGRAM(s) Oral daily  aspirin enteric coated 81 milliGRAM(s) Oral daily  dextrose 5%. 1000 milliLiter(s) (50 mL/Hr) IV Continuous <Continuous>  dextrose 50% Injectable 12.5 Gram(s) IV Push once  dextrose 50% Injectable 25 Gram(s) IV Push once  dextrose 50% Injectable 25 Gram(s) IV Push once  enoxaparin Injectable 40 milliGRAM(s) SubCutaneous daily  hydroxychloroquine   Oral   hydroxychloroquine 400 milliGRAM(s) Oral every 24 hours  insulin glargine Injectable (LANTUS) 30 Unit(s) SubCutaneous at bedtime  insulin lispro (HumaLOG) corrective regimen sliding scale   SubCutaneous three times a day before meals  insulin lispro (HumaLOG) corrective regimen sliding scale   SubCutaneous at bedtime  insulin lispro Injectable (HumaLOG) 12 Unit(s) SubCutaneous three times a day before meals  methylPREDNISolone sodium succinate Injectable 40 milliGRAM(s) IV Push two times a day  pantoprazole    Tablet 40 milliGRAM(s) Oral before breakfast  risperiDONE   Tablet 1 milliGRAM(s) Oral daily  sodium chloride 0.9%. 1000 milliLiter(s) (60 mL/Hr) IV Continuous <Continuous>    CAPILLARY BLOOD GLUCOSE  POCT Blood Glucose.: 263 mg/dL (18 Apr 2020 07:51)  POCT Blood Glucose.: 230 mg/dL (17 Apr 2020 20:54)  POCT Blood Glucose.: 248 mg/dL (17 Apr 2020 16:57)  POCT Blood Glucose.: 313 mg/dL (17 Apr 2020 11:45)    04-18    131<L>  |  96<L>  |  40<H>  ----------------------------<  259<H>  4.4   |  20<L>  |  1.36<H>    Ca    8.5      18 Apr 2020 05:30  Phos  3.4     04-18  Mg     2.0     04-18    TPro  7.3  /  Alb  2.9<L>  /  TBili  0.3  /  DBili  x   /  AST  35<H>  /  ALT  35<H>  /  AlkPhos  52  04-18    Hemoglobin A1C, Whole Blood: 8.8 % (12-13-19 @ 06:00)    Anion Gap, Serum: 15 mmo/L (04-18-20 @ 05:30)  Anion Gap, Serum: 17 mmo/L (04-17-20 @ 04:58)  Anion Gap, Serum: 16 mmo/L (04-16-20 @ 18:50)  Anion Gap, Serum: 16 mmo/L (04-16-20 @ 08:00)    DM 2 follow up - hyperglycemia exacerbated by steroids   Please refer to complete consult from 4/16/20 for available history and plan of care   Uncontrolled with hyperglycemia   Steroids continue as Solumedrol twice daily   Patient received 76 units of insulin over past 24 hours  Will increase Lantus to 32 units   Will increase pre-meal Humalog to 15 units TID before meals   Will continue MODERATE Humalog correctional scales before meals and bedtime   Will add on BHB to AM labs for tomorrow but low suspicion for DKA   Consistent carbohydrate diet   FS before meals and bedtime   Target glucose 100-200 mg/dL  *PLEASE contact endocrine when steroids are decreased or stopped as insulin will need to be decreased to prevent hypoglycemia     Discharge planning: Patient was on multiple oral medications, will likely resume a PO regimen pending GRF and Lactate closer to discharge   Will need to confirm doses she was taking   Endocrine

## 2020-04-18 NOTE — PROGRESS NOTE ADULT - SUBJECTIVE AND OBJECTIVE BOX
[facility] [place] NEPHROLOGY PRACTICE   MD Rip Wilson MD, D.O. Ruoru Wong, PA    From 7 AM - 5 PM:  OFFICE: [phone]  Dr. Schulz cell: [phone]  Dr. Garcia cell: [phone]  Dr. Leal cell: [phone]  IVY Christy cell: [phone]    From 5 PM - 7 AM: Answering Service: [phone]      RENAL FOLLOW UP NOTE  --------------------------------------------------------------------------------  HPI: Pt covid +     PAST HISTORY  --------------------------------------------------------------------------------  No significant changes to PMH, PSH, FHx, SHx, unless otherwise noted    ALLERGIES & MEDICATIONS  --------------------------------------------------------------------------------  Allergies    peanuts (Short breath; Hives)  penicillin (Short breath; Hives)    Intolerances      Standing Inpatient Medications  amLODIPine   Tablet 10 milliGRAM(s) Oral daily  aspirin enteric coated 81 milliGRAM(s) Oral daily  dextrose 5%. 1000 milliLiter(s) IV Continuous <Continuous>  dextrose 50% Injectable 12.5 Gram(s) IV Push once  dextrose 50% Injectable 25 Gram(s) IV Push once  dextrose 50% Injectable 25 Gram(s) IV Push once  enoxaparin Injectable 40 milliGRAM(s) SubCutaneous daily  hydroxychloroquine   Oral   hydroxychloroquine 400 milliGRAM(s) Oral every 24 hours  insulin glargine Injectable (LANTUS) 32 Unit(s) SubCutaneous at bedtime  insulin lispro (HumaLOG) corrective regimen sliding scale   SubCutaneous three times a day before meals  insulin lispro (HumaLOG) corrective regimen sliding scale   SubCutaneous at bedtime  insulin lispro Injectable (HumaLOG) 15 Unit(s) SubCutaneous three times a day before meals  methylPREDNISolone sodium succinate Injectable 40 milliGRAM(s) IV Push two times a day  pantoprazole    Tablet 40 milliGRAM(s) Oral before breakfast  risperiDONE   Tablet 1 milliGRAM(s) Oral daily  sodium chloride 0.9%. 1000 milliLiter(s) IV Continuous <Continuous>    PRN Inpatient Medications  acetaminophen   Tablet .. 650 milliGRAM(s) Oral every 6 hours PRN  dextrose 40% Gel 15 Gram(s) Oral once PRN  glucagon  Injectable 1 milliGRAM(s) IntraMuscular once PRN      REVIEW OF SYSTEMS  --------------------------------------------------------------------------------  Unable to obtain     VITALS/PHYSICAL EXAM  --------------------------------------------------------------------------------  T(C): 36.8 (04-18-20 @ 12:02), Max: 36.8 (04-18-20 @ 04:13)  HR: 99 (04-18-20 @ 12:02) (96 - 99)  BP: 149/66 (04-18-20 @ 12:02) (138/72 - 149/66)  RR: 20 (04-18-20 @ 12:02) (19 - 20)  SpO2: 98% (04-18-20 @ 12:02) (98% - 98%)  Wt(kg): --    04-17-20 @ 07:01  -  04-18-20 @ 07:00  --------------------------------------------------------  IN: 0 mL / OUT: 1200 mL / NET: -1200 mL    LABS/STUDIES  --------------------------------------------------------------------------------              10.3   11.16 >-----------<  493      [04-18-20 @ 05:30]              30.7     131  |  96  |  40  ----------------------------<  259      [04-18-20 @ 05:30]  4.4   |  20  |  1.36        Ca     8.5     [04-18-20 @ 05:30]      Mg     2.0     [04-18-20 @ 05:30]      Phos  3.4     [04-18-20 @ 05:30]    TPro  7.3  /  Alb  2.9  /  TBili  0.3  /  DBili  x   /  AST  35  /  ALT  35  /  AlkPhos  52  [04-18-20 @ 05:30]          [04-17-20 @ 04:58]    Creatinine Trend:  SCr 1.36 [04-18 @ 05:30]  SCr 1.76 [04-17 @ 04:58]  SCr 1.90 [04-16 @ 18:50]  SCr 1.87 [04-16 @ 08:00]  SCr 1.98 [04-15 @ 06:00]    Urinalysis - [04-15-20 @ 17:35]      Color YELLOW / Appearance Lt TURBID / SG 1.016 / pH 6.0      Gluc 300 / Ketone NEGATIVE  / Bili NEGATIVE / Urobili NORMAL       Blood TRACE / Protein 200 / Leuk Est NEGATIVE / Nitrite NEGATIVE      RBC 3-5 / WBC 3-5 / Hyaline NEGATIVE / Gran  / Sq Epi OCC / Non Sq Epi  / Bacteria FEW    Urine Creatinine 116.70      [04-15-20 @ 17:35]  Urine Sodium < 20      [04-15-20 @ 17:35]  Urine Potassium 36.2      [04-15-20 @ 17:35]  Urine Chloride 28      [04-15-20 @ 17:35]  Urine Osmolality 311      [04-15-20 @ 17:35]    Ferritin 618.0      [04-17-20 @ 04:58]  HbA1c 8.8      [12-13-19 @ 06:00]  TSH 2.69      [12-12-19 @ 06:45]  Lipid: chol 197, , HDL 41,       [12-14-19 @ 03:50]

## 2020-04-18 NOTE — PROGRESS NOTE ADULT - SUBJECTIVE AND OBJECTIVE BOX
Pulmonary Follow up Note     Pneumonia and Respiratory  failure due to COVID 19     improved oxygenation on NC o2    no complaints today    MEDICATIONS  (STANDING):  amLODIPine   Tablet 10 milliGRAM(s) Oral daily  aspirin enteric coated 81 milliGRAM(s) Oral daily  dextrose 5%. 1000 milliLiter(s) (50 mL/Hr) IV Continuous <Continuous>  dextrose 50% Injectable 12.5 Gram(s) IV Push once  dextrose 50% Injectable 25 Gram(s) IV Push once  dextrose 50% Injectable 25 Gram(s) IV Push once  enoxaparin Injectable 40 milliGRAM(s) SubCutaneous daily  hydroxychloroquine   Oral   hydroxychloroquine 400 milliGRAM(s) Oral every 24 hours  insulin glargine Injectable (LANTUS) 30 Unit(s) SubCutaneous at bedtime  insulin lispro (HumaLOG) corrective regimen sliding scale   SubCutaneous three times a day before meals  insulin lispro (HumaLOG) corrective regimen sliding scale   SubCutaneous at bedtime  insulin lispro Injectable (HumaLOG) 12 Unit(s) SubCutaneous three times a day before meals  methylPREDNISolone sodium succinate Injectable 40 milliGRAM(s) IV Push two times a day  pantoprazole    Tablet 40 milliGRAM(s) Oral before breakfast  risperiDONE   Tablet 1 milliGRAM(s) Oral daily  sodium chloride 0.9%. 1000 milliLiter(s) (60 mL/Hr) IV Continuous <Continuous>      Vital Signs Last 24 Hrs  T(C): 36.8 (18 Apr 2020 12:02), Max: 36.8 (18 Apr 2020 04:13)  T(F): 98.2 (18 Apr 2020 12:02), Max: 98.3 (18 Apr 2020 04:13)  HR: 99 (18 Apr 2020 12:02) (96 - 99)  BP: 149/66 (18 Apr 2020 12:02) (138/72 - 149/66)  BP(mean): --  RR: 20 (18 Apr 2020 12:02) (19 - 20)  SpO2: 98% (18 Apr 2020 12:02) (98% - 98%)    respirations  unlabored, no dyspnea  pulses regular  abdomen soft NT  extr no edema    skin warm    alert conversant       DATA                  CXR     Chest 1 View AP/PA (04.14.20 @ 12:19) >  Overlying EKG leads are noted. The heart is normal in size. Fullness in the superior mediastinum is most likely artifactually related to the AP /oblique positioning.. Peripheral RIGHT lung and LEFT base consolidation is observed. No pleural effusion or pneumothorax.    IMPRESSION: Bilateral pneumonia most compatible atypical viral/COVID 19 pneumonia.    < end of copied text >        IMPRESSION     Pneumonia and Respiratory  failure due to COVID 19     CXR with bilateral pulmonary infiltrates   stable respiratory status on NC    on hydroxychloroquine   status improved      PLAN     complete hydroxychloroquine  continue O2  Continue to monitor respiratory status and parameters of systemic inflammation and abnormal coagulation         Labs, meds radiographic studies reviewed    Norm Barlow MD    PULMONARY    MD Herson Willis MD George Haralambou, MD    [phone]

## 2020-04-18 NOTE — PROGRESS NOTE ADULT - ASSESSMENT
74F PMH HTN, DM2 admitted with low oxygen sats upon triage found to be covid +. Nephrology consulted for Hyponatremia     Hyponatremia   Pt with acute on chronic hyponatremia  hx of recent admission with hyponatremia in the past secondary to polydipsia/ HCTZ   also in the setting of elevated glucose   urine work up suggested hypovolemia   s/p NS @ 60cc/hr x5hrs on 4/16  Na improved and remains stable today   Advise to optimize DM control (given high serum osm)  caution with IVF given hypoxia, poor resp status.    monitor Na     HTN  BP controlled at present   Low salt diet     SUNIL on CKD III  Baseline Scr 1 with GFR 45 12/2019   Pt now admitted with SUNIL in the setting of covid 19 infection vs. ATN vs prerenal in setting of hyperglycemia  Scr improved today  optimize dm control  Monitor urine output   Avoid Nephrotoxins     Acidosis  serum bicarb low  Monitor at present     Anemia  Hb at goal   Monitor at present     Covid +  Monitor temp/ O2 sat   on plaquenil

## 2020-04-19 LAB
ALBUMIN SERPL ELPH-MCNC: 2.7 G/DL — LOW (ref 3.3–5)
ALP SERPL-CCNC: 51 U/L — SIGNIFICANT CHANGE UP (ref 40–120)
ALT FLD-CCNC: 42 U/L — HIGH (ref 4–33)
ANION GAP SERPL CALC-SCNC: 11 MMO/L — SIGNIFICANT CHANGE UP (ref 7–14)
AST SERPL-CCNC: 33 U/L — HIGH (ref 4–32)
B-OH-BUTYR SERPL-SCNC: < 0 MMOL/L — LOW (ref 0–0.4)
BILIRUB SERPL-MCNC: 0.2 MG/DL — SIGNIFICANT CHANGE UP (ref 0.2–1.2)
BUN SERPL-MCNC: 35 MG/DL — HIGH (ref 7–23)
CALCIUM SERPL-MCNC: 8.5 MG/DL — SIGNIFICANT CHANGE UP (ref 8.4–10.5)
CHLORIDE SERPL-SCNC: 96 MMOL/L — LOW (ref 98–107)
CO2 SERPL-SCNC: 22 MMOL/L — SIGNIFICANT CHANGE UP (ref 22–31)
CREAT SERPL-MCNC: 1.23 MG/DL — SIGNIFICANT CHANGE UP (ref 0.5–1.3)
GLUCOSE BLDC GLUCOMTR-MCNC: 104 MG/DL — HIGH (ref 70–99)
GLUCOSE BLDC GLUCOMTR-MCNC: 189 MG/DL — HIGH (ref 70–99)
GLUCOSE BLDC GLUCOMTR-MCNC: 193 MG/DL — HIGH (ref 70–99)
GLUCOSE BLDC GLUCOMTR-MCNC: 210 MG/DL — HIGH (ref 70–99)
GLUCOSE SERPL-MCNC: 217 MG/DL — HIGH (ref 70–99)
HCT VFR BLD CALC: 31.5 % — LOW (ref 34.5–45)
HGB BLD-MCNC: 10.4 G/DL — LOW (ref 11.5–15.5)
MAGNESIUM SERPL-MCNC: 1.8 MG/DL — SIGNIFICANT CHANGE UP (ref 1.6–2.6)
MCHC RBC-ENTMCNC: 25.6 PG — LOW (ref 27–34)
MCHC RBC-ENTMCNC: 33 % — SIGNIFICANT CHANGE UP (ref 32–36)
MCV RBC AUTO: 77.4 FL — LOW (ref 80–100)
NRBC # FLD: 0.02 K/UL — SIGNIFICANT CHANGE UP (ref 0–0)
PHOSPHATE SERPL-MCNC: 3.3 MG/DL — SIGNIFICANT CHANGE UP (ref 2.5–4.5)
PLATELET # BLD AUTO: 509 K/UL — HIGH (ref 150–400)
PMV BLD: 9.7 FL — SIGNIFICANT CHANGE UP (ref 7–13)
POTASSIUM SERPL-MCNC: 4.3 MMOL/L — SIGNIFICANT CHANGE UP (ref 3.5–5.3)
POTASSIUM SERPL-SCNC: 4.3 MMOL/L — SIGNIFICANT CHANGE UP (ref 3.5–5.3)
PROT SERPL-MCNC: 6.8 G/DL — SIGNIFICANT CHANGE UP (ref 6–8.3)
RBC # BLD: 4.07 M/UL — SIGNIFICANT CHANGE UP (ref 3.8–5.2)
RBC # FLD: 13.5 % — SIGNIFICANT CHANGE UP (ref 10.3–14.5)
SODIUM SERPL-SCNC: 129 MMOL/L — LOW (ref 135–145)
WBC # BLD: 12.68 K/UL — HIGH (ref 3.8–10.5)
WBC # FLD AUTO: 12.68 K/UL — HIGH (ref 3.8–10.5)

## 2020-04-19 RX ORDER — INSULIN LISPRO 100/ML
VIAL (ML) SUBCUTANEOUS AT BEDTIME
Refills: 0 | Status: DISCONTINUED | OUTPATIENT
Start: 2020-04-19 | End: 2020-04-19

## 2020-04-19 RX ORDER — INSULIN LISPRO 100/ML
VIAL (ML) SUBCUTANEOUS
Refills: 0 | Status: COMPLETED | OUTPATIENT
Start: 2020-04-19 | End: 2020-04-19

## 2020-04-19 RX ORDER — INSULIN LISPRO 100/ML
15 VIAL (ML) SUBCUTANEOUS
Refills: 0 | Status: DISCONTINUED | OUTPATIENT
Start: 2020-04-19 | End: 2020-04-19

## 2020-04-19 RX ORDER — INSULIN GLARGINE 100 [IU]/ML
20 INJECTION, SOLUTION SUBCUTANEOUS AT BEDTIME
Refills: 0 | Status: DISCONTINUED | OUTPATIENT
Start: 2020-04-19 | End: 2020-04-19

## 2020-04-19 RX ORDER — INSULIN LISPRO 100/ML
VIAL (ML) SUBCUTANEOUS AT BEDTIME
Refills: 0 | Status: DISCONTINUED | OUTPATIENT
Start: 2020-04-20 | End: 2020-04-21

## 2020-04-19 RX ORDER — INSULIN GLARGINE 100 [IU]/ML
15 INJECTION, SOLUTION SUBCUTANEOUS AT BEDTIME
Refills: 0 | Status: DISCONTINUED | OUTPATIENT
Start: 2020-04-19 | End: 2020-04-20

## 2020-04-19 RX ORDER — INSULIN LISPRO 100/ML
5 VIAL (ML) SUBCUTANEOUS
Refills: 0 | Status: DISCONTINUED | OUTPATIENT
Start: 2020-04-20 | End: 2020-04-20

## 2020-04-19 RX ORDER — INSULIN LISPRO 100/ML
VIAL (ML) SUBCUTANEOUS
Refills: 0 | Status: DISCONTINUED | OUTPATIENT
Start: 2020-04-20 | End: 2020-04-21

## 2020-04-19 RX ADMIN — INSULIN GLARGINE 15 UNIT(S): 100 INJECTION, SOLUTION SUBCUTANEOUS at 21:57

## 2020-04-19 RX ADMIN — Medication 15 UNIT(S): at 12:23

## 2020-04-19 RX ADMIN — Medication 40 MILLIGRAM(S): at 04:12

## 2020-04-19 RX ADMIN — ENOXAPARIN SODIUM 40 MILLIGRAM(S): 100 INJECTION SUBCUTANEOUS at 11:34

## 2020-04-19 RX ADMIN — Medication 81 MILLIGRAM(S): at 11:33

## 2020-04-19 RX ADMIN — Medication 2: at 12:23

## 2020-04-19 RX ADMIN — RISPERIDONE 1 MILLIGRAM(S): 4 TABLET ORAL at 11:33

## 2020-04-19 RX ADMIN — Medication 15 UNIT(S): at 08:08

## 2020-04-19 RX ADMIN — AMLODIPINE BESYLATE 10 MILLIGRAM(S): 2.5 TABLET ORAL at 04:12

## 2020-04-19 RX ADMIN — Medication 4: at 08:08

## 2020-04-19 RX ADMIN — PANTOPRAZOLE SODIUM 40 MILLIGRAM(S): 20 TABLET, DELAYED RELEASE ORAL at 04:12

## 2020-04-19 NOTE — CHART NOTE - NSCHARTNOTEFT_GEN_A_CORE
Per current hospital medicine emergency protocol, in an effort to reduce COVID exposures and also conserve PPE for necessary encounters, below information has been obtained from chart review, nursing/care team and providers without direct patient contact.    Patient is a 74-year-old, Creole speaking female, with past medical history of hypertension, hyperlipidemia, mild cognitive impairment, depression, type 2 diabetes mellitus, presenting with fever and poor appetite, found to have COVID 19+ infection.    MEDICATIONS  (STANDING):  amLODIPine   Tablet 10 milliGRAM(s) Oral daily  aspirin enteric coated 81 milliGRAM(s) Oral daily  dextrose 5%. 1000 milliLiter(s) (50 mL/Hr) IV Continuous <Continuous>  dextrose 50% Injectable 12.5 Gram(s) IV Push once  dextrose 50% Injectable 25 Gram(s) IV Push once  dextrose 50% Injectable 25 Gram(s) IV Push once  enoxaparin Injectable 40 milliGRAM(s) SubCutaneous daily  insulin glargine Injectable (LANTUS) 32 Unit(s) SubCutaneous at bedtime  insulin lispro (HumaLOG) corrective regimen sliding scale   SubCutaneous three times a day before meals  insulin lispro (HumaLOG) corrective regimen sliding scale   SubCutaneous at bedtime  insulin lispro Injectable (HumaLOG) 15 Unit(s) SubCutaneous three times a day before meals  pantoprazole    Tablet 40 milliGRAM(s) Oral before breakfast  risperiDONE   Tablet 1 milliGRAM(s) Oral daily  sodium chloride 0.9%. 1000 milliLiter(s) (60 mL/Hr) IV Continuous <Continuous>      CAPILLARY BLOOD GLUCOSE  POCT Blood Glucose.: 193 mg/dL (19 Apr 2020 11:45)  POCT Blood Glucose.: 210 mg/dL (19 Apr 2020 08:05)  POCT Blood Glucose.: 188 mg/dL (18 Apr 2020 21:18)  POCT Blood Glucose.: 158 mg/dL (18 Apr 2020 16:57)  POCT Blood Glucose.: 263 mg/dL (18 Apr 2020 07:51)  POCT Blood Glucose.: 230 mg/dL (17 Apr 2020 20:54)  POCT Blood Glucose.: 248 mg/dL (17 Apr 2020 16:57)  POCT Blood Glucose.: 313 mg/dL (17 Apr 2020 11:45)    04-19    129<L>  |  96<L>  |  35<H>  ----------------------------<  217<H>  4.3   |  22  |  1.23    Ca    8.5      19 Apr 2020 05:30  Phos  3.3     04-19  Mg     1.8     04-19    TPro  6.8  /  Alb  2.7<L>  /  TBili  0.2  /  DBili  x   /  AST  33<H>  /  ALT  42<H>  /  AlkPhos  51  04-19      Hemoglobin A1C, Whole Blood: 8.8 % (12-13-19 @ 06:00)    Anion Gap, Serum: 11 mmo/L (04-19-20 @ 05:30)  Anion Gap, Serum: 15 mmo/L (04-18-20 @ 05:30)  Anion Gap, Serum: 17 mmo/L (04-17-20 @ 04:58)  Anion Gap, Serum: 16 mmo/L (04-16-20 @ 18:50)      DM 2 follow up - hyperglycemia exacerbated by steroids   Please refer to complete consult from 4/16/20 for available history and plan of care   Uncontrolled with hyperglycemia   Last dose of steroids today    Anticipate decrease in glucose over the next 24-48 hours   Will decrease Lantus to 20 units for tonight (slightly over 30 % reduction)  Will c/w pre-meal Humalog to 15 units TID before meals for today only, decreasing to 10 units starting tomorrow AM (30 % reduction)  Will continue MODERATE Humalog correctional scales before meals and bedtime for today only, will decrease scale to LOW starting tomorrow AM   Consistent carbohydrate diet   FS before meals and bedtime   Target glucose 100-200 mg/dL      Discharge planning: Patient was on multiple oral medications, will likely resume a PO regimen pending GRF and Lactate closer to discharge   Will need to confirm doses she was taking   Endocrine

## 2020-04-19 NOTE — PROGRESS NOTE ADULT - SUBJECTIVE AND OBJECTIVE BOX
[facility] [place] [facility]ROLOGY PRACT[facility]   MD Rip Wilson MD, D.O. Ruoru Wong, PA    From 7 AM - 5 PM:  OFFICE: [phone]  Dr. Schulz cell: [phone]  Dr. Garcia cell: [phone]  Dr. Leal cell: [phone]  IVY Christy cell: [phone]    From 5 PM - 7 AM: Answering Service: [phone]      RENAL FOLLOW UP NOTE  --------------------------------------------------------------------------------  HPI: Pt covid +     PAST HISTORY  --------------------------------------------------------------------------------  No significant changes to PMH, PSH, FHx, SHx, unless otherwise noted    ALLERGIES & MEDICATIONS  --------------------------------------------------------------------------------  Allergies    peanuts (Short breath; Hives)  penicillin (Short breath; Hives)    Intolerances      Standing Inpatient Medications  amLODIPine   Tablet 10 milliGRAM(s) Oral daily  aspirin enteric coated 81 milliGRAM(s) Oral daily  dextrose 5%. 1000 milliLiter(s) IV Continuous <Continuous>  dextrose 50% Injectable 12.5 Gram(s) IV Push once  dextrose 50% Injectable 25 Gram(s) IV Push once  dextrose 50% Injectable 25 Gram(s) IV Push once  enoxaparin Injectable 40 milliGRAM(s) SubCutaneous daily  insulin glargine Injectable (LANTUS) 20 Unit(s) SubCutaneous at bedtime  insulin lispro (HumaLOG) corrective regimen sliding scale   SubCutaneous at bedtime  pantoprazole    Tablet 40 milliGRAM(s) Oral before breakfast  risperiDONE   Tablet 1 milliGRAM(s) Oral daily  sodium chloride 0.9%. 1000 milliLiter(s) IV Continuous <Continuous>    PRN Inpatient Medications  acetaminophen   Tablet .. 650 milliGRAM(s) Oral every 6 hours PRN  dextrose 40% Gel 15 Gram(s) Oral once PRN  glucagon  Injectable 1 milliGRAM(s) IntraMuscular once PRN      REVIEW OF SYSTEMS  --------------------------------------------------------------------------------  Unable to obtain     VITALS/PHYSICAL EXAM  --------------------------------------------------------------------------------  T(C): 37 (04-19-20 @ 11:32), Max: 37 (04-19-20 @ 11:32)  HR: 92 (04-19-20 @ 11:32) (91 - 95)  BP: 148/67 (04-19-20 @ 11:32) (147/75 - 151/78)  RR: 18 (04-19-20 @ 11:32) (18 - 20)  SpO2: 95% (04-19-20 @ 11:32) (95% - 99%)  Wt(kg): --    LABS/STUDIES  --------------------------------------------------------------------------------              10.4   12.68 >-----------<  509      [04-19-20 @ 05:30]              31.5     129  |  96  |  35  ----------------------------<  217      [04-19-20 @ 05:30]  4.3   |  22  |  1.23        Ca     8.5     [04-19-20 @ 05:30]      Mg     1.8     [04-19-20 @ 05:30]      Phos  3.3     [04-19-20 @ 05:30]    TPro  6.8  /  Alb  2.7  /  TBili  0.2  /  DBili  x   /  AST  33  /  ALT  42  /  AlkPhos  51  [04-19-20 @ 05:30]    Creatinine Trend:  SCr 1.23 [04-19 @ 05:30]  SCr 1.36 [04-18 @ 05:30]  SCr 1.76 [04-17 @ 04:58]  SCr 1.90 [04-16 @ 18:50]  SCr 1.87 [04-16 @ 08:00]    Urinalysis - [04-15-20 @ 17:35]      Color YELLOW / Appearance Lt TURBID / SG 1.016 / pH 6.0      Gluc 300 / Ketone NEGATIVE  / Bili NEGATIVE / Urobili NORMAL       Blood TRACE / Protein 200 / Leuk Est NEGATIVE / Nitrite NEGATIVE      RBC 3-5 / WBC 3-5 / Hyaline NEGATIVE / Gran  / Sq Epi OCC / Non Sq Epi  / Bacteria FEW    Urine Creatinine 116.70      [04-15-20 @ 17:35]  Urine Sodium < 20      [04-15-20 @ 17:35]  Urine Potassium 36.2      [04-15-20 @ 17:35]  Urine Chloride 28      [04-15-20 @ 17:35]  Urine Osmolality 311      [04-15-20 @ 17:35]    Ferritin 618.0      [04-17-20 @ 04:58]  HbA1c 8.8      [12-13-19 @ 06:00]  TSH 2.69      [12-12-19 @ 06:45]  Lipid: chol 197, , HDL 41,       [12-14-19 @ 03:50]

## 2020-04-19 NOTE — PROGRESS NOTE ADULT - ASSESSMENT
74F PMH HTN, DM2 admitted with low oxygen sats upon triage found to be covid +. Nephrology consulted for Hyponatremia     Hyponatremia   Pt with acute on chronic hyponatremia  hx of recent admission with hyponatremia in the past secondary to polydipsia/ HCTZ   also in the setting of elevated glucose   urine work up suggested hypovolemia   s/p NS @ 60cc/hr x5hrs on 4/16  Na slightly low today. Monitor  Advise to optimize DM control (given high serum osm)  caution with IVF given hypoxia, poor resp status.    monitor Na     HTN  BP controlled at present   Low salt diet     SUNIL on CKD III  Baseline Scr 1 with GFR 45 12/2019   Pt now admitted with SUNIL in the setting of covid 19 infection vs. ATN vs prerenal in setting of hyperglycemia  Scr improved today  optimize dm control  Monitor urine output   Avoid Nephrotoxins     Acidosis  serum bicarb low  Monitor at present     Anemia  Hb at goal   Monitor at present     Covid +  Monitor temp/ O2 sat   on plaquenil

## 2020-04-19 NOTE — PROGRESS NOTE ADULT - SUBJECTIVE AND OBJECTIVE BOX
JOSE SAGASTUME  74y  Female      Patient is a 74y old  Female who presents with a chief complaint of COVID (17 Apr 2020 16:25)  feels fine.no sob,no cp,no cough,no fever,on room air.     REVIEW OF SYSTEMS:  as above        INTERVAL HPI/OVERNIGHT EVENTS:  T(C): 37 (04-19-20 @ 11:32), Max: 37 (04-19-20 @ 11:32)  HR: 92 (04-19-20 @ 11:32) (91 - 95)  BP: 148/67 (04-19-20 @ 11:32) (147/75 - 151/78)  RR: 18 (04-19-20 @ 11:32) (18 - 20)  SpO2: 95% (04-19-20 @ 11:32) (95% - 99%)  Wt(kg): --  I&O's Summary    T(C): 37 (04-19-20 @ 11:32), Max: 37 (04-19-20 @ 11:32)  HR: 92 (04-19-20 @ 11:32) (91 - 95)  BP: 148/67 (04-19-20 @ 11:32) (147/75 - 151/78)  RR: 18 (04-19-20 @ 11:32) (18 - 20)  SpO2: 95% (04-19-20 @ 11:32) (95% - 99%)  Wt(kg): --Vital Signs Last 24 Hrs  T(C): 37 (19 Apr 2020 11:32), Max: 37 (19 Apr 2020 11:32)  T(F): 98.6 (19 Apr 2020 11:32), Max: 98.6 (19 Apr 2020 11:32)  HR: 92 (19 Apr 2020 11:32) (91 - 95)  BP: 148/67 (19 Apr 2020 11:32) (147/75 - 151/78)  BP(mean): --  RR: 18 (19 Apr 2020 11:32) (18 - 20)  SpO2: 95% (19 Apr 2020 11:32) (95% - 99%)    LABS:                        10.4   12.68 )-----------( 509      ( 19 Apr 2020 05:30 )             31.5     04-19    129<L>  |  96<L>  |  35<H>  ----------------------------<  217<H>  4.3   |  22  |  1.23    Ca    8.5      19 Apr 2020 05:30  Phos  3.3     04-19  Mg     1.8     04-19    TPro  6.8  /  Alb  2.7<L>  /  TBili  0.2  /  DBili  x   /  AST  33<H>  /  ALT  42<H>  /  AlkPhos  51  04-19        CAPILLARY BLOOD GLUCOSE      POCT Blood Glucose.: 104 mg/dL (19 Apr 2020 17:10)  POCT Blood Glucose.: 193 mg/dL (19 Apr 2020 11:45)  POCT Blood Glucose.: 210 mg/dL (19 Apr 2020 08:05)  POCT Blood Glucose.: 188 mg/dL (18 Apr 2020 21:18)            PAST MEDICAL & SURGICAL HISTORY:  Anxiety and depression  Hypercholesterolemia  Hypertension  Diabetes mellitus  No significant past surgical history      MEDICATIONS  (STANDING):  amLODIPine   Tablet 10 milliGRAM(s) Oral daily  aspirin enteric coated 81 milliGRAM(s) Oral daily  dextrose 5%. 1000 milliLiter(s) (50 mL/Hr) IV Continuous <Continuous>  dextrose 50% Injectable 12.5 Gram(s) IV Push once  dextrose 50% Injectable 25 Gram(s) IV Push once  dextrose 50% Injectable 25 Gram(s) IV Push once  enoxaparin Injectable 40 milliGRAM(s) SubCutaneous daily  insulin glargine Injectable (LANTUS) 20 Unit(s) SubCutaneous at bedtime  insulin lispro (HumaLOG) corrective regimen sliding scale   SubCutaneous at bedtime  pantoprazole    Tablet 40 milliGRAM(s) Oral before breakfast  risperiDONE   Tablet 1 milliGRAM(s) Oral daily  sodium chloride 0.9%. 1000 milliLiter(s) (60 mL/Hr) IV Continuous <Continuous>    MEDICATIONS  (PRN):  acetaminophen   Tablet .. 650 milliGRAM(s) Oral every 6 hours PRN Temp greater or equal to 38C (100.4F), Mild Pain (1 - 3), Moderate Pain (4 - 6), Severe Pain (7 - 10)  dextrose 40% Gel 15 Gram(s) Oral once PRN Blood Glucose LESS THAN 70 milliGRAM(s)/deciliter  glucagon  Injectable 1 milliGRAM(s) IntraMuscular once PRN Glucose LESS THAN 70 milligrams/deciliter        RADIOLOGY & ADDITIONAL TESTS:    Imaging Personally Reviewed:  [ ] YES  [ ] NO    Consultant(s) Notes Reviewed:  [x ] YES  [ ] NO    PHYSICAL EXAM:  GENERAL: Alert and awake lying in bed in no distress  HEAD:  Atraumatic, Normocephalic  EYES: EOMI, MATEUS, conjunctiva and sclera clear  NECK: Supple, No JVD, Normal thyroid  NERVOUS SYSTEM:  Alert & Oriented X3, Motor and sensory systems are intact,   CHEST/LUNG: Bilateral clear breath sounds, no rhochi, no wheezing, no crepitations,  HEART: Regular rate and rhythm; No murmurs, rubs, or gallops  ABDOMEN: Soft, Nontender, Nondistended; Bowel sounds present  EXTREMITIES:   Peripheral Pulses are palpable, no  edema        Care Discussed with Consultants/Other Providers [ ] YES  [ ] NO      Code Status: [] Full Code [] DNR [] DNI [] Goals of Care:   Disposition: [] ICU [] Stroke Unit [] RCU []PCU []Floor [] Discharge Home         SYLVIA HahnP

## 2020-04-20 LAB
ALBUMIN SERPL ELPH-MCNC: 2.5 G/DL — LOW (ref 3.3–5)
ALP SERPL-CCNC: 48 U/L — SIGNIFICANT CHANGE UP (ref 40–120)
ALT FLD-CCNC: 43 U/L — HIGH (ref 4–33)
ANION GAP SERPL CALC-SCNC: 11 MMO/L — SIGNIFICANT CHANGE UP (ref 7–14)
AST SERPL-CCNC: 32 U/L — SIGNIFICANT CHANGE UP (ref 4–32)
BILIRUB SERPL-MCNC: < 0.2 MG/DL — LOW (ref 0.2–1.2)
BUN SERPL-MCNC: 33 MG/DL — HIGH (ref 7–23)
CALCIUM SERPL-MCNC: 8.6 MG/DL — SIGNIFICANT CHANGE UP (ref 8.4–10.5)
CHLORIDE SERPL-SCNC: 98 MMOL/L — SIGNIFICANT CHANGE UP (ref 98–107)
CO2 SERPL-SCNC: 24 MMOL/L — SIGNIFICANT CHANGE UP (ref 22–31)
CREAT SERPL-MCNC: 1.12 MG/DL — SIGNIFICANT CHANGE UP (ref 0.5–1.3)
CRP SERPL-MCNC: 7.6 MG/L — HIGH
D DIMER BLD IA.RAPID-MCNC: 420 NG/ML — SIGNIFICANT CHANGE UP
FERRITIN SERPL-MCNC: 501.7 NG/ML — HIGH (ref 15–150)
GLUCOSE BLDC GLUCOMTR-MCNC: 101 MG/DL — HIGH (ref 70–99)
GLUCOSE BLDC GLUCOMTR-MCNC: 105 MG/DL — HIGH (ref 70–99)
GLUCOSE BLDC GLUCOMTR-MCNC: 145 MG/DL — HIGH (ref 70–99)
GLUCOSE BLDC GLUCOMTR-MCNC: 153 MG/DL — HIGH (ref 70–99)
GLUCOSE BLDC GLUCOMTR-MCNC: 156 MG/DL — HIGH (ref 70–99)
GLUCOSE SERPL-MCNC: 99 MG/DL — SIGNIFICANT CHANGE UP (ref 70–99)
HCT VFR BLD CALC: 32.1 % — LOW (ref 34.5–45)
HGB BLD-MCNC: 10.7 G/DL — LOW (ref 11.5–15.5)
LDH SERPL L TO P-CCNC: 396 U/L — HIGH (ref 135–225)
MAGNESIUM SERPL-MCNC: 1.6 MG/DL — SIGNIFICANT CHANGE UP (ref 1.6–2.6)
MCHC RBC-ENTMCNC: 25.9 PG — LOW (ref 27–34)
MCHC RBC-ENTMCNC: 33.3 % — SIGNIFICANT CHANGE UP (ref 32–36)
MCV RBC AUTO: 77.7 FL — LOW (ref 80–100)
NRBC # FLD: 0.02 K/UL — SIGNIFICANT CHANGE UP (ref 0–0)
PHOSPHATE SERPL-MCNC: 2.7 MG/DL — SIGNIFICANT CHANGE UP (ref 2.5–4.5)
PLATELET # BLD AUTO: 571 K/UL — HIGH (ref 150–400)
PMV BLD: 9.8 FL — SIGNIFICANT CHANGE UP (ref 7–13)
POTASSIUM SERPL-MCNC: 4.1 MMOL/L — SIGNIFICANT CHANGE UP (ref 3.5–5.3)
POTASSIUM SERPL-SCNC: 4.1 MMOL/L — SIGNIFICANT CHANGE UP (ref 3.5–5.3)
PROT SERPL-MCNC: 6.6 G/DL — SIGNIFICANT CHANGE UP (ref 6–8.3)
RBC # BLD: 4.13 M/UL — SIGNIFICANT CHANGE UP (ref 3.8–5.2)
RBC # FLD: 13.7 % — SIGNIFICANT CHANGE UP (ref 10.3–14.5)
SODIUM SERPL-SCNC: 133 MMOL/L — LOW (ref 135–145)
WBC # BLD: 13.42 K/UL — HIGH (ref 3.8–10.5)
WBC # FLD AUTO: 13.42 K/UL — HIGH (ref 3.8–10.5)

## 2020-04-20 PROCEDURE — 99232 SBSQ HOSP IP/OBS MODERATE 35: CPT

## 2020-04-20 RX ORDER — INSULIN LISPRO 100/ML
3 VIAL (ML) SUBCUTANEOUS
Refills: 0 | Status: DISCONTINUED | OUTPATIENT
Start: 2020-04-20 | End: 2020-04-21

## 2020-04-20 RX ORDER — INSULIN GLARGINE 100 [IU]/ML
10 INJECTION, SOLUTION SUBCUTANEOUS AT BEDTIME
Refills: 0 | Status: DISCONTINUED | OUTPATIENT
Start: 2020-04-20 | End: 2020-04-21

## 2020-04-20 RX ADMIN — Medication 3 UNIT(S): at 17:43

## 2020-04-20 RX ADMIN — AMLODIPINE BESYLATE 10 MILLIGRAM(S): 2.5 TABLET ORAL at 05:54

## 2020-04-20 RX ADMIN — Medication 5 UNIT(S): at 08:37

## 2020-04-20 RX ADMIN — ENOXAPARIN SODIUM 40 MILLIGRAM(S): 100 INJECTION SUBCUTANEOUS at 12:42

## 2020-04-20 RX ADMIN — Medication 3 UNIT(S): at 12:40

## 2020-04-20 RX ADMIN — INSULIN GLARGINE 10 UNIT(S): 100 INJECTION, SOLUTION SUBCUTANEOUS at 23:33

## 2020-04-20 RX ADMIN — Medication 81 MILLIGRAM(S): at 12:43

## 2020-04-20 RX ADMIN — RISPERIDONE 1 MILLIGRAM(S): 4 TABLET ORAL at 12:43

## 2020-04-20 RX ADMIN — PANTOPRAZOLE SODIUM 40 MILLIGRAM(S): 20 TABLET, DELAYED RELEASE ORAL at 05:54

## 2020-04-20 NOTE — PHYSICAL THERAPY INITIAL EVALUATION ADULT - PREDICTED DURATION OF THERAPY (DAYS/WKS), PT EVAL
Samples Given: goodrx coupon given to pt for the tretinoin
Detail Level: Zone
Initiate Treatment: tretinoin 0.05% cream QHS
Plan: If pt calls ok to send in higher or lower strength of the tretinoin depending on how irritating the pt is getting per 
till discharge

## 2020-04-20 NOTE — PROGRESS NOTE ADULT - SUBJECTIVE AND OBJECTIVE BOX
Comfortable at rest; O2 sat 97 % on R air    VS Stable Afeb    Lungs- clear  Heart- Reg  Abd- non tender  Ext- no edema    IMP: Improved post Covid infection; post hydroxychl and steroids  Inflammatory markers trending downward    PLAN: Continue supportive care  Outpt lab f/u    Dima Griffin MD [facility]  [phone]    (Elias/Steven/Yen)

## 2020-04-20 NOTE — PROGRESS NOTE ADULT - SUBJECTIVE AND OBJECTIVE BOX
Follow Up:      Inverval History/ROS:Patient is a 74y old  Female who presents with a chief complaint of COVID (17 Apr 2020 16:25)    Breathing comfortable on room air.      Allergies    peanuts (Short breath; Hives)  penicillin (Short breath; Hives)    Intolerances        ANTIMICROBIALS:      OTHER MEDS:  acetaminophen   Tablet .. 650 milliGRAM(s) Oral every 6 hours PRN  amLODIPine   Tablet 10 milliGRAM(s) Oral daily  aspirin enteric coated 81 milliGRAM(s) Oral daily  dextrose 40% Gel 15 Gram(s) Oral once PRN  dextrose 5%. 1000 milliLiter(s) IV Continuous <Continuous>  dextrose 50% Injectable 12.5 Gram(s) IV Push once  dextrose 50% Injectable 25 Gram(s) IV Push once  dextrose 50% Injectable 25 Gram(s) IV Push once  enoxaparin Injectable 40 milliGRAM(s) SubCutaneous daily  glucagon  Injectable 1 milliGRAM(s) IntraMuscular once PRN  insulin glargine Injectable (LANTUS) 10 Unit(s) SubCutaneous at bedtime  insulin lispro (HumaLOG) corrective regimen sliding scale   SubCutaneous three times a day before meals  insulin lispro (HumaLOG) corrective regimen sliding scale   SubCutaneous at bedtime  insulin lispro Injectable (HumaLOG) 3 Unit(s) SubCutaneous three times a day before meals  pantoprazole    Tablet 40 milliGRAM(s) Oral before breakfast  risperiDONE   Tablet 1 milliGRAM(s) Oral daily      Vital Signs Last 24 Hrs  T(C): 36.8 (20 Apr 2020 12:45), Max: 37.4 (19 Apr 2020 20:03)  T(F): 98.3 (20 Apr 2020 12:45), Max: 99.3 (19 Apr 2020 20:03)  HR: 92 (20 Apr 2020 12:45) (92 - 97)  BP: 150/60 (20 Apr 2020 12:45) (150/60 - 159/76)  BP(mean): --  RR: 18 (20 Apr 2020 12:45) (18 - 19)  SpO2: 96% (20 Apr 2020 12:45) (95% - 96%)    PHYSICAL EXAM:  General: [x ] non-toxic  HEAD/EYES: [ ] PERRL [x ] white sclera [ ] icterus  ENT:  [ ] normal [x ] supple [ ] thrush [ ] pharyngeal exudate  Cardiovascular:   [ ] murmur [x ] normal [ ] PPM/AICD  Respiratory:  [ x] clear to ausculation bilaterally  GI:  [x ] soft, non-tender, normal bowel sounds  :  [ ] michaels [ x] no CVA tenderness   Musculoskeletal:  [ ] no synovitis  Neurologic:  [ ] non-focal exam   Skin:  [x ]x no rash  Lymph: [ ] no lymphadenopathy  Psychiatric:  [ ] appropriate affect [x ] alert & oriented  Lines:  [ x] no phlebitis [ ] central line                                10.7   13.42 )-----------( 571      ( 20 Apr 2020 05:00 )             32.1       04-20    133<L>  |  98  |  33<H>  ----------------------------<  99  4.1   |  24  |  1.12    Ca    8.6      20 Apr 2020 05:00  Phos  2.7     04-20  Mg     1.6     04-20    TPro  6.6  /  Alb  2.5<L>  /  TBili  < 0.2<L>  /  DBili  x   /  AST  32  /  ALT  43<H>  /  AlkPhos  48  04-20          MICROBIOLOGY:    RADIOLOGY:

## 2020-04-20 NOTE — PROGRESS NOTE ADULT - ASSESSMENT
76 year old with poorly controlled DM and hypertension presents with fever and diarrhea- found to have hypoxia.    COVID with multifocal viral pneumonia  Complicated by hypoxia.    Clinically improving.    Supportive care

## 2020-04-20 NOTE — CHART NOTE - NSCHARTNOTEFT_GEN_A_CORE
Per current hospital medicine emergency protocol, in an effort to reduce COVID exposures and also conserve PPE for necessary encounters, below information has been obtained from chart review, nursing/care team and providers without direct patient contact.    Patient is a 74-year-old, Creole speaking female, with past medical history of hypertension, hyperlipidemia, mild cognitive impairment, depression, type 2 diabetes mellitus, presenting with fever and poor appetite, found to have COVID 19+ infection.    MEDICATIONS  (STANDING):  amLODIPine   Tablet 10 milliGRAM(s) Oral daily  aspirin enteric coated 81 milliGRAM(s) Oral daily  dextrose 5%. 1000 milliLiter(s) (50 mL/Hr) IV Continuous <Continuous>  dextrose 50% Injectable 12.5 Gram(s) IV Push once  dextrose 50% Injectable 25 Gram(s) IV Push once  dextrose 50% Injectable 25 Gram(s) IV Push once  enoxaparin Injectable 40 milliGRAM(s) SubCutaneous daily  insulin glargine Injectable (LANTUS) 15 Unit(s) SubCutaneous at bedtime  insulin lispro (HumaLOG) corrective regimen sliding scale   SubCutaneous three times a day before meals  insulin lispro (HumaLOG) corrective regimen sliding scale   SubCutaneous at bedtime  insulin lispro Injectable (HumaLOG) 5 Unit(s) SubCutaneous three times a day before meals  pantoprazole    Tablet 40 milliGRAM(s) Oral before breakfast  risperiDONE   Tablet 1 milliGRAM(s) Oral daily    CAPILLARY BLOOD GLUCOSE  POCT Blood Glucose.: 105 mg/dL (20 Apr 2020 08:16)  POCT Blood Glucose.: 189 mg/dL (19 Apr 2020 21:30)  POCT Blood Glucose.: 104 mg/dL (19 Apr 2020 17:10)  POCT Blood Glucose.: 193 mg/dL (19 Apr 2020 11:45)      Hemoglobin A1C, Whole Blood: 8.8 % (12-13-19 @ 06:00)    04-20    133<L>  |  98  |  33<H>  ----------------------------<  99  4.1   |  24  |  1.12    Ca    8.6      20 Apr 2020 05:00  Phos  2.7     04-20  Mg     1.6     04-20    TPro  6.6  /  Alb  2.5<L>  /  TBili  < 0.2<L>  /  DBili  x   /  AST  32  /  ALT  43<H>  /  AlkPhos  48  04-20    Anion Gap, Serum: 11 mmo/L (04-20-20 @ 05:00)  Anion Gap, Serum: 11 mmo/L (04-19-20 @ 05:30)  Anion Gap, Serum: 15 mmo/L (04-18-20 @ 05:30)        DM 2 follow up - hyperglycemia exacerbated by steroids   Please refer to complete consult from 4/16/20 for available history and plan of care   Last dose of steroids yesterday  Aggressively decreasing insulin to prevent hypoglycemia   Will decrease Lantus to 10 units for tonight   Will decrease Humalog to 3 units TID before meals   C/W Humalog low correctional scales before meals and bed   Consistent carbohydrate diet   FS before meals and bedtime   Target glucose 100-200 mg/dL      Discharge planning: Patient was on multiple oral medications, will likely resume a PO regimen pending GRF and Lactate closer to discharge   Will need to confirm doses she was taking   Endocrine

## 2020-04-20 NOTE — PROGRESS NOTE ADULT - SUBJECTIVE AND OBJECTIVE BOX
JOSE SAGASTUME  74y  Female      Patient is a 74y old  Female who presents with a chief complaint of covid (20 Apr 2020 15:52)  comfortable,feels better.on RA,97%.no cough,no sob,no cp,no fever,no cough     REVIEW OF SYSTEMS:  neg      INTERVAL HPI/OVERNIGHT EVENTS:  T(C): 36.8 (04-20-20 @ 12:45), Max: 37.4 (04-19-20 @ 20:03)  HR: 92 (04-20-20 @ 12:45) (92 - 97)  BP: 150/60 (04-20-20 @ 12:45) (150/60 - 159/76)  RR: 18 (04-20-20 @ 12:45) (18 - 19)  SpO2: 96% (04-20-20 @ 12:45) (95% - 96%)  Wt(kg): --  I&O's Summary    T(C): 36.8 (04-20-20 @ 12:45), Max: 37.4 (04-19-20 @ 20:03)  HR: 92 (04-20-20 @ 12:45) (92 - 97)  BP: 150/60 (04-20-20 @ 12:45) (150/60 - 159/76)  RR: 18 (04-20-20 @ 12:45) (18 - 19)  SpO2: 96% (04-20-20 @ 12:45) (95% - 96%)  Wt(kg): --Vital Signs Last 24 Hrs  T(C): 36.8 (20 Apr 2020 12:45), Max: 37.4 (19 Apr 2020 20:03)  T(F): 98.3 (20 Apr 2020 12:45), Max: 99.3 (19 Apr 2020 20:03)  HR: 92 (20 Apr 2020 12:45) (92 - 97)  BP: 150/60 (20 Apr 2020 12:45) (150/60 - 159/76)  BP(mean): --  RR: 18 (20 Apr 2020 12:45) (18 - 19)  SpO2: 96% (20 Apr 2020 12:45) (95% - 96%)    LABS:                        10.7   13.42 )-----------( 571      ( 20 Apr 2020 05:00 )             32.1     04-20    133<L>  |  98  |  33<H>  ----------------------------<  99  4.1   |  24  |  1.12    Ca    8.6      20 Apr 2020 05:00  Phos  2.7     04-20  Mg     1.6     04-20    TPro  6.6  /  Alb  2.5<L>  /  TBili  < 0.2<L>  /  DBili  x   /  AST  32  /  ALT  43<H>  /  AlkPhos  48  04-20        CAPILLARY BLOOD GLUCOSE      POCT Blood Glucose.: 145 mg/dL (20 Apr 2020 17:09)  POCT Blood Glucose.: 101 mg/dL (20 Apr 2020 11:53)  POCT Blood Glucose.: 105 mg/dL (20 Apr 2020 08:16)  POCT Blood Glucose.: 189 mg/dL (19 Apr 2020 21:30)            PAST MEDICAL & SURGICAL HISTORY:  Anxiety and depression  Hypercholesterolemia  Hypertension  Diabetes mellitus  No significant past surgical history      MEDICATIONS  (STANDING):  amLODIPine   Tablet 10 milliGRAM(s) Oral daily  aspirin enteric coated 81 milliGRAM(s) Oral daily  dextrose 5%. 1000 milliLiter(s) (50 mL/Hr) IV Continuous <Continuous>  dextrose 50% Injectable 12.5 Gram(s) IV Push once  dextrose 50% Injectable 25 Gram(s) IV Push once  dextrose 50% Injectable 25 Gram(s) IV Push once  enoxaparin Injectable 40 milliGRAM(s) SubCutaneous daily  insulin glargine Injectable (LANTUS) 10 Unit(s) SubCutaneous at bedtime  insulin lispro (HumaLOG) corrective regimen sliding scale   SubCutaneous three times a day before meals  insulin lispro (HumaLOG) corrective regimen sliding scale   SubCutaneous at bedtime  insulin lispro Injectable (HumaLOG) 3 Unit(s) SubCutaneous three times a day before meals  pantoprazole    Tablet 40 milliGRAM(s) Oral before breakfast  risperiDONE   Tablet 1 milliGRAM(s) Oral daily    MEDICATIONS  (PRN):  acetaminophen   Tablet .. 650 milliGRAM(s) Oral every 6 hours PRN Temp greater or equal to 38C (100.4F), Mild Pain (1 - 3), Moderate Pain (4 - 6), Severe Pain (7 - 10)  dextrose 40% Gel 15 Gram(s) Oral once PRN Blood Glucose LESS THAN 70 milliGRAM(s)/deciliter  glucagon  Injectable 1 milliGRAM(s) IntraMuscular once PRN Glucose LESS THAN 70 milligrams/deciliter        RADIOLOGY & ADDITIONAL TESTS:    Imaging Personally Reviewed:  [ ] YES  [ ] NO    Consultant(s) Notes Reviewed:  [ ] YES  [ ] NO    PHYSICAL EXAM:  GENERAL: Alert and awake lying in bed in no distress  HEAD:  Atraumatic, Normocephalic  EYES: EOMI, MATEUS, conjunctiva and sclera clear  NECK: Supple, No JVD, Normal thyroid  NERVOUS SYSTEM:  Alert & Oriented X3, Motor and sensory systems are intact,   CHEST/LUNG: Bilateral clear breath sounds, no rhochi, no wheezing, no crepitations,  HEART: Regular rate and rhythm; No murmurs, rubs, or gallops  ABDOMEN: Soft, Nontender, Nondistended; Bowel sounds present  EXTREMITIES:   Peripheral Pulses are palpable, no  edema        Care Discussed with Consultants/Other Providers [ ] YES  [ ] NO      Code Status: [] Full Code [] DNR [] DNI [] Goals of Care:   Disposition: [] ICU [] Stroke Unit [] RCU []PCU []Floor [] Discharge Home         KITA HahnFACP

## 2020-04-20 NOTE — PROGRESS NOTE ADULT - ASSESSMENT
74F PMH HTN, DM2 admitted with low oxygen sats upon triage found to be covid +. Nephrology consulted for Hyponatremia     Hyponatremia   Pt with acute on chronic hyponatremia  hx of recent admission with hyponatremia in the past secondary to polydipsia/ HCTZ   also in the setting of elevated glucose   urine work up suggested hypovolemia   s/p NS   Na better  Advise to optimize DM control (given high serum osm)  caution with IVF given hypoxia, poor resp status.    monitor Na     HTN  BP controlled at present   Low salt diet     SUNIL on CKD III  Baseline Scr 1 with GFR 45 12/2019   Pt now admitted with SUNIL in the setting of covid 19 infection vs. ATN vs prerenal in setting of hyperglycemia  Scr improved today  optimize dm control  Monitor urine output   Avoid Nephrotoxins     Acidosis  serum bicarb improved  Monitor at present     Anemia  Hb at goal   Monitor at present     Covid +  Monitor temp/ O2 sat   on plaquenil   f/u pulm

## 2020-04-20 NOTE — PROGRESS NOTE ADULT - SUBJECTIVE AND OBJECTIVE BOX
[facility]   MD MICHAEL STOUT DO ANAM SIDDIQUI ANGELA WONG, PA    TEL:  OFFICE: [phone]  DR FLORES CELL: [phone]  DR. HUSSEIN CELL: [phone]  DR. HODGES CELL: [phone]  WESTLEY DAVE CELL: [phone]    From 5pm-7am Answering Service [phone]    Patient is a 74y old  Female who presents with a chief complaint of COVID (17 Apr 2020 16:25)      Patient +covid    VITALS:  T(F): 98.3 (04-20-20 @ 12:45), Max: 99.3 (04-19-20 @ 20:03)  HR: 92 (04-20-20 @ 12:45)  BP: 150/60 (04-20-20 @ 12:45)  RR: 18 (04-20-20 @ 12:45)  SpO2: 96% (04-20-20 @ 12:45)  Wt(kg): --    Hospital Medications:   MEDICATIONS  (STANDING):  amLODIPine   Tablet 10 milliGRAM(s) Oral daily  aspirin enteric coated 81 milliGRAM(s) Oral daily  dextrose 5%. 1000 milliLiter(s) (50 mL/Hr) IV Continuous <Continuous>  dextrose 50% Injectable 12.5 Gram(s) IV Push once  dextrose 50% Injectable 25 Gram(s) IV Push once  dextrose 50% Injectable 25 Gram(s) IV Push once  enoxaparin Injectable 40 milliGRAM(s) SubCutaneous daily  insulin glargine Injectable (LANTUS) 10 Unit(s) SubCutaneous at bedtime  insulin lispro (HumaLOG) corrective regimen sliding scale   SubCutaneous three times a day before meals  insulin lispro (HumaLOG) corrective regimen sliding scale   SubCutaneous at bedtime  insulin lispro Injectable (HumaLOG) 3 Unit(s) SubCutaneous three times a day before meals  pantoprazole    Tablet 40 milliGRAM(s) Oral before breakfast  risperiDONE   Tablet 1 milliGRAM(s) Oral daily      LABS:  04-20    133<L>  |  98  |  33<H>  ----------------------------<  99  4.1   |  24  |  1.12    Ca    8.6      20 Apr 2020 05:00  Phos  2.7     04-20  Mg     1.6     04-20    TPro  6.6  /  Alb  2.5<L>  /  TBili  < 0.2<L>  /  DBili      /  AST  32  /  ALT  43<H>  /  AlkPhos  48  04-20    Creatinine Trend: 1.12 <--, 1.23 <--, 1.36 <--, 1.76 <--, 1.90 <--, 1.87 <--, 1.98 <--, 1.64 <--, 1.69 <--    Phosphorus Level, Serum: 2.7 mg/dL (04-20 @ 05:00)  Albumin, Serum: 2.5 g/dL (04-20 @ 05:00)  Ferritin, Serum: 501.7 ng/mL (04-20 @ 05:00)                              10.7   13.42 )-----------( 571      ( 20 Apr 2020 05:00 )             32.1     Urine Studies:  Urinalysis - [04-15-20 @ 17:35]      Color YELLOW / Appearance Lt TURBID / SG 1.016 / pH 6.0      Gluc 300 / Ketone NEGATIVE  / Bili NEGATIVE / Urobili NORMAL       Blood TRACE / Protein 200 / Leuk Est NEGATIVE / Nitrite NEGATIVE      RBC 3-5 / WBC 3-5 / Hyaline NEGATIVE / Gran  / Sq Epi OCC / Non Sq Epi  / Bacteria FEW    Urine Creatinine 116.70      [04-15-20 @ 17:35]  Urine Sodium < 20      [04-15-20 @ 17:35]  Urine Potassium 36.2      [04-15-20 @ 17:35]  Urine Chloride 28      [04-15-20 @ 17:35]  Urine Osmolality 311      [04-15-20 @ 17:35]    Ferritin 501.7      [04-20-20 @ 05:00]  HbA1c 8.8      [12-13-19 @ 06:00]  TSH 2.69      [12-12-19 @ 06:45]  Lipid: chol 197, , HDL 41,       [12-14-19 @ 03:50]        RADIOLOGY & ADDITIONAL STUDIES:

## 2020-04-20 NOTE — PROGRESS NOTE ADULT - PROBLEM SELECTOR PLAN 2
with AMS -improved  pt met sepsis criteria on admission  ( RR of 26, HR >100)   Suspect secondary to COVID   continue to monitor,improving

## 2020-04-21 ENCOUNTER — TRANSCRIPTION ENCOUNTER (OUTPATIENT)
Age: 75
End: 2020-04-21

## 2020-04-21 VITALS
RESPIRATION RATE: 18 BRPM | OXYGEN SATURATION: 99 % | TEMPERATURE: 97 F | SYSTOLIC BLOOD PRESSURE: 154 MMHG | DIASTOLIC BLOOD PRESSURE: 72 MMHG | HEART RATE: 82 BPM

## 2020-04-21 LAB
GLUCOSE BLDC GLUCOMTR-MCNC: 101 MG/DL — HIGH (ref 70–99)
GLUCOSE BLDC GLUCOMTR-MCNC: 163 MG/DL — HIGH (ref 70–99)
GLUCOSE BLDC GLUCOMTR-MCNC: 249 MG/DL — HIGH (ref 70–99)

## 2020-04-21 PROCEDURE — 99232 SBSQ HOSP IP/OBS MODERATE 35: CPT

## 2020-04-21 RX ORDER — SITAGLIPTIN 50 MG/1
1 TABLET, FILM COATED ORAL
Qty: 0 | Refills: 0 | DISCHARGE

## 2020-04-21 RX ORDER — METFORMIN HYDROCHLORIDE 850 MG/1
1 TABLET ORAL
Qty: 0 | Refills: 0 | DISCHARGE

## 2020-04-21 RX ADMIN — Medication 1: at 17:10

## 2020-04-21 RX ADMIN — Medication 3 UNIT(S): at 12:29

## 2020-04-21 RX ADMIN — PANTOPRAZOLE SODIUM 40 MILLIGRAM(S): 20 TABLET, DELAYED RELEASE ORAL at 05:04

## 2020-04-21 RX ADMIN — ENOXAPARIN SODIUM 40 MILLIGRAM(S): 100 INJECTION SUBCUTANEOUS at 12:29

## 2020-04-21 RX ADMIN — Medication 3 UNIT(S): at 17:10

## 2020-04-21 RX ADMIN — Medication 2: at 12:28

## 2020-04-21 RX ADMIN — RISPERIDONE 1 MILLIGRAM(S): 4 TABLET ORAL at 12:29

## 2020-04-21 RX ADMIN — AMLODIPINE BESYLATE 10 MILLIGRAM(S): 2.5 TABLET ORAL at 05:04

## 2020-04-21 RX ADMIN — Medication 81 MILLIGRAM(S): at 12:30

## 2020-04-21 RX ADMIN — Medication 3 UNIT(S): at 08:29

## 2020-04-21 NOTE — PROGRESS NOTE ADULT - SUBJECTIVE AND OBJECTIVE BOX
Comfortable at rest on R air; sat ~ 96%    VS Stable Afeb    Lungs- clear   Heart- Reg  Abd- non tender  Ext- no edema    IMP: Covid 19 positive with bilat chest infiltrates post hydroxychloroquine  Comfortable on R air    PLAN: Outpt chest xray f/u   OOB  F/u ;abs    Dima Griffin MD [facility]  [phone]    (Elias/Steven/Yen)

## 2020-04-21 NOTE — DISCHARGE NOTE PROVIDER - HOSPITAL COURSE
Pt was admitted to the hospital and was found to have COVID 19. Pt was treated with Plaquenil and IV steroids Pt was also managed supportively with oxygen supplementation. Pt was weaned off supplemental oxygen and was saturating well on room air prior to discharge.         Pt medically stable for discharge on 4/21/20, as per discussion with Dr. Hahn.

## 2020-04-21 NOTE — DISCHARGE NOTE NURSING/CASE MANAGEMENT/SOCIAL WORK - NSDCFUADDAPPT_GEN_ALL_CORE_FT
[facility]  [address], [address], [place] [address]  [phone]   Please call to schedule appointment with TELEHEALTH

## 2020-04-21 NOTE — PROGRESS NOTE ADULT - SUBJECTIVE AND OBJECTIVE BOX
[facility]   MD MICHAEL STOUT DO ANAM SIDDIQUI ANGELA WONG, PA    TEL:  OFFICE: [phone]  DR FLORES CELL: [phone]  DR. HUSSEIN CELL: [phone]  DR. HODGES CELL: [phone]  WESTLEY DAVE CELL: [phone]    From 5pm-7am Answering Service [phone]    Patient is a 74y old  Female who presents with a chief complaint of covid (20 Apr 2020 15:52)      Patient +covid    VITALS:  T(F): 98.6 (04-20-20 @ 20:38), Max: 98.6 (04-20-20 @ 20:38)  HR: 79 (04-21-20 @ 05:00)  BP: 149/72 (04-21-20 @ 05:00)  RR: 18 (04-20-20 @ 20:38)  SpO2: 97% (04-20-20 @ 20:38)  Wt(kg): --    04-20 @ 07:01  -  04-21 @ 07:00  --------------------------------------------------------  IN: 0 mL / OUT: 2200 mL / NET: -2200 mL        Hospital Medications:   MEDICATIONS  (STANDING):  amLODIPine   Tablet 10 milliGRAM(s) Oral daily  aspirin enteric coated 81 milliGRAM(s) Oral daily  dextrose 5%. 1000 milliLiter(s) (50 mL/Hr) IV Continuous <Continuous>  dextrose 50% Injectable 12.5 Gram(s) IV Push once  dextrose 50% Injectable 25 Gram(s) IV Push once  dextrose 50% Injectable 25 Gram(s) IV Push once  enoxaparin Injectable 40 milliGRAM(s) SubCutaneous daily  insulin glargine Injectable (LANTUS) 10 Unit(s) SubCutaneous at bedtime  insulin lispro (HumaLOG) corrective regimen sliding scale   SubCutaneous three times a day before meals  insulin lispro (HumaLOG) corrective regimen sliding scale   SubCutaneous at bedtime  insulin lispro Injectable (HumaLOG) 3 Unit(s) SubCutaneous three times a day before meals  pantoprazole    Tablet 40 milliGRAM(s) Oral before breakfast  risperiDONE   Tablet 1 milliGRAM(s) Oral daily      LABS:  04-20    133<L>  |  98  |  33<H>  ----------------------------<  99  4.1   |  24  |  1.12    Ca    8.6      20 Apr 2020 05:00  Phos  2.7     04-20  Mg     1.6     04-20    TPro  6.6  /  Alb  2.5<L>  /  TBili  < 0.2<L>  /  DBili      /  AST  32  /  ALT  43<H>  /  AlkPhos  48  04-20    Creatinine Trend: 1.12 <--, 1.23 <--, 1.36 <--, 1.76 <--, 1.90 <--, 1.87 <--, 1.98 <--, 1.64 <--                                10.7   13.42 )-----------( 571      ( 20 Apr 2020 05:00 )             32.1     Urine Studies:  Urinalysis - [04-15-20 @ 17:35]      Color YELLOW / Appearance Lt TURBID / SG 1.016 / pH 6.0      Gluc 300 / Ketone NEGATIVE  / Bili NEGATIVE / Urobili NORMAL       Blood TRACE / Protein 200 / Leuk Est NEGATIVE / Nitrite NEGATIVE      RBC 3-5 / WBC 3-5 / Hyaline NEGATIVE / Gran  / Sq Epi OCC / Non Sq Epi  / Bacteria FEW    Urine Creatinine 116.70      [04-15-20 @ 17:35]  Urine Sodium < 20      [04-15-20 @ 17:35]  Urine Potassium 36.2      [04-15-20 @ 17:35]  Urine Chloride 28      [04-15-20 @ 17:35]  Urine Osmolality 311      [04-15-20 @ 17:35]    Ferritin 501.7      [04-20-20 @ 05:00]  HbA1c 8.8      [12-13-19 @ 06:00]  TSH 2.69      [12-12-19 @ 06:45]  Lipid: chol 197, , HDL 41,       [12-14-19 @ 03:50]        RADIOLOGY & ADDITIONAL STUDIES: [facility]   MD MICHAEL STOUT DO ANAM SIDDIQUI ANGELA WONG, PA    TEL:  OFFICE: [phone]  DR FLORES CELL: [phone]  DR. HUSSEIN CELL: [phone]  DR. HODGES CELL: [phone]  WESTLEY DAVE CELL: [phone]    From 5pm-7am Answering Service [phone]    Patient is a 74y old  Female who presents with a chief complaint of covid (20 Apr 2020 15:52)      Patient +covid    VITALS:  T(F): 98.6 (04-20-20 @ 20:38), Max: 98.6 (04-20-20 @ 20:38)  HR: 79 (04-21-20 @ 05:00)  BP: 149/72 (04-21-20 @ 05:00)  RR: 18 (04-20-20 @ 20:38)  SpO2: 97% (04-20-20 @ 20:38)  Wt(kg): --    04-20 @ 07:01  -  04-21 @ 07:00  --------------------------------------------------------  IN: 0 mL / OUT: 2200 mL / NET: -2200 mL    Hospital Medications:   MEDICATIONS  (STANDING):  amLODIPine   Tablet 10 milliGRAM(s) Oral daily  aspirin enteric coated 81 milliGRAM(s) Oral daily  dextrose 5%. 1000 milliLiter(s) (50 mL/Hr) IV Continuous <Continuous>  dextrose 50% Injectable 12.5 Gram(s) IV Push once  dextrose 50% Injectable 25 Gram(s) IV Push once  dextrose 50% Injectable 25 Gram(s) IV Push once  enoxaparin Injectable 40 milliGRAM(s) SubCutaneous daily  insulin glargine Injectable (LANTUS) 10 Unit(s) SubCutaneous at bedtime  insulin lispro (HumaLOG) corrective regimen sliding scale   SubCutaneous three times a day before meals  insulin lispro (HumaLOG) corrective regimen sliding scale   SubCutaneous at bedtime  insulin lispro Injectable (HumaLOG) 3 Unit(s) SubCutaneous three times a day before meals  pantoprazole    Tablet 40 milliGRAM(s) Oral before breakfast  risperiDONE   Tablet 1 milliGRAM(s) Oral daily      LABS:  04-20    133<L>  |  98  |  33<H>  ----------------------------<  99  4.1   |  24  |  1.12    Ca    8.6      20 Apr 2020 05:00  Phos  2.7     04-20  Mg     1.6     04-20    TPro  6.6  /  Alb  2.5<L>  /  TBili  < 0.2<L>  /  DBili      /  AST  32  /  ALT  43<H>  /  AlkPhos  48  04-20    Creatinine Trend: 1.12 <--, 1.23 <--, 1.36 <--, 1.76 <--, 1.90 <--, 1.87 <--, 1.98 <--, 1.64 <--                                10.7   13.42 )-----------( 571      ( 20 Apr 2020 05:00 )             32.1     Urine Studies:  Urinalysis - [04-15-20 @ 17:35]      Color YELLOW / Appearance Lt TURBID / SG 1.016 / pH 6.0      Gluc 300 / Ketone NEGATIVE  / Bili NEGATIVE / Urobili NORMAL       Blood TRACE / Protein 200 / Leuk Est NEGATIVE / Nitrite NEGATIVE      RBC 3-5 / WBC 3-5 / Hyaline NEGATIVE / Gran  / Sq Epi OCC / Non Sq Epi  / Bacteria FEW    Urine Creatinine 116.70      [04-15-20 @ 17:35]  Urine Sodium < 20      [04-15-20 @ 17:35]  Urine Potassium 36.2      [04-15-20 @ 17:35]  Urine Chloride 28      [04-15-20 @ 17:35]  Urine Osmolality 311      [04-15-20 @ 17:35]    Ferritin 501.7      [04-20-20 @ 05:00]  HbA1c 8.8      [12-13-19 @ 06:00]  TSH 2.69      [12-12-19 @ 06:45]  Lipid: chol 197, , HDL 41,       [12-14-19 @ 03:50]        RADIOLOGY & ADDITIONAL STUDIES:

## 2020-04-21 NOTE — PROGRESS NOTE ADULT - PROBLEM SELECTOR PROBLEM 2
Type 2 diabetes mellitus with other neurologic complication, without long-term current use of insulin
Sepsis

## 2020-04-21 NOTE — DISCHARGE NOTE NURSING/CASE MANAGEMENT/SOCIAL WORK - NSDCCRTYPESERV_GEN_ALL_CORE_FT
Your CDP[facility] Services for 7d x 6.5 hr's will be Reinstated. Please call the  to confirm Patient is Home.

## 2020-04-21 NOTE — PROGRESS NOTE ADULT - ASSESSMENT
74F PMH HTN, DM2 admitted with low oxygen sats upon triage found to be covid +. Nephrology consulted for Hyponatremia     Hyponatremia   Pt with acute on chronic hyponatremia  hx of recent admission with hyponatremia in the past secondary to polydipsia/ HCTZ   also in the setting of elevated glucose   urine work up suggested hypovolemia   s/p NS   Na better  DM better  caution with IVF given hypoxia, poor resp status.    monitor Na     HTN  BP controlled at present   Low salt diet     SUNIL on CKD III  Baseline Scr 1 with GFR 45 12/2019   Pt now admitted with SUNIL in the setting of covid 19 infection vs. ATN vs prerenal in setting of hyperglycemia  Scr improving   optimize dm control  Monitor urine output   Avoid Nephrotoxins     Acidosis  serum bicarb improved  Monitor at present     Anemia  Hb at goal   Monitor at present     Covid +  Monitor temp/ O2 sat   on plaquenil   f/u pulm 74F PMH HTN, DM2 admitted with low oxygen sats upon triage found to be covid +. Nephrology consulted for Hyponatremia     Hyponatremia   Pt with acute on chronic hyponatremia  hx of recent admission with hyponatremia in the past secondary to polydipsia/ HCTZ   also in the setting of elevated glucose   urine work up suggested hypovolemia   s/p NS   Na improving  DM control improving  caution with IVF given hypoxia, poor resp status.    monitor Na     HTN  BP controlled at present   Low salt diet     SUNIL on CKD III  Baseline Scr 1 with GFR 45 12/2019   Pt now admitted with SUNIL in the setting of covid 19 infection vs. ATN vs prerenal in setting of hyperglycemia  Scr improving   optimize dm control  Monitor urine output   Avoid Nephrotoxins     Acidosis  serum bicarb improved  Monitor at present     Anemia  Hb at goal   Monitor at present     Covid +  Monitor temp/ O2 sat   on plaquenil   f/u pulm

## 2020-04-21 NOTE — DISCHARGE NOTE NURSING/CASE MANAGEMENT/SOCIAL WORK - NSSCTYPOFSERV_GEN_ALL_CORE
Visiting Nurse Services: The Nurse will call to arrange a Visit. It will probably be a Telehealth Visit.

## 2020-04-21 NOTE — PROGRESS NOTE ADULT - SUBJECTIVE AND OBJECTIVE BOX
Contact info:   Luis Cristobal MD  pager [phone], please provide 10 digit call back number.   Please note that this patient may be followed by another provider tomorrow.   If no answer or after hours, please contact [phone]    Due to [facility] policy during the evolving novel coronavirus outbreak, efforts are being made to limit unnecessary patient contacts to limit the spread of disease. Accordingly, patients without clear indication for physical exam or face to face interview from the Endocrine team will be adjusted in conjunction with conversations with primary provider team. This is being done for the safety of all the patients we care for.     Subjective: Per nursing note and primary team note, patient is satting well on room air.  Attempted to call patient's room but no answer.  Spoke with patient's daughter on phone, who is anticipating a discharge plan today.     MEDICATIONS  (STANDING):  amLODIPine   Tablet 10 milliGRAM(s) Oral daily  aspirin enteric coated 81 milliGRAM(s) Oral daily  dextrose 5%. 1000 milliLiter(s) (50 mL/Hr) IV Continuous <Continuous>  dextrose 50% Injectable 12.5 Gram(s) IV Push once  dextrose 50% Injectable 25 Gram(s) IV Push once  dextrose 50% Injectable 25 Gram(s) IV Push once  enoxaparin Injectable 40 milliGRAM(s) SubCutaneous daily  insulin glargine Injectable (LANTUS) 10 Unit(s) SubCutaneous at bedtime  insulin lispro (HumaLOG) corrective regimen sliding scale   SubCutaneous three times a day before meals  insulin lispro (HumaLOG) corrective regimen sliding scale   SubCutaneous at bedtime  insulin lispro Injectable (HumaLOG) 3 Unit(s) SubCutaneous three times a day before meals  pantoprazole    Tablet 40 milliGRAM(s) Oral before breakfast  risperiDONE   Tablet 1 milliGRAM(s) Oral daily    MEDICATIONS  (PRN):  acetaminophen   Tablet .. 650 milliGRAM(s) Oral every 6 hours PRN Temp greater or equal to 38C (100.4F), Mild Pain (1 - 3), Moderate Pain (4 - 6), Severe Pain (7 - 10)  dextrose 40% Gel 15 Gram(s) Oral once PRN Blood Glucose LESS THAN 70 milliGRAM(s)/deciliter  glucagon  Injectable 1 milliGRAM(s) IntraMuscular once PRN Glucose LESS THAN 70 milligrams/deciliter      Allergies    peanuts (Short breath; Hives)  penicillin (Short breath; Hives)    Intolerances      Review of Systems:  In accordance with current stands limiting patient contact, inpatient physical exam referenced from today's Progress Note on ROS.     PHYSICAL EXAM:  VITALS: T(C): 37 (04-20-20 @ 20:38)  T(F): 98.6 (04-20-20 @ 20:38), Max: 98.6 (04-20-20 @ 20:38)  HR: 79 (04-21-20 @ 05:00) (79 - 90)  BP: 149/72 (04-21-20 @ 05:00) (132/73 - 149/72)  RR:  (18 - 20)  SpO2:  (97% - 98%)  Wt(kg): --  In accordance with current stands limiting patient contact, inpatient physical exam referenced from today's Progress Note by Frederic Hahn)     GENERAL: Alert and awake lying in bed in no distress  HEAD:  Atraumatic, Normocephalic  EYES: EOMI, MATEUS, conjunctiva and sclera clear  NECK: Supple, No JVD, Normal thyroid  NERVOUS SYSTEM:  Alert & Oriented X3, Motor and sensory systems are intact,   CHEST/LUNG: Bilateral clear breath sounds, no rhochi, no wheezing, no crepitations,  HEART: Regular rate and rhythm; No murmurs, rubs, or gallops  ABDOMEN: Soft, Nontender, Nondistended; Bowel sounds present  EXTREMITIES:   Peripheral Pulses are palpable, no  edema    POCT Blood Glucose.: 249 mg/dL (04-21-20 @ 11:46)  POCT Blood Glucose.: 101 mg/dL (04-21-20 @ 08:01)  POCT Blood Glucose.: 153 mg/dL (04-20-20 @ 23:31)  POCT Blood Glucose.: 156 mg/dL (04-20-20 @ 22:11)  POCT Blood Glucose.: 145 mg/dL (04-20-20 @ 17:09)  POCT Blood Glucose.: 101 mg/dL (04-20-20 @ 11:53)  POCT Blood Glucose.: 105 mg/dL (04-20-20 @ 08:16)  POCT Blood Glucose.: 189 mg/dL (04-19-20 @ 21:30)  POCT Blood Glucose.: 104 mg/dL (04-19-20 @ 17:10)  POCT Blood Glucose.: 193 mg/dL (04-19-20 @ 11:45)  POCT Blood Glucose.: 210 mg/dL (04-19-20 @ 08:05)  POCT Blood Glucose.: 188 mg/dL (04-18-20 @ 21:18)  POCT Blood Glucose.: 158 mg/dL (04-18-20 @ 16:57)      04-20    133<L>  |  98  |  33<H>  ----------------------------<  99  4.1   |  24  |  1.12    EGFR if : 56  EGFR if non : 48    Ca    8.6      04-20  Mg     1.6     04-20  Phos  2.7     04-20    TPro  6.6  /  Alb  2.5<L>  /  TBili  < 0.2<L>  /  DBili  x   /  AST  32  /  ALT  43<H>  /  AlkPhos  48  04-20        Thyroid Function Tests:      Blood Gas Venous - Lactate: 2.8: Please note updated reference range. mmol/L (04.14.20 @ 11:51)

## 2020-04-21 NOTE — DISCHARGE NOTE PROVIDER - NSDCMRMEDTOKEN_GEN_ALL_CORE_FT
aspirin 81 mg oral delayed release tablet: 1 tab(s) orally once a day  Januvia 50 mg oral tablet: 1 tab(s) orally once a day   losartan 100 mg oral tablet: 1 tab(s) orally once a day  metFORMIN 500 mg oral tablet: 1 tab(s) orally 2 times a day   Norvasc 10 mg oral tablet: 1 tab(s) orally once a day   Prandin 1 mg oral tablet: 1 tab(s) orally 3 times a day (before meals)   risperiDONE 1 mg oral tablet: 1 milligram(s) orally once a day

## 2020-04-21 NOTE — PROGRESS NOTE ADULT - ASSESSMENT
Patient is a 74-year-old, Creole speaking female, with past medical history of hypertension, hyperlipidemia, mild cognitive impairment, depression, type 2 diabetes mellitus, presenting with fever and poor appetite, found to have COVID 19+ infection.    #1 Type 2 diabetes mellitus, uncontrolled, with A1c of 9.7% at baseline, Exacerbated by Steroid  -Monitor glucose QA CHS.  -While in patient, continue with Lantus 10 at bedtime  -Humalog 3 units tid with meals.    -Discharge regimen should be:  Metformin 500mg bid  Januvia 50mg daily   Prandin 1 mg tid with meals.   If patient wishes to follow up with [facility]     [facility]  [address], [address], [place] [address]  [phone]   Please call to schedule appointment with TELEHEALTH     #2 Hypertension  Blood pressure goal 140/90  Continue with amlodipine 10mg daily     #3 Hyperlipidemia  Consider statin therapy outpatient.

## 2020-04-21 NOTE — PROGRESS NOTE ADULT - PROBLEM SELECTOR PROBLEM 3
Hypercholesterolemia
Hyponatremia

## 2020-04-21 NOTE — PROGRESS NOTE ADULT - SUBJECTIVE AND OBJECTIVE BOX
JOSE SAGASTUME  74y  Female      Patient is a 74y old  Female who presents with a chief complaint of covid (20 Apr 2020 15:52)  Patient feels fine.no sob,no cp,no fever,no cough o2 sat-96% on RA    REVIEW OF SYSTEMS:  NEG      INTERVAL HPI/OVERNIGHT EVENTS:  T(C): 37 (04-20-20 @ 20:38), Max: 37 (04-20-20 @ 20:38)  HR: 79 (04-21-20 @ 05:00) (79 - 90)  BP: 149/72 (04-21-20 @ 05:00) (132/73 - 149/72)  RR: 20 (04-21-20 @ 14:05) (18 - 20)  SpO2: 98% (04-21-20 @ 14:05) (97% - 98%)  Wt(kg): --  I&O's Summary    20 Apr 2020 07:01  -  21 Apr 2020 07:00  --------------------------------------------------------  IN: 0 mL / OUT: 2200 mL / NET: -2200 mL      T(C): 37 (04-20-20 @ 20:38), Max: 37 (04-20-20 @ 20:38)  HR: 79 (04-21-20 @ 05:00) (79 - 90)  BP: 149/72 (04-21-20 @ 05:00) (132/73 - 149/72)  RR: 20 (04-21-20 @ 14:05) (18 - 20)  SpO2: 98% (04-21-20 @ 14:05) (97% - 98%)  Wt(kg): --Vital Signs Last 24 Hrs  T(C): 37 (20 Apr 2020 20:38), Max: 37 (20 Apr 2020 20:38)  T(F): 98.6 (20 Apr 2020 20:38), Max: 98.6 (20 Apr 2020 20:38)  HR: 79 (21 Apr 2020 05:00) (79 - 90)  BP: 149/72 (21 Apr 2020 05:00) (132/73 - 149/72)  BP(mean): --  RR: 20 (21 Apr 2020 14:05) (18 - 20)  SpO2: 98% (21 Apr 2020 14:05) (97% - 98%)    LABS:                        10.7   13.42 )-----------( 571      ( 20 Apr 2020 05:00 )             32.1     04-20    133<L>  |  98  |  33<H>  ----------------------------<  99  4.1   |  24  |  1.12    Ca    8.6      20 Apr 2020 05:00  Phos  2.7     04-20  Mg     1.6     04-20    TPro  6.6  /  Alb  2.5<L>  /  TBili  < 0.2<L>  /  DBili  x   /  AST  32  /  ALT  43<H>  /  AlkPhos  48  04-20        CAPILLARY BLOOD GLUCOSE      POCT Blood Glucose.: 249 mg/dL (21 Apr 2020 11:46)  POCT Blood Glucose.: 101 mg/dL (21 Apr 2020 08:01)  POCT Blood Glucose.: 153 mg/dL (20 Apr 2020 23:31)  POCT Blood Glucose.: 156 mg/dL (20 Apr 2020 22:11)  POCT Blood Glucose.: 145 mg/dL (20 Apr 2020 17:09)            PAST MEDICAL & SURGICAL HISTORY:  Anxiety and depression  Hypercholesterolemia  Hypertension  Diabetes mellitus  No significant past surgical history      MEDICATIONS  (STANDING):  amLODIPine   Tablet 10 milliGRAM(s) Oral daily  aspirin enteric coated 81 milliGRAM(s) Oral daily  dextrose 5%. 1000 milliLiter(s) (50 mL/Hr) IV Continuous <Continuous>  dextrose 50% Injectable 12.5 Gram(s) IV Push once  dextrose 50% Injectable 25 Gram(s) IV Push once  dextrose 50% Injectable 25 Gram(s) IV Push once  enoxaparin Injectable 40 milliGRAM(s) SubCutaneous daily  insulin glargine Injectable (LANTUS) 10 Unit(s) SubCutaneous at bedtime  insulin lispro (HumaLOG) corrective regimen sliding scale   SubCutaneous three times a day before meals  insulin lispro (HumaLOG) corrective regimen sliding scale   SubCutaneous at bedtime  insulin lispro Injectable (HumaLOG) 3 Unit(s) SubCutaneous three times a day before meals  pantoprazole    Tablet 40 milliGRAM(s) Oral before breakfast  risperiDONE   Tablet 1 milliGRAM(s) Oral daily    MEDICATIONS  (PRN):  acetaminophen   Tablet .. 650 milliGRAM(s) Oral every 6 hours PRN Temp greater or equal to 38C (100.4F), Mild Pain (1 - 3), Moderate Pain (4 - 6), Severe Pain (7 - 10)  dextrose 40% Gel 15 Gram(s) Oral once PRN Blood Glucose LESS THAN 70 milliGRAM(s)/deciliter  glucagon  Injectable 1 milliGRAM(s) IntraMuscular once PRN Glucose LESS THAN 70 milligrams/deciliter        RADIOLOGY & ADDITIONAL TESTS:    Imaging Personally Reviewed:  [ ] YES  [ ] NO    Consultant(s) Notes Reviewed:  [X ] YES  [ ] NO    PHYSICAL EXAM:  GENERAL: Alert and awake lying in bed in no distress  HEAD:  Atraumatic, Normocephalic  EYES: EOMI, MATEUS, conjunctiva and sclera clear  NECK: Supple, No JVD, Normal thyroid  NERVOUS SYSTEM:  Alert & Oriented X3, Motor and sensory systems are intact,   CHEST/LUNG: Bilateral clear breath sounds, no rhochi, no wheezing, no crepitations,  HEART: Regular rate and rhythm; No murmurs, rubs, or gallops  ABDOMEN: Soft, Nontender, Nondistended; Bowel sounds present  EXTREMITIES:   Peripheral Pulses are palpable, no  edema        Care Discussed with Consultants/Other Providers [ ] YES  [ ] NO      Code Status: [] Full Code [] DNR [] DNI [] Goals of Care:   Disposition: [] ICU [] Stroke Unit [] RCU []PCU []Floor [] Discharge Home         KITA HahnFACP

## 2020-04-21 NOTE — DISCHARGE NOTE NURSING/CASE MANAGEMENT/SOCIAL WORK - PATIENT PORTAL LINK FT
You can access the FollowMyHealth Patient Portal offered by [facility] by registering at the following website: [email or web address]. By joining [facility]’s FollowMyHealth portal, you will also be able to view your health information using other applications (apps) compatible with our system.

## 2020-04-22 RX ORDER — METFORMIN HYDROCHLORIDE 850 MG/1
1 TABLET ORAL
Qty: 60 | Refills: 0
Start: 2020-04-22 | End: 2020-05-21

## 2020-04-22 RX ORDER — SITAGLIPTIN 50 MG/1
1 TABLET, FILM COATED ORAL
Qty: 30 | Refills: 0
Start: 2020-04-22 | End: 2020-05-21

## 2020-06-20 ENCOUNTER — INPATIENT (INPATIENT)
Facility: HOSPITAL | Age: 75
LOS: 12 days | Discharge: HOME HEALTH | End: 2020-07-03
Attending: INTERNAL MEDICINE | Admitting: INTERNAL MEDICINE
Payer: MEDICARE

## 2020-06-20 VITALS
TEMPERATURE: 99 F | SYSTOLIC BLOOD PRESSURE: 183 MMHG | DIASTOLIC BLOOD PRESSURE: 90 MMHG | HEART RATE: 114 BPM | OXYGEN SATURATION: 96 % | RESPIRATION RATE: 20 BRPM

## 2020-06-20 DIAGNOSIS — I50.9 HEART FAILURE, UNSPECIFIED: ICD-10-CM

## 2020-06-20 DIAGNOSIS — E11.9 TYPE 2 DIABETES MELLITUS WITHOUT COMPLICATIONS: ICD-10-CM

## 2020-06-20 DIAGNOSIS — I10 ESSENTIAL (PRIMARY) HYPERTENSION: ICD-10-CM

## 2020-06-20 DIAGNOSIS — F41.9 ANXIETY DISORDER, UNSPECIFIED: ICD-10-CM

## 2020-06-20 LAB
ALBUMIN SERPL ELPH-MCNC: 3.7 G/DL — SIGNIFICANT CHANGE UP (ref 3.3–5)
ALP SERPL-CCNC: 66 U/L — SIGNIFICANT CHANGE UP (ref 40–120)
ALT FLD-CCNC: 12 U/L — SIGNIFICANT CHANGE UP (ref 4–33)
ANION GAP SERPL CALC-SCNC: 18 MMO/L — HIGH (ref 7–14)
AST SERPL-CCNC: 17 U/L — SIGNIFICANT CHANGE UP (ref 4–32)
BASOPHILS # BLD AUTO: 0.02 K/UL — SIGNIFICANT CHANGE UP (ref 0–0.2)
BASOPHILS NFR BLD AUTO: 0.3 % — SIGNIFICANT CHANGE UP (ref 0–2)
BILIRUB SERPL-MCNC: 0.4 MG/DL — SIGNIFICANT CHANGE UP (ref 0.2–1.2)
BUN SERPL-MCNC: 11 MG/DL — SIGNIFICANT CHANGE UP (ref 7–23)
CALCIUM SERPL-MCNC: 9.4 MG/DL — SIGNIFICANT CHANGE UP (ref 8.4–10.5)
CHLORIDE SERPL-SCNC: 97 MMOL/L — LOW (ref 98–107)
CO2 SERPL-SCNC: 22 MMOL/L — SIGNIFICANT CHANGE UP (ref 22–31)
CREAT SERPL-MCNC: 1.13 MG/DL — SIGNIFICANT CHANGE UP (ref 0.5–1.3)
EOSINOPHIL # BLD AUTO: 0.06 K/UL — SIGNIFICANT CHANGE UP (ref 0–0.5)
EOSINOPHIL NFR BLD AUTO: 0.8 % — SIGNIFICANT CHANGE UP (ref 0–6)
GLUCOSE SERPL-MCNC: 313 MG/DL — HIGH (ref 70–99)
HCT VFR BLD CALC: 32.3 % — LOW (ref 34.5–45)
HGB BLD-MCNC: 10.4 G/DL — LOW (ref 11.5–15.5)
IMM GRANULOCYTES NFR BLD AUTO: 0.4 % — SIGNIFICANT CHANGE UP (ref 0–1.5)
LYMPHOCYTES # BLD AUTO: 1.93 K/UL — SIGNIFICANT CHANGE UP (ref 1–3.3)
LYMPHOCYTES # BLD AUTO: 26.1 % — SIGNIFICANT CHANGE UP (ref 13–44)
MCHC RBC-ENTMCNC: 25.9 PG — LOW (ref 27–34)
MCHC RBC-ENTMCNC: 32.2 % — SIGNIFICANT CHANGE UP (ref 32–36)
MCV RBC AUTO: 80.3 FL — SIGNIFICANT CHANGE UP (ref 80–100)
MONOCYTES # BLD AUTO: 0.82 K/UL — SIGNIFICANT CHANGE UP (ref 0–0.9)
MONOCYTES NFR BLD AUTO: 11.1 % — SIGNIFICANT CHANGE UP (ref 2–14)
NEUTROPHILS # BLD AUTO: 4.54 K/UL — SIGNIFICANT CHANGE UP (ref 1.8–7.4)
NEUTROPHILS NFR BLD AUTO: 61.3 % — SIGNIFICANT CHANGE UP (ref 43–77)
NRBC # FLD: 0 K/UL — SIGNIFICANT CHANGE UP (ref 0–0)
NT-PROBNP SERPL-SCNC: 1690 PG/ML — SIGNIFICANT CHANGE UP
PLATELET # BLD AUTO: 311 K/UL — SIGNIFICANT CHANGE UP (ref 150–400)
PMV BLD: 11.4 FL — SIGNIFICANT CHANGE UP (ref 7–13)
POTASSIUM SERPL-MCNC: 3.4 MMOL/L — LOW (ref 3.5–5.3)
POTASSIUM SERPL-SCNC: 3.4 MMOL/L — LOW (ref 3.5–5.3)
PROT SERPL-MCNC: 7.1 G/DL — SIGNIFICANT CHANGE UP (ref 6–8.3)
RBC # BLD: 4.02 M/UL — SIGNIFICANT CHANGE UP (ref 3.8–5.2)
RBC # FLD: 15.3 % — HIGH (ref 10.3–14.5)
SODIUM SERPL-SCNC: 137 MMOL/L — SIGNIFICANT CHANGE UP (ref 135–145)
TROPONIN T, HIGH SENSITIVITY: 36 NG/L — SIGNIFICANT CHANGE UP (ref ?–14)
WBC # BLD: 7.4 K/UL — SIGNIFICANT CHANGE UP (ref 3.8–10.5)
WBC # FLD AUTO: 7.4 K/UL — SIGNIFICANT CHANGE UP (ref 3.8–10.5)

## 2020-06-20 PROCEDURE — 99223 1ST HOSP IP/OBS HIGH 75: CPT

## 2020-06-20 PROCEDURE — 71045 X-RAY EXAM CHEST 1 VIEW: CPT | Mod: 26

## 2020-06-20 PROCEDURE — 71275 CT ANGIOGRAPHY CHEST: CPT | Mod: 26

## 2020-06-20 RX ORDER — INSULIN LISPRO 100/ML
VIAL (ML) SUBCUTANEOUS AT BEDTIME
Refills: 0 | Status: DISCONTINUED | OUTPATIENT
Start: 2020-06-20 | End: 2020-07-03

## 2020-06-20 RX ORDER — AMLODIPINE BESYLATE 2.5 MG/1
10 TABLET ORAL DAILY
Refills: 0 | Status: DISCONTINUED | OUTPATIENT
Start: 2020-06-20 | End: 2020-06-21

## 2020-06-20 RX ORDER — LABETALOL HCL 100 MG
10 TABLET ORAL ONCE
Refills: 0 | Status: COMPLETED | OUTPATIENT
Start: 2020-06-20 | End: 2020-06-20

## 2020-06-20 RX ORDER — LOSARTAN POTASSIUM 100 MG/1
100 TABLET, FILM COATED ORAL DAILY
Refills: 0 | Status: DISCONTINUED | OUTPATIENT
Start: 2020-06-20 | End: 2020-06-21

## 2020-06-20 RX ORDER — RISPERIDONE 4 MG/1
1 TABLET ORAL DAILY
Refills: 0 | Status: DISCONTINUED | OUTPATIENT
Start: 2020-06-20 | End: 2020-07-03

## 2020-06-20 RX ORDER — INSULIN LISPRO 100/ML
VIAL (ML) SUBCUTANEOUS
Refills: 0 | Status: DISCONTINUED | OUTPATIENT
Start: 2020-06-20 | End: 2020-07-03

## 2020-06-20 RX ORDER — ENOXAPARIN SODIUM 100 MG/ML
40 INJECTION SUBCUTANEOUS DAILY
Refills: 0 | Status: DISCONTINUED | OUTPATIENT
Start: 2020-06-20 | End: 2020-06-21

## 2020-06-20 RX ORDER — FUROSEMIDE 40 MG
60 TABLET ORAL ONCE
Refills: 0 | Status: COMPLETED | OUTPATIENT
Start: 2020-06-20 | End: 2020-06-20

## 2020-06-20 RX ORDER — ASPIRIN/CALCIUM CARB/MAGNESIUM 324 MG
81 TABLET ORAL DAILY
Refills: 0 | Status: DISCONTINUED | OUTPATIENT
Start: 2020-06-20 | End: 2020-07-03

## 2020-06-20 RX ORDER — FUROSEMIDE 40 MG
40 TABLET ORAL
Refills: 0 | Status: DISCONTINUED | OUTPATIENT
Start: 2020-06-21 | End: 2020-06-26

## 2020-06-20 RX ADMIN — Medication 60 MILLIGRAM(S): at 21:18

## 2020-06-20 RX ADMIN — Medication 10 MILLIGRAM(S): at 23:34

## 2020-06-20 NOTE — H&P ADULT - NSHPREVIEWOFSYSTEMS_GEN_ALL_CORE
Constitutional Symptoms: No weakness, fevers, chills, weight loss  Eyes: No visual changes, headache, eye pain, double vision  Ears, Nose, Mouth, Throat: No runny nose, sinus pain, ear pain, tinnitus, sore throat, dysphagia, odynophagia  Cardiovascular: +edema, no chest pain, palpitations  Respiratory: No cough, wheezing, hemoptysis, shortness of breath  Gastrointestinal: No abdominal pain, dysphagia, anorexia, nausea/vomiting, diarrhea/constipation, hematemesis, BRBPR, melena  Genitourinary: No dysuria, frequency, hematuria  Musculoskeletal: No joint pain, joint swelling, decreased ROM  Skin: No pruritus, rashes, lesions, wounds  Neurologic:  No seizures, headache, paraesthesia, numbness, limb weakness    Positives and pertinent negatives noted and all other systems negative.

## 2020-06-20 NOTE — H&P ADULT - NSHPPHYSICALEXAM_GEN_ALL_CORE
T(C): 37.2 (06-20-20 @ 19:59), Max: 37.2 (06-20-20 @ 19:59)  HR: 118 (06-20-20 @ 22:54) (114 - 118)  BP: 208/89 (06-20-20 @ 22:54) (183/90 - 218/83)  RR: 19 (06-20-20 @ 22:54) (19 - 20)  SpO2: 98% (06-20-20 @ 22:54) (96% - 99%)    Constitutional: NAD, well-developed, well-nourished  Ears, Nose, Mouth, and Throat: normal external ears and nose, normal hearing, moist oral mucosa  Eyes: normal conjunctiva, EOMI, PERRL  Neck: supple, no JVD  Respiratory: Clear to auscultation bilaterally. No wheezes, rales or rhonchi. Normal respiratory effort  Cardiovascular: +tachycardia, no M/R/G, +2 pitting LE edema B/L, 2+ Peripheral Pulses  Gastrointestinal: soft, nontender, nondistended, +BS, no hernia  Skin: warm, dry, no rash  Neurologic: sensation grossly intact, CN grossly intact, non-focal exam  Musculoskeletal: no clubbing, no cyanosis, no joint swelling  Psychiatric: AOX3, appropriate mood, affect

## 2020-06-20 NOTE — H&P ADULT - PROBLEM SELECTOR PLAN 1
s/p lasix 60 mg IV X1 in the ED w/ marked improvement in LE edema as per RN w/ patient urinating regularly, persistent edema, will c/w lasix 40 mg IV BID, daily weights, monitor on telemetry, TTE, TSH

## 2020-06-20 NOTE — ED PROVIDER NOTE - CARE PLAN
Principal Discharge DX:	Lower extremity edema Principal Discharge DX:	Heart failure  Secondary Diagnosis:	Lower extremity edema

## 2020-06-20 NOTE — ED ADULT NURSE NOTE - NSIMPLEMENTINTERV_GEN_ALL_ED
Implemented All Fall Risk Interventions:  [place] to call system. Call bell, personal items and telephone within reach. Instruct patient to call for assistance. Room bathroom lighting operational. Non-slip footwear when patient is off stretcher. Physically safe environment: no spills, clutter or unnecessary equipment. Stretcher in lowest position, wheels locked, appropriate side rails in place. Provide visual cue, wrist band, yellow gown, etc. Monitor gait and stability. Monitor for mental status changes and reorient to person, place, and time. Review medications for side effects contributing to fall risk. Reinforce activity limits and safety measures with patient and family.

## 2020-06-20 NOTE — H&P ADULT - NSHPLABSRESULTS_GEN_ALL_CORE
06-20    137  |  97<L>  |  11  ----------------------------<  313<H>  3.4<L>   |  22  |  1.13    Ca    9.4      20 Jun 2020 20:50    TPro  7.1  /  Alb  3.7  /  TBili  0.4  /  DBili  x   /  AST  17  /  ALT  12  /  AlkPhos  66  06-20                            10.4   7.40  )-----------( 311      ( 20 Jun 2020 20:50 )             32.3             LIVER FUNCTIONS - ( 20 Jun 2020 20:50 )  Alb: 3.7 g/dL / Pro: 7.1 g/dL / ALK PHOS: 66 u/L / ALT: 12 u/L / AST: 17 u/L / GGT: x

## 2020-06-20 NOTE — H&P ADULT - HISTORY OF PRESENT ILLNESS
***Patient is a poor historian***  Patient is a 75 y/o F PMH HTN, HLD, DM2, Depression, COVID-19 (4/20) p/w B/L LE swelling X1-2 weeks. Reports no other symptoms, no change in functional status, no change in diet, compliant with all her medications, follows up regularly with her PMD. Reports no changes in meds since admission in April 2020 for COVID-19.

## 2020-06-20 NOTE — ED ADULT TRIAGE NOTE - CHIEF COMPLAINT QUOTE
pt arrives via EMS. Pt had COVID end of January--now COVID negative. Pt c/o bilateral leg swelling --no weeping. legs swollen and hard. Pts heart rate 114  pt states she feels fine--no SOB/or chest pain

## 2020-06-20 NOTE — ED PROVIDER NOTE - PROGRESS NOTE DETAILS
Kalpana, pgy3: diffuse B-line b/l w/ small-moderate pleural effusions b/l and small pericardial effusion on POCUS. IV diuresis started, pt tba tele for continued diuresis and medical optimization

## 2020-06-20 NOTE — ED PROVIDER NOTE - CLINICAL SUMMARY MEDICAL DECISION MAKING FREE TEXT BOX
Pt p/w b/l LE pitting edema for 1 wk, otherwise asymptomatic but tachycardic on EKG and on monitor. Given recent COVID19 infx and no prior cardiac hx will likely eval for PE via CTA given propensity for coagulation abnormalities w/ COVID infx . Will eval labs for new onset HF vs cardiac strain as well. EKG shows sinus tach, no signs of acute ischemia. Anticipate possible CDU for echo if CTA showing no PE -[place]

## 2020-06-20 NOTE — ED ADULT NURSE NOTE - OBJECTIVE STATEMENT
Pt received to spot 20 presents with b/l lower leg swelling. Pt a&ox4 and ambulatory at baseline, skin intact, respirations even and unlabored, abd soft and distended, nontender to palpation. Pt sinus tachy on CM, reports swelling began 1 week ago. MD made aware of pt's blood pressure and heart rate. MD Bernal at bedside assessing pt. Pt denies SOB, fever, chills or any other symptoms. Will continue to monitor.

## 2020-06-20 NOTE — ED PROVIDER NOTE - OBJECTIVE STATEMENT
74y f PMhx DM, HTN, admitted for hypoxic resp. failure 2/2 covid19 infx 2 mo. ago, p/w b/l leg swelling for 1 week. Pt denies SOB or [place], denies CP or palpitations, denies f/c/n/v/d. Pt has not had issues w/ leg swelling before.

## 2020-06-21 DIAGNOSIS — I47.2 VENTRICULAR TACHYCARDIA: ICD-10-CM

## 2020-06-21 DIAGNOSIS — I50.9 HEART FAILURE, UNSPECIFIED: ICD-10-CM

## 2020-06-21 DIAGNOSIS — I10 ESSENTIAL (PRIMARY) HYPERTENSION: ICD-10-CM

## 2020-06-21 DIAGNOSIS — I21.4 NON-ST ELEVATION (NSTEMI) MYOCARDIAL INFARCTION: ICD-10-CM

## 2020-06-21 DIAGNOSIS — E78.00 PURE HYPERCHOLESTEROLEMIA, UNSPECIFIED: ICD-10-CM

## 2020-06-21 DIAGNOSIS — E03.9 HYPOTHYROIDISM, UNSPECIFIED: ICD-10-CM

## 2020-06-21 PROBLEM — F41.9 ANXIETY DISORDER, UNSPECIFIED: Chronic | Status: ACTIVE | Noted: 2020-04-14

## 2020-06-21 LAB
ALBUMIN SERPL ELPH-MCNC: 3.4 G/DL — SIGNIFICANT CHANGE UP (ref 3.3–5)
ALBUMIN SERPL ELPH-MCNC: 3.4 G/DL — SIGNIFICANT CHANGE UP (ref 3.3–5)
ALP SERPL-CCNC: 62 U/L — SIGNIFICANT CHANGE UP (ref 40–120)
ALP SERPL-CCNC: 64 U/L — SIGNIFICANT CHANGE UP (ref 40–120)
ALT FLD-CCNC: 13 U/L — SIGNIFICANT CHANGE UP (ref 4–33)
ALT FLD-CCNC: 14 U/L — SIGNIFICANT CHANGE UP (ref 4–33)
ANION GAP SERPL CALC-SCNC: 15 MMO/L — HIGH (ref 7–14)
ANION GAP SERPL CALC-SCNC: 16 MMO/L — HIGH (ref 7–14)
APTT BLD: 127 SEC — CRITICAL HIGH (ref 27.5–36.3)
APTT BLD: 30.1 SEC — SIGNIFICANT CHANGE UP (ref 27.5–36.3)
APTT BLD: 84.7 SEC — HIGH (ref 27.5–36.3)
AST SERPL-CCNC: 24 U/L — SIGNIFICANT CHANGE UP (ref 4–32)
AST SERPL-CCNC: 28 U/L — SIGNIFICANT CHANGE UP (ref 4–32)
BASOPHILS # BLD AUTO: 0.02 K/UL — SIGNIFICANT CHANGE UP (ref 0–0.2)
BASOPHILS NFR BLD AUTO: 0.3 % — SIGNIFICANT CHANGE UP (ref 0–2)
BILIRUB SERPL-MCNC: 0.3 MG/DL — SIGNIFICANT CHANGE UP (ref 0.2–1.2)
BILIRUB SERPL-MCNC: 0.3 MG/DL — SIGNIFICANT CHANGE UP (ref 0.2–1.2)
BUN SERPL-MCNC: 12 MG/DL — SIGNIFICANT CHANGE UP (ref 7–23)
BUN SERPL-MCNC: 16 MG/DL — SIGNIFICANT CHANGE UP (ref 7–23)
CALCIUM SERPL-MCNC: 9 MG/DL — SIGNIFICANT CHANGE UP (ref 8.4–10.5)
CALCIUM SERPL-MCNC: 9.3 MG/DL — SIGNIFICANT CHANGE UP (ref 8.4–10.5)
CHLORIDE SERPL-SCNC: 98 MMOL/L — SIGNIFICANT CHANGE UP (ref 98–107)
CHLORIDE SERPL-SCNC: 99 MMOL/L — SIGNIFICANT CHANGE UP (ref 98–107)
CK MB BLD-MCNC: 14.09 NG/ML — HIGH (ref 1–4.7)
CK MB BLD-MCNC: 21.09 NG/ML — HIGH (ref 1–4.7)
CK MB BLD-MCNC: 21.18 NG/ML — HIGH (ref 1–4.7)
CK MB BLD-MCNC: 5.3 — HIGH (ref 0–2.5)
CK MB BLD-MCNC: 6.7 — HIGH (ref 0–2.5)
CK SERPL-CCNC: 265 U/L — HIGH (ref 25–170)
CK SERPL-CCNC: 265 U/L — HIGH (ref 25–170)
CK SERPL-CCNC: 274 U/L — HIGH (ref 25–170)
CK SERPL-CCNC: 318 U/L — HIGH (ref 25–170)
CO2 SERPL-SCNC: 22 MMOL/L — SIGNIFICANT CHANGE UP (ref 22–31)
CO2 SERPL-SCNC: 23 MMOL/L — SIGNIFICANT CHANGE UP (ref 22–31)
CREAT SERPL-MCNC: 1.2 MG/DL — SIGNIFICANT CHANGE UP (ref 0.5–1.3)
CREAT SERPL-MCNC: 1.41 MG/DL — HIGH (ref 0.5–1.3)
D DIMER BLD IA.RAPID-MCNC: 562 NG/ML — SIGNIFICANT CHANGE UP
EOSINOPHIL # BLD AUTO: 0.05 K/UL — SIGNIFICANT CHANGE UP (ref 0–0.5)
EOSINOPHIL NFR BLD AUTO: 0.8 % — SIGNIFICANT CHANGE UP (ref 0–6)
GLUCOSE BLDC GLUCOMTR-MCNC: 162 MG/DL — HIGH (ref 70–99)
GLUCOSE BLDC GLUCOMTR-MCNC: 263 MG/DL — HIGH (ref 70–99)
GLUCOSE BLDC GLUCOMTR-MCNC: 265 MG/DL — HIGH (ref 70–99)
GLUCOSE BLDC GLUCOMTR-MCNC: 304 MG/DL — HIGH (ref 70–99)
GLUCOSE BLDC GLUCOMTR-MCNC: 306 MG/DL — HIGH (ref 70–99)
GLUCOSE BLDC GLUCOMTR-MCNC: 310 MG/DL — HIGH (ref 70–99)
GLUCOSE SERPL-MCNC: 247 MG/DL — HIGH (ref 70–99)
GLUCOSE SERPL-MCNC: 268 MG/DL — HIGH (ref 70–99)
HBA1C BLD-MCNC: 9.3 % — HIGH (ref 4–5.6)
HCT VFR BLD CALC: 31 % — LOW (ref 34.5–45)
HGB BLD-MCNC: 9.6 G/DL — LOW (ref 11.5–15.5)
IMM GRANULOCYTES NFR BLD AUTO: 0.2 % — SIGNIFICANT CHANGE UP (ref 0–1.5)
INR BLD: 0.97 — SIGNIFICANT CHANGE UP (ref 0.88–1.17)
LYMPHOCYTES # BLD AUTO: 1.88 K/UL — SIGNIFICANT CHANGE UP (ref 1–3.3)
LYMPHOCYTES # BLD AUTO: 29.8 % — SIGNIFICANT CHANGE UP (ref 13–44)
MAGNESIUM SERPL-MCNC: 1.6 MG/DL — SIGNIFICANT CHANGE UP (ref 1.6–2.6)
MAGNESIUM SERPL-MCNC: 2.2 MG/DL — SIGNIFICANT CHANGE UP (ref 1.6–2.6)
MCHC RBC-ENTMCNC: 24.9 PG — LOW (ref 27–34)
MCHC RBC-ENTMCNC: 31 % — LOW (ref 32–36)
MCV RBC AUTO: 80.3 FL — SIGNIFICANT CHANGE UP (ref 80–100)
MONOCYTES # BLD AUTO: 0.74 K/UL — SIGNIFICANT CHANGE UP (ref 0–0.9)
MONOCYTES NFR BLD AUTO: 11.7 % — SIGNIFICANT CHANGE UP (ref 2–14)
NEUTROPHILS # BLD AUTO: 3.61 K/UL — SIGNIFICANT CHANGE UP (ref 1.8–7.4)
NEUTROPHILS NFR BLD AUTO: 57.2 % — SIGNIFICANT CHANGE UP (ref 43–77)
NRBC # FLD: 0 K/UL — SIGNIFICANT CHANGE UP (ref 0–0)
NT-PROBNP SERPL-SCNC: 3121 PG/ML — SIGNIFICANT CHANGE UP
PHOSPHATE SERPL-MCNC: 3.8 MG/DL — SIGNIFICANT CHANGE UP (ref 2.5–4.5)
PHOSPHATE SERPL-MCNC: 3.9 MG/DL — SIGNIFICANT CHANGE UP (ref 2.5–4.5)
PLATELET # BLD AUTO: 285 K/UL — SIGNIFICANT CHANGE UP (ref 150–400)
PMV BLD: 10.5 FL — SIGNIFICANT CHANGE UP (ref 7–13)
POTASSIUM SERPL-MCNC: 3.4 MMOL/L — LOW (ref 3.5–5.3)
POTASSIUM SERPL-MCNC: 3.8 MMOL/L — SIGNIFICANT CHANGE UP (ref 3.5–5.3)
POTASSIUM SERPL-SCNC: 3.4 MMOL/L — LOW (ref 3.5–5.3)
POTASSIUM SERPL-SCNC: 3.8 MMOL/L — SIGNIFICANT CHANGE UP (ref 3.5–5.3)
PROT SERPL-MCNC: 6.3 G/DL — SIGNIFICANT CHANGE UP (ref 6–8.3)
PROT SERPL-MCNC: 6.7 G/DL — SIGNIFICANT CHANGE UP (ref 6–8.3)
PROTHROM AB SERPL-ACNC: 11 SEC — SIGNIFICANT CHANGE UP (ref 9.8–13.1)
RBC # BLD: 3.86 M/UL — SIGNIFICANT CHANGE UP (ref 3.8–5.2)
RBC # FLD: 15.5 % — HIGH (ref 10.3–14.5)
SARS-COV-2 RNA SPEC QL NAA+PROBE: SIGNIFICANT CHANGE UP
SODIUM SERPL-SCNC: 136 MMOL/L — SIGNIFICANT CHANGE UP (ref 135–145)
SODIUM SERPL-SCNC: 137 MMOL/L — SIGNIFICANT CHANGE UP (ref 135–145)
T4 FREE SERPL-MCNC: 1.23 NG/DL — SIGNIFICANT CHANGE UP (ref 0.9–1.8)
TROPONIN T, HIGH SENSITIVITY: 615 NG/L — CRITICAL HIGH (ref ?–14)
TROPONIN T, HIGH SENSITIVITY: 623 NG/L — CRITICAL HIGH (ref ?–14)
TROPONIN T, HIGH SENSITIVITY: 707 NG/L — CRITICAL HIGH (ref ?–14)
TSH SERPL-MCNC: 7.68 UIU/ML — HIGH (ref 0.27–4.2)
WBC # BLD: 6.31 K/UL — SIGNIFICANT CHANGE UP (ref 3.8–10.5)
WBC # FLD AUTO: 6.31 K/UL — SIGNIFICANT CHANGE UP (ref 3.8–10.5)

## 2020-06-21 PROCEDURE — 93306 TTE W/DOPPLER COMPLETE: CPT | Mod: 26

## 2020-06-21 PROCEDURE — 93010 ELECTROCARDIOGRAM REPORT: CPT | Mod: 76

## 2020-06-21 RX ORDER — DEXTROSE 50 % IN WATER 50 %
15 SYRINGE (ML) INTRAVENOUS ONCE
Refills: 0 | Status: DISCONTINUED | OUTPATIENT
Start: 2020-06-21 | End: 2020-07-03

## 2020-06-21 RX ORDER — HYDRALAZINE HCL 50 MG
25 TABLET ORAL THREE TIMES A DAY
Refills: 0 | Status: DISCONTINUED | OUTPATIENT
Start: 2020-06-21 | End: 2020-06-27

## 2020-06-21 RX ORDER — HEPARIN SODIUM 5000 [USP'U]/ML
INJECTION INTRAVENOUS; SUBCUTANEOUS
Qty: 25000 | Refills: 0 | Status: DISCONTINUED | OUTPATIENT
Start: 2020-06-21 | End: 2020-06-21

## 2020-06-21 RX ORDER — DEXTROSE 50 % IN WATER 50 %
12.5 SYRINGE (ML) INTRAVENOUS ONCE
Refills: 0 | Status: DISCONTINUED | OUTPATIENT
Start: 2020-06-21 | End: 2020-07-03

## 2020-06-21 RX ORDER — POTASSIUM CHLORIDE 20 MEQ
40 PACKET (EA) ORAL ONCE
Refills: 0 | Status: COMPLETED | OUTPATIENT
Start: 2020-06-21 | End: 2020-06-21

## 2020-06-21 RX ORDER — POTASSIUM CHLORIDE 20 MEQ
20 PACKET (EA) ORAL ONCE
Refills: 0 | Status: COMPLETED | OUTPATIENT
Start: 2020-06-21 | End: 2020-06-21

## 2020-06-21 RX ORDER — HEPARIN SODIUM 5000 [USP'U]/ML
5500 INJECTION INTRAVENOUS; SUBCUTANEOUS EVERY 6 HOURS
Refills: 0 | Status: DISCONTINUED | OUTPATIENT
Start: 2020-06-21 | End: 2020-06-21

## 2020-06-21 RX ORDER — CLOPIDOGREL BISULFATE 75 MG/1
75 TABLET, FILM COATED ORAL DAILY
Refills: 0 | Status: DISCONTINUED | OUTPATIENT
Start: 2020-06-22 | End: 2020-07-03

## 2020-06-21 RX ORDER — DEXTROSE 50 % IN WATER 50 %
25 SYRINGE (ML) INTRAVENOUS ONCE
Refills: 0 | Status: DISCONTINUED | OUTPATIENT
Start: 2020-06-21 | End: 2020-07-03

## 2020-06-21 RX ORDER — HEPARIN SODIUM 5000 [USP'U]/ML
2500 INJECTION INTRAVENOUS; SUBCUTANEOUS EVERY 6 HOURS
Refills: 0 | Status: DISCONTINUED | OUTPATIENT
Start: 2020-06-21 | End: 2020-06-21

## 2020-06-21 RX ORDER — INSULIN LISPRO 100/ML
7 VIAL (ML) SUBCUTANEOUS
Refills: 0 | Status: DISCONTINUED | OUTPATIENT
Start: 2020-06-21 | End: 2020-06-26

## 2020-06-21 RX ORDER — CLOPIDOGREL BISULFATE 75 MG/1
600 TABLET, FILM COATED ORAL ONCE
Refills: 0 | Status: COMPLETED | OUTPATIENT
Start: 2020-06-21 | End: 2020-06-21

## 2020-06-21 RX ORDER — MAGNESIUM SULFATE 500 MG/ML
1 VIAL (ML) INJECTION ONCE
Refills: 0 | Status: COMPLETED | OUTPATIENT
Start: 2020-06-21 | End: 2020-06-21

## 2020-06-21 RX ORDER — GLUCAGON INJECTION, SOLUTION 0.5 MG/.1ML
1 INJECTION, SOLUTION SUBCUTANEOUS ONCE
Refills: 0 | Status: DISCONTINUED | OUTPATIENT
Start: 2020-06-21 | End: 2020-07-03

## 2020-06-21 RX ORDER — SODIUM CHLORIDE 9 MG/ML
1000 INJECTION, SOLUTION INTRAVENOUS
Refills: 0 | Status: DISCONTINUED | OUTPATIENT
Start: 2020-06-21 | End: 2020-07-03

## 2020-06-21 RX ORDER — INSULIN GLARGINE 100 [IU]/ML
40 INJECTION, SOLUTION SUBCUTANEOUS AT BEDTIME
Refills: 0 | Status: DISCONTINUED | OUTPATIENT
Start: 2020-06-21 | End: 2020-06-26

## 2020-06-21 RX ORDER — ATORVASTATIN CALCIUM 80 MG/1
40 TABLET, FILM COATED ORAL AT BEDTIME
Refills: 0 | Status: DISCONTINUED | OUTPATIENT
Start: 2020-06-21 | End: 2020-07-03

## 2020-06-21 RX ORDER — ASPIRIN/CALCIUM CARB/MAGNESIUM 324 MG
243 TABLET ORAL DAILY
Refills: 0 | Status: DISCONTINUED | OUTPATIENT
Start: 2020-06-21 | End: 2020-06-22

## 2020-06-21 RX ORDER — HEPARIN SODIUM 5000 [USP'U]/ML
1200 INJECTION INTRAVENOUS; SUBCUTANEOUS
Qty: 25000 | Refills: 0 | Status: DISCONTINUED | OUTPATIENT
Start: 2020-06-21 | End: 2020-06-22

## 2020-06-21 RX ADMIN — Medication 243 MILLIGRAM(S): at 15:53

## 2020-06-21 RX ADMIN — Medication 100 GRAM(S): at 10:00

## 2020-06-21 RX ADMIN — CLOPIDOGREL BISULFATE 600 MILLIGRAM(S): 75 TABLET, FILM COATED ORAL at 18:55

## 2020-06-21 RX ADMIN — Medication 40 MILLIGRAM(S): at 06:01

## 2020-06-21 RX ADMIN — LOSARTAN POTASSIUM 100 MILLIGRAM(S): 100 TABLET, FILM COATED ORAL at 06:01

## 2020-06-21 RX ADMIN — INSULIN GLARGINE 40 UNIT(S): 100 INJECTION, SOLUTION SUBCUTANEOUS at 21:56

## 2020-06-21 RX ADMIN — Medication 81 MILLIGRAM(S): at 12:50

## 2020-06-21 RX ADMIN — Medication 100 GRAM(S): at 14:24

## 2020-06-21 RX ADMIN — ATORVASTATIN CALCIUM 40 MILLIGRAM(S): 80 TABLET, FILM COATED ORAL at 21:56

## 2020-06-21 RX ADMIN — RISPERIDONE 1 MILLIGRAM(S): 4 TABLET ORAL at 12:50

## 2020-06-21 RX ADMIN — Medication 0: at 21:58

## 2020-06-21 RX ADMIN — Medication 2: at 00:13

## 2020-06-21 RX ADMIN — HEPARIN SODIUM 11 UNIT(S)/HR: 5000 INJECTION INTRAVENOUS; SUBCUTANEOUS at 17:18

## 2020-06-21 RX ADMIN — Medication 6: at 12:50

## 2020-06-21 RX ADMIN — Medication 40 MILLIGRAM(S): at 17:28

## 2020-06-21 RX ADMIN — Medication 20 MILLIEQUIVALENT(S): at 14:24

## 2020-06-21 RX ADMIN — Medication 8: at 17:18

## 2020-06-21 RX ADMIN — Medication 40 MILLIEQUIVALENT(S): at 10:00

## 2020-06-21 RX ADMIN — Medication 7 UNIT(S): at 17:18

## 2020-06-21 RX ADMIN — HEPARIN SODIUM 1200 UNIT(S)/HR: 5000 INJECTION INTRAVENOUS; SUBCUTANEOUS at 11:05

## 2020-06-21 RX ADMIN — Medication 25 MILLIGRAM(S): at 21:56

## 2020-06-21 RX ADMIN — Medication 6: at 08:35

## 2020-06-21 RX ADMIN — Medication 20 MILLIEQUIVALENT(S): at 21:55

## 2020-06-21 RX ADMIN — HEPARIN SODIUM 11 UNIT(S)/HR: 5000 INJECTION INTRAVENOUS; SUBCUTANEOUS at 18:55

## 2020-06-21 NOTE — PROVIDER CONTACT NOTE (CRITICAL VALUE NOTIFICATION) - ASSESSMENT
Patient asymptomatic, denies chest pain. 2nd degree type 1 heart block on tele monitor. /66, HR 97, temp 97.3 oral, respiration 16 with oxygen saturation 97% on room air.

## 2020-06-21 NOTE — PROVIDER CONTACT NOTE (OTHER) - SITUATION
[facility], elevated troponins on a heparin drip

## 2020-06-21 NOTE — CHART NOTE - NSCHARTNOTEFT_GEN_A_CORE
Patients troponin noted to have increased from 36 to 615 this AM, with CKMB 21.09 and .  Stat EKG done showing sinus with 1st deg AVB with new TWI V1-V2.  Patient seen and examined - denies any complaints currently, NO chest pain.   Results and case discussed with cardiology team with recommendations to start on heparin gtt.  Repeat TropT and Ck/CKMB ordered.   Patient currently stable, will continue to monitor closely. Patients troponin noted to have increased from 36 to 615 this AM, with CKMB 21.09 and .  Stat EKG done showing sinus with 1st deg AVB with new TWI V1-V2.  Patient seen and examined - denies any complaints currently, NO chest pain.   Results and case discussed with cardiology team with recommendations to start on heparin gtt.  Repeat TropT and Ck/CKMB ordered.   Patient currently stable, will continue to monitor closely.    Addendum 5:52 PM (late note entry):  Patient seen and examined following 24 beats of NSVT - was asymptomatic. Vital stable.   Mg and K repleted further - follow BMP.  Patient remains asymptomatic, hemodynamically stable.  D-dimer 562 - LE duplex ordered (CTA neg for PE).  Plan for serial cardiac enzymes.   Patient accepted to CCU - signout given to CCU NP.

## 2020-06-21 NOTE — CONSULT NOTE ADULT - ASSESSMENT
Assessment  DMT2: 74y Female with DM T2 with hyperglycemia, was on oral meds and insulin at home, now on basal insulin and coverage, blood sugars running high, no hypoglycemic episode,  eating meals, compliant with low carb diet.  CHF: on medications, no chest pain, stable, monitored.  Hypothyroidism/subclinical: Apparently no history of thyroid disease, asymptomatic.  HTN:  Controlled,  on antihypertensive medications.    Eddie Smith MD  Cell: [phone]  Office: [phone]

## 2020-06-21 NOTE — CONSULT NOTE ADULT - SUBJECTIVE AND OBJECTIVE BOX
Patient is a 74y old  Female who presents with a chief complaint of LE swelling       HPI:  75 y/o F PMH HTN, HLD, DM2, Depression, COVID-19 (4/20) p/w B/L LE swelling X1-2 weeks. Reports no other symptoms, no change in functional status, no change in diet, compliant with all her medications, follows up regularly with her PMD. Reports no changes in meds since admission in April 2020 for COVID-19.      PAST MEDICAL & SURGICAL HISTORY:  Anxiety and depression  Hypercholesterolemia  Hypertension  Diabetes mellitus  No significant past surgical history      Social History: no smoking ,alcohol or drugs.     FAMILY HISTORY:  No pertinent family history in first degree relatives      Allergies    peanuts (Short breath; Hives)  penicillin (Short breath; Hives)    Intolerances        REVIEW OF SYSTEMS:    CONSTITUTIONAL: No fever, weight loss, or fatigue  EYES: No eye pain, visual disturbances, or discharge  RESPIRATORY: No cough, wheezing, chills or hemoptysis; No shortness of breath  CARDIOVASCULAR: No chest pain, palpitations, dizziness, but  leg swelling  GASTROINTESTINAL: No abdominal or epigastric pain. No nausea, vomiting, or hematemesis; No diarrhea or constipation. No melena or hematochezia.  GENITOURINARY: No dysuria, frequency, hematuria, or incontinence  NEUROLOGICAL: No headaches, memory loss, loss of strength, numbness, or tremors  SKIN: No itching, burning, rashes, or lesions   ENDOCRINE: No heat or cold intolerance; No hair loss  MUSCULOSKELETAL: No joint pain or swelling; No muscle, back, or extremity pain  PSYCHIATRIC: No depression, anxiety, mood swings, or difficulty sleeping      MEDICATIONS  (STANDING):  aspirin  chewable 243 milliGRAM(s) Oral daily  aspirin enteric coated 81 milliGRAM(s) Oral daily  atorvastatin 40 milliGRAM(s) Oral at bedtime  dextrose 5%. 1000 milliLiter(s) (50 mL/Hr) IV Continuous <Continuous>  dextrose 50% Injectable 12.5 Gram(s) IV Push once  dextrose 50% Injectable 25 Gram(s) IV Push once  dextrose 50% Injectable 25 Gram(s) IV Push once  furosemide   Injectable 40 milliGRAM(s) IV Push two times a day  heparin  Infusion 1200 Unit(s)/Hr (11 mL/Hr) IV Continuous <Continuous>  insulin glargine Injectable (LANTUS) 40 Unit(s) SubCutaneous at bedtime  insulin lispro (HumaLOG) corrective regimen sliding scale   SubCutaneous three times a day before meals  insulin lispro (HumaLOG) corrective regimen sliding scale   SubCutaneous at bedtime  insulin lispro Injectable (HumaLOG) 7 Unit(s) SubCutaneous three times a day before meals  risperiDONE   Tablet 1 milliGRAM(s) Oral daily    MEDICATIONS  (PRN):  dextrose 40% Gel 15 Gram(s) Oral once PRN Blood Glucose LESS THAN 70 milliGRAM(s)/deciLiter  glucagon  Injectable 1 milliGRAM(s) IntraMuscular once PRN Glucose <70 milliGRAM(s)/deciLiter      Vital Signs Last 24 Hrs  T(C): 36.9 (21 Jun 2020 20:00), Max: 37.3 (21 Jun 2020 18:42)  T(F): 98.4 (21 Jun 2020 20:00), Max: 99.2 (21 Jun 2020 18:42)  HR: 102 (21 Jun 2020 21:00) (88 - 118)  BP: 168/83 (21 Jun 2020 21:00) (144/66 - 208/89)  BP(mean): 109 (21 Jun 2020 21:00) (107 - 110)  RR: 21 (21 Jun 2020 21:00) (16 - 26)  SpO2: 98% (21 Jun 2020 21:00) (95% - 99%)    PHYSICAL EXAM:    GENERAL: NAD, well-groomed, well-developed  HEAD:  Atraumatic, Normocephalic  EYES: EOMI, PERRLA, conjunctiva and sclera clear  ENMT: No tonsillar erythema, exudates, or enlargement; Moist mucous membranes, Good dentition, No lesions  NECK: Supple, No JVD, Normal thyroid  NERVOUS SYSTEM:  Alert & Oriented X1 to 2, No focal sign   CHEST/LUNG: Clear to percussion bilaterally; No rales, rhonchi, wheezing, or rubs  HEART: Regular rate and rhythm; No murmurs, rubs, or gallops  ABDOMEN: Soft, Nontender, Nondistended; Bowel sounds present  EXTREMITIES:  2+  edema  LABS:                        9.6    6.31  )-----------( 285      ( 21 Jun 2020 07:11 )             31.0     06-21    136  |  98  |  16  ----------------------------<  268<H>  3.8   |  22  |  1.41<H>    Ca    9.3      21 Jun 2020 16:04  Phos  3.8     06-21  Mg     2.2     06-21    TPro  6.3  /  Alb  3.4  /  TBili  0.3  /  DBili  x   /  AST  24  /  ALT  13  /  AlkPhos  62  06-21    PT/INR - ( 21 Jun 2020 11:12 )   PT: 11.0 SEC;   INR: 0.97          PTT - ( 21 Jun 2020 16:04 )  PTT:84.7 SEC        RADIOLOGY & ADDITIONAL STUDIES:

## 2020-06-21 NOTE — CHART NOTE - NSCHARTNOTEFT_GEN_A_CORE
Reason for CCU Consult: NSTEMI    HISTORY OF PRESENT ILLNESS:  This is a 74yoF w/ PMHx HTN, HLD, DM, depression, COVID (04/20) initially presented with b/l LE edema x 1-2 weeks admitted for acute on chronic congestive heart failure was being diuresed on a telemetry floor when hospital course was complicated by NSTEMI.  Patient was started on heparin gtt.  TTE showed EF 75%, moderate MR, mod pericardial effusion.  Today, on telemetry, noted to have 25 beats of NSVT.  Interventionalist Deana was notified, patient does not require emergent cardiac catheterization but would admit to CCU for closer monitoring and load with ASA, plavix and start on heparin gtt.  Patient denies any chest pain, palpitations, SOB, lightheadedness, dizziness.     Allergies  peanuts (Short breath; Hives)  penicillin (Short breath; Hives)    PAST MEDICAL & SURGICAL HISTORY:  Anxiety and depression  Hypercholesterolemia  Hypertension  Diabetes mellitus  No significant past surgical history    MEDICATIONS  (STANDING):  aspirin  chewable 243 milliGRAM(s) Oral daily  aspirin enteric coated 81 milliGRAM(s) Oral daily  clopidogrel Tablet 600 milliGRAM(s) Oral once  dextrose 5%. 1000 milliLiter(s) (50 mL/Hr) IV Continuous <Continuous>  dextrose 50% Injectable 12.5 Gram(s) IV Push once  dextrose 50% Injectable 25 Gram(s) IV Push once  dextrose 50% Injectable 25 Gram(s) IV Push once  furosemide   Injectable 40 milliGRAM(s) IV Push two times a day  heparin  Infusion.  Unit(s)/Hr (12 mL/Hr) IV Continuous <Continuous>  insulin lispro (HumaLOG) corrective regimen sliding scale   SubCutaneous three times a day before meals  insulin lispro (HumaLOG) corrective regimen sliding scale   SubCutaneous at bedtime  insulin lispro Injectable (HumaLOG) 7 Unit(s) SubCutaneous three times a day before meals  losartan 100 milliGRAM(s) Oral daily  risperiDONE   Tablet 1 milliGRAM(s) Oral daily    MEDICATIONS  (PRN):  dextrose 40% Gel 15 Gram(s) Oral once PRN Blood Glucose LESS THAN 70 milliGRAM(s)/deciLiter  glucagon  Injectable 1 milliGRAM(s) IntraMuscular once PRN Glucose <70 milliGRAM(s)/deciLiter  heparin   Injectable 5500 Unit(s) IV Push every 6 hours PRN For aPTT less than 40  heparin   Injectable 2500 Unit(s) IV Push every 6 hours PRN For aPTT between 40 - 57    Drug Dosing Weight  Height (cm): 157.5 (21 Jun 2020 00:45)  Weight (kg): 69.3 (21 Jun 2020 00:45)  BMI (kg/m2): 27.9 (21 Jun 2020 00:45)  BSA (m2): 1.7 (21 Jun 2020 00:45)    FAMILY HISTORY:  No pertinent family history in first degree relatives    CONSTITUTIONAL: No fevers, No chills, No fatigue, No weight gain  EYES: No vision changes   ENT: No congestion, No ear pain, No sore throat.  NECK: No pain, No stiffness  RESPIRATORY: No shortness of breath, No cough, No wheezing, No hemoptysis  CARDIOVASCULAR: No chest pain. No palpitations, No ROSENTHAL, No orthopnea, No paroxysmal nocturnal dyspnea, No pleuritic pain  GASTROINTESTINAL: No abdominal pain, No nausea, No vomiting, No hematemesis, No diarrhea No constipation. No melena  GENITOURINARY: No dysuria, No frequency, No incontinence, No hematuria  NEUROLOGICAL: No dizziness, No lightheadedness, No syncope, No LOC, No headache, No numbness or weakness  MUSCULOSKELETAL: No Edema, No joint pain, No joint swelling.  PSYCHIATRIC: No anxiety, No depression  DERMATOLOGY: No diaphoresis. No itching, No rashes, No pressure ulcers  HEME/LYMPH: No easy bruising, or bleeding gums    All other review of systems is negative unless indicated above.    Appearance: NAD, no distress  HEENT: Moist Mucous Membranes, Anicteric, PERRL, EOMI  Cardiovascular: Regular rate and rhythm, Normal S1 S2, No JVD, No murmurs  Respiratory: Lungs clear to auscultation. No rales, No rhonchi, No wheezing. No tenderness to palpation  Gastrointestinal:  Soft, Non-tender, + BS  Neurologic: Non-focal, A&Ox3  Skin: Warm and dry, No rashes, No ecchymosis, No cyanosis  Musculoskeletal: No clubbing, No cyanosis, No edema  Vascular: Peripheral pulses palpable 2+ bilaterally  Psychiatry: Mood & affect appropriate    ICU Vital Signs Last 24 Hrs  T(C): 36.3 (21 Jun 2020 09:35), Max: 37.2 (20 Jun 2020 19:59)  T(F): 97.3 (21 Jun 2020 09:35), Max: 98.9 (20 Jun 2020 19:59)  HR: 97 (21 Jun 2020 09:35) (88 - 118)  BP: 144/66 (21 Jun 2020 09:35) (144/66 - 218/83)  BP(mean): --  ABP: --  ABP(mean): --  RR: 16 (21 Jun 2020 09:35) (16 - 20)  SpO2: 97% (21 Jun 2020 09:35) (95% - 99%)    LABS:  CBC Full  -  ( 21 Jun 2020 07:11 )  WBC Count : 6.31 K/uL  RBC Count : 3.86 M/uL  Hemoglobin : 9.6 g/dL  Hematocrit : 31.0 %  Platelet Count - Automated : 285 K/uL  Mean Cell Volume : 80.3 fL  Mean Cell Hemoglobin : 24.9 pg  Mean Cell Hemoglobin Concentration : 31.0 %  Auto Neutrophil # : 3.61 K/uL  Auto Lymphocyte # : 1.88 K/uL  Auto Monocyte # : 0.74 K/uL  Auto Eosinophil # : 0.05 K/uL  Auto Basophil # : 0.02 K/uL  Auto Neutrophil % : 57.2 %  Auto Lymphocyte % : 29.8 %  Auto Monocyte % : 11.7 %  Auto Eosinophil % : 0.8 %  Auto Basophil % : 0.3 %    06-21    137  |  99  |  12  ----------------------------<  247<H>  3.4<L>   |  23  |  1.20    Ca    9.0      21 Jun 2020 07:11  Phos  3.9     06-21  Mg     1.6     06-21    TPro  6.7  /  Alb  3.4  /  TBili  0.3  /  DBili  x   /  AST  28  /  ALT  14  /  AlkPhos  64  06-21    CARDIAC MARKERS ( 21 Jun 2020 10:11 )  x     / x     / 318 u/L / 21.18 ng/mL / x      CARDIAC MARKERS ( 21 Jun 2020 07:11 )  x     / x     / 274 u/L / 21.09 ng/mL / x          CAPILLARY BLOOD GLUCOSE  POCT Blood Glucose.: 265 mg/dL (21 Jun 2020 12:43)  PT/INR - ( 21 Jun 2020 11:12 )   PT: 11.0 SEC;   INR: 0.97     PTT - ( 21 Jun 2020 11:12 )  PTT:30.1 SEC    I&O's Detail  21 Jun 2020 07:01  -  21 Jun 2020 15:49  --------------------------------------------------------  IN:    heparin  Infusion.: 48 mL    IV PiggyBack: 200 mL    Oral Fluid: 580 mL  Total IN: 828 mL    OUT:    Voided: 1050 mL  Total OUT: 1050 mL    Total NET: -222 mL    < from: Transthoracic Echocardiogram (06.21.20 @ 12:47) >    CONCLUSIONS:  1. Mitral annular calcification, otherwise normal mitral  valve. Moderate mitral regurgitation.  2. Normal left ventricular internal dimensions and wall  thicknesses.  3. Hyperdynamic left ventricle.  4. The right ventricle is not well visualized; grossly  normal right ventricular systolic function.  5. Moderate pericardial effusion (measures about 1.3 cm  lateral to RA on apical four-chamber views).  6. Left pleural effusion.  ------------------------------------------------------------------------    < end of copied text >    Recommendation/Plan:    #NSTEMI  ASA, plavix load   heparin gtt; keep goal ptt 53-73  start lidocaine gtt if more episodes of NSVT  NPO after midnight for anticipated LHC tomorrow   Trend cardiac enzymes   Keep Mg>2 K>4    #LE edema  C/w lasix 40mg IV BID  Strict I'sO's    #DM  Insulin sliding scale   Fingersticks before meals and at bedtime  Consistent carb diet     #HTN  Hold Losartan in AM tomorrow     #HLD    #Depression  C/w Risperidone

## 2020-06-21 NOTE — CONSULT NOTE ADULT - SUBJECTIVE AND OBJECTIVE BOX
HPI:  ***Patient is a poor historian***  Patient is a 73 y/o F PMH HTN, HLD, DM2, Depression, COVID-19 (4/20) p/w B/L LE swelling X1-2 weeks. Reports no other symptoms, no change in functional status, no change in diet, compliant with all her medications, follows up regularly with her PMD. Reports no changes in meds since admission in April 2020 for COVID-19. (20 Jun 2020 23:05)  Patient has history of diabetes, on oral meds at home and insulin at home, no recent hypoglycemic episodes, no polyuria polydipsia. Patient follows up with PCP for diabetes management.    PAST MEDICAL & SURGICAL HISTORY:  Anxiety and depression  Hypercholesterolemia  Hypertension  Diabetes mellitus  No significant past surgical history      FAMILY HISTORY:  No pertinent family history in first degree relatives      Social History:    Outpatient Medications:    MEDICATIONS  (STANDING):  aspirin enteric coated 81 milliGRAM(s) Oral daily  dextrose 5%. 1000 milliLiter(s) (50 mL/Hr) IV Continuous <Continuous>  dextrose 50% Injectable 12.5 Gram(s) IV Push once  dextrose 50% Injectable 25 Gram(s) IV Push once  dextrose 50% Injectable 25 Gram(s) IV Push once  furosemide   Injectable 40 milliGRAM(s) IV Push two times a day  heparin  Infusion.  Unit(s)/Hr (12 mL/Hr) IV Continuous <Continuous>  insulin lispro (HumaLOG) corrective regimen sliding scale   SubCutaneous three times a day before meals  insulin lispro (HumaLOG) corrective regimen sliding scale   SubCutaneous at bedtime  insulin lispro Injectable (HumaLOG) 7 Unit(s) SubCutaneous three times a day before meals  losartan 100 milliGRAM(s) Oral daily  risperiDONE   Tablet 1 milliGRAM(s) Oral daily    MEDICATIONS  (PRN):  dextrose 40% Gel 15 Gram(s) Oral once PRN Blood Glucose LESS THAN 70 milliGRAM(s)/deciLiter  glucagon  Injectable 1 milliGRAM(s) IntraMuscular once PRN Glucose <70 milliGRAM(s)/deciLiter  heparin   Injectable 5500 Unit(s) IV Push every 6 hours PRN For aPTT less than 40  heparin   Injectable 2500 Unit(s) IV Push every 6 hours PRN For aPTT between 40 - 57      Allergies    peanuts (Short breath; Hives)  penicillin (Short breath; Hives)    Intolerances      Review of Systems:  Constitutional: No fever, no chills  Eyes: No blurry vision  Neuro: No tremors  HEENT: No pain, no neck swelling  Cardiovascular: No chest pain, no palpitations  Respiratory: No SOB, no cough  GI: No nausea, vomiting, abdominal pain  : No dysuria  Skin: no rash  MSK: Has leg swelling.  Psych: no depression  Endocrine: no polyuria, polydipsia    UNABLE TO OBTAIN    ALL OTHER SYSTEMS REVIEWED AND NEGATIVE        PHYSICAL EXAM:  VITALS: T(C): 36.3 (06-21-20 @ 09:35)  T(F): 97.3 (06-21-20 @ 09:35), Max: 98.9 (06-20-20 @ 19:59)  HR: 97 (06-21-20 @ 09:35) (88 - 118)  BP: 144/66 (06-21-20 @ 09:35) (144/66 - 218/83)  RR:  (16 - 20)  SpO2:  (95% - 99%)  Wt(kg): --  GENERAL: NAD, well-groomed, well-developed  EYES: No proptosis, no lid lag  HEENT:  Atraumatic, Normocephalic  THYROID: Normal size, no palpable nodules  RESPIRATORY: Clear to auscultation bilaterally; No rales, rhonchi, wheezing  CARDIOVASCULAR: Si S2, No murmurs;  GI: Soft, non distended, normal bowel sounds  SKIN: Dry, intact, No rashes or lesions  MUSCULOSKELETAL: Has BL lower extremity edema.  NEURO:  no tremor, sensation decreased in feet BL,    POCT Blood Glucose.: 265 mg/dL (06-21-20 @ 12:43)  POCT Blood Glucose.: 263 mg/dL (06-21-20 @ 08:28)  POCT Blood Glucose.: 310 mg/dL (06-21-20 @ 00:49)  POCT Blood Glucose.: 306 mg/dL (06-21-20 @ 00:12)  POCT Blood Glucose.: 328 mg/dL (06-20-20 @ 21:04)                            9.6    6.31  )-----------( 285      ( 21 Jun 2020 07:11 )             31.0       06-21    137  |  99  |  12  ----------------------------<  247<H>  3.4<L>   |  23  |  1.20    EGFR if : 51  EGFR if non : 44    Ca    9.0      06-21  Mg     1.6     06-21  Phos  3.9     06-21    TPro  6.7  /  Alb  3.4  /  TBili  0.3  /  DBili  x   /  AST  28  /  ALT  14  /  AlkPhos  64  06-21      Thyroid Function Tests:  06-21 @ 10:11 TSH -- FreeT4 1.23 T3 -- Anti TPO -- Anti Thyroglobulin Ab -- TSI --  06-21 @ 07:11 TSH 7.68 FreeT4 -- T3 -- Anti TPO -- Anti Thyroglobulin Ab -- TSI --              Radiology:

## 2020-06-21 NOTE — CONSULT NOTE ADULT - ATTENDING COMMENTS
EP ATTENDING    Agree with above. EP called for NSVT in setting of NSTEMI. Echo shows normal LVEF. Recommend beta blockers and a cath. No indication for ICD at this time.

## 2020-06-21 NOTE — CONSULT NOTE ADULT - SUBJECTIVE AND OBJECTIVE BOX
Requesting Physician : ER    Reason for Consultation: NSTEMI    HISTORY OF PRESENT ILLNESS: 74 year old female with history of HTN, HLD, DM, recent COVID infection in April of 2020 who presented to the ED with LE swelling for 1-2 weeks.  The patient denies chest pain or anginal symptoms.  No orthopnea or LE edema.  The patient was admitted and found to have elevated troponin with elevated CPK.  ECG demonstrates SR with APC's and occasional Wenchebach with no significant ST-T changes.  Cardiology consulted to further evaluate.  Upon evaluation, patient was chest pain free.          PAST MEDICAL & SURGICAL HISTORY:  Anxiety and depression  Hypercholesterolemia  Hypertension  Diabetes mellitus  No significant past surgical history          MEDICATIONS:  MEDICATIONS  (STANDING):  aspirin enteric coated 81 milliGRAM(s) Oral daily  dextrose 5%. 1000 milliLiter(s) (50 mL/Hr) IV Continuous <Continuous>  dextrose 50% Injectable 12.5 Gram(s) IV Push once  dextrose 50% Injectable 25 Gram(s) IV Push once  dextrose 50% Injectable 25 Gram(s) IV Push once  furosemide   Injectable 40 milliGRAM(s) IV Push two times a day  heparin  Infusion.  Unit(s)/Hr (12 mL/Hr) IV Continuous <Continuous>  insulin lispro (HumaLOG) corrective regimen sliding scale   SubCutaneous three times a day before meals  insulin lispro (HumaLOG) corrective regimen sliding scale   SubCutaneous at bedtime  insulin lispro Injectable (HumaLOG) 7 Unit(s) SubCutaneous three times a day before meals  losartan 100 milliGRAM(s) Oral daily  risperiDONE   Tablet 1 milliGRAM(s) Oral daily      Allergies    peanuts (Short breath; Hives)  penicillin (Short breath; Hives)    Intolerances        FAMILY HISTORY:  No pertinent family history in first degree relatives    Non-contributary for premature coronary disease or sudden cardiac death    SOCIAL HISTORY:    [x ] Non-smoker  [ ] Smoker  [ ] Alcohol      REVIEW OF SYSTEMS:  [ ]chest pain  [  ]shortness of breath  [  ]palpitations  [  ]syncope  [ ]near syncope [ ]upper extremity weakness   [ ] lower extremity weakness  [  ]diplopia  [  ]altered mental status   [  ]fevers  [ ]chills [ ]nausea  [ ]vomitting  [  ]dysphagia    [ ]abdominal pain  [ ]melena  [ ]BRBPR    [  ]epistaxis  [  ]rash    [x ]lower extremity edema        [ x] All others negative	  [ ] Unable to obtain    PHYSICAL EXAM:  T(C): 36.3 (06-21-20 @ 09:35), Max: 37.2 (06-20-20 @ 19:59)  HR: 97 (06-21-20 @ 09:35) (88 - 118)  BP: 144/66 (06-21-20 @ 09:35) (144/66 - 218/83)  RR: 16 (06-21-20 @ 09:35) (16 - 20)  SpO2: 97% (06-21-20 @ 09:35) (95% - 99%)  Wt(kg): --  I&O's Summary    21 Jun 2020 07:01  -  21 Jun 2020 14:48  --------------------------------------------------------  IN: 828 mL / OUT: 1050 mL / NET: -222 mL          HEENT:   Normal oral mucosa, PERRL, EOMI	  Lymphatic: No lymphadenopathy, + pitting edema in LE bilaterally   Cardiovascular: Normal S1 S2, No JVD, No murmurs , Peripheral pulses palpable 2+ bilaterally  Respiratory: Lungs clear to auscultation, normal effort 	  Gastrointestinal:  Soft, Non-tender, + BS	  Skin: No rashes, No ecchymoses, No cyanosis, warm to touch  Musculoskeletal: Normal range of motion, normal strength  Psychiatry:  Mood & affect appropriate      TELEMETRY: SR, Wenckebach 24 beats NSVT	    ECG:  SR, blocked APC's, Wenckebach	  RADIOLOGY:  OTHER:     DIAGNOSTIC TESTING:  [ ] Echocardiogram:  < from: Transthoracic Echocardiogram (06.21.20 @ 12:47) >  CONCLUSIONS:  1. Mitral annular calcification, otherwise normal mitral  valve. Moderate mitral regurgitation.  2. Normal left ventricular internal dimensions and wall  thicknesses.  3. Hyperdynamic left ventricle.  4. The right ventricle is not well visualized; grossly  normal right ventricular systolic function.  5. Moderate pericardial effusion (measures about 1.3 cm  lateral to RA on apical four-chamber views).  6. Left pleural effusion.    < end of copied text >    [ ]  Catheterization:  [ ] Stress Test:    	  	  LABS:	 	    CARDIAC MARKERS:  CARDIAC MARKERS ( 21 Jun 2020 10:11 )  x     / x     / 318 u/L / 21.18 ng/mL / x      CARDIAC MARKERS ( 21 Jun 2020 07:11 )  x     / x     / 274 u/L / 21.09 ng/mL / x                                  9.6    6.31  )-----------( 285      ( 21 Jun 2020 07:11 )             31.0     06-21    137  |  99  |  12  ----------------------------<  247<H>  3.4<L>   |  23  |  1.20    Ca    9.0      21 Jun 2020 07:11  Phos  3.9     06-21  Mg     1.6     06-21    TPro  6.7  /  Alb  3.4  /  TBili  0.3  /  DBili  x   /  AST  28  /  ALT  14  /  AlkPhos  64  06-21    proBNP: Serum Pro-Brain Natriuretic Peptide: 3121 pg/mL (06-21 @ 07:11)  Serum Pro-Brain Natriuretic Peptide: 1690 pg/mL (06-20 @ 20:50)    Lipid Profile:   HgA1c:   TSH: Thyroid Stimulating Hormone, Serum: 7.68 uIU/mL (06-21 @ 07:11)      ASSESSMENT/PLAN:  74 year old female with history of HTN, HLD, DM, recent COVID infection in April of 2020 who presented to the ED with LE swelling for 1-2 weeks.    -pt. grossly volume overloaded with pitting edema and imaging with pulmonary edema  -continue with IV lasix to keep O > I  -CT scan negative for PE  -TTE with normal LV function with moderate pericardial effusion - pt. with no clinical evidence of tamponade  -Currently chest pain free - would continue with hep gtt and asa for nstemi  -Cath in am  -EP consult with Dr. Patton for NSVT and 2nd degree HB type I seen on tele    Donis Jaquez MD

## 2020-06-21 NOTE — CHART NOTE - NSCHARTNOTEFT_GEN_A_CORE
Notified by RN patient in 2nd degree type 1 HB.   Patient admitted for Acute CHF Exacerbation BNP:   CTA 6/21 negative for PE.   BNP: 1690   Tele strip noted with 2nd degree heart block type I   ECG performed which demonstrates 1st degree heart block   however, monitor shows 2nd degree HB type 1.   Vital signs stable.   Will d/c AV blocking agents, continue telemetry.

## 2020-06-21 NOTE — CONSULT NOTE ADULT - ASSESSMENT
75 y/o F PMH HTN, HLD, DM2, Depression, COVID-19 (4/20) p/w B/L LE swelling X1-2 weeks. Reports no other symptoms, no change in functional status, no change in diet, compliant with all her medications, follows up regularly with her PMD. Reports no changes in meds since admission in April 2020 for COVID-19.

## 2020-06-21 NOTE — CONSULT NOTE ADULT - PROBLEM SELECTOR RECOMMENDATION 9
Will continue Lantus 40 units at bed time.  Will start Humalog 7 units before each meal in addition to Humalog correction scale coverage.  Patient counseled for compliance with consistent low carb diet.

## 2020-06-21 NOTE — CONSULT NOTE ADULT - SUBJECTIVE AND OBJECTIVE BOX
HISTORY OF PRESENT ILLNESS: HPI:    74 year old female with history of HTN, HLD, DM, recent COVID infection in April of 2020 who presented to the ED with LE swelling for 1-2 weeks.  Patient is a poor historian, I have called pts daughter Nelsy [phone] for collateral medical history. Patient without prior cardiac history. Per daughter "mom has never had problems with her heart", denies hx of CAD, MI, prior syncopal episodes or palpitations. Remote echo for which results are not known. The patient denies chest pain or anginal symptoms.  Patient was admitted and found to have elevated troponin with elevated CPK.  ECG demonstrates SR with APC's and occasional Wenchebach with no significant ST-T changes.  Electrophysiology now called for 23 beats of NSVT on tele.         PAST MEDICAL & SURGICAL HISTORY:  Anxiety and depression  Hypercholesterolemia  Hypertension  Diabetes mellitus  No significant past surgical history      MEDICATIONS:  MEDICATIONS  (STANDING):  aspirin enteric coated 81 milliGRAM(s) Oral daily  dextrose 5%. 1000 milliLiter(s) (50 mL/Hr) IV Continuous <Continuous>  dextrose 50% Injectable 12.5 Gram(s) IV Push once  dextrose 50% Injectable 25 Gram(s) IV Push once  dextrose 50% Injectable 25 Gram(s) IV Push once  furosemide   Injectable 40 milliGRAM(s) IV Push two times a day  heparin  Infusion.  Unit(s)/Hr (12 mL/Hr) IV Continuous <Continuous>  insulin lispro (HumaLOG) corrective regimen sliding scale   SubCutaneous three times a day before meals  insulin lispro (HumaLOG) corrective regimen sliding scale   SubCutaneous at bedtime  insulin lispro Injectable (HumaLOG) 7 Unit(s) SubCutaneous three times a day before meals  losartan 100 milliGRAM(s) Oral daily  risperiDONE   Tablet 1 milliGRAM(s) Oral daily      Allergies    peanuts (Short breath; Hives)  penicillin (Short breath; Hives)    Intolerances        FAMILY HISTORY:  No pertinent family history in first degree relatives    Non-contributary for premature coronary disease or sudden cardiac death    SOCIAL HISTORY:    [X ] Non-smoker  [ ] Smoker  [ ] Alcohol    FLU VACCINE THIS YEAR STARTS IN AUGUST:  [X ] Yes    [ ] No    IF OVER 65 HAVE YOU EVER HAD A PNA VACCINE:  [ ] Yes    [X ] No       [ ] N/A      REVIEW OF SYSTEMS:  [ ] chest pain  [  ]shortness of breath  [  ]palpitations  [  ]syncope  [ ]near syncope [ ]upper extremity weakness   [ ] lower extremity weakness  [  ]diplopia  [  ]altered mental status   [  ]fevers  [ ]chills [ ]nausea  [ ]vomitting  [  ]dysphagia    [ ]abdominal pain  [ ]melena  [ ]BRBPR    [  ]epistaxis  [  ]rash    [ ]lower extremity edema      [ X] All others negative	  [ ] Unable to obtain      LABS:	 	    CARDIAC MARKERS:  CARDIAC MARKERS ( 21 Jun 2020 10:11 )  x     / x     / 318 u/L / 21.18 ng/mL / x      CARDIAC MARKERS ( 21 Jun 2020 07:11 )  x     / x     / 274 u/L / 21.09 ng/mL / x                           9.6    6.31  )-----------( 285      ( 21 Jun 2020 07:11 )             31.0     Hb Trend: 9.6<--, 10.4<--    06-21    137  |  99  |  12  ----------------------------<  247<H>  3.4<L>   |  23  |  1.20    Ca    9.0      21 Jun 2020 07:11  Phos  3.9     06-21  Mg     1.6     06-21    TPro  6.7  /  Alb  3.4  /  TBili  0.3  /  DBili  x   /  AST  28  /  ALT  14  /  AlkPhos  64  06-21    Creatinine Trend: 1.20<--, 1.13<--    Coags:  PT/INR - ( 21 Jun 2020 11:12 )   PT: 11.0 SEC;   INR: 0.97          PTT - ( 21 Jun 2020 11:12 )  PTT:30.1 SEC    proBNP: Serum Pro-Brain Natriuretic Peptide: 3121 pg/mL (06-21 @ 07:11)  Serum Pro-Brain Natriuretic Peptide: 1690 pg/mL (06-20 @ 20:50)    Lipid Profile:   HgA1c:   TSH: Thyroid Stimulating Hormone, Serum: 7.68 uIU/mL (06-21 @ 07:11)      PHYSICAL EXAM:  T(C): 36.3 (06-21-20 @ 09:35), Max: 37.2 (06-20-20 @ 19:59)  HR: 97 (06-21-20 @ 09:35) (88 - 118)  BP: 144/66 (06-21-20 @ 09:35) (144/66 - 218/83)  RR: 16 (06-21-20 @ 09:35) (16 - 20)  SpO2: 97% (06-21-20 @ 09:35) (95% - 99%)  Wt(kg): --   BMI (kg/m2): 27.9 (06-21-20 @ 00:45)  I&O's Summary    21 Jun 2020 07:01  -  21 Jun 2020 15:24  --------------------------------------------------------  IN: 828 mL / OUT: 1050 mL / NET: -222 mL        Gen: Appears well in NAD  HEENT:  (-)icterus (-)pallor  CV: N S1 S2 1/6 PARK (+)2 Pulses B/l, No JVD   Resp:  Clear to auscultation B/L, normal effort  GI: (+) BS Soft, NT, ND  Lymph:  (-)Edema, (-)obvious lymphadenopathy  Skin: Warm to touch, Normal turgor  Psych: Appropriate mood and affect    TELEMETRY: 	  NSR with with APC's and occasional Wenchebach with no significant ST-T changes;  23 beats of NSVT today     ECG:  	NSR with with APC's and occasional Wenchebach with no significant ST-T changes    RADIOLOGY:         CXR:   < from: Xray Chest 1 View- PORTABLE-Urgent (06.20.20 @ 22:27) >  IMPRESSION:     Small bilateral pleural effusions and associated lower lung partial atelectasis possibly with superimposed pulmonary edema.    RANCHO MARQUEZ M.D., RADIOLOGY RESIDENT  This document has been electronically signed.  MARIA GUADALUPE ROSE M.D. ATTENDING RADIOLOGIST  This document has been electronically signed. Jun 21 2020  8:47AM    < end of copied text >    < from: Transthoracic Echocardiogram (06.21.20 @ 12:47) >  CONCLUSIONS:  1. Mitral annular calcification, otherwise normal mitral  valve. Moderate mitral regurgitation.  2. Normal left ventricular internal dimensions and wall  thicknesses.  3. Hyperdynamic left ventricle.  4. The right ventricle is not well visualized; grossly  normal right ventricular systolic function.  5. Moderate pericardial effusion (measures about 1.3 cm  lateral to RA on apical four-chamber views).  6. Left pleural effusion.  ------------------------------------------------------------------------  Confirmed on  6/21/2020 - 13:58:57 by Omar Almendarez MD, [facility],  [facility]    < end of copied text >      ASSESSMENT/PLAN: 	    74 year old female with history of HTN, HLD, DM, recent COVID infection in April of 2020 who presented to the ED with LE swelling for 1-2 weeks.  Patient is a poor historian, I have called pts daughter Nelsy  for collateral medical history. Patient without prior cardiac history. ECG demonstrates SR with APC's and occasional Wenchebach with no significant ST-T changes.  Electrophysiology now called for 23 beats of NSVT on tele.       -- pt without cp, sob or anginal symptoms on exam   -- tele with 23 beats of NSVT and positive trop and cpk, ecg and tele reviewed with attending   -- continue telemetry   -- keep mag >2, potassium > 4  -- stared on heparin gtt for elevated trop and cpk, cont to trend   -- echo as noted    -- plan for ischemic eval, f/u cards  -- further reccs pending above HISTORY OF PRESENT ILLNESS: HPI:    74 year old female with history of HTN, HLD, DM, recent COVID infection in April of 2020 who presented to the ED with LE swelling for 1-2 weeks.  Patient is a poor historian, I have called pts daughter Nelsy [phone] for collateral medical history. Patient without prior cardiac history. Per daughter "mom has never had problems with her heart", denies hx of CAD, MI, prior syncopal episodes or palpitations. Remote echo for which results are not known. The patient denies chest pain or anginal symptoms.  Patient was admitted and found to have elevated troponin with elevated CPK.  ECG demonstrates SR with APC's and occasional Wenchebach with no significant ST-T changes.  Electrophysiology now called for 23 beats of NSVT on tele.         PAST MEDICAL & SURGICAL HISTORY:  Anxiety and depression  Hypercholesterolemia  Hypertension  Diabetes mellitus  No significant past surgical history      MEDICATIONS:  MEDICATIONS  (STANDING):  aspirin enteric coated 81 milliGRAM(s) Oral daily  dextrose 5%. 1000 milliLiter(s) (50 mL/Hr) IV Continuous <Continuous>  dextrose 50% Injectable 12.5 Gram(s) IV Push once  dextrose 50% Injectable 25 Gram(s) IV Push once  dextrose 50% Injectable 25 Gram(s) IV Push once  furosemide   Injectable 40 milliGRAM(s) IV Push two times a day  heparin  Infusion.  Unit(s)/Hr (12 mL/Hr) IV Continuous <Continuous>  insulin lispro (HumaLOG) corrective regimen sliding scale   SubCutaneous three times a day before meals  insulin lispro (HumaLOG) corrective regimen sliding scale   SubCutaneous at bedtime  insulin lispro Injectable (HumaLOG) 7 Unit(s) SubCutaneous three times a day before meals  losartan 100 milliGRAM(s) Oral daily  risperiDONE   Tablet 1 milliGRAM(s) Oral daily      Allergies    peanuts (Short breath; Hives)  penicillin (Short breath; Hives)    Intolerances        FAMILY HISTORY:  No pertinent family history in first degree relatives    Non-contributary for premature coronary disease or sudden cardiac death    SOCIAL HISTORY:    [X ] Non-smoker  [ ] Smoker  [ ] Alcohol    FLU VACCINE THIS YEAR STARTS IN AUGUST:  [X ] Yes    [ ] No    IF OVER 65 HAVE YOU EVER HAD A PNA VACCINE:  [ ] Yes    [X ] No       [ ] N/A      REVIEW OF SYSTEMS:  [ ] chest pain  [  ]shortness of breath  [  ]palpitations  [  ]syncope  [ ]near syncope [ ]upper extremity weakness   [ ] lower extremity weakness  [  ]diplopia  [  ]altered mental status   [  ]fevers  [ ]chills [ ]nausea  [ ]vomitting  [  ]dysphagia    [ ]abdominal pain  [ ]melena  [ ]BRBPR    [  ]epistaxis  [  ]rash    [ ]lower extremity edema      [ X] All others negative	  [ ] Unable to obtain      LABS:	 	    CARDIAC MARKERS:  CARDIAC MARKERS ( 21 Jun 2020 10:11 )  x     / x     / 318 u/L / 21.18 ng/mL / x      CARDIAC MARKERS ( 21 Jun 2020 07:11 )  x     / x     / 274 u/L / 21.09 ng/mL / x                           9.6    6.31  )-----------( 285      ( 21 Jun 2020 07:11 )             31.0     Hb Trend: 9.6<--, 10.4<--    06-21    137  |  99  |  12  ----------------------------<  247<H>  3.4<L>   |  23  |  1.20    Ca    9.0      21 Jun 2020 07:11  Phos  3.9     06-21  Mg     1.6     06-21    TPro  6.7  /  Alb  3.4  /  TBili  0.3  /  DBili  x   /  AST  28  /  ALT  14  /  AlkPhos  64  06-21    Creatinine Trend: 1.20<--, 1.13<--    Coags:  PT/INR - ( 21 Jun 2020 11:12 )   PT: 11.0 SEC;   INR: 0.97          PTT - ( 21 Jun 2020 11:12 )  PTT:30.1 SEC    proBNP: Serum Pro-Brain Natriuretic Peptide: 3121 pg/mL (06-21 @ 07:11)  Serum Pro-Brain Natriuretic Peptide: 1690 pg/mL (06-20 @ 20:50)    Lipid Profile:   HgA1c:   TSH: Thyroid Stimulating Hormone, Serum: 7.68 uIU/mL (06-21 @ 07:11)      PHYSICAL EXAM:  T(C): 36.3 (06-21-20 @ 09:35), Max: 37.2 (06-20-20 @ 19:59)  HR: 97 (06-21-20 @ 09:35) (88 - 118)  BP: 144/66 (06-21-20 @ 09:35) (144/66 - 218/83)  RR: 16 (06-21-20 @ 09:35) (16 - 20)  SpO2: 97% (06-21-20 @ 09:35) (95% - 99%)  Wt(kg): --   BMI (kg/m2): 27.9 (06-21-20 @ 00:45)  I&O's Summary    21 Jun 2020 07:01  -  21 Jun 2020 15:24  --------------------------------------------------------  IN: 828 mL / OUT: 1050 mL / NET: -222 mL        Gen: Appears well in NAD  HEENT:  (-)icterus (-)pallor  CV: N S1 S2 1/6 PARK (+)2 Pulses B/l, No JVD   Resp:  Clear to auscultation B/L, normal effort  GI: (+) BS Soft, NT, ND  Lymph:  (-)Edema, (-)obvious lymphadenopathy  Skin: Warm to touch, Normal turgor  Psych: Appropriate mood and affect    TELEMETRY: 	  NSR with with APC's and occasional Wenchebach with no significant ST-T changes;  23 beats of NSVT today     ECG:  	NSR with with APC's and occasional Wenchebach with no significant ST-T changes    RADIOLOGY:         CXR:   < from: Xray Chest 1 View- PORTABLE-Urgent (06.20.20 @ 22:27) >  IMPRESSION:     Small bilateral pleural effusions and associated lower lung partial atelectasis possibly with superimposed pulmonary edema.    RANCHO MARQUEZ M.D., RADIOLOGY RESIDENT  This document has been electronically signed.  MARIA GUADALUPE ROSE M.D. ATTENDING RADIOLOGIST  This document has been electronically signed. Jun 21 2020  8:47AM    < end of copied text >    < from: Transthoracic Echocardiogram (06.21.20 @ 12:47) >  CONCLUSIONS:  1. Mitral annular calcification, otherwise normal mitral  valve. Moderate mitral regurgitation.  2. Normal left ventricular internal dimensions and wall  thicknesses.  3. Hyperdynamic left ventricle.  4. The right ventricle is not well visualized; grossly  normal right ventricular systolic function.  5. Moderate pericardial effusion (measures about 1.3 cm  lateral to RA on apical four-chamber views).  6. Left pleural effusion.  ------------------------------------------------------------------------  Confirmed on  6/21/2020 - 13:58:57 by Omar Almendarez MD, [facility],  [facility]    < end of copied text >      ASSESSMENT/PLAN: 	    74 year old female with history of HTN, HLD, DM, recent COVID infection in April of 2020 who presented to the ED with LE swelling for 1-2 weeks.  Patient is a poor historian, I have called pts daughter Nelsy  for collateral medical history. Patient without prior cardiac history. ECG demonstrates SR with APC's and occasional Wenchebach with no significant ST-T changes.  Electrophysiology now called for 23 beats of NSVT on tele.       -- pt without cp, sob or anginal symptoms on exam   -- tele with 23 beats of NSVT and positive trop and cpk, ecg and tele reviewed with attending   -- continue telemetry   -- stared on heparin gtt for elevated trop and cpk, cont to trend   -- echo as noted    -- pt to be monitored in CCU awaiting bed   -- plan for ischemic eval, f/u cards  -- keep mag >2, potassium > 4  -- TSH elevated, f/u endocrine   -- further reccs pending above

## 2020-06-22 ENCOUNTER — TRANSCRIPTION ENCOUNTER (OUTPATIENT)
Age: 75
End: 2020-06-22

## 2020-06-22 LAB
ALBUMIN SERPL ELPH-MCNC: 3.3 G/DL — SIGNIFICANT CHANGE UP (ref 3.3–5)
ALP SERPL-CCNC: 60 U/L — SIGNIFICANT CHANGE UP (ref 40–120)
ALT FLD-CCNC: 14 U/L — SIGNIFICANT CHANGE UP (ref 4–33)
ANION GAP SERPL CALC-SCNC: 12 MMO/L — SIGNIFICANT CHANGE UP (ref 7–14)
ANION GAP SERPL CALC-SCNC: 14 MMO/L — SIGNIFICANT CHANGE UP (ref 7–14)
APPEARANCE UR: CLEAR — SIGNIFICANT CHANGE UP
APTT BLD: 114.8 SEC — HIGH (ref 27.5–36.3)
APTT BLD: 116.1 SEC — HIGH (ref 27.5–36.3)
AST SERPL-CCNC: 20 U/L — SIGNIFICANT CHANGE UP (ref 4–32)
BACTERIA # UR AUTO: SIGNIFICANT CHANGE UP
BILIRUB SERPL-MCNC: 0.3 MG/DL — SIGNIFICANT CHANGE UP (ref 0.2–1.2)
BILIRUB UR-MCNC: NEGATIVE — SIGNIFICANT CHANGE UP
BLOOD UR QL VISUAL: NEGATIVE — SIGNIFICANT CHANGE UP
BUN SERPL-MCNC: 17 MG/DL — SIGNIFICANT CHANGE UP (ref 7–23)
BUN SERPL-MCNC: 20 MG/DL — SIGNIFICANT CHANGE UP (ref 7–23)
CALCIUM SERPL-MCNC: 9.2 MG/DL — SIGNIFICANT CHANGE UP (ref 8.4–10.5)
CALCIUM SERPL-MCNC: 9.4 MG/DL — SIGNIFICANT CHANGE UP (ref 8.4–10.5)
CHLORIDE SERPL-SCNC: 100 MMOL/L — SIGNIFICANT CHANGE UP (ref 98–107)
CHLORIDE SERPL-SCNC: 98 MMOL/L — SIGNIFICANT CHANGE UP (ref 98–107)
CO2 SERPL-SCNC: 24 MMOL/L — SIGNIFICANT CHANGE UP (ref 22–31)
CO2 SERPL-SCNC: 28 MMOL/L — SIGNIFICANT CHANGE UP (ref 22–31)
COLOR SPEC: COLORLESS — SIGNIFICANT CHANGE UP
CREAT ?TM UR-MCNC: 21.3 MG/DL — SIGNIFICANT CHANGE UP
CREAT SERPL-MCNC: 1.87 MG/DL — HIGH (ref 0.5–1.3)
CREAT SERPL-MCNC: 2.25 MG/DL — HIGH (ref 0.5–1.3)
GLUCOSE BLDC GLUCOMTR-MCNC: 138 MG/DL — HIGH (ref 70–99)
GLUCOSE BLDC GLUCOMTR-MCNC: 147 MG/DL — HIGH (ref 70–99)
GLUCOSE BLDC GLUCOMTR-MCNC: 158 MG/DL — HIGH (ref 70–99)
GLUCOSE BLDC GLUCOMTR-MCNC: 160 MG/DL — HIGH (ref 70–99)
GLUCOSE SERPL-MCNC: 128 MG/DL — HIGH (ref 70–99)
GLUCOSE SERPL-MCNC: 152 MG/DL — HIGH (ref 70–99)
GLUCOSE UR-MCNC: NEGATIVE — SIGNIFICANT CHANGE UP
HCT VFR BLD CALC: 31.1 % — LOW (ref 34.5–45)
HGB BLD-MCNC: 9.7 G/DL — LOW (ref 11.5–15.5)
HYALINE CASTS # UR AUTO: NEGATIVE — SIGNIFICANT CHANGE UP
KETONES UR-MCNC: NEGATIVE — SIGNIFICANT CHANGE UP
LEUKOCYTE ESTERASE UR-ACNC: NEGATIVE — SIGNIFICANT CHANGE UP
MAGNESIUM SERPL-MCNC: 2 MG/DL — SIGNIFICANT CHANGE UP (ref 1.6–2.6)
MCHC RBC-ENTMCNC: 24.9 PG — LOW (ref 27–34)
MCHC RBC-ENTMCNC: 31.2 % — LOW (ref 32–36)
MCV RBC AUTO: 79.9 FL — LOW (ref 80–100)
NITRITE UR-MCNC: NEGATIVE — SIGNIFICANT CHANGE UP
NRBC # FLD: 0 K/UL — SIGNIFICANT CHANGE UP (ref 0–0)
PH UR: 8 — SIGNIFICANT CHANGE UP (ref 5–8)
PHOSPHATE SERPL-MCNC: 4.3 MG/DL — SIGNIFICANT CHANGE UP (ref 2.5–4.5)
PLATELET # BLD AUTO: 272 K/UL — SIGNIFICANT CHANGE UP (ref 150–400)
PMV BLD: 10.1 FL — SIGNIFICANT CHANGE UP (ref 7–13)
POTASSIUM SERPL-MCNC: 3.8 MMOL/L — SIGNIFICANT CHANGE UP (ref 3.5–5.3)
POTASSIUM SERPL-MCNC: 3.8 MMOL/L — SIGNIFICANT CHANGE UP (ref 3.5–5.3)
POTASSIUM SERPL-SCNC: 3.8 MMOL/L — SIGNIFICANT CHANGE UP (ref 3.5–5.3)
POTASSIUM SERPL-SCNC: 3.8 MMOL/L — SIGNIFICANT CHANGE UP (ref 3.5–5.3)
PROT SERPL-MCNC: 6.1 G/DL — SIGNIFICANT CHANGE UP (ref 6–8.3)
PROT UR-MCNC: 100 — HIGH
RBC # BLD: 3.89 M/UL — SIGNIFICANT CHANGE UP (ref 3.8–5.2)
RBC # FLD: 15.4 % — HIGH (ref 10.3–14.5)
RBC CASTS # UR COMP ASSIST: SIGNIFICANT CHANGE UP (ref 0–?)
SODIUM SERPL-SCNC: 138 MMOL/L — SIGNIFICANT CHANGE UP (ref 135–145)
SODIUM SERPL-SCNC: 138 MMOL/L — SIGNIFICANT CHANGE UP (ref 135–145)
SP GR SPEC: 1.01 — SIGNIFICANT CHANGE UP (ref 1–1.04)
SQUAMOUS # UR AUTO: SIGNIFICANT CHANGE UP
THYROPEROXIDASE AB SERPL-ACNC: <10 IU/ML — SIGNIFICANT CHANGE UP
UROBILINOGEN FLD QL: NORMAL — SIGNIFICANT CHANGE UP
UUN UR-MCNC: 110 MG/DL — SIGNIFICANT CHANGE UP
WBC # BLD: 6.28 K/UL — SIGNIFICANT CHANGE UP (ref 3.8–10.5)
WBC # FLD AUTO: 6.28 K/UL — SIGNIFICANT CHANGE UP (ref 3.8–10.5)
WBC UR QL: SIGNIFICANT CHANGE UP (ref 0–?)

## 2020-06-22 PROCEDURE — 93306 TTE W/DOPPLER COMPLETE: CPT | Mod: 26

## 2020-06-22 PROCEDURE — 93970 EXTREMITY STUDY: CPT | Mod: 26

## 2020-06-22 RX ORDER — HEPARIN SODIUM 5000 [USP'U]/ML
800 INJECTION INTRAVENOUS; SUBCUTANEOUS
Qty: 25000 | Refills: 0 | Status: DISCONTINUED | OUTPATIENT
Start: 2020-06-22 | End: 2020-06-29

## 2020-06-22 RX ORDER — CARVEDILOL PHOSPHATE 80 MG/1
3.12 CAPSULE, EXTENDED RELEASE ORAL EVERY 12 HOURS
Refills: 0 | Status: DISCONTINUED | OUTPATIENT
Start: 2020-06-22 | End: 2020-07-03

## 2020-06-22 RX ORDER — CHLORHEXIDINE GLUCONATE 213 G/1000ML
1 SOLUTION TOPICAL DAILY
Refills: 0 | Status: DISCONTINUED | OUTPATIENT
Start: 2020-06-22 | End: 2020-07-03

## 2020-06-22 RX ADMIN — INSULIN GLARGINE 40 UNIT(S): 100 INJECTION, SOLUTION SUBCUTANEOUS at 22:07

## 2020-06-22 RX ADMIN — Medication 7 UNIT(S): at 08:03

## 2020-06-22 RX ADMIN — Medication 40 MILLIGRAM(S): at 05:21

## 2020-06-22 RX ADMIN — Medication 25 MILLIGRAM(S): at 22:06

## 2020-06-22 RX ADMIN — CLOPIDOGREL BISULFATE 75 MILLIGRAM(S): 75 TABLET, FILM COATED ORAL at 11:58

## 2020-06-22 RX ADMIN — ATORVASTATIN CALCIUM 40 MILLIGRAM(S): 80 TABLET, FILM COATED ORAL at 22:06

## 2020-06-22 RX ADMIN — Medication 25 MILLIGRAM(S): at 13:04

## 2020-06-22 RX ADMIN — CHLORHEXIDINE GLUCONATE 1 APPLICATION(S): 213 SOLUTION TOPICAL at 16:54

## 2020-06-22 RX ADMIN — Medication 81 MILLIGRAM(S): at 11:58

## 2020-06-22 RX ADMIN — HEPARIN SODIUM 9 UNIT(S)/HR: 5000 INJECTION INTRAVENOUS; SUBCUTANEOUS at 00:00

## 2020-06-22 RX ADMIN — Medication 2: at 08:03

## 2020-06-22 RX ADMIN — Medication 7 UNIT(S): at 16:54

## 2020-06-22 RX ADMIN — RISPERIDONE 1 MILLIGRAM(S): 4 TABLET ORAL at 11:58

## 2020-06-22 RX ADMIN — Medication 40 MILLIGRAM(S): at 16:53

## 2020-06-22 RX ADMIN — Medication 7 UNIT(S): at 11:58

## 2020-06-22 RX ADMIN — Medication 25 MILLIGRAM(S): at 05:22

## 2020-06-22 RX ADMIN — CARVEDILOL PHOSPHATE 3.12 MILLIGRAM(S): 80 CAPSULE, EXTENDED RELEASE ORAL at 16:53

## 2020-06-22 NOTE — CONSULT NOTE ADULT - SUBJECTIVE AND OBJECTIVE BOX
[facility]   MD MICHAEL STOUT DO ANAM SIDDIQUI ANGELA WONG, PA    TEL:  OFFICE: [phone]  DR FLORES CELL: [phone]  DR. HUSSEIN CELL: [phone]  DR. HODGES CELL: [phone]  WESTLEY DAVE CELL: [phone]    From 5pm-7am answering service [phone]    HPI:  ***Patient is a poor historian***  74 year old female with PMH of HTN, HLD, DM, recent COVID infection in April 2020 who presented with LE swelling for weeks, found to have HF exacerbation and treated with diuretics; patient was found to have Wenchebach on telemetry and had 23 beats of NSVT, as well as elevated cardiac enzymes, warranting transfer to CCU for continued monitoring and ischemic workup for likely NSTEMI.    Allergies:  peanuts (Short breath; Hives)  penicillin (Short breath; Hives)      PAST MEDICAL & SURGICAL HISTORY:  Anxiety and depression  Hypercholesterolemia  Hypertension  Diabetes mellitus  No significant past surgical history      Home Medications Reviewed    Hospital Medications:   MEDICATIONS  (STANDING):  aspirin enteric coated 81 milliGRAM(s) Oral daily  atorvastatin 40 milliGRAM(s) Oral at bedtime  carvedilol 3.125 milliGRAM(s) Oral every 12 hours  clopidogrel Tablet 75 milliGRAM(s) Oral daily  dextrose 5%. 1000 milliLiter(s) (50 mL/Hr) IV Continuous <Continuous>  dextrose 50% Injectable 12.5 Gram(s) IV Push once  dextrose 50% Injectable 25 Gram(s) IV Push once  dextrose 50% Injectable 25 Gram(s) IV Push once  furosemide   Injectable 40 milliGRAM(s) IV Push two times a day  hydrALAZINE 25 milliGRAM(s) Oral three times a day  insulin glargine Injectable (LANTUS) 40 Unit(s) SubCutaneous at bedtime  insulin lispro (HumaLOG) corrective regimen sliding scale   SubCutaneous three times a day before meals  insulin lispro (HumaLOG) corrective regimen sliding scale   SubCutaneous at bedtime  insulin lispro Injectable (HumaLOG) 7 Unit(s) SubCutaneous three times a day before meals  risperiDONE   Tablet 1 milliGRAM(s) Oral daily      SOCIAL HISTORY:  Denies ETOh, Smoking,     FAMILY HISTORY:  No pertinent family history in first degree relatives      REVIEW OF SYSTEMS:  CONSTITUTIONAL: No weakness, fevers or chills  EYES/ENT: No visual changes;  No vertigo or throat pain   NECK: No pain or stiffness  RESPIRATORY: No cough, wheezing, hemoptysis; No shortness of breath  CARDIOVASCULAR: No chest pain or palpitations.  GASTROINTESTINAL: No abdominal or epigastric pain. No nausea, vomiting, or hematemesis; No diarrhea or constipation. No melena or hematochezia.  GENITOURINARY: No dysuria, frequency, foamy urine, urinary urgency, incontinence or hematuria  NEUROLOGICAL: No numbness or weakness  SKIN: No itching, burning, rashes, or lesions   VASCULAR: + bilateral lower extremity edema.   All other review of systems is negative unless indicated above.    VITALS:  T(F): 98.3 (06-22-20 @ 08:00), Max: 99.2 (06-21-20 @ 18:42)  HR: 107 (06-22-20 @ 13:00)  BP: 170/66 (06-22-20 @ 13:00)  RR: 25 (06-22-20 @ 13:00)  SpO2: 97% (06-22-20 @ 13:00)  Wt(kg): --    06-21 @ 07:01  -  06-22 @ 07:00  --------------------------------------------------------  IN: 1155 mL / OUT: 2350 mL / NET: -1195 mL    06-22 @ 07:01  -  06-22 @ 13:48  --------------------------------------------------------  IN: 516 mL / OUT: 1400 mL / NET: -884 mL      Height (cm): 152.4 (06-21 @ 18:42)  Weight (kg): 68.4 (06-21 @ 18:42)  BMI (kg/m2): 29.5 (06-21 @ 18:42)  BSA (m2): 1.66 (06-21 @ 18:42)    PHYSICAL EXAM:  Constitutional: NAD  HEENT: anicteric sclera, oropharynx clear, MMM  Neck: No JVD  Respiratory: CTA   Cardiovascular: S1, S2, RRR  Gastrointestinal: BS+, soft, NT/ND  Extremities: 1+peripheral edema  Neurological: A/O x 3, no focal deficits  Psychiatric: Normal mood, normal affect  : No CVA tenderness. No michaels.   Skin: No rashes      LABS:  06-22    138  |  100  |  17  ----------------------------<  152<H>  3.8   |  24  |  1.87<H>    Ca    9.2      22 Jun 2020 05:00  Phos  4.3     06-22  Mg     2.0     06-22    TPro  6.1  /  Alb  3.3  /  TBili  0.3  /  DBili      /  AST  20  /  ALT  14  /  AlkPhos  60  06-22    Creatinine Trend: 1.87 <--, 1.41 <--, 1.20 <--, 1.13 <--                        9.7    6.28  )-----------( 272      ( 22 Jun 2020 05:00 )             31.1     Urine Studies:        RADIOLOGY & ADDITIONAL STUDIES:

## 2020-06-22 NOTE — PROGRESS NOTE ADULT - SUBJECTIVE AND OBJECTIVE BOX
INTERVAL HPI/OVERNIGHT EVENTS: I feel fine.   Vital Signs Last 24 Hrs  T(C): 37.2 (2020 17:00), Max: 37.3 (2020 18:42)  T(F): 98.9 (2020 17:00), Max: 99.2 (2020 18:42)  HR: 93 (2020 16:00) (75 - 114)  BP: 156/55 (2020 16:00) (137/66 - 194/71)  BP(mean): 84 (2020 16:00) (84 - 122)  RR: 19 (2020 16:00) (13 - 26)  SpO2: 100% (2020 16:00) (97% - 100%)  I&O's Summary    2020 07:01  -  2020 07:00  --------------------------------------------------------  IN: 1155 mL / OUT: 2350 mL / NET: -1195 mL    2020 07:01  -  2020 18:00  --------------------------------------------------------  IN: 636 mL / OUT: 1700 mL / NET: -1064 mL      MEDICATIONS  (STANDING):  aspirin enteric coated 81 milliGRAM(s) Oral daily  atorvastatin 40 milliGRAM(s) Oral at bedtime  carvedilol 3.125 milliGRAM(s) Oral every 12 hours  chlorhexidine 4% Liquid 1 Application(s) Topical daily  clopidogrel Tablet 75 milliGRAM(s) Oral daily  dextrose 5%. 1000 milliLiter(s) (50 mL/Hr) IV Continuous <Continuous>  dextrose 50% Injectable 12.5 Gram(s) IV Push once  dextrose 50% Injectable 25 Gram(s) IV Push once  dextrose 50% Injectable 25 Gram(s) IV Push once  furosemide   Injectable 40 milliGRAM(s) IV Push two times a day  heparin  Infusion 800 Unit(s)/Hr (8 mL/Hr) IV Continuous <Continuous>  hydrALAZINE 25 milliGRAM(s) Oral three times a day  insulin glargine Injectable (LANTUS) 40 Unit(s) SubCutaneous at bedtime  insulin lispro (HumaLOG) corrective regimen sliding scale   SubCutaneous three times a day before meals  insulin lispro (HumaLOG) corrective regimen sliding scale   SubCutaneous at bedtime  insulin lispro Injectable (HumaLOG) 7 Unit(s) SubCutaneous three times a day before meals  risperiDONE   Tablet 1 milliGRAM(s) Oral daily    MEDICATIONS  (PRN):  dextrose 40% Gel 15 Gram(s) Oral once PRN Blood Glucose LESS THAN 70 milliGRAM(s)/deciLiter  glucagon  Injectable 1 milliGRAM(s) IntraMuscular once PRN Glucose <70 milliGRAM(s)/deciLiter    LABS:                        9.7    6.28  )-----------( 272      ( 2020 05:00 )             31.1     06-22    138  |  100  |  17  ----------------------------<  152<H>  3.8   |  24  |  1.87<H>    Ca    9.2      2020 05:00  Phos  4.3     06-22  Mg     2.0     06-    TPro  6.1  /  Alb  3.3  /  TBili  0.3  /  DBili  x   /  AST  20  /  ALT  14  /  AlkPhos  60  06-22    PT/INR - ( 2020 11:12 )   PT: 11.0 SEC;   INR: 0.97          PTT - ( 2020 12:10 )  PTT:116.1 SEC  Urinalysis Basic - ( 2020 17:00 )    Color: COLORLESS / Appearance: CLEAR / S.009 / pH: 8.0  Gluc: NEGATIVE / Ketone: NEGATIVE  / Bili: NEGATIVE / Urobili: NORMAL   Blood: NEGATIVE / Protein: 100 / Nitrite: NEGATIVE   Leuk Esterase: NEGATIVE / RBC: 0-2 / WBC 0-2   Sq Epi: OCC / Non Sq Epi: x / Bacteria: FEW      CAPILLARY BLOOD GLUCOSE      POCT Blood Glucose.: 138 mg/dL (2020 16:46)  POCT Blood Glucose.: 147 mg/dL (2020 11:52)  POCT Blood Glucose.: 158 mg/dL (2020 07:56)  POCT Blood Glucose.: 162 mg/dL (2020 21:49)        Urinalysis Basic - ( 2020 17:00 )    Color: COLORLESS / Appearance: CLEAR / S.009 / pH: 8.0  Gluc: NEGATIVE / Ketone: NEGATIVE  / Bili: NEGATIVE / Urobili: NORMAL   Blood: NEGATIVE / Protein: 100 / Nitrite: NEGATIVE   Leuk Esterase: NEGATIVE / RBC: 0-2 / WBC 0-2   Sq Epi: OCC / Non Sq Epi: x / Bacteria: FEW      REVIEW OF SYSTEMS:  CONSTITUTIONAL: No fever, weight loss, or fatigue  EYES: No eye pain, visual disturbances, or discharge  ENMT:  No difficulty hearing, tinnitus, vertigo; No sinus or throat pain  NECK: No pain or stiffness  RESPIRATORY: No cough, wheezing, chills or hemoptysis; No shortness of breath  CARDIOVASCULAR: No chest pain, palpitations, dizziness, or leg swelling  GASTROINTESTINAL: No abdominal or epigastric pain. No nausea, vomiting, or hematemesis; No diarrhea or constipation. No melena or hematochezia.  GENITOURINARY: No dysuria, frequency, hematuria, or incontinence  NEUROLOGICAL: No headaches, memory loss, loss of strength, numbness, or tremors    Consultant(s) Notes Reviewed:  [x ] YES  [ ] NO    PHYSICAL EXAM:  GENERAL: NAD, well-groomed, well-developed, not in any distress ,  HEAD:  Atraumatic, Normocephalic  EYES: EOMI, PERRLA, conjunctiva and sclera clear  ENMT: No tonsillar erythema, exudates, or enlargement; Moist mucous membranes, Good dentition, No lesions  NECK: Supple, No JVD, Normal thyroid  NERVOUS SYSTEM:  Alert & Oriented X3, No focal deficit   CHEST/LUNG: Good air entry bilateral with no  rales, rhonchi, wheezing, or rubs  HEART: Regular rate and rhythm; No murmurs, rubs, or gallops  ABDOMEN: Soft, Nontender, Nondistended; Bowel sounds present  EXTREMITIES:  2+ Peripheral Pulses, No clubbing, cyanosis, or edema    Care Discussed with Consultants/Other Providers [ x] YES  [ ] NO

## 2020-06-22 NOTE — DISCHARGE NOTE PROVIDER - NSDCCPCAREPLAN_GEN_ALL_CORE_FT
PRINCIPAL DISCHARGE DIAGNOSIS  Diagnosis: Heart failure  Assessment and Plan of Treatment:       SECONDARY DISCHARGE DIAGNOSES  Diagnosis: DVT, lower extremity  Assessment and Plan of Treatment: PRINCIPAL DISCHARGE DIAGNOSIS  Diagnosis: Heart failure  Assessment and Plan of Treatment: Continue Lasix as prescribed. Low salt diet, fluid restriction to 1500 ml daily, monitor your fluid intake and weight daily, exercise as tolerated 30 minutes daily, and follow up with DrJarred      SECONDARY DISCHARGE DIAGNOSES  Diagnosis: NSTEMI (non-ST elevated myocardial infarction)  Assessment and Plan of Treatment: You underwent cardiac catheterization and received 2 stents. Continue Aspirin, Plavix, Coreg, Hydralazine, and Atorvastatin as prescribed. Low salt, low cholesterol, low fat diet. Follow up with     Diagnosis: Ventricular tachycardia, non-sustained  Assessment and Plan of Treatment: You had episodes of abnormally fast heart rhythm this admission. Electrophysiology was consulted, but did not recommend a defibrillator. Continue Coreg as prescribed for rate control.    Diagnosis: DVT, lower extremity  Assessment and Plan of Treatment: Continue Eliquis 5mg twice a day for at least 3 months (may need longer if bedbound). Follow up with your Hematologist within 1-2 weeks.    Diagnosis: SUNIL (acute kidney injury)  Assessment and Plan of Treatment: Resolved. Follow up with your primary care physician or Nephrologist, Dr. Schulz, to monitor your kidney function within 1-2 weeks.    Diagnosis: Diabetes mellitus type 2, noninsulin dependent  Assessment and Plan of Treatment: Continue your home diabetes regimen. Monitor finger sticks pre-meal and bedtime, low salt, fat and carbohydrate diet, minimize glucose intake.  Exercise daily for at least 30 minutes and weight loss.  Follow up with Dr. Smith for routine Hemoglobin A1C checks and management.  Follow up with your ophthalmologist for routine yearly vision exams.    Diagnosis: Hypothyroidism  Assessment and Plan of Treatment: Follow up with Dr. Smith to repeat Thyroid function tests in 4-6 weeks. PRINCIPAL DISCHARGE DIAGNOSIS  Diagnosis: Heart failure  Assessment and Plan of Treatment: Continue Lasix as prescribed. Low salt diet, fluid restriction to 1500 ml daily, monitor your fluid intake and weight daily, exercise as tolerated 30 minutes daily. Outpatient follow up with Dr. Santana on 07/06/2020 at 10:15 am at [facility] [address], [place].      SECONDARY DISCHARGE DIAGNOSES  Diagnosis: NSTEMI (non-ST elevated myocardial infarction)  Assessment and Plan of Treatment: You underwent cardiac catheterization and received 2 stents. Continue Aspirin, Plavix, Coreg, Hydralazine, and Atorvastatin as prescribed. Low salt, low cholesterol, low fat diet. Outpatient follow up with Dr. Santana on 07/06/2020 at 10:15 am at [facility] [address], [place].    Diagnosis: Ventricular tachycardia, non-sustained  Assessment and Plan of Treatment: You had episodes of abnormally fast heart rhythm this admission. Electrophysiology was consulted, but did not recommend a defibrillator. Continue Coreg as prescribed for rate control. Outpatient follow up with Dr. Santana on 07/06/2020 at 10:15 am at [facility] [address], [place], [place].    Diagnosis: DVT, lower extremity  Assessment and Plan of Treatment: Continue Eliquis 5mg twice a day for at least 3 months (may need longer if bedbound). Follow up with your Hematologist within 1-2 weeks.    Diagnosis: SUNIL (acute kidney injury)  Assessment and Plan of Treatment: Resolved. Follow up with your primary care physician or Nephrologist, Dr. Schulz, to monitor your kidney function within 1-2 weeks.    Diagnosis: Diabetes mellitus type 2, noninsulin dependent  Assessment and Plan of Treatment: Continue your home diabetes regimen. Monitor finger sticks pre-meal and bedtime, low salt, fat and carbohydrate diet, minimize glucose intake.  Exercise daily for at least 30 minutes and weight loss.  Follow up with Dr. Smith for routine Hemoglobin A1C checks and management.  Follow up with your ophthalmologist for routine yearly vision exams.    Diagnosis: Hypothyroidism  Assessment and Plan of Treatment: Follow up with Dr. Smith to repeat Thyroid function tests in 4-6 weeks. PRINCIPAL DISCHARGE DIAGNOSIS  Diagnosis: Heart failure  Assessment and Plan of Treatment: Low salt diet, fluid restriction to 1500 ml daily, monitor your fluid intake and weight daily, exercise as tolerated 30 minutes daily. Outpatient follow up with Dr. Santana on 07/06/2020 at 10:15 am at [facility] [address] [place] [address].      SECONDARY DISCHARGE DIAGNOSES  Diagnosis: NSTEMI (non-ST elevated myocardial infarction)  Assessment and Plan of Treatment: You underwent cardiac catheterization and received 2 stents. Continue Aspirin, Plavix, Coreg, Hydralazine, and Atorvastatin as prescribed. Low salt, low cholesterol, low fat diet. Outpatient follow up with Dr. Santana on 07/06/2020 at 10:15 am at [facility] [address] [place] [address].    Diagnosis: Ventricular tachycardia, non-sustained  Assessment and Plan of Treatment: You had episodes of abnormally fast heart rhythm this admission. Electrophysiology was consulted, but did not recommend a defibrillator. Continue Coreg as prescribed for rate control. Outpatient follow up with Dr. Santana on 07/06/2020 at 10:15 am at [facility] [address] [facility], [place], [place].    Diagnosis: DVT, lower extremity  Assessment and Plan of Treatment: Continue Eliquis 5mg twice a day for at least 3 months (may need longer if bedbound). Follow up with your Hematologist within 1-2 weeks.    Diagnosis: SUNIL (acute kidney injury)  Assessment and Plan of Treatment: Resolved. Follow up with your primary care physician or Nephrologist, Dr. Schulz, to monitor your kidney function within 1-2 weeks.    Diagnosis: Diabetes mellitus type 2, noninsulin dependent  Assessment and Plan of Treatment: Continue your home diabetes regimen. Monitor finger sticks pre-meal and bedtime, low salt, fat and carbohydrate diet, minimize glucose intake.  Exercise daily for at least 30 minutes and weight loss.  Follow up with Dr. Smith for routine Hemoglobin A1C checks and management.  Follow up with your ophthalmologist for routine yearly vision exams.    Diagnosis: Hypothyroidism  Assessment and Plan of Treatment: Follow up with Dr. Smith to repeat Thyroid function tests in 4-6 weeks. PRINCIPAL DISCHARGE DIAGNOSIS  Diagnosis: Heart failure  Assessment and Plan of Treatment: Continue Lasix 40mg daily. Low salt diet, fluid restriction to 1500 ml daily, monitor your fluid intake and weight daily, exercise as tolerated 30 minutes daily. Outpatient follow up with Dr. Santana on 07/06/2020 at 10:15 am at [facility] [address], [place].      SECONDARY DISCHARGE DIAGNOSES  Diagnosis: NSTEMI (non-ST elevated myocardial infarction)  Assessment and Plan of Treatment: You underwent cardiac catheterization and received 2 stents. Continue Aspirin, Plavix, Coreg, Hydralazine, and Atorvastatin as prescribed. Low salt, low cholesterol, low fat diet. Outpatient follow up with Dr. Santana on 07/06/2020 at 10:15 am at [facility] [address], [place].    Diagnosis: Ventricular tachycardia, non-sustained  Assessment and Plan of Treatment: You had episodes of abnormally fast heart rhythm this admission. Electrophysiology was consulted, but did not recommend a defibrillator. Continue Coreg as prescribed for rate control. Outpatient follow up with Dr. Santana on 07/06/2020 at 10:15 am at [facility] [address], [place], [place].    Diagnosis: DVT, lower extremity  Assessment and Plan of Treatment: Continue Eliquis 5mg twice a day for at least 3 months (may need longer if bedbound). Follow up with your Hematologist within 1-2 weeks.    Diagnosis: SUNIL (acute kidney injury)  Assessment and Plan of Treatment: Resolved. Follow up with your primary care physician or Nephrologist, Dr. Schulz, to monitor your kidney function within 1-2 weeks.    Diagnosis: Diabetes mellitus type 2, noninsulin dependent  Assessment and Plan of Treatment: Continue your home diabetes regimen. Monitor finger sticks pre-meal and bedtime, low salt, fat and carbohydrate diet, minimize glucose intake.  Exercise daily for at least 30 minutes and weight loss.  Follow up with Dr. Smith for routine Hemoglobin A1C checks and management.  Follow up with your ophthalmologist for routine yearly vision exams.    Diagnosis: Hypothyroidism  Assessment and Plan of Treatment: Follow up with Dr. Smith to repeat Thyroid function tests in 4-6 weeks. PRINCIPAL DISCHARGE DIAGNOSIS  Diagnosis: Heart failure  Assessment and Plan of Treatment: Continue Lasix 40mg daily. Low salt diet, fluid restriction to 1500 ml daily, monitor your fluid intake and weight daily, exercise as tolerated 30 minutes daily. Outpatient follow up with Dr. Santana on 07/06/2020 at 10:15 am at [facility] [address], [place], [place].      SECONDARY DISCHARGE DIAGNOSES  Diagnosis: NSTEMI (non-ST elevated myocardial infarction)  Assessment and Plan of Treatment: You underwent cardiac catheterization and received 2 stents. Continue Aspirin, Plavix, Coreg, Hydralazine, and Atorvastatin as prescribed. Your wrist developed a hematoma (pocket of blood) after the procedure which improved. Continue ice packs to site for the next few days for decreased swelling. Low salt, low cholesterol, low fat diet. Outpatient follow up with Dr. Santana on 07/06/2020 at 10:15 am at [facility] [address], [place].    Diagnosis: Ventricular tachycardia, non-sustained  Assessment and Plan of Treatment: You had episodes of abnormally fast heart rhythm this admission. Electrophysiology was consulted, but did not recommend a defibrillator. Continue Coreg as prescribed for rate control. Outpatient follow up with Dr. Santana on 07/06/2020 at 10:15 am at [facility] [address], [place], [place].    Diagnosis: DVT, lower extremity  Assessment and Plan of Treatment: Continue Eliquis 5mg twice a day for at least 3 months (may need longer if bedbound). Follow up with your Hematologist within 1-2 weeks.    Diagnosis: SUNIL (acute kidney injury)  Assessment and Plan of Treatment: Resolved. Follow up with your primary care physician or Nephrologist, Dr. Schulz, to monitor your kidney function within 1-2 weeks.    Diagnosis: Diabetes mellitus type 2, noninsulin dependent  Assessment and Plan of Treatment: Continue your home diabetes regimen. Monitor finger sticks pre-meal and bedtime, low salt, fat and carbohydrate diet, minimize glucose intake.  Exercise daily for at least 30 minutes and weight loss.  Follow up with Dr. Smith for routine Hemoglobin A1C checks and management.  Follow up with your ophthalmologist for routine yearly vision exams.    Diagnosis: Hypothyroidism  Assessment and Plan of Treatment: Follow up with Dr. Smith to repeat Thyroid function tests in 4-6 weeks. PRINCIPAL DISCHARGE DIAGNOSIS  Diagnosis: Heart failure  Assessment and Plan of Treatment: Continue Lasix 40mg daily. Low salt diet, fluid restriction to 1500 ml daily, monitor your fluid intake and weight daily, exercise as tolerated 30 minutes daily. Outpatient follow up with Dr. Santana on 07/06/2020 at 10:15 am at [facility] [address], [place], [place].      SECONDARY DISCHARGE DIAGNOSES  Diagnosis: NSTEMI (non-ST elevated myocardial infarction)  Assessment and Plan of Treatment: You underwent cardiac catheterization and received 2 stents. Continue Aspirin, Plavix, Coreg, Hydralazine, and Atorvastatin as prescribed. Your wrist developed a hematoma (pocket of blood) after the procedure which improved. Continue ice packs to site for the next few days for decreased swelling. Low salt, low cholesterol, low fat diet. Outpatient follow up with Dr. Santana on 07/06/2020 at 10:15 am at [facility] [address], [place].    Diagnosis: Ventricular tachycardia, non-sustained  Assessment and Plan of Treatment: You had episodes of abnormally fast heart rhythm this admission. Electrophysiology was consulted, but did not recommend a defibrillator. Continue Coreg as prescribed for rate control. Outpatient follow up with Dr. Santana on 07/06/2020 at 10:15 am at [facility] [address], [place], [place].    Diagnosis: DVT, lower extremity  Assessment and Plan of Treatment: Continue Eliquis 5mg twice a day for at least 3 months (may need longer if bedbound). Follow up with your Hematologist within 1-2 weeks.    Diagnosis: SUNIL (acute kidney injury)  Assessment and Plan of Treatment: Resolved. Follow up with your primary care physician or Nephrologist, Dr. Schulz, to monitor your kidney function within 1-2 weeks.    Diagnosis: Diabetes mellitus type 2, noninsulin dependent  Assessment and Plan of Treatment: Continue Metformin 500mg twice a day, Januvia 50mg daily, and Prandin 1mg three times a day before meals. Monitor finger sticks pre-meal and bedtime, low salt, fat and carbohydrate diet, minimize glucose intake.  Exercise daily for at least 30 minutes and weight loss.  Follow up with Dr. Smith for routine Hemoglobin A1C checks and management; call [phone] for appointment. Follow up with your ophthalmologist for routine yearly vision exams.    Diagnosis: Hypothyroidism  Assessment and Plan of Treatment: Follow up with Dr. Smith to repeat Thyroid function tests in 4-6 weeks. PRINCIPAL DISCHARGE DIAGNOSIS  Diagnosis: Heart failure  Assessment and Plan of Treatment: Continue Lasix 40mg daily. Low salt diet, fluid restriction to 1500 ml daily, monitor your fluid intake and weight daily, exercise as tolerated 30 minutes daily. Outpatient follow up with Dr. Santana on 07/06/2020 at 10:15 am at [facility] [address], [place], [place].      SECONDARY DISCHARGE DIAGNOSES  Diagnosis: NSTEMI (non-ST elevated myocardial infarction)  Assessment and Plan of Treatment: You underwent cardiac catheterization and received 2 stents. Continue Aspirin, Plavix, Coreg, Hydralazine, and Atorvastatin as prescribed. Your wrist developed a hematoma (pocket of blood) after the procedure which improved. Continue ice packs to site for the next few days for decreased swelling. Low salt, low cholesterol, low fat diet. Outpatient follow up with Dr. Santana on 07/06/2020 at 10:15 am at [facility] [address], [place].    Diagnosis: Ventricular tachycardia, non-sustained  Assessment and Plan of Treatment: You had episodes of abnormally fast heart rhythm this admission. Electrophysiology was consulted, but did not recommend a defibrillator. Continue Coreg as prescribed for rate control. Outpatient follow up with Dr. Santana on 07/06/2020 at 10:15 am at [facility] [address], [place], [place].    Diagnosis: DVT, lower extremity  Assessment and Plan of Treatment: Continue Eliquis 5mg twice a day for at least 3 months (may need longer if bedbound). Follow up with your Hematologist within 1-2 weeks.    Diagnosis: SUNIL (acute kidney injury)  Assessment and Plan of Treatment: Your kidney function is now stable. Follow up with Nephrologist Dr. Schulz to monitor your kidney function in 2 weeks; call [phone]  for appointment.    Diagnosis: Diabetes mellitus type 2, noninsulin dependent  Assessment and Plan of Treatment: Continue Metformin 500mg twice a day, Januvia 50mg daily, and Prandin 1mg three times a day before meals. Monitor finger sticks pre-meal and bedtime, low salt, fat and carbohydrate diet, minimize glucose intake.  Exercise daily for at least 30 minutes and weight loss.  Follow up with Dr. Smith for routine Hemoglobin A1C checks and management; call [phone] for appointment. Follow up with your ophthalmologist for routine yearly vision exams.    Diagnosis: Hypothyroidism  Assessment and Plan of Treatment: Follow up with Dr. Smith to repeat Thyroid function tests in 4-6 weeks.

## 2020-06-22 NOTE — DISCHARGE NOTE PROVIDER - CARE PROVIDER_API CALL
Donis Jaquez  CARDIOVASCULAR DISEASE  [address] [place] [address]  Phone: [phone]  Fax: [phone]  Follow Up Time: Donis Jaquez  CARDIOVASCULAR DISEASE  [address] [place] [address]  Phone: [phone]  Fax: [phone]  Follow Up Time:     David Clayton  HEMATOLOGY  [address] [address]  [address]  [place] [address]  Phone: [phone]  Fax: [phone]  Follow Up Time:     Seth Schulz  INTERNAL MEDICINE  [address] [place] [address]  Phone: [phone]  Fax: [phone]  Follow Up Time:     Eddie Smith  Endocrinology, Diabetes and Metabolism  [address] [place] [address]  Phone: [phone]  Fax: [phone]  Follow Up Time: David Clayton  HEMATOLOGY  [address]  [address]  [place] [address]  Phone: [phone]  Fax: [phone]  Follow Up Time:     Seth Schulz  INTERNAL MEDICINE  [address] [place] [address]  Phone: [phone]  Fax: [phone]  Follow Up Time:     Eddie Smith  Endocrinology, Diabetes and Metabolism  [address] [place] [address]  Phone: [phone]  Fax: [phone]  Follow Up Time:     Ankit Santana  CARDIOLOGY  [address] [facility] [address]  [place] [address]  Phone: [phone]  Fax: [phone]  Scheduled Appointment: 07/06/2020 10:15 AM

## 2020-06-22 NOTE — PROGRESS NOTE ADULT - SUBJECTIVE AND OBJECTIVE BOX
Patient denies chest pain or shortness of breath.   Review of systems otherwise (-)  	    MEDICATIONS  (STANDING):  aspirin enteric coated 81 milliGRAM(s) Oral daily  atorvastatin 40 milliGRAM(s) Oral at bedtime  carvedilol 3.125 milliGRAM(s) Oral every 12 hours  chlorhexidine 4% Liquid 1 Application(s) Topical daily  clopidogrel Tablet 75 milliGRAM(s) Oral daily  dextrose 5%. 1000 milliLiter(s) (50 mL/Hr) IV Continuous <Continuous>  dextrose 50% Injectable 12.5 Gram(s) IV Push once  dextrose 50% Injectable 25 Gram(s) IV Push once  dextrose 50% Injectable 25 Gram(s) IV Push once  furosemide   Injectable 40 milliGRAM(s) IV Push two times a day  hydrALAZINE 25 milliGRAM(s) Oral three times a day  insulin glargine Injectable (LANTUS) 40 Unit(s) SubCutaneous at bedtime  insulin lispro (HumaLOG) corrective regimen sliding scale   SubCutaneous three times a day before meals  insulin lispro (HumaLOG) corrective regimen sliding scale   SubCutaneous at bedtime  insulin lispro Injectable (HumaLOG) 7 Unit(s) SubCutaneous three times a day before meals  risperiDONE   Tablet 1 milliGRAM(s) Oral daily      LABS:	 	                     9.7    6.28  )-----------( 272      ( 22 Jun 2020 05:00 )             31.1     138  |  100  |  17  ----------------------------<  152<H>  3.8   |  24  |  1.87<H>    Ca    9.2      22 Jun 2020 05:00  Phos  4.3     06-22  Mg     2.0     06-22    TPro  6.1  /  Alb  3.3  /  TBili  0.3  /  DBili  x   /  AST  20  /  ALT  14  /  AlkPhos  60  06-22    Creatinine Trend: 1.87<--, 1.41<--, 1.20<--, 1.13<--  COAGS:   PTT - ( 22 Jun 2020 12:10 )  PTT:116.1 SEC    PHYSICAL EXAM:  T(C): 36.8 (06-22-20 @ 08:00), Max: 37.3 (06-21-20 @ 18:42)  HR: 94 (06-22-20 @ 14:00) (75 - 114)  BP: 176/84 (06-22-20 @ 14:00) (137/66 - 194/71)  RR: 24 (06-22-20 @ 14:00) (13 - 26)  SpO2: 99% (06-22-20 @ 14:00) (97% - 100%)    Height (cm): 152.4 (06-21 @ 18:42)  Weight (kg): 68.4 (06-21 @ 18:42)  BMI (kg/m2): 29.5 (06-21 @ 18:42)  BSA (m2): 1.66 (06-21 @ 18:42)    Gen: Appears well in NAD  HEENT:  (-)icterus (-)pallor  CV: N S1 S2 1/6 PARK (+)2 Pulses B/l  Resp:  Clear to ausculatation B/L, normal effort  GI: (+) BS Soft, NT, ND  Lymph:  (-)Edema, (-)obvious lymphadenopathy  Skin: Warm to touch, Normal turgor  Psych: Appropriate mood and affect    TELEMETRY: 	SR, 24 NSVT 6/21      < from: Transthoracic Echocardiogram (06.21.20 @ 12:47) >  CONCLUSIONS:  1. Mitral annular calcification, otherwise normal mitral  valve. Moderate mitral regurgitation.  2. Normal left ventricular internal dimensions and wall  thicknesses.  3. Hyperdynamic left ventricle.  4. The right ventricle is not well visualized; grossly  normal right ventricular systolic function.  5. Moderate pericardial effusion (measures about 1.3 cm  lateral to RA on apical four-chamber views).  6. Left pleural effusion.  ------------------------------------  < end of copied text >    < from: CT Angio Chest w/ IV Cont (06.20.20 @ 22:37) >    IMPRESSION:   No pulmonary embolism.  No gross CT evidence of pneumonia.  Mild left heart enlargement.  Mild interlobular septal thickening in both lungs may reflect interstitial pulmonary edema.  Small to moderate pleural effusions bilaterally.  Small pericardial effusion.    < end of copied text >      ASSESSMENT/PLAN: 	    74 year old female with history of HTN, HLD, DM, recent COVID infection in April of 2020 who presented to the ED with LE swelling for 1-2 weeks.    -pt. grossly volume overloaded with pitting edema and imaging with pulmonary edema  -continue with IV lasix to keep O > I  -CT scan negative for PE  -TTE with normal LV function with moderate pericardial effusion - pt. with no clinical evidence of tamponade  -Currently chest pain free - would continue with hep gtt and asa for nstemi  -EP following  -Renal consult for SUNIL  -possible cardiac MRI with linda to eval for myopericarditis if renal fxn allows

## 2020-06-22 NOTE — PROGRESS NOTE ADULT - SUBJECTIVE AND OBJECTIVE BOX
***********  Covering Resident:  Mynor Torres, PGY-1  Internal Medicine  NS: [phone]   LI: [id number]  ***********    PATIENT:  JOSE SAGASTUME  [id number]    CHIEF COMPLAINT:  Patient is a 74y old  Female who presents with a chief complaint of LE swelling (2020 15:23)      INTERVAL HISTORYOVERNIGHT EVENTS: PTT elevated this morning to 114.8; recheck sent this morning before adjusting rate. Patient had no other acute events overnight. Seen and examined at bedside, and states ____.      MEDICATIONS:  MEDICATIONS  (STANDING):  aspirin enteric coated 81 milliGRAM(s) Oral daily  atorvastatin 40 milliGRAM(s) Oral at bedtime  clopidogrel Tablet 75 milliGRAM(s) Oral daily  dextrose 5%. 1000 milliLiter(s) (50 mL/Hr) IV Continuous <Continuous>  dextrose 50% Injectable 12.5 Gram(s) IV Push once  dextrose 50% Injectable 25 Gram(s) IV Push once  dextrose 50% Injectable 25 Gram(s) IV Push once  furosemide   Injectable 40 milliGRAM(s) IV Push two times a day  heparin  Infusion 1200 Unit(s)/Hr (9 mL/Hr) IV Continuous <Continuous>  hydrALAZINE 25 milliGRAM(s) Oral three times a day  insulin glargine Injectable (LANTUS) 40 Unit(s) SubCutaneous at bedtime  insulin lispro (HumaLOG) corrective regimen sliding scale   SubCutaneous three times a day before meals  insulin lispro (HumaLOG) corrective regimen sliding scale   SubCutaneous at bedtime  insulin lispro Injectable (HumaLOG) 7 Unit(s) SubCutaneous three times a day before meals  risperiDONE   Tablet 1 milliGRAM(s) Oral daily    MEDICATIONS  (PRN):  dextrose 40% Gel 15 Gram(s) Oral once PRN Blood Glucose LESS THAN 70 milliGRAM(s)/deciLiter  glucagon  Injectable 1 milliGRAM(s) IntraMuscular once PRN Glucose <70 milliGRAM(s)/deciLiter      ALLERGIES:  Allergies    peanuts (Short breath; Hives)  penicillin (Short breath; Hives)    Intolerances        OBJECTIVE:  ICU Vital Signs Last 24 Hrs  T(C): 36.9 (2020 04:00), Max: 37.3 (2020 18:42)  T(F): 98.4 (2020 04:00), Max: 99.2 (2020 18:42)  HR: 96 (2020 07:00) (75 - 114)  BP: 153/67 (2020 07:00) (137/66 - 183/82)  BP(mean): 88 (2020 07:00) (86 - 122)  ABP: --  ABP(mean): --  RR: 20 (2020 07:00) (13 - 26)  SpO2: 100% (2020 07:00) (97% - 100%)      Adult Advanced Hemodynamics Last 24 Hrs  CVP(mm Hg): --  CVP(cm H2O): --  CO: --  CI: --  PA: --  PA(mean): --  PCWP: --  SVR: --  SVRI: --  PVR: --  PVRI: --  CAPILLARY BLOOD GLUCOSE      POCT Blood Glucose.: 162 mg/dL (2020 21:49)  POCT Blood Glucose.: 304 mg/dL (2020 16:58)  POCT Blood Glucose.: 265 mg/dL (2020 12:43)  POCT Blood Glucose.: 263 mg/dL (2020 08:28)    CAPILLARY BLOOD GLUCOSE      POCT Blood Glucose.: 162 mg/dL (2020 21:49)    I&O's Summary    2020 07:01  -  2020 07:00  --------------------------------------------------------  IN: 1146 mL / OUT: 2350 mL / NET: -1204 mL      Daily Height in cm: 152.4 (2020 18:42)    Daily Weight in k.9 (2020 05:00)    PHYSICAL EXAMINATION:    GENERAL: NAD, no distress  HEENT: Moist Mucous Membranes, Anicteric, PERRL, EOMI  Cardiovascular: Regular rate and rhythm, Normal S1 S2, No JVD, No murmurs  Respiratory: Lungs clear to auscultation. No rales, No rhonchi, No wheezing.  Gastrointestinal:  Soft, Non-tender, + BS  Neurologic: Non-focal, A&Ox3  Skin: Warm and dry, No rashes, No ecchymosis, No cyanosis  Musculoskeletal: No clubbing, No cyanosis, No edema  Vascular: Peripheral pulses palpable 2+ bilaterally  Psychiatry: Mood & affect appropriate    LABS:                          9.7    6.28  )-----------( 272      ( 2020 05:00 )             31.1         138  |  100  |  17  ----------------------------<  152<H>  3.8   |  24  |  1.87<H>    Ca    9.2      2020 05:00  Phos  4.3       Mg     2.0         TPro  6.1  /  Alb  3.3  /  TBili  0.3  /  DBili  x   /  AST  20  /  ALT  14  /  AlkPhos  60      LIVER FUNCTIONS - ( 2020 05:00 )  Alb: 3.3 g/dL / Pro: 6.1 g/dL / ALK PHOS: 60 u/L / ALT: 14 u/L / AST: 20 u/L / GGT: x           PT/INR - ( 2020 11:12 )   PT: 11.0 SEC;   INR: 0.97          PTT - ( 2020 00:50 )  PTT:114.8 SEC  Creatine Kinase, Serum: 265 u/L ( @ 16:04)  Creatine Kinase, Serum: 265 u/L ( @ 16:04)  CKMB: 14.09 ng/mL ( @ 16:04)  CKMB Relative Index: 5.3 ( @ 16:04)  Creatine Kinase, Serum: 318 u/L ( @ 10:11)  CKMB: 21.18 ng/mL ( @ 10:11)  CKMB Relative Index: 6.7 ( @ 10:11)    CARDIAC MARKERS ( 2020 16:04 )  x     / x     / 265 u/L / 14.09 ng/mL / x      CARDIAC MARKERS ( 2020 10:11 )  x     / x     / 318 u/L / 21.18 ng/mL / x      CARDIAC MARKERS ( 2020 07:11 )  x     / x     / 274 u/L / 21.09 ng/mL / x          TELEMETRY: 1st degree AV block    EKG: sinus tachy with prolonged NE interval    IMAGING:    Transthoracic Echocardiogram (20 @ 12:47)  CONCLUSIONS:  1. Mitral annular calcification, otherwise normal mitral  valve. Moderate mitral regurgitation.  2. Normal left ventricular internal dimensions and wall  thicknesses.  3. Hyperdynamic left ventricle.  4. The right ventricle is not well visualized; grossly  normal right ventricular systolic function.  5. Moderate pericardial effusion (measures about 1.3 cm  lateral to RA on apical four-chamber views).  6. Left pleural effusion.    Xray Chest 1 View- PORTABLE-Urgent (20 @ 22:27)  IMPRESSION:     Small bilateral pleural effusions and associated lower lung partial atelectasis possibly with superimposed pulmonary edema. ***********  Covering Resident:  Mynor Torres, PGY-1  Internal Medicine  NS: [phone]   [facility]: [id number]  ***********    PATIENT:  JOSE SAGASTUME  [id number]    CHIEF COMPLAINT:  Patient is a 74y old  Female who presents with a chief complaint of LE swelling (2020 15:23)      INTERVAL HISTORYOVERNIGHT EVENTS: PTT elevated this morning to 114.8; recheck sent this morning before adjusting rate. Patient had no other acute events overnight. Seen and examined at bedside, and states that she feels okay. Patient denies any current headache, fever, chest pain, palpitations, SOB, nausea/vomiting, abdominal pain, dysuria. Has not had any chest pain or difficulty breathing since transfer to CCU. States that she is being monitored for high blood sugars and high blood pressure.      MEDICATIONS:  MEDICATIONS  (STANDING):  aspirin enteric coated 81 milliGRAM(s) Oral daily  atorvastatin 40 milliGRAM(s) Oral at bedtime  clopidogrel Tablet 75 milliGRAM(s) Oral daily  dextrose 5%. 1000 milliLiter(s) (50 mL/Hr) IV Continuous <Continuous>  dextrose 50% Injectable 12.5 Gram(s) IV Push once  dextrose 50% Injectable 25 Gram(s) IV Push once  dextrose 50% Injectable 25 Gram(s) IV Push once  furosemide   Injectable 40 milliGRAM(s) IV Push two times a day  heparin  Infusion 1200 Unit(s)/Hr (9 mL/Hr) IV Continuous <Continuous>  hydrALAZINE 25 milliGRAM(s) Oral three times a day  insulin glargine Injectable (LANTUS) 40 Unit(s) SubCutaneous at bedtime  insulin lispro (HumaLOG) corrective regimen sliding scale   SubCutaneous three times a day before meals  insulin lispro (HumaLOG) corrective regimen sliding scale   SubCutaneous at bedtime  insulin lispro Injectable (HumaLOG) 7 Unit(s) SubCutaneous three times a day before meals  risperiDONE   Tablet 1 milliGRAM(s) Oral daily    MEDICATIONS  (PRN):  dextrose 40% Gel 15 Gram(s) Oral once PRN Blood Glucose LESS THAN 70 milliGRAM(s)/deciLiter  glucagon  Injectable 1 milliGRAM(s) IntraMuscular once PRN Glucose <70 milliGRAM(s)/deciLiter      ALLERGIES:  Allergies    peanuts (Short breath; Hives)  penicillin (Short breath; Hives)    Intolerances        OBJECTIVE:  ICU Vital Signs Last 24 Hrs  T(C): 36.9 (2020 04:00), Max: 37.3 (2020 18:42)  T(F): 98.4 (2020 04:00), Max: 99.2 (2020 18:42)  HR: 96 (2020 07:00) (75 - 114)  BP: 153/67 (2020 07:00) (137/66 - 183/82)  BP(mean): 88 (2020 07:00) (86 - 122)  ABP: --  ABP(mean): --  RR: 20 (2020 07:00) (13 - 26)  SpO2: 100% (2020 07:00) (97% - 100%)      Adult Advanced Hemodynamics Last 24 Hrs  CVP(mm Hg): --  CVP(cm H2O): --  CO: --  CI: --  PA: --  PA(mean): --  PCWP: --  SVR: --  SVRI: --  PVR: --  PVRI: --  CAPILLARY BLOOD GLUCOSE      POCT Blood Glucose.: 162 mg/dL (2020 21:49)  POCT Blood Glucose.: 304 mg/dL (2020 16:58)  POCT Blood Glucose.: 265 mg/dL (2020 12:43)  POCT Blood Glucose.: 263 mg/dL (2020 08:28)    CAPILLARY BLOOD GLUCOSE      POCT Blood Glucose.: 162 mg/dL (2020 21:49)    I&O's Summary    2020 07:01  -  2020 07:00  --------------------------------------------------------  IN: 1146 mL / OUT: 2350 mL / NET: -1204 mL      Daily Height in cm: 152.4 (2020 18:42)    Daily Weight in k.9 (2020 05:00)    PHYSICAL EXAMINATION:    GENERAL: NAD, no distress  HEENT: Moist Mucous Membranes, Anicteric, PERRL, EOMI  Cardiovascular: irregular rhythm, Normal S1 S2, No JVD, No murmurs  Respiratory: Lungs clear to auscultation. No rales, No rhonchi, No wheezing.  Gastrointestinal:  Soft, Non-tender, + BS  Neurologic: Non-focal, A&Ox3  Skin: Warm and dry, No rashes, No ecchymosis, No cyanosis  Musculoskeletal: No clubbing, No cyanosis, No edema  Vascular: Peripheral pulses palpable 2+ bilaterally  Psychiatry: Mood & affect appropriate    LABS:                          9.7    6.28  )-----------( 272      ( 2020 05:00 )             31.1         138  |  100  |  17  ----------------------------<  152<H>  3.8   |  24  |  1.87<H>    Ca    9.2      2020 05:00  Phos  4.3       Mg     2.0         TPro  6.1  /  Alb  3.3  /  TBili  0.3  /  DBili  x   /  AST  20  /  ALT  14  /  AlkPhos  60      LIVER FUNCTIONS - ( 2020 05:00 )  Alb: 3.3 g/dL / Pro: 6.1 g/dL / ALK PHOS: 60 u/L / ALT: 14 u/L / AST: 20 u/L / GGT: x           PT/INR - ( 2020 11:12 )   PT: 11.0 SEC;   INR: 0.97          PTT - ( 2020 00:50 )  PTT:114.8 SEC  Creatine Kinase, Serum: 265 u/L ( @ 16:04)  Creatine Kinase, Serum: 265 u/L ( @ 16:04)  CKMB: 14.09 ng/mL ( @ 16:04)  CKMB Relative Index: 5.3 ( @ 16:04)  Creatine Kinase, Serum: 318 u/L ( @ 10:11)  CKMB: 21.18 ng/mL ( @ 10:11)  CKMB Relative Index: 6.7 ( @ 10:11)    CARDIAC MARKERS ( 2020 16:04 )  x     / x     / 265 u/L / 14.09 ng/mL / x      CARDIAC MARKERS ( 2020 10:11 )  x     / x     / 318 u/L / 21.18 ng/mL / x      CARDIAC MARKERS ( 2020 07:11 )  x     / x     / 274 u/L / 21.09 ng/mL / x          TELEMETRY: 1st degree AV block, occasional skipped beats    EKG: sinus tachy with prolonged MO interval    IMAGING:    Transthoracic Echocardiogram (20 @ 12:47)  CONCLUSIONS:  1. Mitral annular calcification, otherwise normal mitral  valve. Moderate mitral regurgitation.  2. Normal left ventricular internal dimensions and wall  thicknesses.  3. Hyperdynamic left ventricle.  4. The right ventricle is not well visualized; grossly  normal right ventricular systolic function.  5. Moderate pericardial effusion (measures about 1.3 cm  lateral to RA on apical four-chamber views).  6. Left pleural effusion.    Xray Chest 1 View- PORTABLE-Urgent (20 @ 22:27)  IMPRESSION:     Small bilateral pleural effusions and associated lower lung partial atelectasis possibly with superimposed pulmonary edema.

## 2020-06-22 NOTE — DISCHARGE NOTE PROVIDER - NSDCMRMEDTOKEN_GEN_ALL_CORE_FT
aspirin 81 mg oral delayed release tablet: 1 tab(s) orally once a day  Januvia 50 mg oral tablet: 1 tab(s) orally once a day   losartan 100 mg oral tablet: 1 tab(s) orally once a day  metFORMIN 500 mg oral tablet: 1 tab(s) orally 2 times a day   Norvasc 10 mg oral tablet: 1 tab(s) orally once a day   Prandin 1 mg oral tablet: 1 tab(s) orally 3 times a day (before meals)   risperiDONE 1 mg oral tablet: 1 milligram(s) orally once a day apixaban 5 mg oral tablet: 1 tab(s) orally every 12 hours  aspirin 81 mg oral delayed release tablet: 1 tab(s) orally once a day  atorvastatin 40 mg oral tablet: 1 tab(s) orally once a day (at bedtime)  carvedilol 3.125 mg oral tablet: 1 tab(s) orally every 12 hours  clopidogrel 75 mg oral tablet: 1 tab(s) orally once a day  hydrALAZINE 50 mg oral tablet: 1 tab(s) orally 3 times a day  Januvia 50 mg oral tablet: 1 tab(s) orally once a day   metFORMIN 500 mg oral tablet: 1 tab(s) orally 2 times a day   risperiDONE 1 mg oral tablet: 1 milligram(s) orally once a day apixaban 5 mg oral tablet: 1 tab(s) orally every 12 hours  aspirin 81 mg oral delayed release tablet: 1 tab(s) orally once a day  atorvastatin 40 mg oral tablet: 1 tab(s) orally once a day (at bedtime)  carvedilol 3.125 mg oral tablet: 1 tab(s) orally every 12 hours  clopidogrel 75 mg oral tablet: 1 tab(s) orally once a day  furosemide 40 mg oral tablet: 1 tab(s) orally once a day  hydrALAZINE 50 mg oral tablet: 1 tab(s) orally 3 times a day  Januvia 50 mg oral tablet: 1 tab(s) orally once a day   metFORMIN 500 mg oral tablet: 1 tab(s) orally 2 times a day   risperiDONE 1 mg oral tablet: 1 milligram(s) orally once a day apixaban 5 mg oral tablet: 1 tab(s) orally every 12 hours  aspirin 81 mg oral delayed release tablet: 1 tab(s) orally once a day  atorvastatin 40 mg oral tablet: 1 tab(s) orally once a day (at bedtime)  carvedilol 3.125 mg oral tablet: 1 tab(s) orally every 12 hours  clopidogrel 75 mg oral tablet: 1 tab(s) orally once a day  furosemide 40 mg oral tablet: 1 tab(s) orally once a day  hydrALAZINE 50 mg oral tablet: 1 tab(s) orally 3 times a day  Januvia 50 mg oral tablet: 1 tab(s) orally once a day   metFORMIN 500 mg oral tablet: 1 tab(s) orally 2 times a day   Prandin 1 mg oral tablet: 1 tab(s) orally 3 times a day (before meals)   risperiDONE 1 mg oral tablet: 1 milligram(s) orally once a day

## 2020-06-22 NOTE — PROGRESS NOTE ADULT - ATTENDING COMMENTS
Patient seen and examined.  Agree with above.   Admitted with LE edema and dyspnea found to have elevated cardiac markers  Pt. chest pain free with normal LV function on TTE and moderate pericardial effusion  Repeat limited TTE today to assess pericardial effusion  Given no anginal symptoms and SUNIL, will hold off on cardiac cath until pt. optimized  Pt. with possible myopericarditis in the setting of recent COVID infection - will need ischemic evaluation and possible cardiac MRI to further evaluate  Continue with supportive care per CCU  Repeat chem-7 to evaluate Cr  Further workup pending above      Donis Jaquez MD

## 2020-06-22 NOTE — PROGRESS NOTE ADULT - SUBJECTIVE AND OBJECTIVE BOX
Patient denies chest pain or shortness of breath.   Review of systems otherwise (-)    	  MEDICATIONS:  MEDICATIONS  (STANDING):  aspirin enteric coated 81 milliGRAM(s) Oral daily  atorvastatin 40 milliGRAM(s) Oral at bedtime  carvedilol 3.125 milliGRAM(s) Oral every 12 hours  chlorhexidine 4% Liquid 1 Application(s) Topical daily  clopidogrel Tablet 75 milliGRAM(s) Oral daily  dextrose 5%. 1000 milliLiter(s) (50 mL/Hr) IV Continuous <Continuous>  dextrose 50% Injectable 12.5 Gram(s) IV Push once  dextrose 50% Injectable 25 Gram(s) IV Push once  dextrose 50% Injectable 25 Gram(s) IV Push once  furosemide   Injectable 40 milliGRAM(s) IV Push two times a day  hydrALAZINE 25 milliGRAM(s) Oral three times a day  insulin glargine Injectable (LANTUS) 40 Unit(s) SubCutaneous at bedtime  insulin lispro (HumaLOG) corrective regimen sliding scale   SubCutaneous three times a day before meals  insulin lispro (HumaLOG) corrective regimen sliding scale   SubCutaneous at bedtime  insulin lispro Injectable (HumaLOG) 7 Unit(s) SubCutaneous three times a day before meals  risperiDONE   Tablet 1 milliGRAM(s) Oral daily      LABS:	 	    CARDIAC MARKERS:  CARDIAC MARKERS ( 21 Jun 2020 16:04 )  x     / x     / 265 u/L / 14.09 ng/mL / x      CARDIAC MARKERS ( 21 Jun 2020 10:11 )  x     / x     / 318 u/L / 21.18 ng/mL / x      CARDIAC MARKERS ( 21 Jun 2020 07:11 )  x     / x     / 274 u/L / 21.09 ng/mL / x                              9.7    6.28  )-----------( 272      ( 22 Jun 2020 05:00 )             31.1     Hemoglobin: 9.7 g/dL (06-22 @ 05:00)  Hemoglobin: 9.6 g/dL (06-21 @ 07:11)  Hemoglobin: 10.4 g/dL (06-20 @ 20:50)      06-22    138  |  100  |  17  ----------------------------<  152<H>  3.8   |  24  |  1.87<H>    Ca    9.2      22 Jun 2020 05:00  Phos  4.3     06-22  Mg     2.0     06-22    TPro  6.1  /  Alb  3.3  /  TBili  0.3  /  DBili  x   /  AST  20  /  ALT  14  /  AlkPhos  60  06-22    Creatinine Trend: 1.87<--, 1.41<--, 1.20<--, 1.13<--    COAGS:   PTT - ( 22 Jun 2020 12:10 )  PTT:116.1 SEC    proBNP:   Lipid Profile:   HgA1c:   TSH:       PHYSICAL EXAM:  T(C): 36.8 (06-22-20 @ 08:00), Max: 37.3 (06-21-20 @ 18:42)  HR: 107 (06-22-20 @ 13:00) (75 - 114)  BP: 170/66 (06-22-20 @ 13:00) (137/66 - 194/71)  RR: 25 (06-22-20 @ 13:00) (13 - 26)  SpO2: 97% (06-22-20 @ 13:00) (97% - 100%)  Wt(kg): --  I&O's Summary    21 Jun 2020 07:01  -  22 Jun 2020 07:00  --------------------------------------------------------  IN: 1155 mL / OUT: 2350 mL / NET: -1195 mL    22 Jun 2020 07:01  -  22 Jun 2020 14:30  --------------------------------------------------------  IN: 516 mL / OUT: 1400 mL / NET: -884 mL      Height (cm): 152.4 (06-21 @ 18:42)  Weight (kg): 68.4 (06-21 @ 18:42)  BMI (kg/m2): 29.5 (06-21 @ 18:42)  BSA (m2): 1.66 (06-21 @ 18:42)    Gen: Appears well in NAD  HEENT:  (-)icterus (-)pallor  CV: N S1 S2 1/6 PARK (+)2 Pulses B/l  Resp:  Clear to auscultation B/L, normal effort  GI: (+) BS Soft, NT, ND  Lymph:  (-)Edema, (-)obvious lymphadenopathy  Skin: Warm to touch, Normal turgor  Psych: Appropriate mood and affect      TELEMETRY: 	NSR, 1st degree AVB     ECG:  	NSR with with APC's and occasional Wenchebach with no significant ST-T changes    RADIOLOGY:         CXR:   < from: Xray Chest 1 View- PORTABLE-Urgent (06.20.20 @ 22:27) >  IMPRESSION:     Small bilateral pleural effusions and associated lower lung partial atelectasis possibly with superimposed pulmonary edema.    RANCHO MARQUEZ M.D., RADIOLOGY RESIDENT  This document has been electronically signed.  MARIA GUADALUPE ROSE M.D. ATTENDING RADIOLOGIST  This document has been electronically signed. Jun 21 2020  8:47AM    < end of copied text >    < from: Transthoracic Echocardiogram (06.21.20 @ 12:47) >  CONCLUSIONS:  1. Mitral annular calcification, otherwise normal mitral  valve. Moderate mitral regurgitation.  2. Normal left ventricular internal dimensions and wall  thicknesses.  3. Hyperdynamic left ventricle.  4. The right ventricle is not well visualized; grossly  normal right ventricular systolic function.  5. Moderate pericardial effusion (measures about 1.3 cm  lateral to RA on apical four-chamber views).  6. Left pleural effusion.  ------------------------------------------------------------------------  Confirmed on  6/21/2020 - 13:58:57 by Omar Almendarez MD, FAC,  FASE, RPVI    < end of copied text >      ASSESSMENT/PLAN: 	    74 year old female with history of HTN, HLD, DM, recent COVID infection in April of 2020 who presented to the ED with LE swelling for 1-2 weeks.  Patient is a poor historian, I have called pts daughter Nelsy  for collateral medical history. Patient without prior cardiac history. ECG demonstrates SR with APC's and occasional Wenchebach with no significant ST-T changes.  Electrophysiology now called for 23 beats of NSVT on tele.       -- pt without cp, sob or anginal symptoms on exam   -- tele with 23 beats (6/21) of NSVT and positive trop and cpk, keep mag >2, potassium > 4  -- continue telemetry, sr with 1st degree avb, monitor closely, was started on low dose coreg    -- stated on plavix and asa for ST Elevation, no anginal symptoms, hep gtt now d/c   -- echo as noted    -- plan for eventual ischemic eval, f/u cards  -- SUNIL - nephrology f/u appreciated, reccs noted   -- TSH elevated, f/u endocrine   -- further reccs pending above

## 2020-06-22 NOTE — DISCHARGE NOTE PROVIDER - HOSPITAL COURSE
75 yo female with PMH of HTN, HLD, DM2, depression, and recent COVID-19 infection in April 2020 who presented with bilateral lower extremity swelling which had worsened over the prior 1-2 weeks. Patient was felt to have an acute heart failure exacerbation and was admitted and treated with lasix for diuresis. On admission labs, it was noted that the patient had elevated cardiac enzymes and there was concern for NSTEMI. Patient was also noted to be in SUNIL with elevated creatinine, so catheterization and further ischemic workup was delayed until SUNIL was evaluated and treated since patient was denying any symptoms and not actively having chest pain. Patient was started on a heparin gtt and aspirin/plavix loaded, and had an echocardiogram that showed moderate pleural effusion. On continued monitoring, patient had a 25 beat run of nonsustained ventricular tachycardia and was admitted to the CCU for closer monitoring while the SUNIL was being treated. LE dopplers showed left posterior tibial DVT, so heparing gtt was continued. Given no recurrence of ventricular tachycardia, patient was transferred back to the floors for further treatment of her SUNIL and further ischemic workup. 75 yo female with PMH of HTN, HLD, DM2, depression, and recent COVID-19 infection in April 2020 who presented with bilateral lower extremity swelling which had worsened over the prior 1-2 weeks. Patient was felt to have an acute heart failure exacerbation and was admitted and treated with lasix for diuresis. On admission labs, it was noted that the patient had elevated cardiac enzymes and there was concern for NSTEMI. Patient was also noted to be in SUNIL with elevated creatinine, so catheterization and further ischemic workup was delayed until SUNIL was evaluated and treated since patient was denying any symptoms and not actively having chest pain. Patient was started on a heparin gtt and aspirin/plavix loaded, and had an echocardiogram that showed moderate pleural effusion. On continued monitoring, patient had a 25 beat run of nonsustained ventricular tachycardia and was admitted to the CCU for closer monitoring while the SUNIL was being treated. EP was consulted and given normal LVEF there is no indication for an ICD nor AAD. LE dopplers showed left posterior tibial DVT, so heparing gtt was continued. Given no recurrence of ventricular tachycardia, patient was transferred back to the floors for further treatment of her SUNIL and further ischemic workup. Cardiac cath performed showing severe stenoses in the RCA and LCx - pt. now s/p KIERSTEN to RCA and LCx. Patient noted to have small hematoma on right wrist after cath which was stable on discharge. Good pulses. H&H stable. Continue with DAPT. Patient also transitioned from heparin gtt to Eliquis on discharge for DVT (Heme/Onc recommends Eliquis 5mg BID, no taper). Endocrine also followed patient for diabetes type 2 and hypothyroidism. For DC home, patient not willing to do injections, may continue her oral DM meds at home. Repeat TFTs in 4 to 6 weeks as outpatient. 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 CT Head negative. 75 yo female with PMH of HTN, HLD, DM2, depression, and recent COVID-19 infection in April 2020 who presented with bilateral lower extremity swelling which had worsened over the prior 1-2 weeks. Patient was felt to have an acute heart failure exacerbation and was admitted and treated with lasix for diuresis. On admission labs, it was noted that the patient had elevated cardiac enzymes and there was concern for NSTEMI. Patient was also noted to be in SUNIL with elevated creatinine, so catheterization and further ischemic workup was delayed until SUNIL was evaluated and treated since patient was denying any symptoms and not actively having chest pain. Patient was started on a heparin gtt and aspirin/plavix loaded, and had an echocardiogram that showed moderate pleural effusion. On continued monitoring, patient had a 25 beat run of nonsustained ventricular tachycardia and was admitted to the CCU for closer monitoring while the SUNIL was being treated. EP was consulted and given normal LVEF there is no indication for an ICD nor AAD. LE dopplers showed left posterior tibial DVT, so heparing gtt was continued. Given no recurrence of ventricular tachycardia, patient was transferred back to the floors for further treatment of her SUNIL and further ischemic workup. Cardiac cath performed showing severe stenoses in the RCA and LCx - pt. now s/p KIERSTEN to RCA and LCx. Patient noted to have small hematoma on right wrist after cath which was stable on discharge. Good pulses. H&H stable. Continue with DAPT. Patient also transitioned from heparin gtt to Eliquis on discharge for DVT (Heme/Onc recommends Eliquis 5mg BID, no taper). Endocrine also followed patient for diabetes type 2 and hypothyroidism. For DC home, patient not willing to do injections, may continue her oral DM meds at home. Repeat TFTs in 4 to 6 weeks as outpatient. 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 CT Head negative.        Per Dr. Jaquez patient is medically stable for discharge home. 75 yo female with PMH of HTN, HLD, DM2, depression, and recent COVID-19 infection in April 2020 who presented with bilateral lower extremity swelling which had worsened over the prior 1-2 weeks. Patient was felt to have an acute heart failure exacerbation and was admitted and treated with IV lasix for diuresis which was stopped on discharge. On admission labs, it was noted that the patient had elevated cardiac enzymes and there was concern for NSTEMI. Patient was also noted to be in SUNIL with elevated creatinine, so catheterization and further ischemic workup was delayed until SUNIL was evaluated and treated since patient was denying any symptoms and not actively having chest pain. Patient was started on a heparin gtt and aspirin/plavix loaded, and had an echocardiogram that showed moderate pleural effusion. On continued monitoring, patient had a 25 beat run of nonsustained ventricular tachycardia and was admitted to the CCU for closer monitoring while the SUNIL was being treated. EP was consulted and given normal LVEF there is no indication for an ICD nor AAD. LE dopplers showed left posterior tibial DVT, so heparing gtt was continued. Given no recurrence of ventricular tachycardia, patient was transferred back to the floors for further treatment of her SUNIL and further ischemic workup. Cardiac cath performed showing severe stenoses in the RCA and LCx - pt. now s/p KIERSTEN to RCA and LCx. Patient noted to have small hematoma on right wrist after cath which was stable on discharge. Good pulses. H&H stable. Continue with DAPT. Patient also transitioned from heparin gtt to Eliquis on discharge for DVT (Heme/Onc recommends Eliquis 5mg BID, no taper). Endocrine also followed patient for diabetes type 2 and hypothyroidism. For DC home, patient not willing to do injections, may continue her oral DM meds at home. Repeat TFTs in 4 to 6 weeks as outpatient. 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 CT Head negative.        Per Dr. Jaquez patient is medically stable for discharge home. 73 yo female with PMH of HTN, HLD, DM2, depression, and recent COVID-19 infection in April 2020 who presented with bilateral lower extremity swelling which had worsened over the prior 1-2 weeks. Patient was felt to have an acute heart failure exacerbation and was admitted and treated with IV lasix for diuresis and transitioned to PO on discharge. On admission labs, it was noted that the patient had elevated cardiac enzymes and there was concern for NSTEMI. Patient was also noted to be in SUNIL with elevated creatinine, so catheterization and further ischemic workup was delayed until SUNIL was evaluated and treated since patient was denying any symptoms and not actively having chest pain. Patient was started on a heparin gtt and aspirin/plavix loaded, and had an echocardiogram that showed moderate pleural effusion. On continued monitoring, patient had a 25 beat run of nonsustained ventricular tachycardia and was admitted to the CCU for closer monitoring while the SUNIL was being treated. EP was consulted and given normal LVEF there is no indication for an ICD nor AAD. LE dopplers showed left posterior tibial DVT, so heparing gtt was continued. Given no recurrence of ventricular tachycardia, patient was transferred back to the floors for further treatment of her SUNIL and further ischemic workup. Cardiac cath performed showing severe stenoses in the RCA and LCx - pt. now s/p KIERSTEN to RCA and LCx. Patient noted to have small hematoma on right wrist after cath which was stable on discharge. Good pulses. H&H stable. Continue with DAPT. Patient also transitioned from heparin gtt to Eliquis on discharge for DVT (Heme/Onc recommends Eliquis 5mg BID, no taper). Endocrine also followed patient for diabetes type 2 and hypothyroidism. For DC home, patient not willing to do injections, may continue her oral DM meds at home. Repeat TFTs in 4 to 6 weeks as outpatient. 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 CT Head negative.        Per Dr. Jaquez patient is medically stable for discharge home. 73 yo female with PMH of HTN, HLD, DM2, depression, and recent COVID-19 infection in April 2020 who presented with bilateral lower extremity swelling which had worsened over the prior 1-2 weeks. Patient was felt to have an acute heart failure exacerbation and was admitted and treated with IV lasix for diuresis and transitioned to PO on discharge. On admission labs, it was noted that the patient had elevated cardiac enzymes and there was concern for NSTEMI. Patient was also noted to be in SUNIL with elevated creatinine, so catheterization and further ischemic workup was delayed until SUNIL was evaluated and treated since patient was denying any symptoms and not actively having chest pain. Patient was started on a heparin gtt and aspirin/plavix loaded, and had an echocardiogram that showed moderate pleural effusion. On continued monitoring, patient had a 25 beat run of nonsustained ventricular tachycardia and was admitted to the CCU for closer monitoring while the SUNIL was being treated. EP was consulted and given normal LVEF there is no indication for an ICD nor AAD. LE dopplers showed left posterior tibial DVT, so heparing gtt was continued. Given no recurrence of ventricular tachycardia, patient was transferred back to the floors for further treatment of her SUNIL and further ischemic workup. Cardiac cath performed showing severe stenoses in the RCA and LCx - pt. now s/p KIERSTEN to RCA and LCx. Patient noted to have small hematoma on right wrist after cath which was stable on discharge. Good pulses. H&H stable. Continue with DAPT. Patient also transitioned from heparin gtt to Eliquis on discharge for DVT (Heme/Onc recommends Eliquis 5mg BID, no taper). Endocrine also followed patient for diabetes type 2 and hypothyroidism. For DC home, patient not willing to do injections, may continue her oral DM meds at home. Repeat TFTs in 4 to 6 weeks as outpatient. 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 CT Head negative.        Case discussed with Dr. Jaquez and patient is medically stable for discharge home. 75 yo female with PMH of HTN, HLD, DM2, depression, and recent COVID-19 infection in April 2020 who presented with bilateral lower extremity swelling which had worsened over the prior 1-2 weeks. Patient was felt to have an acute heart failure exacerbation and was admitted and treated with IV lasix for diuresis and transitioned to PO on discharge. On admission labs, it was noted that the patient had elevated cardiac enzymes and there was concern for NSTEMI. Patient was also noted to be in SUNIL with elevated creatinine, so catheterization and further ischemic workup was delayed until SUNIL was evaluated and treated since patient was denying any symptoms and not actively having chest pain. Patient was started on a heparin gtt and aspirin/plavix loaded, and had an echocardiogram that showed moderate pleural effusion. On continued monitoring, patient had a 25 beat run of nonsustained ventricular tachycardia and was admitted to the CCU for closer monitoring while the SUNIL was being treated. EP was consulted and given normal LVEF there is no indication for an ICD nor AAD. LE dopplers showed left posterior tibial DVT, so heparing gtt was continued. Given no recurrence of ventricular tachycardia, patient was transferred back to the floors for further treatment of her SUNIL and further ischemic workup. Cardiac cath performed showing severe stenoses in the RCA and LCx - pt. now s/p KIERSTEN to RCA and LCx. Patient noted to have small hematoma on right wrist after cath which was stable on discharge. Good pulses. H&H stable. Continue with DAPT. Patient also transitioned from heparin gtt to Eliquis on discharge for DVT (Heme/Onc recommends Eliquis 5mg BID, no taper). Endocrine also followed patient for diabetes type 2 and hypothyroidism. For DC home, patient not willing to do injections, may continue her oral DM meds at home. Repeat TFTs in 4 to 6 weeks as outpatient. 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 CT Head negative. Patient with multiple events on telemetry the night before discharge, all blocked APCs per Dr. Patton.        Case discussed with Dr. Jaquez and patient is medically stable for discharge home.

## 2020-06-22 NOTE — PROGRESS NOTE ADULT - ASSESSMENT
73 y/o F PMH HTN, HLD, DM2, Depression, COVID-19 (4/20) p/w B/L LE swelling X1-2 weeks. Reports no other symptoms, no change in functional status, no change in diet, compliant with all her medications, follows up regularly with her PMD. Reports no changes in meds since admission in April 2020 for COVID-19.     Problem/Recommendation - 1:  Problem: NSTEMI (non-ST elevated myocardial infarction). Recommendation: .ASA,Plavix and Heparin. Cardiology following .Ischemic work up and MRI pending.      Problem/Recommendation - 2:  ·  Problem: Ventricular tachycardia, non-sustained.  Recommendation: EP helap appreciated.      Problem/Recommendation - 3:  ·  Problem: R/O Acute on chronic congestive heart failure, unspecified heart failure type.  Recommendation: IV Lasix.      Problem/Recommendation - 4:  ·  Problem: Diabetes mellitus type 2, noninsulin dependent.  Recommendation: Lantus and SSI for now. Endo helping.      Problem/Recommendation - 5:  ·  Problem: Hypertension.  Recommendation: BP meds with hold parameters .      Problem/Recommendation - 6:  Problem: Hypercholesterolemia. Recommendation: Statin.

## 2020-06-22 NOTE — CONSULT NOTE ADULT - ASSESSMENT
74 year old female with PMH of HTN, HLD, DM, recent COVID infection in April 2020 who presented with LE swelling for weeks, found to have HF exacerbation and treated with diuretics; patient was found to have [place] on telemetry and had 23 beats of NSVT, as well as elevated cardiac enzymes, warranting transfer to CCU for continued monitoring and ischemic workup for likely NSTEMI.    SUNIL on CKD III  Baseline Scr 1 with GFR 45 12/2019   scr on admission 1.13, renal function worsening  SUNIL possible sec to JUNIOR (CTA 6/20) and/or ATN   check urine urea, cr, UA  check bladder scan to r/o obstruction  hypervolemic on lasix 40mg iv bid  possible need cath/MRI with linda.   renal function worsening, contrast not recommended given worsening renal function unless benefit is greater than risk.   Spoke with patient's daughter and son about risk and benefits. they expressed understanding.     Anemia  acceptable  Monitor at present     NSTEMI  care per CCU  continue lasix  monitor urine output, weight and bmp

## 2020-06-22 NOTE — PROGRESS NOTE ADULT - ASSESSMENT
74 year old female with PMH of HTN, HLD, DM, recent COVID infection in April 2020 who presented with LE swelling for weeks, found to have HF exacerbation and treated with diuretics; patient was found to have [place]bach on telemetry and had 23 beats of NSVT, as well as elevated cardiac enzymes, warranting transfer to CCU for continued monitoring and ischemic workup for likely NSTEMI.    #NEURO  - alert and oriented, no acute issues  - continue to monitor    #RESPIRATORY  - pulmonary edema noted on chest xray, patient without respiratory complaints  - continue diuresis for HF  - continue to monitor    #CARDIO  NSTEMI:  - elevated cardiac enzymes with rhythm changes and ST inversions on EKG suggestive of NSTEMI. Patient started on heparin gtt yesterday  - PTT elevated this morning, repeat PTT sent; adjust heparin per protocol based on repeat  - Plan for LH cath today per cardio consult  - continue to trend cardiac enzymes  - keep Mg >2, K+ > 4  - pt without complaints of chest pain, continue to monitor    1st Degree AV Block  - possibly in setting of NSTEMI, patient without complaints  - continue to monitor on tele  - f/u EP recs after LH cath for ischemic workup    HTN:  - losartan discontinued overnight  - continue hydralazine 25mg TID    #GI  - no acute issues, continue to monitor  - Diet: DASH, consistent carb    #RENAL  - SUNIL with Cr rising to 1.87  - getting lasix 40 IV BID for HF exacerbation  - continue to monitor BMP on lasix    #ID  - no acute evidence of infection, continue to monitor    #ENDO  Diabetes:  - currently on 40 units lantus, 7 units humalog with meals; continue and monitor FS  - continue corrective scale insulin    #HEME  - PTT elevated, rechecking and adjust heparin based on recheck  - no additional acute issues 74 year old female with PMH of HTN, HLD, DM, recent COVID infection in April 2020 who presented with LE swelling for weeks, found to have HF exacerbation and treated with diuretics; patient was found to have [place]bach on telemetry and had 23 beats of NSVT, as well as elevated cardiac enzymes, warranting transfer to CCU for continued monitoring and ischemic workup for likely NSTEMI.    #NEURO  - alert and oriented, no acute issues  - continue to monitor    #RESPIRATORY  - pulmonary edema noted on chest xray, patient without respiratory complaints  - continue diuresis for HF  - continue to monitor    #CARDIO  Elevated troponins  - elevated cardiac enzymes with rhythm changes and ST inversions on EKG suggestive of possible NSTEMI; however, patient without chest pain or other complaints indicative of ischemia. Given recent COVID infection, possible myopericarditis associated with COVID.  - PTT elevated this morning and on repeat; given lesser concern for NSTEMI and current elevation, heparin gtt now held  - Given current SUNIL, holding on LH cath for now  - continue to trend cardiac enzymes; if remains elevated or increases, will re-consider LH cath  - keep Mg >2, K+ > 4  - pt without complaints of chest pain, continue to monitor    1st Degree AV Block  - possibly in setting of NSTEMI, patient without complaints  - continue to monitor on tele  - f/u EP recs    HTN:  - losartan discontinued overnight, hydralazine 25 TID started  - patient remains hypertensive with SBP in 170's  - continue hydralazine 25mg TID  - will add carvedilol, 3.125 mg BID  - continue to monitor vitals    #GI  - no acute issues, continue to monitor  - Diet: DASH, consistent carb    #RENAL  - SUNIL with Cr rising to 1.87  - getting lasix 40 IV BID for HF exacerbation  - continue to monitor BMP on lasix  - will recheck BMP this afternoon to closely monitor creatinine    #ID  - no acute evidence of infection, continue to monitor    #ENDO  Diabetes:  - currently on 40 units lantus, 7 units humalog with meals; continue and monitor FS  - continue corrective scale insulin    #HEME  - PTT elevated, rechecking and adjust heparin based on recheck  - no additional acute heme issues

## 2020-06-22 NOTE — CHART NOTE - NSCHARTNOTEFT_GEN_A_CORE
CCU transfer note    Transfer from: MICU  Transfer to:  (  ) Medicine    ( x ) Telemetry    (  ) RCU    (  ) Palliative    (  ) Stroke Unit    (  ) _______________  Accepting physican:      HPI:  This is a 74yoF w/ PMHx HTN, HLD, DM, depression, COVID (04/20) initially presented with b/l LE edema x 1-2 weeks admitted for acute on chronic congestive heart failure was being diuresed on a telemetry floor when hospital course was complicated by NSTEMI.  Patient was started on heparin gtt.  TTE showed EF 75%, moderate MR, mod pericardial effusion.  Today, on telemetry, noted to have 25 beats of NSVT.  Interventionalist Deana was notified, patient does not require emergent cardiac catheterization but would admit to CCU for closer monitoring and load with ASA, plavix and start on heparin gtt.  Patient denies any chest pain, palpitations, SOB, lightheadedness, dizziness.     CCU COURSE:  Pt noted to have +DVT (left posterior tibial), continued on hep gtt. Nephrology was consulted; believes SUNIL likely due to JUNIOR and/or ATN. urine studies sent. Pt continued on lasix 20mg IV BID.         ASSESSMENT & PLAN:   74 year old female with PMH of HTN, HLD, DM, recent COVID infection in April 2020 who presented with LE swelling for weeks, found to have HF exacerbation and treated with diuretics; patient was found to have Wenchebach on telemetry and had 23 beats of NSVT, as well as elevated cardiac enzymes, warranting transfer to CCU for continued monitoring and ischemic workup for likely NSTEMI.    #NEURO  - alert and oriented, no acute issues  - continue to monitor    #RESPIRATORY  - pulmonary edema noted on chest xray, patient without respiratory complaints  - continue diuresis for HF  - continue to monitor    #CARDIO  Elevated troponins  - elevated cardiac enzymes with rhythm changes and ST inversions on EKG suggestive of possible NSTEMI; however, patient without chest pain or other complaints indicative of ischemia. Given recent COVID infection, possible myopericarditis associated with COVID.  - PTT elevated this morning and on repeat; given lesser concern for NSTEMI and current elevation, heparin gtt now held  - Given current SUNIL, holding on LH cath for now  - continue to trend cardiac enzymes; if remains elevated or increases, will re-consider LH cath  - keep Mg >2, K+ > 4  - pt without complaints of chest pain, continue to monitor    1st Degree AV Block  - possibly in setting of NSTEMI, patient without complaints  - continue to monitor on tele  - f/u EP recs    HTN:  - losartan discontinued overnight, hydralazine 25 TID started  - patient remains hypertensive with SBP in 170's  - continue hydralazine 25mg TID  - will add carvedilol, 3.125 mg BID  - continue to monitor vitals    #GI  - no acute issues, continue to monitor  - Diet: DASH, consistent carb    #RENAL  - SUNIL with Cr rising to 1.87  - getting lasix 40 IV BID for HF exacerbation  - continue to monitor BMP on lasix  - will recheck BMP this afternoon to closely monitor creatinine    #ID  - no acute evidence of infection, continue to monitor    #ENDO  Diabetes:  - currently on 40 units lantus, 7 units humalog with meals; continue and monitor FS  - continue corrective scale insulin    #HEME  - PTT elevated, rechecking and adjust heparin based on recheck  - no additional acute heme issues      For Follow-Up:  [ ] plan for cardiac cath when SUNIL resolves/improves  [ ] f/u renal recs  [ ] trend cardiac enzyme  [ ] f/u endo recs        Vital Signs Last 24 Hrs  T(C): 37.2 (22 Jun 2020 17:00), Max: 37.3 (21 Jun 2020 18:42)  T(F): 98.9 (22 Jun 2020 17:00), Max: 99.2 (21 Jun 2020 18:42)  HR: 93 (22 Jun 2020 16:00) (75 - 114)  BP: 156/55 (22 Jun 2020 16:00) (137/66 - 194/71)  BP(mean): 84 (22 Jun 2020 16:00) (84 - 122)  RR: 19 (22 Jun 2020 16:00) (13 - 26)  SpO2: 100% (22 Jun 2020 16:00) (97% - 100%)  I&O's Summary    21 Jun 2020 07:01  -  22 Jun 2020 07:00  --------------------------------------------------------  IN: 1155 mL / OUT: 2350 mL / NET: -1195 mL    22 Jun 2020 07:01  -  22 Jun 2020 17:09  --------------------------------------------------------  IN: 636 mL / OUT: 1700 mL / NET: -1064 mL          MEDICATIONS  (STANDING):  aspirin enteric coated 81 milliGRAM(s) Oral daily  atorvastatin 40 milliGRAM(s) Oral at bedtime  carvedilol 3.125 milliGRAM(s) Oral every 12 hours  chlorhexidine 4% Liquid 1 Application(s) Topical daily  clopidogrel Tablet 75 milliGRAM(s) Oral daily  dextrose 5%. 1000 milliLiter(s) (50 mL/Hr) IV Continuous <Continuous>  dextrose 50% Injectable 12.5 Gram(s) IV Push once  dextrose 50% Injectable 25 Gram(s) IV Push once  dextrose 50% Injectable 25 Gram(s) IV Push once  furosemide   Injectable 40 milliGRAM(s) IV Push two times a day  heparin  Infusion 800 Unit(s)/Hr (8 mL/Hr) IV Continuous <Continuous>  hydrALAZINE 25 milliGRAM(s) Oral three times a day  insulin glargine Injectable (LANTUS) 40 Unit(s) SubCutaneous at bedtime  insulin lispro (HumaLOG) corrective regimen sliding scale   SubCutaneous three times a day before meals  insulin lispro (HumaLOG) corrective regimen sliding scale   SubCutaneous at bedtime  insulin lispro Injectable (HumaLOG) 7 Unit(s) SubCutaneous three times a day before meals  risperiDONE   Tablet 1 milliGRAM(s) Oral daily    MEDICATIONS  (PRN):  dextrose 40% Gel 15 Gram(s) Oral once PRN Blood Glucose LESS THAN 70 milliGRAM(s)/deciLiter  glucagon  Injectable 1 milliGRAM(s) IntraMuscular once PRN Glucose <70 milliGRAM(s)/deciLiter        LABS                                            9.7                   Neurophils% (auto):   x      (06-22 @ 05:00):    6.28 )-----------(272          Lymphocytes% (auto):  x                                             31.1                   Eosinphils% (auto):   x        Manual%: Neutrophils x    ; Lymphocytes x    ; Eosinophils x    ; Bands%: x    ; Blasts x                                    138    |  100    |  17                  Calcium: 9.2   / iCa: x      (06-22 @ 05:00)    ----------------------------<  152       Magnesium: 2.0                              3.8     |  24     |  1.87             Phosphorous: 4.3      TPro  6.1    /  Alb  3.3    /  TBili  0.3    /  DBili  x      /  AST  20     /  ALT  14     /  AlkPhos  60     22 Jun 2020 05:00    ( 06-22 @ 12:10 )   PT: x    ;   INR: x      aPTT: 116.1 SEC CCU transfer note    Transfer from: MICU  Transfer to:  (  ) Medicine    ( x ) Telemetry    (  ) RCU    (  ) Palliative    (  ) Stroke Unit    (  ) _______________  Accepting physican:      HPI:  This is a 74yoF w/ PMHx HTN, HLD, DM, depression, COVID (04/20) initially presented with b/l LE edema x 1-2 weeks admitted for acute on chronic congestive heart failure was being diuresed on a telemetry floor when hospital course was complicated by elevated cardiac enzymes, concerning for NSTEMI vs myocarditis. Patient was started on heparin gtt.  TTE showed EF 75%, moderate MR, mod pericardial effusion.  Today, on telemetry, noted to have 25 beats of NSVT.  Interventionalist Deana was notified, patient does not require emergent cardiac catheterization but would admit to CCU for closer monitoring and load with ASA, plavix and start on heparin gtt.  Patient denies any chest pain, palpitations, SOB, lightheadedness, dizziness.     CCU COURSE:  Pt noted to have +DVT (left posterior tibial), continued on hep gtt. Nephrology was consulted; believes SUNIL likely due to JUNIOR and/or ATN. urine studies sent. Pt continued on lasix 20mg IV BID. Given no recurrence of VT, pt was transferred back to floors for further management.         ASSESSMENT & PLAN:   74 year old female with PMH of HTN, HLD, DM, recent COVID infection in April 2020 who presented with LE swelling for weeks, found to have HF exacerbation and treated with diuretics; patient was found to have Wenchebach on telemetry and had 23 beats of NSVT, as well as elevated cardiac enzymes, warranting transfer to CCU for continued monitoring and ischemic workup for likely NSTEMI.    #NEURO  - alert and oriented, no acute issues  - continue to monitor    #RESPIRATORY  - pulmonary edema noted on chest xray, patient without respiratory complaints  - continue diuresis for HF  - continue to monitor    #CARDIO  Elevated troponins  - elevated cardiac enzymes with rhythm changes and ST inversions on EKG suggestive of possible NSTEMI; however, patient without chest pain or other complaints indicative of ischemia. Given recent COVID infection, possible myopericarditis associated with COVID.  - PTT elevated this morning and on repeat; given lesser concern for NSTEMI and current elevation, heparin gtt now held  - Given current SUNIL, holding on LH cath for now  - continue to trend cardiac enzymes; if remains elevated or increases, will re-consider LH cath  - keep Mg >2, K+ > 4  - pt without complaints of chest pain, continue to monitor    1st Degree AV Block  - possibly in setting of NSTEMI, patient without complaints  - continue to monitor on tele  - f/u EP recs    HTN:  - losartan discontinued overnight, hydralazine 25 TID started  - patient remains hypertensive with SBP in 170's  - continue hydralazine 25mg TID  - will add carvedilol, 3.125 mg BID  - continue to monitor vitals    #GI  - no acute issues, continue to monitor  - Diet: DASH, consistent carb    #RENAL  - SUNIL with Cr rising to 1.87  - getting lasix 40 IV BID for HF exacerbation  - continue to monitor BMP on lasix  - will recheck BMP this afternoon to closely monitor creatinine    #ID  - no acute evidence of infection, continue to monitor    #ENDO  Diabetes:  - currently on 40 units lantus, 7 units humalog with meals; continue and monitor FS  - continue corrective scale insulin    #HEME  - PTT elevated, rechecking and adjust heparin based on recheck  - no additional acute heme issues      For Follow-Up:  [ ] plan for cardiac cath when SUNIL resolves/improves  [ ] f/u renal recs  [ ] trend cardiac enzyme  [ ] f/u endo recs        Vital Signs Last 24 Hrs  T(C): 37.2 (22 Jun 2020 17:00), Max: 37.3 (21 Jun 2020 18:42)  T(F): 98.9 (22 Jun 2020 17:00), Max: 99.2 (21 Jun 2020 18:42)  HR: 93 (22 Jun 2020 16:00) (75 - 114)  BP: 156/55 (22 Jun 2020 16:00) (137/66 - 194/71)  BP(mean): 84 (22 Jun 2020 16:00) (84 - 122)  RR: 19 (22 Jun 2020 16:00) (13 - 26)  SpO2: 100% (22 Jun 2020 16:00) (97% - 100%)  I&O's Summary    21 Jun 2020 07:01  -  22 Jun 2020 07:00  --------------------------------------------------------  IN: 1155 mL / OUT: 2350 mL / NET: -1195 mL    22 Jun 2020 07:01  -  22 Jun 2020 17:09  --------------------------------------------------------  IN: 636 mL / OUT: 1700 mL / NET: -1064 mL          MEDICATIONS  (STANDING):  aspirin enteric coated 81 milliGRAM(s) Oral daily  atorvastatin 40 milliGRAM(s) Oral at bedtime  carvedilol 3.125 milliGRAM(s) Oral every 12 hours  chlorhexidine 4% Liquid 1 Application(s) Topical daily  clopidogrel Tablet 75 milliGRAM(s) Oral daily  dextrose 5%. 1000 milliLiter(s) (50 mL/Hr) IV Continuous <Continuous>  dextrose 50% Injectable 12.5 Gram(s) IV Push once  dextrose 50% Injectable 25 Gram(s) IV Push once  dextrose 50% Injectable 25 Gram(s) IV Push once  furosemide   Injectable 40 milliGRAM(s) IV Push two times a day  heparin  Infusion 800 Unit(s)/Hr (8 mL/Hr) IV Continuous <Continuous>  hydrALAZINE 25 milliGRAM(s) Oral three times a day  insulin glargine Injectable (LANTUS) 40 Unit(s) SubCutaneous at bedtime  insulin lispro (HumaLOG) corrective regimen sliding scale   SubCutaneous three times a day before meals  insulin lispro (HumaLOG) corrective regimen sliding scale   SubCutaneous at bedtime  insulin lispro Injectable (HumaLOG) 7 Unit(s) SubCutaneous three times a day before meals  risperiDONE   Tablet 1 milliGRAM(s) Oral daily    MEDICATIONS  (PRN):  dextrose 40% Gel 15 Gram(s) Oral once PRN Blood Glucose LESS THAN 70 milliGRAM(s)/deciLiter  glucagon  Injectable 1 milliGRAM(s) IntraMuscular once PRN Glucose <70 milliGRAM(s)/deciLiter        LABS                                            9.7                   Neurophils% (auto):   x      (06-22 @ 05:00):    6.28 )-----------(272          Lymphocytes% (auto):  x                                             31.1                   Eosinphils% (auto):   x        Manual%: Neutrophils x    ; Lymphocytes x    ; Eosinophils x    ; Bands%: x    ; Blasts x                                    138    |  100    |  17                  Calcium: 9.2   / iCa: x      (06-22 @ 05:00)    ----------------------------<  152       Magnesium: 2.0                              3.8     |  24     |  1.87             Phosphorous: 4.3      TPro  6.1    /  Alb  3.3    /  TBili  0.3    /  DBili  x      /  AST  20     /  ALT  14     /  AlkPhos  60     22 Jun 2020 05:00    ( 06-22 @ 12:10 )   PT: x    ;   INR: x      aPTT: 116.1 SEC

## 2020-06-22 NOTE — PROGRESS NOTE ADULT - SUBJECTIVE AND OBJECTIVE BOX
Chief complaint  Patient is a 74y old  Female who presents with a chief complaint of LE swelling (22 Jun 2020 07:25)   Review of systems  Patient in bed, looks comfortable, no fever, no hypoglycemia.    Labs and Fingersticks  CAPILLARY BLOOD GLUCOSE      POCT Blood Glucose.: 147 mg/dL (22 Jun 2020 11:52)  POCT Blood Glucose.: 158 mg/dL (22 Jun 2020 07:56)  POCT Blood Glucose.: 162 mg/dL (21 Jun 2020 21:49)  POCT Blood Glucose.: 304 mg/dL (21 Jun 2020 16:58)  POCT Blood Glucose.: 265 mg/dL (21 Jun 2020 12:43)      Anion Gap, Serum: 14 (06-22 @ 05:00)  Anion Gap, Serum: 16 <H> (06-21 @ 16:04)  Anion Gap, Serum: 15 <H> (06-21 @ 07:11)  Anion Gap, Serum: 18 <H> (06-20 @ 20:50)      Calcium, Total Serum: 9.2 (06-22 @ 05:00)  Calcium, Total Serum: 9.3 (06-21 @ 16:04)  Calcium, Total Serum: 9.0 (06-21 @ 07:11)  Calcium, Total Serum: 9.4 (06-20 @ 20:50)  Albumin, Serum: 3.3 (06-22 @ 05:00)  Albumin, Serum: 3.4 (06-21 @ 16:04)  Albumin, Serum: 3.4 (06-21 @ 07:11)  Albumin, Serum: 3.7 (06-20 @ 20:50)    Alanine Aminotransferase (ALT/SGPT): 14 (06-22 @ 05:00)  Alanine Aminotransferase (ALT/SGPT): 13 (06-21 @ 16:04)  Alanine Aminotransferase (ALT/SGPT): 14 (06-21 @ 07:11)  Alanine Aminotransferase (ALT/SGPT): 12 (06-20 @ 20:50)  Alkaline Phosphatase, Serum: 60 (06-22 @ 05:00)  Alkaline Phosphatase, Serum: 62 (06-21 @ 16:04)  Alkaline Phosphatase, Serum: 64 (06-21 @ 07:11)  Alkaline Phosphatase, Serum: 66 (06-20 @ 20:50)  Aspartate Aminotransferase (AST/SGOT): 20 (06-22 @ 05:00)  Aspartate Aminotransferase (AST/SGOT): 24 (06-21 @ 16:04)  Aspartate Aminotransferase (AST/SGOT): 28 (06-21 @ 07:11)  Aspartate Aminotransferase (AST/SGOT): 17 (06-20 @ 20:50)        06-22    138  |  100  |  17  ----------------------------<  152<H>  3.8   |  24  |  1.87<H>    Ca    9.2      22 Jun 2020 05:00  Phos  4.3     06-22  Mg     2.0     06-22    TPro  6.1  /  Alb  3.3  /  TBili  0.3  /  DBili  x   /  AST  20  /  ALT  14  /  AlkPhos  60  06-22                        9.7    6.28  )-----------( 272      ( 22 Jun 2020 05:00 )             31.1     Medications  MEDICATIONS  (STANDING):  aspirin enteric coated 81 milliGRAM(s) Oral daily  atorvastatin 40 milliGRAM(s) Oral at bedtime  clopidogrel Tablet 75 milliGRAM(s) Oral daily  dextrose 5%. 1000 milliLiter(s) (50 mL/Hr) IV Continuous <Continuous>  dextrose 50% Injectable 12.5 Gram(s) IV Push once  dextrose 50% Injectable 25 Gram(s) IV Push once  dextrose 50% Injectable 25 Gram(s) IV Push once  furosemide   Injectable 40 milliGRAM(s) IV Push two times a day  heparin  Infusion 1200 Unit(s)/Hr (9 mL/Hr) IV Continuous <Continuous>  hydrALAZINE 25 milliGRAM(s) Oral three times a day  insulin glargine Injectable (LANTUS) 40 Unit(s) SubCutaneous at bedtime  insulin lispro (HumaLOG) corrective regimen sliding scale   SubCutaneous three times a day before meals  insulin lispro (HumaLOG) corrective regimen sliding scale   SubCutaneous at bedtime  insulin lispro Injectable (HumaLOG) 7 Unit(s) SubCutaneous three times a day before meals  risperiDONE   Tablet 1 milliGRAM(s) Oral daily      Physical Exam  General: Patient comfortable in bed  Vital Signs Last 12 Hrs  T(F): 98.3 (06-22-20 @ 08:00), Max: 98.4 (06-22-20 @ 04:00)  HR: 112 (06-22-20 @ 11:00) (82 - 112)  BP: 194/71 (06-22-20 @ 11:00) (138/111 - 194/71)  BP(mean): 105 (06-22-20 @ 11:00) (88 - 122)  RR: 24 (06-22-20 @ 11:00) (13 - 25)  SpO2: 100% (06-22-20 @ 11:00) (98% - 100%)  Neck: No palpable thyroid nodules.  CVS: S1S2, No murmurs  Respiratory: No wheezing, no crepitations  GI: Abdomen soft, bowel sounds positive  Musculoskeletal:  edema lower extremities.   Skin: No skin rashes, no ecchymosis    Diagnostics

## 2020-06-22 NOTE — PROGRESS NOTE ADULT - ASSESSMENT
Assessment  DMT2: 74y Female with DM T2 with hyperglycemia, was on oral meds and insulin at home, on low dose insulin, blood sugars improving, no hypoglycemic episode,  eating meals, compliant with low carb diet.  CHF: on medications, no chest pain, stable, monitored.  Hypothyroidism/subclinical: Apparently no history of thyroid disease, asymptomatic, repeat TFTs euthyroid..  HTN:  Controlled,  on antihypertensive medications.    Eddie Smith MD  Cell: [phone]  Office: [phone]

## 2020-06-22 NOTE — DISCHARGE NOTE PROVIDER - PROVIDER TOKENS
PROVIDER:[TOKEN:[[id number]]] PROVIDER:[TOKEN:[[id number]]],PROVIDER:[TOKEN:[[id number]]],PROVIDER:[TOKEN:[[id number]]],PROVIDER:[TOKEN:[[id number]]] PROVIDER:[TOKEN:[[id number]]],PROVIDER:[TOKEN:[[id number]]],PROVIDER:[TOKEN:[[id number]]],PROVIDER:[[id number]],SCHEDULEDAPPT:[07/06/2020],SCHEDULEDAPPTTIME:[10:15 AM]]

## 2020-06-23 LAB
ALBUMIN SERPL ELPH-MCNC: 3.1 G/DL — LOW (ref 3.3–5)
ALP SERPL-CCNC: 62 U/L — SIGNIFICANT CHANGE UP (ref 40–120)
ALT FLD-CCNC: 11 U/L — SIGNIFICANT CHANGE UP (ref 4–33)
ANION GAP SERPL CALC-SCNC: 14 MMO/L — SIGNIFICANT CHANGE UP (ref 7–14)
APTT BLD: 63.1 SEC — HIGH (ref 27.5–36.3)
APTT BLD: 69.1 SEC — HIGH (ref 27.5–36.3)
AST SERPL-CCNC: 18 U/L — SIGNIFICANT CHANGE UP (ref 4–32)
BILIRUB SERPL-MCNC: 0.3 MG/DL — SIGNIFICANT CHANGE UP (ref 0.2–1.2)
BUN SERPL-MCNC: 28 MG/DL — HIGH (ref 7–23)
CALCIUM SERPL-MCNC: 9.3 MG/DL — SIGNIFICANT CHANGE UP (ref 8.4–10.5)
CHLORIDE SERPL-SCNC: 95 MMOL/L — LOW (ref 98–107)
CO2 SERPL-SCNC: 25 MMOL/L — SIGNIFICANT CHANGE UP (ref 22–31)
CREAT ?TM UR-MCNC: 44.7 MG/DL — SIGNIFICANT CHANGE UP
CREAT SERPL-MCNC: 2.42 MG/DL — HIGH (ref 0.5–1.3)
GLUCOSE BLDC GLUCOMTR-MCNC: 103 MG/DL — HIGH (ref 70–99)
GLUCOSE BLDC GLUCOMTR-MCNC: 134 MG/DL — HIGH (ref 70–99)
GLUCOSE BLDC GLUCOMTR-MCNC: 139 MG/DL — HIGH (ref 70–99)
GLUCOSE BLDC GLUCOMTR-MCNC: 207 MG/DL — HIGH (ref 70–99)
GLUCOSE SERPL-MCNC: 93 MG/DL — SIGNIFICANT CHANGE UP (ref 70–99)
HCT VFR BLD CALC: 32.4 % — LOW (ref 34.5–45)
HGB BLD-MCNC: 10.2 G/DL — LOW (ref 11.5–15.5)
MAGNESIUM SERPL-MCNC: 2 MG/DL — SIGNIFICANT CHANGE UP (ref 1.6–2.6)
MCHC RBC-ENTMCNC: 24.8 PG — LOW (ref 27–34)
MCHC RBC-ENTMCNC: 31.5 % — LOW (ref 32–36)
MCV RBC AUTO: 78.8 FL — LOW (ref 80–100)
NRBC # FLD: 0 K/UL — SIGNIFICANT CHANGE UP (ref 0–0)
PHOSPHATE SERPL-MCNC: 5.5 MG/DL — HIGH (ref 2.5–4.5)
PLATELET # BLD AUTO: 304 K/UL — SIGNIFICANT CHANGE UP (ref 150–400)
PMV BLD: 10.9 FL — SIGNIFICANT CHANGE UP (ref 7–13)
POTASSIUM SERPL-MCNC: 3.5 MMOL/L — SIGNIFICANT CHANGE UP (ref 3.5–5.3)
POTASSIUM SERPL-SCNC: 3.5 MMOL/L — SIGNIFICANT CHANGE UP (ref 3.5–5.3)
PROT SERPL-MCNC: 6.7 G/DL — SIGNIFICANT CHANGE UP (ref 6–8.3)
PROT UR-MCNC: 111.4 MG/DL — SIGNIFICANT CHANGE UP
RBC # BLD: 4.11 M/UL — SIGNIFICANT CHANGE UP (ref 3.8–5.2)
RBC # FLD: 15.7 % — HIGH (ref 10.3–14.5)
SODIUM SERPL-SCNC: 134 MMOL/L — LOW (ref 135–145)
WBC # BLD: 7.39 K/UL — SIGNIFICANT CHANGE UP (ref 3.8–10.5)
WBC # FLD AUTO: 7.39 K/UL — SIGNIFICANT CHANGE UP (ref 3.8–10.5)

## 2020-06-23 RX ADMIN — Medication 7 UNIT(S): at 08:12

## 2020-06-23 RX ADMIN — Medication 40 MILLIGRAM(S): at 05:34

## 2020-06-23 RX ADMIN — Medication 25 MILLIGRAM(S): at 22:30

## 2020-06-23 RX ADMIN — HEPARIN SODIUM 8 UNIT(S)/HR: 5000 INJECTION INTRAVENOUS; SUBCUTANEOUS at 12:32

## 2020-06-23 RX ADMIN — CARVEDILOL PHOSPHATE 3.12 MILLIGRAM(S): 80 CAPSULE, EXTENDED RELEASE ORAL at 17:52

## 2020-06-23 RX ADMIN — CARVEDILOL PHOSPHATE 3.12 MILLIGRAM(S): 80 CAPSULE, EXTENDED RELEASE ORAL at 05:37

## 2020-06-23 RX ADMIN — Medication 25 MILLIGRAM(S): at 12:33

## 2020-06-23 RX ADMIN — Medication 25 MILLIGRAM(S): at 05:37

## 2020-06-23 RX ADMIN — RISPERIDONE 1 MILLIGRAM(S): 4 TABLET ORAL at 12:33

## 2020-06-23 RX ADMIN — CLOPIDOGREL BISULFATE 75 MILLIGRAM(S): 75 TABLET, FILM COATED ORAL at 12:32

## 2020-06-23 RX ADMIN — INSULIN GLARGINE 40 UNIT(S): 100 INJECTION, SOLUTION SUBCUTANEOUS at 22:30

## 2020-06-23 RX ADMIN — Medication 7 UNIT(S): at 17:52

## 2020-06-23 RX ADMIN — Medication 40 MILLIGRAM(S): at 17:52

## 2020-06-23 RX ADMIN — Medication 7 UNIT(S): at 12:33

## 2020-06-23 RX ADMIN — ATORVASTATIN CALCIUM 40 MILLIGRAM(S): 80 TABLET, FILM COATED ORAL at 22:30

## 2020-06-23 RX ADMIN — Medication 81 MILLIGRAM(S): at 12:32

## 2020-06-23 RX ADMIN — HEPARIN SODIUM 8 UNIT(S)/HR: 5000 INJECTION INTRAVENOUS; SUBCUTANEOUS at 05:39

## 2020-06-23 NOTE — PROGRESS NOTE ADULT - ASSESSMENT
1. Acute Left Post Tibial DVT    -- agree w Unfractionated Heparin gtt in light of SUNIL  -- would AC for 3 mos or longer if bed bound  -- Options include coumadin and Apixaban if SUNIL improves and Neg LAC  -- check LAC, D Dimer, Prot C+S    2. Microcytic Anemia    -- check full iron profile  -- check Hgb Electrophoresis  -- SPEP/LOI, hemolysis labs  -- trend counts  -- contribution from SUNIL    Salty Huerta MD  [phone]

## 2020-06-23 NOTE — PROGRESS NOTE ADULT - ASSESSMENT
74 year old female with PMH of HTN, HLD, DM, recent COVID infection in April 2020 who presented with LE swelling for weeks, found to have HF exacerbation and treated with diuretics; patient was found to have Wenchebach on telemetry and had 23 beats of NSVT, as well as elevated cardiac enzymes, warranting transfer to CCU for continued monitoring and ischemic workup for likely NSTEMI.    SUNIL on CKD III  Baseline Scr 1 with GFR 45 12/2019   scr on admission 1.13, renal function worsening  SUNIL possible sec to JUNIOR (CTA 6/20) and/or ATN   FEurea>50%  non oliguric   hypervolemic on lasix 40mg iv bid  possible need cath/MRI with linda.   renal function worsening, contrast not recommended given worsening renal function unless benefit is greater than risk.   Spoke with patient's daughter and son about risk and benefits. they expressed understanding.     Anemia  acceptable  Monitor at present     NSTEMI  continue lasix  monitor urine output, weight and bmp  f/u cardio 74 year old female with PMH of HTN, HLD, DM, recent COVID infection in April 2020 who presented with LE swelling for weeks, found to have HF exacerbation and treated with diuretics; patient was found to have Wenchebach on telemetry and had 23 beats of NSVT, as well as elevated cardiac enzymes, warranting transfer to CCU for continued monitoring and ischemic workup for likely NSTEMI.    SUNIL on CKD III  Baseline Scr 1 with GFR 45 12/2019   scr on admission 1.13, renal function worsening  SUNIL possible sec to JUNIOR (CTA 6/20) and/or ATN   FEurea>50%  non oliguric   hypervolemic on lasix 40mg iv bid  possible need cath/MRI with linda.   renal function worsening, contrast not recommended given worsening renal function unless benefit is greater than risk.   Spoke with patient's daughter and son about risk and benefits. they expressed understanding.     Anemia  acceptable  Monitor at present     NSTEMI  continue lasix  monitor urine output, weight and bmp  f/u cardio    Proteinuria  UA with 100 protein  check urine p/c ratio  likely sec to DM/HTN 74 year old female with PMH of HTN, HLD, DM, recent COVID infection in April 2020 who presented with LE swelling for weeks, found to have HF exacerbation and treated with diuretics; patient was found to have Wenchebach on telemetry and had 23 beats of NSVT, as well as elevated cardiac enzymes, warranting transfer to CCU for continued monitoring and ischemic workup for likely NSTEMI.    SUNIL on CKD III  Baseline Scr 1 with GFR 45 12/2019   scr on admission 1.13, renal function worsening  SUNIL possible sec to JUNIOR (CTA 6/20) and/or ATN   FEurea>50%  Pt non oliguric   Pt hypervolemic on lasix 40mg iv bid  possible need cath/MRI with linda.   renal function worsening, contrast not recommended given worsening renal function unless benefit is greater than risk.   Spoke with patient's daughter and son about risk and benefits. they expressed understanding.     Anemia  acceptable  Monitor at present     NSTEMI  continue lasix  monitor urine output, weight and bmp  f/u cardio    Proteinuria  UA with 100 protein  check urine p/c ratio  likely sec to DM/HTN

## 2020-06-23 NOTE — PROGRESS NOTE ADULT - ASSESSMENT
75 y/o F PMH HTN, HLD, DM2, Depression, COVID-19 (4/20) p/w B/L LE swelling X1-2 weeks. Reports no other symptoms, no change in functional status, no change in diet, compliant with all her medications, follows up regularly with her PMD. Reports no changes in meds since admission in April 2020 for COVID-19.     Problem/Recommendation - 1:  Problem: NSTEMI (non-ST elevated myocardial infarction). Recommendation: .ASA,Plavix and Heparin. Cardiology following .Ischemic work up and MRI pending.      Problem/Recommendation - 2:  ·  Problem: Ventricular tachycardia, non-sustained.  Recommendation: EP helap appreciated.      Problem/Recommendation - 3:  ·  Problem: R Acute on chronic congestive heart failure, unspecified heart failure type.  Recommendation: IV Lasix.      Problem/Recommendation - 4:  ·  Problem: Diabetes mellitus type 2, noninsulin dependent.  Recommendation: Lantus and SSI for now. Endo helping.      Problem/Recommendation - 5:  ·  Problem: Hypertension.  Recommendation: BP meds with hold parameters .      Problem/Recommendation - 6:  Problem: Hypercholesterolemia. Recommendation: Statin.     Problem/Recommendation - 7:  Problem: SUNIL . Recommendation: Creatinine going up.      Problem/Recommendation - 8:  Problem: Acute DVT . Recommendation: AC . Hematology helping.

## 2020-06-23 NOTE — PROGRESS NOTE ADULT - SUBJECTIVE AND OBJECTIVE BOX
Chief complaint  Patient is a 74y old  Female who presents with a chief complaint of LE swelling (23 Jun 2020 10:38)   Review of systems  Patient in bed, looks comfortable, no fever, no hypoglycemia.    Labs and Fingersticks  CAPILLARY BLOOD GLUCOSE      POCT Blood Glucose.: 103 mg/dL (23 Jun 2020 07:55)  POCT Blood Glucose.: 160 mg/dL (22 Jun 2020 22:02)  POCT Blood Glucose.: 138 mg/dL (22 Jun 2020 16:46)  POCT Blood Glucose.: 147 mg/dL (22 Jun 2020 11:52)      Anion Gap, Serum: 14 (06-23 @ 05:59)  Anion Gap, Serum: 12 (06-22 @ 16:55)  Anion Gap, Serum: 14 (06-22 @ 05:00)  Anion Gap, Serum: 16 <H> (06-21 @ 16:04)      Calcium, Total Serum: 9.3 (06-23 @ 05:59)  Calcium, Total Serum: 9.4 (06-22 @ 16:55)  Calcium, Total Serum: 9.2 (06-22 @ 05:00)  Calcium, Total Serum: 9.3 (06-21 @ 16:04)  Albumin, Serum: 3.1 <L> (06-23 @ 05:59)  Albumin, Serum: 3.3 (06-22 @ 05:00)  Albumin, Serum: 3.4 (06-21 @ 16:04)    Alanine Aminotransferase (ALT/SGPT): 11 (06-23 @ 05:59)  Alanine Aminotransferase (ALT/SGPT): 14 (06-22 @ 05:00)  Alanine Aminotransferase (ALT/SGPT): 13 (06-21 @ 16:04)  Alkaline Phosphatase, Serum: 62 (06-23 @ 05:59)  Alkaline Phosphatase, Serum: 60 (06-22 @ 05:00)  Alkaline Phosphatase, Serum: 62 (06-21 @ 16:04)  Aspartate Aminotransferase (AST/SGOT): 18 (06-23 @ 05:59)  Aspartate Aminotransferase (AST/SGOT): 20 (06-22 @ 05:00)  Aspartate Aminotransferase (AST/SGOT): 24 (06-21 @ 16:04)        06-23    134<L>  |  95<L>  |  28<H>  ----------------------------<  93  3.5   |  25  |  2.42<H>    Ca    9.3      23 Jun 2020 05:59  Phos  5.5     06-23  Mg     2.0     06-23    TPro  6.7  /  Alb  3.1<L>  /  TBili  0.3  /  DBili  x   /  AST  18  /  ALT  11  /  AlkPhos  62  06-23                        10.2   7.39  )-----------( 304      ( 23 Jun 2020 05:59 )             32.4     Medications  MEDICATIONS  (STANDING):  aspirin enteric coated 81 milliGRAM(s) Oral daily  atorvastatin 40 milliGRAM(s) Oral at bedtime  carvedilol 3.125 milliGRAM(s) Oral every 12 hours  chlorhexidine 4% Liquid 1 Application(s) Topical daily  clopidogrel Tablet 75 milliGRAM(s) Oral daily  dextrose 5%. 1000 milliLiter(s) (50 mL/Hr) IV Continuous <Continuous>  dextrose 50% Injectable 12.5 Gram(s) IV Push once  dextrose 50% Injectable 25 Gram(s) IV Push once  dextrose 50% Injectable 25 Gram(s) IV Push once  furosemide   Injectable 40 milliGRAM(s) IV Push two times a day  heparin  Infusion 800 Unit(s)/Hr (8 mL/Hr) IV Continuous <Continuous>  hydrALAZINE 25 milliGRAM(s) Oral three times a day  insulin glargine Injectable (LANTUS) 40 Unit(s) SubCutaneous at bedtime  insulin lispro (HumaLOG) corrective regimen sliding scale   SubCutaneous three times a day before meals  insulin lispro (HumaLOG) corrective regimen sliding scale   SubCutaneous at bedtime  insulin lispro Injectable (HumaLOG) 7 Unit(s) SubCutaneous three times a day before meals  risperiDONE   Tablet 1 milliGRAM(s) Oral daily      Physical Exam  General: Patient comfortable in bed  Vital Signs Last 12 Hrs  T(F): 98.4 (06-23-20 @ 08:09), Max: 98.6 (06-23-20 @ 05:31)  HR: 83 (06-23-20 @ 08:09) (83 - 98)  BP: 150/81 (06-23-20 @ 08:09) (148/77 - 160/80)  BP(mean): --  RR: 18 (06-23-20 @ 08:09) (18 - 18)  SpO2: 99% (06-23-20 @ 08:09) (99% - 100%)  Neck: No palpable thyroid nodules.  CVS: S1S2, No murmurs  Respiratory: No wheezing, no crepitations  GI: Abdomen soft, bowel sounds positive  Musculoskeletal:  edema lower extremities.   Skin: No skin rashes, no ecchymosis    Diagnostics

## 2020-06-23 NOTE — PROGRESS NOTE ADULT - SUBJECTIVE AND OBJECTIVE BOX
[facility]   MD MICHAEL STOUT DO ANAM SIDDIQUI ANGELA WONG, PA    TEL:  OFFICE: [phone]  DR FLORES CELL: [phone]  DR. HUSSEIN CELL: [phone]  DR. HODGES CELL: [phone]  WESTLEY DAVE CELL: [phone]    From 5pm-7am Answering Service [phone]    Patient is a 74y old  Female who presents with a chief complaint of LE swelling (23 Jun 2020 10:11)      Patient seen and examined at bedside. No chest pain/sob    VITALS:  T(F): 98.4 (06-23-20 @ 08:09), Max: 99.2 (06-22-20 @ 21:35)  HR: 83 (06-23-20 @ 08:09)  BP: 150/81 (06-23-20 @ 08:09)  RR: 18 (06-23-20 @ 08:09)  SpO2: 99% (06-23-20 @ 08:09)  Wt(kg): --    06-22 @ 07:01  -  06-23 @ 07:00  --------------------------------------------------------  IN: 732 mL / OUT: 2450 mL / NET: -1718 mL          PHYSICAL EXAM:  Constitutional: NAD  Neck: No JVD  Respiratory: CTAB, no wheezes, rales or rhonchi  Cardiovascular: S1, S2, RRR  Gastrointestinal: BS+, soft, NT/ND  Extremities: 1+ peripheral edema    Hospital Medications:   MEDICATIONS  (STANDING):  aspirin enteric coated 81 milliGRAM(s) Oral daily  atorvastatin 40 milliGRAM(s) Oral at bedtime  carvedilol 3.125 milliGRAM(s) Oral every 12 hours  chlorhexidine 4% Liquid 1 Application(s) Topical daily  clopidogrel Tablet 75 milliGRAM(s) Oral daily  dextrose 5%. 1000 milliLiter(s) (50 mL/Hr) IV Continuous <Continuous>  dextrose 50% Injectable 12.5 Gram(s) IV Push once  dextrose 50% Injectable 25 Gram(s) IV Push once  dextrose 50% Injectable 25 Gram(s) IV Push once  furosemide   Injectable 40 milliGRAM(s) IV Push two times a day  heparin  Infusion 800 Unit(s)/Hr (8 mL/Hr) IV Continuous <Continuous>  hydrALAZINE 25 milliGRAM(s) Oral three times a day  insulin glargine Injectable (LANTUS) 40 Unit(s) SubCutaneous at bedtime  insulin lispro (HumaLOG) corrective regimen sliding scale   SubCutaneous three times a day before meals  insulin lispro (HumaLOG) corrective regimen sliding scale   SubCutaneous at bedtime  insulin lispro Injectable (HumaLOG) 7 Unit(s) SubCutaneous three times a day before meals  risperiDONE   Tablet 1 milliGRAM(s) Oral daily      LABS:  06-23    134<L>  |  95<L>  |  28<H>  ----------------------------<  93  3.5   |  25  |  2.42<H>    Ca    9.3      23 Jun 2020 05:59  Phos  5.5     06-23  Mg     2.0     06-23    TPro  6.7  /  Alb  3.1<L>  /  TBili  0.3  /  DBili      /  AST  18  /  ALT  11  /  AlkPhos  62  06-23    Creatinine Trend: 2.42 <--, 2.25 <--, 1.87 <--, 1.41 <--, 1.20 <--, 1.13 <--    Phosphorus Level, Serum: 5.5 mg/dL (06-23 @ 05:59)  Albumin, Serum: 3.1 g/dL (06-23 @ 05:59)                              10.2   7.39  )-----------( 304      ( 23 Jun 2020 05:59 )             32.4     Urine Studies:  Urinalysis - [06-22-20 @ 17:00]      Color COLORLESS / Appearance CLEAR / SG 1.009 / pH 8.0      Gluc NEGATIVE / Ketone NEGATIVE  / Bili NEGATIVE / Urobili NORMAL       Blood NEGATIVE / Protein 100 / Leuk Est NEGATIVE / Nitrite NEGATIVE      RBC 0-2 / WBC 0-2 / Hyaline NEGATIVE / Gran  / Sq Epi OCC / Non Sq Epi  / Bacteria FEW    Urine Creatinine 21.30      [06-22-20 @ 17:00]  Urine Urea Nitrogen 110      [06-22-20 @ 17:00]    Ferritin 501.7      [04-20-20 @ 05:00]  HbA1c 8.8      [12-13-19 @ 06:00]  TSH 7.68      [06-21-20 @ 07:11]  Lipid: chol 197, , HDL 41,       [12-14-19 @ 03:50]        RADIOLOGY & ADDITIONAL STUDIES:

## 2020-06-23 NOTE — PROGRESS NOTE ADULT - ASSESSMENT
Assessment  DMT2: 74y Female with DM T2 with hyperglycemia, was on oral meds and insulin at home, blood sugars in acceptable range on low dose insulin,, no hypoglycemic episode,  eating meals, compliant with low carb diet.  CHF: on medications, no chest pain, stable, monitored.  Hypothyroidism/subclinical: Apparently no history of thyroid disease, asymptomatic, repeat TFTs euthyroid..  HTN:  Controlled,  on antihypertensive medications.    Eddie Smith MD  Cell: [phone]  Office: [phone]

## 2020-06-23 NOTE — PROGRESS NOTE ADULT - SUBJECTIVE AND OBJECTIVE BOX
EP ATTENDING    tele: NSR, no further NSVT    she denies palpitations, syncope, nor angina, ROS otherwise -    aspirin enteric coated 81 milliGRAM(s) Oral daily  atorvastatin 40 milliGRAM(s) Oral at bedtime  carvedilol 3.125 milliGRAM(s) Oral every 12 hours  chlorhexidine 4% Liquid 1 Application(s) Topical daily  clopidogrel Tablet 75 milliGRAM(s) Oral daily  dextrose 40% Gel 15 Gram(s) Oral once PRN  dextrose 5%. 1000 milliLiter(s) IV Continuous <Continuous>  dextrose 50% Injectable 12.5 Gram(s) IV Push once  dextrose 50% Injectable 25 Gram(s) IV Push once  dextrose 50% Injectable 25 Gram(s) IV Push once  furosemide   Injectable 40 milliGRAM(s) IV Push two times a day  glucagon  Injectable 1 milliGRAM(s) IntraMuscular once PRN  heparin  Infusion 800 Unit(s)/Hr IV Continuous <Continuous>  hydrALAZINE 25 milliGRAM(s) Oral three times a day  insulin glargine Injectable (LANTUS) 40 Unit(s) SubCutaneous at bedtime  insulin lispro (HumaLOG) corrective regimen sliding scale   SubCutaneous three times a day before meals  insulin lispro (HumaLOG) corrective regimen sliding scale   SubCutaneous at bedtime  insulin lispro Injectable (HumaLOG) 7 Unit(s) SubCutaneous three times a day before meals  risperiDONE   Tablet 1 milliGRAM(s) Oral daily                            10.2   7.39  )-----------( 304      ( 23 Jun 2020 05:59 )             32.4       06-23    134<L>  |  95<L>  |  28<H>  ----------------------------<  93  3.5   |  25  |  2.42<H>    Ca    9.3      23 Jun 2020 05:59  Phos  5.5     06-23  Mg     2.0     06-23    TPro  6.7  /  Alb  3.1<L>  /  TBili  0.3  /  DBili  x   /  AST  18  /  ALT  11  /  AlkPhos  62  06-23      CARDIAC MARKERS ( 21 Jun 2020 16:04 )  x     / x     / 265 u/L / 14.09 ng/mL / x      CARDIAC MARKERS ( 21 Jun 2020 10:11 )  x     / x     / 318 u/L / 21.18 ng/mL / x      CARDIAC MARKERS ( 21 Jun 2020 07:11 )  x     / x     / 274 u/L / 21.09 ng/mL / x            T(C): 36.9 (06-23-20 @ 08:09), Max: 37.3 (06-22-20 @ 21:35)  HR: 83 (06-23-20 @ 08:09) (83 - 112)  BP: 150/81 (06-23-20 @ 08:09) (148/77 - 194/71)  RR: 18 (06-23-20 @ 08:09) (17 - 26)  SpO2: 99% (06-23-20 @ 08:09) (97% - 100%)  Wt(kg): --    I&O's Summary    22 Jun 2020 07:01  -  23 Jun 2020 07:00  --------------------------------------------------------  IN: 732 mL / OUT: 2450 mL / NET: -1718 mL    A&O x 3  neurologically intact  no JVD  RRR, no murmurs  CTAB  soft nt/nd  no c/c/e    echo: moderate MR, normal LVEF      A/P) 74 year old female PMH HTN, hyperlipidemia, DM admitted with LE edema. EP called for NSVT in setting of normal LVEF.    -continue beta blockers for NSVT  -recommend cath  -no further inpatient EP workup expected  -given normal LVEF there is no indication for an ICD nor AAD      Deshaun Patton M.D., RS  Cardiac Electrophysiology  [phone]

## 2020-06-23 NOTE — PROGRESS NOTE ADULT - ATTENDING COMMENTS
Patient seen and examined.  Agree with above.   Pt. with no chest pain or anginal symptoms  Pt. with rising Cr and with SUNIL  In addition, pt. with acute DVT  Continue with hep gtt for DVT  Repeat TTE shows mild pericardial effusion (with no growth from prior)  Will plan on ischemic evaluation when patient medically and renally optimized    Donis Jaquez MD

## 2020-06-23 NOTE — PROGRESS NOTE ADULT - SUBJECTIVE AND OBJECTIVE BOX
INTERVAL HPI/OVERNIGHT EVENTS: No new concerns. I feel fine.   Vital Signs Last 24 Hrs  T(C): 36.8 (2020 19:39), Max: 37 (2020 05:31)  T(F): 98.2 (2020 19:39), Max: 98.6 (2020 05:31)  HR: 89 (2020 21:55) (83 - 98)  BP: 158/81 (2020 21:55) (143/66 - 170/83)  BP(mean): --  RR: 18 (2020 21:55) (17 - 18)  SpO2: 100% (2020 21:55) (99% - 100%)  I&O's Summary    2020 07:01  -  2020 07:00  --------------------------------------------------------  IN: 732 mL / OUT: 2450 mL / NET: -1718 mL    2020 07:01  -  2020 23:15  --------------------------------------------------------  IN: 40 mL / OUT: 800 mL / NET: -760 mL      MEDICATIONS  (STANDING):  aspirin enteric coated 81 milliGRAM(s) Oral daily  atorvastatin 40 milliGRAM(s) Oral at bedtime  carvedilol 3.125 milliGRAM(s) Oral every 12 hours  chlorhexidine 4% Liquid 1 Application(s) Topical daily  clopidogrel Tablet 75 milliGRAM(s) Oral daily  dextrose 5%. 1000 milliLiter(s) (50 mL/Hr) IV Continuous <Continuous>  dextrose 50% Injectable 12.5 Gram(s) IV Push once  dextrose 50% Injectable 25 Gram(s) IV Push once  dextrose 50% Injectable 25 Gram(s) IV Push once  furosemide   Injectable 40 milliGRAM(s) IV Push two times a day  heparin  Infusion 800 Unit(s)/Hr (8 mL/Hr) IV Continuous <Continuous>  hydrALAZINE 25 milliGRAM(s) Oral three times a day  insulin glargine Injectable (LANTUS) 40 Unit(s) SubCutaneous at bedtime  insulin lispro (HumaLOG) corrective regimen sliding scale   SubCutaneous three times a day before meals  insulin lispro (HumaLOG) corrective regimen sliding scale   SubCutaneous at bedtime  insulin lispro Injectable (HumaLOG) 7 Unit(s) SubCutaneous three times a day before meals  risperiDONE   Tablet 1 milliGRAM(s) Oral daily    MEDICATIONS  (PRN):  dextrose 40% Gel 15 Gram(s) Oral once PRN Blood Glucose LESS THAN 70 milliGRAM(s)/deciLiter  glucagon  Injectable 1 milliGRAM(s) IntraMuscular once PRN Glucose <70 milliGRAM(s)/deciLiter    LABS:                        10.2   7.39  )-----------( 304      ( 2020 05:59 )             32.4     06-    134<L>  |  95<L>  |  28<H>  ----------------------------<  93  3.5   |  25  |  2.42<H>    Ca    9.3      2020 05:59  Phos  5.5     06-  Mg     2.0     06-    TPro  6.7  /  Alb  3.1<L>  /  TBili  0.3  /  DBili  x   /  AST  18  /  ALT  11  /  AlkPhos  62  06-23    PTT - ( 2020 05:59 )  PTT:69.1 SEC  Urinalysis Basic - ( 2020 17:00 )    Color: COLORLESS / Appearance: CLEAR / S.009 / pH: 8.0  Gluc: NEGATIVE / Ketone: NEGATIVE  / Bili: NEGATIVE / Urobili: NORMAL   Blood: NEGATIVE / Protein: 100 / Nitrite: NEGATIVE   Leuk Esterase: NEGATIVE / RBC: 0-2 / WBC 0-2   Sq Epi: OCC / Non Sq Epi: x / Bacteria: FEW      CAPILLARY BLOOD GLUCOSE      POCT Blood Glucose.: 207 mg/dL (2020 21:48)  POCT Blood Glucose.: 134 mg/dL (2020 17:05)  POCT Blood Glucose.: 139 mg/dL (2020 12:24)  POCT Blood Glucose.: 103 mg/dL (2020 07:55)        Urinalysis Basic - ( 2020 17:00 )    Color: COLORLESS / Appearance: CLEAR / S.009 / pH: 8.0  Gluc: NEGATIVE / Ketone: NEGATIVE  / Bili: NEGATIVE / Urobili: NORMAL   Blood: NEGATIVE / Protein: 100 / Nitrite: NEGATIVE   Leuk Esterase: NEGATIVE / RBC: 0-2 / WBC 0-2   Sq Epi: OCC / Non Sq Epi: x / Bacteria: FEW      REVIEW OF SYSTEMS:  CONSTITUTIONAL: No fever, weight loss, or fatigue  EYES: No eye pain, visual disturbances, or discharge  ENMT:  No difficulty hearing, tinnitus, vertigo; No sinus or throat pain  RESPIRATORY: No cough, wheezing, chills or hemoptysis; No shortness of breath  CARDIOVASCULAR: No chest pain, palpitations, dizziness, or leg swelling  GASTROINTESTINAL: No abdominal or epigastric pain. No nausea, vomiting, or hematemesis; No diarrhea or constipation. No melena or hematochezia.  GENITOURINARY: No dysuria, frequency, hematuria, or incontinence  NEUROLOGICAL: No headaches, memory loss, loss of strength, numbness, or tremors      RADIOLOGY & ADDITIONAL TESTS:    Consultant(s) Notes Reviewed:  [x ] YES  [ ] NO    PHYSICAL EXAM:  GENERAL: NAD, well-groomed, well-developed,not in any distress ,  HEAD:  Atraumatic, Normocephalic  EYES: EOMI, PERRLA, conjunctiva and sclera clear  ENMT: No tonsillar erythema, exudates, or enlargement; Moist mucous membranes, Good dentition, No lesions  NECK: Supple, No JVD, Normal thyroid  NERVOUS SYSTEM:  Alert & Oriented X3, No focal deficit   CHEST/LUNG: Good air entry bilateral with no  rales, rhonchi, wheezing, or rubs  HEART: Regular rate and rhythm; No murmurs, rubs, or gallops  ABDOMEN: Soft, Nontender, Nondistended; Bowel sounds present  EXTREMITIES:  2+ Peripheral Pulses, No clubbing, cyanosis, or edema  SKIN: No rashes or lesions    Care Discussed with Consultants/Other Providers [ x] YES  [ ] NO

## 2020-06-23 NOTE — PROGRESS NOTE ADULT - SUBJECTIVE AND OBJECTIVE BOX
Patient is a 73 y/o F PMH HTN, HLD, DM2, Depression, COVID-19 (4/20) p/w B/L LE swelling X1-2 weeks. Reports no other symptoms, no change in functional status, no change in diet, compliant with all her medications, follows up regularly with her PMD. Reports no changes in meds since admission in April 2020 for COVID-19.     Pt found to have Acute Left Non Occlusive Post Tibial DVT     No PE on CTA    Denies prior hx of VTE    Pt is poor historian           PAST MEDICAL & SURGICAL HISTORY:  Anxiety and depression  Hypercholesterolemia  Hypertension  Diabetes mellitus  No significant past surgical history      ROS:  Negative except for:    MEDICATIONS  (STANDING):  aspirin enteric coated 81 milliGRAM(s) Oral daily  atorvastatin 40 milliGRAM(s) Oral at bedtime  carvedilol 3.125 milliGRAM(s) Oral every 12 hours  chlorhexidine 4% Liquid 1 Application(s) Topical daily  clopidogrel Tablet 75 milliGRAM(s) Oral daily  dextrose 5%. 1000 milliLiter(s) (50 mL/Hr) IV Continuous <Continuous>  dextrose 50% Injectable 12.5 Gram(s) IV Push once  dextrose 50% Injectable 25 Gram(s) IV Push once  dextrose 50% Injectable 25 Gram(s) IV Push once  furosemide   Injectable 40 milliGRAM(s) IV Push two times a day  heparin  Infusion 800 Unit(s)/Hr (8 mL/Hr) IV Continuous <Continuous>  hydrALAZINE 25 milliGRAM(s) Oral three times a day  insulin glargine Injectable (LANTUS) 40 Unit(s) SubCutaneous at bedtime  insulin lispro (HumaLOG) corrective regimen sliding scale   SubCutaneous three times a day before meals  insulin lispro (HumaLOG) corrective regimen sliding scale   SubCutaneous at bedtime  insulin lispro Injectable (HumaLOG) 7 Unit(s) SubCutaneous three times a day before meals  risperiDONE   Tablet 1 milliGRAM(s) Oral daily    MEDICATIONS  (PRN):  dextrose 40% Gel 15 Gram(s) Oral once PRN Blood Glucose LESS THAN 70 milliGRAM(s)/deciLiter  glucagon  Injectable 1 milliGRAM(s) IntraMuscular once PRN Glucose <70 milliGRAM(s)/deciLiter      Allergies    peanuts (Short breath; Hives)  penicillin (Short breath; Hives)    Intolerances        Vital Signs Last 24 Hrs  T(C): 36.4 (23 Jun 2020 15:48), Max: 37.3 (22 Jun 2020 21:35)  T(F): 97.6 (23 Jun 2020 15:48), Max: 99.2 (22 Jun 2020 21:35)  HR: 94 (23 Jun 2020 15:48) (83 - 103)  BP: 168/74 (23 Jun 2020 15:48) (148/77 - 188/91)  BP(mean): 98 (22 Jun 2020 21:00) (87 - 98)  RR: 18 (23 Jun 2020 15:48) (17 - 20)  SpO2: 99% (23 Jun 2020 15:48) (99% - 100%)    PHYSICAL EXAM  General: adult in NAD  HEENT: clear oropharynx, anicteric sclera, pink conjunctiva  Neck: supple  CV: normal S1/S2 with no murmur rubs or gallops  Lungs: positive air movement b/l ant lungs,clear to auscultation, no wheezes, no rales  Abdomen: soft non-tender non-distended, no hepatosplenomegaly  Ext: + L calf tenderness     LABS:                          10.2   7.39  )-----------( 304      ( 23 Jun 2020 05:59 )             32.4     Serial CBC's  06-23 @ 05:59  Hct-32.4 / Hgb-10.2 / Plat-304 / RBC-4.11 / WBC-7.39          Serial CBC's  06-22 @ 05:00  Hct-31.1 / Hgb-9.7 / Plat-272 / RBC-3.89 / WBC-6.28            06-23    134<L>  |  95<L>  |  28<H>  ----------------------------<  93  3.5   |  25  |  2.42<H>    Ca    9.3      23 Jun 2020 05:59  Phos  5.5     06-23  Mg     2.0     06-23    TPro  6.7  /  Alb  3.1<L>  /  TBili  0.3  /  DBili  x   /  AST  18  /  ALT  11  /  AlkPhos  62  06-23      PTT - ( 23 Jun 2020 05:59 )  PTT:69.1 SEC    WBC Count: 7.39 K/uL (06-23 @ 05:59)  Hemoglobin: 10.2 g/dL (06-23 @ 05:59)            RADIOLOGY & ADDITIONAL STUDIES:

## 2020-06-23 NOTE — PROGRESS NOTE ADULT - SUBJECTIVE AND OBJECTIVE BOX
Patient denies chest pain or shortness of breath.   Review of systems otherwise (-)  	      aspirin enteric coated 81 milliGRAM(s) Oral daily  atorvastatin 40 milliGRAM(s) Oral at bedtime  carvedilol 3.125 milliGRAM(s) Oral every 12 hours  chlorhexidine 4% Liquid 1 Application(s) Topical daily  clopidogrel Tablet 75 milliGRAM(s) Oral daily  dextrose 40% Gel 15 Gram(s) Oral once PRN  dextrose 5%. 1000 milliLiter(s) IV Continuous <Continuous>  dextrose 50% Injectable 12.5 Gram(s) IV Push once  dextrose 50% Injectable 25 Gram(s) IV Push once  dextrose 50% Injectable 25 Gram(s) IV Push once  furosemide   Injectable 40 milliGRAM(s) IV Push two times a day  glucagon  Injectable 1 milliGRAM(s) IntraMuscular once PRN  heparin  Infusion 800 Unit(s)/Hr IV Continuous <Continuous>  hydrALAZINE 25 milliGRAM(s) Oral three times a day  insulin glargine Injectable (LANTUS) 40 Unit(s) SubCutaneous at bedtime  insulin lispro (HumaLOG) corrective regimen sliding scale   SubCutaneous three times a day before meals  insulin lispro (HumaLOG) corrective regimen sliding scale   SubCutaneous at bedtime  insulin lispro Injectable (HumaLOG) 7 Unit(s) SubCutaneous three times a day before meals  risperiDONE   Tablet 1 milliGRAM(s) Oral daily                            10.2   7.39  )-----------( 304      ( 23 Jun 2020 05:59 )             32.4       Hemoglobin: 10.2 g/dL (06-23 @ 05:59)  Hemoglobin: 9.7 g/dL (06-22 @ 05:00)  Hemoglobin: 9.6 g/dL (06-21 @ 07:11)  Hemoglobin: 10.4 g/dL (06-20 @ 20:50)      06-23    134<L>  |  95<L>  |  28<H>  ----------------------------<  93  3.5   |  25  |  2.42<H>    Ca    9.3      23 Jun 2020 05:59  Phos  5.5     06-23  Mg     2.0     06-23    TPro  6.7  /  Alb  3.1<L>  /  TBili  0.3  /  DBili  x   /  AST  18  /  ALT  11  /  AlkPhos  62  06-23    Creatinine Trend: 2.42<--, 2.25<--, 1.87<--, 1.41<--, 1.20<--, 1.13<--    COAGS: PTT - ( 23 Jun 2020 05:59 )  PTT:69.1 SEC    CARDIAC MARKERS ( 21 Jun 2020 16:04 )  x     / x     / 265 u/L / 14.09 ng/mL / x      CARDIAC MARKERS ( 21 Jun 2020 10:11 )  x     / x     / 318 u/L / 21.18 ng/mL / x      CARDIAC MARKERS ( 21 Jun 2020 07:11 )  x     / x     / 274 u/L / 21.09 ng/mL / x            PHYSICAL EXAM:  Vital Signs last 24 Hours   T(C): 36.9 (06-23-20 @ 08:09), Max: 37.3 (06-22-20 @ 21:35)  HR: 83 (06-23-20 @ 08:09) (83 - 107)  BP: 150/81 (06-23-20 @ 08:09) (148/77 - 190/68)  RR: 18 (06-23-20 @ 08:09) (17 - 26)  SpO2: 99% (06-23-20 @ 08:09) (97% - 100%)  Wt(kg): --    I&O's Summary    22 Jun 2020 07:01  -  23 Jun 2020 07:00  --------------------------------------------------------  IN: 732 mL / OUT: 2450 mL / NET: -1718 mL        Gen: Appears well in NAD  HEENT:  (-)icterus (-)pallor  CV: N S1 S2 1/6 PARK (+)2 Pulses B/l  Resp:  Clear to auscultation  B/L, normal effort  GI: (+) BS Soft, NT, ND  Lymph:  (-)Edema, (-)obvious lymphadenopathy  Skin: Warm to touch, Normal turgor  Psych: Appropriate mood and affect    TELEMETRY: 	SR,  1st degree avb     < from: Transthoracic Echocardiogram (06.21.20 @ 12:47) >  CONCLUSIONS:  1. Mitral annular calcification, otherwise normal mitral  valve. Moderate mitral regurgitation.  2. Normal left ventricular internal dimensions and wall  thicknesses.  3. Hyperdynamic left ventricle.  4. The right ventricle is not well visualized; grossly  normal right ventricular systolic function.  5. Moderate pericardial effusion (measures about 1.3 cm  lateral to RA on apical four-chamber views).  6. Left pleural effusion.  ------------------------------------  < end of copied text >    < from: CT Angio Chest w/ IV Cont (06.20.20 @ 22:37) >    IMPRESSION:   No pulmonary embolism.  No gross CT evidence of pneumonia.  Mild left heart enlargement.  Mild interlobular septal thickening in both lungs may reflect interstitial pulmonary edema.  Small to moderate pleural effusions bilaterally.  Small pericardial effusion.    < end of copied text >      ASSESSMENT/PLAN: 	    74 year old female with history of HTN, HLD, DM, recent COVID infection in April of 2020 who presented to the ED with LE swelling for 1-2 weeks.    -volume status improving, continue with IV lasix to keep O > I  -CT scan negative for PE  -TTE with normal LV function with moderate pericardial effusion; repeat echo with small pericardial effusion  -Currently chest pain free - would continue with hep gtt and asa for nstemi  -EP follow up appreciated, no further nsvt, cont bb,  given normal LVEF there is no indication for an ICD nor AAD  -SUNIL - renal follow up appreciated, worsening cr noted, do not recc imaging with contrast at this time   -possible cardiac MRI with linda to eval for myopericarditis when pt optimized from renal perspective   -further inpatient cardiac work up pending above

## 2020-06-24 LAB
ALBUMIN SERPL ELPH-MCNC: 3.6 G/DL — SIGNIFICANT CHANGE UP (ref 3.3–5)
ALP SERPL-CCNC: 67 U/L — SIGNIFICANT CHANGE UP (ref 40–120)
ALT FLD-CCNC: 13 U/L — SIGNIFICANT CHANGE UP (ref 4–33)
ANION GAP SERPL CALC-SCNC: 14 MMO/L — SIGNIFICANT CHANGE UP (ref 7–14)
ANION GAP SERPL CALC-SCNC: 16 MMO/L — HIGH (ref 7–14)
APTT BLD: 81.3 SEC — HIGH (ref 27.5–36.3)
AST SERPL-CCNC: 18 U/L — SIGNIFICANT CHANGE UP (ref 4–32)
BILIRUB SERPL-MCNC: 0.3 MG/DL — SIGNIFICANT CHANGE UP (ref 0.2–1.2)
BUN SERPL-MCNC: 30 MG/DL — HIGH (ref 7–23)
BUN SERPL-MCNC: 41 MG/DL — HIGH (ref 7–23)
CALCIUM SERPL-MCNC: 8.9 MG/DL — SIGNIFICANT CHANGE UP (ref 8.4–10.5)
CALCIUM SERPL-MCNC: 9.1 MG/DL — SIGNIFICANT CHANGE UP (ref 8.4–10.5)
CHLORIDE SERPL-SCNC: 97 MMOL/L — LOW (ref 98–107)
CHLORIDE SERPL-SCNC: 98 MMOL/L — SIGNIFICANT CHANGE UP (ref 98–107)
CO2 SERPL-SCNC: 26 MMOL/L — SIGNIFICANT CHANGE UP (ref 22–31)
CO2 SERPL-SCNC: 27 MMOL/L — SIGNIFICANT CHANGE UP (ref 22–31)
CREAT SERPL-MCNC: 2.17 MG/DL — HIGH (ref 0.5–1.3)
CREAT SERPL-MCNC: 2.28 MG/DL — HIGH (ref 0.5–1.3)
D DIMER BLD IA.RAPID-MCNC: 380 NG/ML — SIGNIFICANT CHANGE UP
DRVVT SCREEN TO CONFIRM RATIO: 0.91 — SIGNIFICANT CHANGE UP (ref 0–1.2)
FERRITIN SERPL-MCNC: 72.33 NG/ML — SIGNIFICANT CHANGE UP (ref 15–150)
FOLATE SERPL-MCNC: 11.7 NG/ML — SIGNIFICANT CHANGE UP (ref 4.7–20)
GLUCOSE BLDC GLUCOMTR-MCNC: 117 MG/DL — HIGH (ref 70–99)
GLUCOSE BLDC GLUCOMTR-MCNC: 141 MG/DL — HIGH (ref 70–99)
GLUCOSE BLDC GLUCOMTR-MCNC: 151 MG/DL — HIGH (ref 70–99)
GLUCOSE BLDC GLUCOMTR-MCNC: 175 MG/DL — HIGH (ref 70–99)
GLUCOSE BLDC GLUCOMTR-MCNC: 176 MG/DL — HIGH (ref 70–99)
GLUCOSE BLDC GLUCOMTR-MCNC: 220 MG/DL — HIGH (ref 70–99)
GLUCOSE SERPL-MCNC: 160 MG/DL — HIGH (ref 70–99)
GLUCOSE SERPL-MCNC: 99 MG/DL — SIGNIFICANT CHANGE UP (ref 70–99)
HAPTOGLOB SERPL-MCNC: 38 MG/DL — SIGNIFICANT CHANGE UP (ref 34–200)
HCT VFR BLD CALC: 35.6 % — SIGNIFICANT CHANGE UP (ref 34.5–45)
HGB A MFR BLD: 97.3 % — SIGNIFICANT CHANGE UP
HGB A2 MFR BLD: 2.3 % — LOW (ref 2.4–3.5)
HGB BLD-MCNC: 11.2 G/DL — LOW (ref 11.5–15.5)
HGB ELECT COMMENT: SIGNIFICANT CHANGE UP
HGB F MFR BLD: < 1 % — SIGNIFICANT CHANGE UP (ref 0–1.5)
INR BLD: 0.99 — SIGNIFICANT CHANGE UP (ref 0.88–1.17)
IRON SATN MFR SERPL: 282 UG/DL — SIGNIFICANT CHANGE UP (ref 140–530)
IRON SATN MFR SERPL: 58 UG/DL — SIGNIFICANT CHANGE UP (ref 30–160)
LDH SERPL L TO P-CCNC: 302 U/L — HIGH (ref 135–225)
MAGNESIUM SERPL-MCNC: 2.1 MG/DL — SIGNIFICANT CHANGE UP (ref 1.6–2.6)
MCHC RBC-ENTMCNC: 25.1 PG — LOW (ref 27–34)
MCHC RBC-ENTMCNC: 31.5 % — LOW (ref 32–36)
MCV RBC AUTO: 79.6 FL — LOW (ref 80–100)
NORMALIZED SCT PPP-RTO: 1 — SIGNIFICANT CHANGE UP (ref 0.85–1.33)
NRBC # FLD: 0 K/UL — SIGNIFICANT CHANGE UP (ref 0–0)
PHOSPHATE SERPL-MCNC: 6.4 MG/DL — HIGH (ref 2.5–4.5)
PLATELET # BLD AUTO: 324 K/UL — SIGNIFICANT CHANGE UP (ref 150–400)
PMV BLD: 10.4 FL — SIGNIFICANT CHANGE UP (ref 7–13)
POTASSIUM SERPL-MCNC: 3.4 MMOL/L — LOW (ref 3.5–5.3)
POTASSIUM SERPL-MCNC: 4.4 MMOL/L — SIGNIFICANT CHANGE UP (ref 3.5–5.3)
POTASSIUM SERPL-SCNC: 3.4 MMOL/L — LOW (ref 3.5–5.3)
POTASSIUM SERPL-SCNC: 4.4 MMOL/L — SIGNIFICANT CHANGE UP (ref 3.5–5.3)
PROT C ACT/NOR PPP: 129 % — SIGNIFICANT CHANGE UP (ref 74–150)
PROT SERPL-MCNC: 6.6 G/DL — SIGNIFICANT CHANGE UP (ref 6–8.3)
PROTHROM AB SERPL-ACNC: 11.3 SEC — SIGNIFICANT CHANGE UP (ref 9.8–13.1)
RBC # BLD: 4.47 M/UL — SIGNIFICANT CHANGE UP (ref 3.8–5.2)
RBC # FLD: 15.6 % — HIGH (ref 10.3–14.5)
RETICS #: 83 K/UL — SIGNIFICANT CHANGE UP (ref 25–125)
RETICS/RBC NFR: 1.9 % — SIGNIFICANT CHANGE UP (ref 0.5–2.5)
SODIUM SERPL-SCNC: 138 MMOL/L — SIGNIFICANT CHANGE UP (ref 135–145)
SODIUM SERPL-SCNC: 140 MMOL/L — SIGNIFICANT CHANGE UP (ref 135–145)
THROMBIN TIME: 159.7 SEC — HIGH (ref 16–26)
UIBC SERPL-MCNC: 224.1 UG/DL — SIGNIFICANT CHANGE UP (ref 110–370)
VIT B12 SERPL-MCNC: 281 PG/ML — SIGNIFICANT CHANGE UP (ref 200–900)
WBC # BLD: 7.03 K/UL — SIGNIFICANT CHANGE UP (ref 3.8–10.5)
WBC # FLD AUTO: 7.03 K/UL — SIGNIFICANT CHANGE UP (ref 3.8–10.5)

## 2020-06-24 PROCEDURE — 70450 CT HEAD/BRAIN W/O DYE: CPT | Mod: 26

## 2020-06-24 PROCEDURE — 86334 IMMUNOFIX E-PHORESIS SERUM: CPT | Mod: 26

## 2020-06-24 PROCEDURE — 84165 PROTEIN E-PHORESIS SERUM: CPT | Mod: 26

## 2020-06-24 RX ORDER — POTASSIUM CHLORIDE 20 MEQ
40 PACKET (EA) ORAL EVERY 4 HOURS
Refills: 0 | Status: COMPLETED | OUTPATIENT
Start: 2020-06-24 | End: 2020-06-24

## 2020-06-24 RX ORDER — HYDRALAZINE HCL 50 MG
25 TABLET ORAL ONCE
Refills: 0 | Status: COMPLETED | OUTPATIENT
Start: 2020-06-24 | End: 2020-06-24

## 2020-06-24 RX ADMIN — RISPERIDONE 1 MILLIGRAM(S): 4 TABLET ORAL at 12:08

## 2020-06-24 RX ADMIN — Medication 40 MILLIGRAM(S): at 18:22

## 2020-06-24 RX ADMIN — CHLORHEXIDINE GLUCONATE 1 APPLICATION(S): 213 SOLUTION TOPICAL at 11:56

## 2020-06-24 RX ADMIN — Medication 7 UNIT(S): at 18:22

## 2020-06-24 RX ADMIN — CLOPIDOGREL BISULFATE 75 MILLIGRAM(S): 75 TABLET, FILM COATED ORAL at 12:07

## 2020-06-24 RX ADMIN — Medication 81 MILLIGRAM(S): at 12:07

## 2020-06-24 RX ADMIN — INSULIN GLARGINE 40 UNIT(S): 100 INJECTION, SOLUTION SUBCUTANEOUS at 22:58

## 2020-06-24 RX ADMIN — Medication 40 MILLIEQUIVALENT(S): at 13:19

## 2020-06-24 RX ADMIN — Medication 25 MILLIGRAM(S): at 13:19

## 2020-06-24 RX ADMIN — Medication 40 MILLIEQUIVALENT(S): at 09:55

## 2020-06-24 RX ADMIN — HEPARIN SODIUM 8 UNIT(S)/HR: 5000 INJECTION INTRAVENOUS; SUBCUTANEOUS at 01:07

## 2020-06-24 RX ADMIN — Medication 7 UNIT(S): at 12:02

## 2020-06-24 RX ADMIN — ATORVASTATIN CALCIUM 40 MILLIGRAM(S): 80 TABLET, FILM COATED ORAL at 22:58

## 2020-06-24 RX ADMIN — Medication 40 MILLIGRAM(S): at 05:39

## 2020-06-24 RX ADMIN — CARVEDILOL PHOSPHATE 3.12 MILLIGRAM(S): 80 CAPSULE, EXTENDED RELEASE ORAL at 05:43

## 2020-06-24 RX ADMIN — CARVEDILOL PHOSPHATE 3.12 MILLIGRAM(S): 80 CAPSULE, EXTENDED RELEASE ORAL at 18:22

## 2020-06-24 RX ADMIN — Medication 25 MILLIGRAM(S): at 05:43

## 2020-06-24 RX ADMIN — Medication 7 UNIT(S): at 09:36

## 2020-06-24 RX ADMIN — Medication 25 MILLIGRAM(S): at 22:58

## 2020-06-24 RX ADMIN — Medication 25 MILLIGRAM(S): at 01:39

## 2020-06-24 RX ADMIN — Medication 4: at 18:21

## 2020-06-24 NOTE — PROGRESS NOTE ADULT - SUBJECTIVE AND OBJECTIVE BOX
EP    tele: NSR, no further NSVT    she denies palpitations, syncope, nor angina, ROS otherwise -      MEDICATIONS  (STANDING):  aspirin enteric coated 81 milliGRAM(s) Oral daily  atorvastatin 40 milliGRAM(s) Oral at bedtime  carvedilol 3.125 milliGRAM(s) Oral every 12 hours  chlorhexidine 4% Liquid 1 Application(s) Topical daily  clopidogrel Tablet 75 milliGRAM(s) Oral daily  dextrose 5%. 1000 milliLiter(s) (50 mL/Hr) IV Continuous <Continuous>  dextrose 50% Injectable 12.5 Gram(s) IV Push once  dextrose 50% Injectable 25 Gram(s) IV Push once  dextrose 50% Injectable 25 Gram(s) IV Push once  furosemide   Injectable 40 milliGRAM(s) IV Push two times a day  heparin  Infusion 800 Unit(s)/Hr (8 mL/Hr) IV Continuous <Continuous>  hydrALAZINE 25 milliGRAM(s) Oral three times a day  insulin glargine Injectable (LANTUS) 40 Unit(s) SubCutaneous at bedtime  insulin lispro (HumaLOG) corrective regimen sliding scale   SubCutaneous three times a day before meals  insulin lispro (HumaLOG) corrective regimen sliding scale   SubCutaneous at bedtime  insulin lispro Injectable (HumaLOG) 7 Unit(s) SubCutaneous three times a day before meals  risperiDONE   Tablet 1 milliGRAM(s) Oral daily    MEDICATIONS  (PRN):  dextrose 40% Gel 15 Gram(s) Oral once PRN Blood Glucose LESS THAN 70 milliGRAM(s)/deciLiter  glucagon  Injectable 1 milliGRAM(s) IntraMuscular once PRN Glucose <70 milliGRAM(s)/deciLiter      LABS:                      11.2   7.03  )-----------( 324      ( 24 Jun 2020 05:43 )             35.6     140  |  97<L>  |  30<H>  ----------------------------<  99  3.4<L>   |  27  |  2.17<H>    Ca    9.1      24 Jun 2020 05:43  Phos  6.4     06-24  Mg     2.1     06-24    TPro  6.6  /  Alb  3.6  /  TBili  0.3  /  DBili  x   /  AST  18  /  ALT  13  /  AlkPhos  67  06-24    Creatinine Trend: 2.17<--, 2.42<--, 2.25<--, 1.87<--, 1.41<--, 1.20<--   PT/INR - ( 24 Jun 2020 05:43 )   PT: 11.3 SEC;   INR: 0.99     PTT - ( 24 Jun 2020 05:43 )  PTT:81.3 SEC  CARDIAC MARKERS ( 21 Jun 2020 16:04 )  x     / x     / 265 u/L / 14.09 ng/mL / x          PHYSICAL EXAM  Vital Signs Last 24 Hrs  T(C): 36.8 (24 Jun 2020 13:18), Max: 37.1 (24 Jun 2020 00:14)  T(F): 98.2 (24 Jun 2020 13:18), Max: 98.7 (24 Jun 2020 00:14)  HR: 90 (24 Jun 2020 13:18) (86 - 95)  BP: 150/76 (24 Jun 2020 13:18) (143/66 - 183/88)  RR: 17 (24 Jun 2020 13:18) (17 - 18)  SpO2: 100% (24 Jun 2020 13:18) (99% - 100%)    A&O x 3  neurologically intact  no JVD  RRR, no murmurs  CTAB  soft nt/nd  no c/c/e    echo: moderate MR, normal LVEF    A/P) 74 year old female PMH HTN, hyperlipidemia, DM admitted with LE edema. EP called for NSVT in setting of normal LVEF.    -continue beta blockers for NSVT  -recommend cath  -no further inpatient EP workup expected  -given normal LVEF there is no indication for an ICD nor AAD

## 2020-06-24 NOTE — PHYSICAL THERAPY INITIAL EVALUATION ADULT - DISCHARGE DISPOSITION, PT EVAL
home w/ home PT/to improve strength and balance for safe ambulation and transfers to prevent future falls

## 2020-06-24 NOTE — CHART NOTE - NSCHARTNOTEFT_GEN_A_CORE
Notified by Rn Notified by RN that patient is hypertensive with a BP of 183/88.    Hydralazine 25mg PO ordered however as per RN patient not arousable. Patient was seen and assessed at bedside. Patient responded to sternal rub. During beginning portion of examination, patient responded slowly but then became more awake. Patient stated that she was dreaming and was able to confirm name, month and year but failed to recognize that she was in the hospital and thought she was home.     Vital Signs Last 24 Hrs  T(C): 37.1 (24 Jun 2020 00:14), Max: 37.1 (24 Jun 2020 00:14)  T(F): 98.7 (24 Jun 2020 00:14), Max: 98.7 (24 Jun 2020 00:14)  HR: 91 (24 Jun 2020 00:14) (83 - 96)  BP: 183/88 (24 Jun 2020 00:14) (143/66 - 183/88)  RR: 17 (24 Jun 2020 00:14) (17 - 18)  SpO2: 99% (24 Jun 2020 00:14) (99% - 100%)      Physical Exam:   GENERAL: Pt lying in bed comfortably in NAD  HEAD:  Atraumatic   EYES: EOMI, PERRL, conjunctiva and sclera clear  CHEST/LUNG: Unlabored respirations  EXTREMITIES:  2+ Peripheral Pulses, brisk capillary refill.   NERVOUS SYSTEM:  Alert & Oriented X2.  Answers questions appropriately. Facial movements symmetrical, no facial droop. Sensation intact.   MSK: FROM x 4 extremities   SKIN: No rashes or lesions    Fingerstick: 151    A/P:   75 y/o F with short change of mental status. Patient is on a Heparin drip for DVT.    -CT Head non contrast ordered    Will continue to monitor patient closely.    Sandy Zepeda PA-C  pager [phone] Notified by RN that patient is hypertensive with a BP of 183/88.    Hydralazine 25mg PO ordered however as per RN patient not arousable. Patient was seen and assessed at bedside. Patient responded to sternal rub. During beginning portion of examination, patient responded slowly but then became more awake. Patient stated that she was dreaming and was able to confirm name, month and year but failed to recognize that she was in the hospital and thought she was home.     Vital Signs Last 24 Hrs  T(C): 37.1 (24 Jun 2020 00:14), Max: 37.1 (24 Jun 2020 00:14)  T(F): 98.7 (24 Jun 2020 00:14), Max: 98.7 (24 Jun 2020 00:14)  HR: 91 (24 Jun 2020 00:14) (83 - 96)  BP: 183/88 (24 Jun 2020 00:14) (143/66 - 183/88)  RR: 17 (24 Jun 2020 00:14) (17 - 18)  SpO2: 99% (24 Jun 2020 00:14) (99% - 100%)      Physical Exam:   GENERAL: Pt lying in bed comfortably in NAD  HEAD:  Atraumatic   EYES: EOMI, PERRL, conjunctiva and sclera clear  CHEST/LUNG: Unlabored respirations  EXTREMITIES:  2+ Peripheral Pulses, brisk capillary refill.   NERVOUS SYSTEM:  Alert & Oriented X2.  Answers questions appropriately. Facial movements symmetrical, no facial droop. Sensation intact.   MSK: FROM x 4 extremities   SKIN: No rashes or lesions    Fingerstick: 151    A/P:   73 y/o F with short change of mental status. Patient is on a Heparin drip for DVT.    -Urgent CT Head non contrast ordered    Will continue to monitor patient closely.    Sandy Zepeda PA-C  pager [phone]

## 2020-06-24 NOTE — PROGRESS NOTE ADULT - ASSESSMENT
Assessment  DMT2: 74y Female with DM T2 with hyperglycemia, was on oral meds and insulin at home, FS improved, no hypoglycemic episode,  eating meals, compliant with low carb diet.  CHF: on medications, no chest pain, stable, monitored.  Hypothyroidism/subclinical: Apparently no history of thyroid disease, asymptomatic, repeat TFTs euthyroid..  HTN:  Controlled,  on antihypertensive medications.    Eddie Smith MD  Cell: [phone]  Office: [phone]

## 2020-06-24 NOTE — PROGRESS NOTE ADULT - ASSESSMENT
73 y/o F PMH HTN, HLD, DM2, Depression, COVID-19 (4/20) p/w B/L LE swelling X1-2 weeks. Reports no other symptoms, no change in functional status, no change in diet, compliant with all her medications, follows up regularly with her PMD. Reports no changes in meds since admission in April 2020 for COVID-19.     Problem/Recommendation - 1:  Problem: NSTEMI (non-ST elevated myocardial infarction). Recommendation: .ASA,Plavix , BB ,Statin and Heparin. Cardiology following .Ischemic work up and MRI pending.      Problem/Recommendation - 2:  ·  Problem: Ventricular tachycardia, non-sustained.  Recommendation: EP help appreciated.      Problem/Recommendation - 3:  ·  Problem:  Acute on chronic congestive heart failure, unspecified heart failure type.  Recommendation: IV Lasix.      Problem/Recommendation - 4:  ·  Problem: Diabetes mellitus type 2, noninsulin dependent.  Recommendation: Lantus and SSI for now. Endo helping.      Problem/Recommendation - 5:  ·  Problem: Hypertension.  Recommendation: BP meds with hold parameters .      Problem/Recommendation - 6:  Problem: Hypercholesterolemia. Recommendation: Statin.     Problem/Recommendation - 7:  Problem: SUNIL . Recommendation: Creatinine stable. Renal helping.      Problem/Recommendation - 8:  Problem: Acute DVT . Recommendation: On AC . Hematology helping.

## 2020-06-24 NOTE — PROGRESS NOTE ADULT - ASSESSMENT
74 year old female with PMH of HTN, HLD, DM, recent COVID infection in April 2020 who presented with LE swelling for weeks, found to have HF exacerbation and treated with diuretics; patient was found to have Wenchebach on telemetry and had 23 beats of NSVT, as well as elevated cardiac enzymes, warranting transfer to CCU for continued monitoring and ischemic workup for likely NSTEMI.    SUNIL on CKD III  Baseline Scr 1 with GFR 45 12/2019   scr on admission 1.13, scr peaked 2.42, improving today  SUNIL likely sec to JUNIOR (CTA 6/20) and/or ATN   FEurea>50%  Pt non oliguric   Pt hypervolemic on lasix 40mg iv bid  possible need cath/MRI with linda.     renal function finally improving today, Patients needs eventual cath, consider wait 1-2 more days for renal function to improve further  Spoke with patient's daughter and son about risk and benefits. they expressed understanding.     Anemia  acceptable  Monitor at present     NSTEMI  continue lasix  monitor urine output, weight and bmp  f/u cardio    Proteinuria  UA with 100 protein  urine p/c ratio 2.5g/day  likely sec to DM/HTN  vasculaitis work up is never done for this patient  consider sending Vasculitis w/u for proteinuria (check hep b surface ag, hep c antibody, hiv, rpr, c3, c4, shonda, anca, spep, serum immunofixation, immuno light chain) 74 year old female with PMH of HTN, HLD, DM, recent COVID infection in April 2020 who presented with LE swelling for weeks, found to have HF exacerbation and treated with diuretics; patient was found to have Wenchebach on telemetry and had 23 beats of NSVT, as well as elevated cardiac enzymes, warranting transfer to CCU for continued monitoring and ischemic workup for likely NSTEMI.    SUNIL on CKD III  Baseline Scr 1 with GFR 45 12/2019   scr on admission 1.13, scr peaked 2.42, improving today  SUNIL likely sec to JUNIOR (CTA 6/20) and/or ATN   FEurea>50%  Pt non oliguric   Pt hypervolemic on lasix 40mg iv bid  possible need cath/MRI with linda.     renal function finally improving today, Patients needs eventual cath, consider wait 1-2 more days for renal function to improve further  Spoke with patient's daughter and son about risk and benefits. they expressed understanding.     Anemia  acceptable  Monitor at present     NSTEMI  continue lasix  monitor urine output, weight and bmp  f/u cardio    Proteinuria  UA with 100 protein  urine p/c ratio 2.5g/day  likely sec to DM/HTN  vasculaitis work up is never done for this patient  consider sending Vasculitis w/u for proteinuria (check hep b surface ag, hep c antibody, hiv, rpr, c3, c4, shonda, anca, spep, serum immunofixation, immuno light chain)     HYpokalemia  sec to diuresing  supplement per team  monitor 74 year old female with PMH of HTN, HLD, DM, recent COVID infection in April 2020 who presented with LE swelling for weeks, found to have HF exacerbation and treated with diuretics; patient was found to have Wenchebach on telemetry and had 23 beats of NSVT, as well as elevated cardiac enzymes, warranting transfer to CCU for continued monitoring and ischemic workup for likely NSTEMI.    SUNIL on CKD III  Baseline Scr 1 with GFR 45 12/2019   scr on admission 1.13, scr peaked 2.42  Scr improving today  SUNIL likely sec to JUNIOR (CTA 6/20) and/or ATN   FEurea>50%  Pt non oliguric   Pt hypervolemic on lasix 40mg iv bid  possible need cath/MRI with linda.     renal function finally improving today, Patients needs eventual cath, consider wait 1-2 more days for renal function to improve further  Spoke with patient's daughter and son about risk and benefits. they expressed understanding.     Anemia  acceptable  Monitor at present     NSTEMI  continue lasix  monitor urine output, weight and bmp  f/u cardio    Proteinuria  UA with 100 protein  urine p/c ratio 2.5g/day  likely sec to DM/HTN  vasculaitis work up is never done for this patient  consider sending Vasculitis w/u for proteinuria (check hep b surface ag, hep c antibody, hiv, rpr, c3, c4, shonda, anca, spep, serum immunofixation, immuno light chain)     HYpokalemia  sec to diuresing  supplement per team  monitor

## 2020-06-24 NOTE — CONSULT NOTE ADULT - CONSULT REQUESTED DATE/TIME
Bedside and Verbal shift change report given to Jenn Humphreys RN (oncoming nurse) by Audrene Cabot, RN (offgoing nurse). Report included the following information SBAR, Kardex, Intake/Output, MAR, Recent Results and Med Rec Status. 24-Jun-2020 23:55

## 2020-06-24 NOTE — PROGRESS NOTE ADULT - SUBJECTIVE AND OBJECTIVE BOX
[facility]   MD MICHAEL STOUT DO ANAM SIDDIQUI ANGELA WONG, PA    TEL:  OFFICE: [phone]  DR FLORES CELL: [phone]  DR. HUSSEIN CELL: [phone]  DR. HODGES CELL: [phone]  WESTLEY DAVE CELL: [phone]    From 5pm-7am Answering Service [phone]    Patient is a 74y old  Female who presents with a chief complaint of LE swelling (24 Jun 2020 10:08)      Patient seen and examined at bedside. No chest pain/sob    VITALS:  T(F): 98.6 (06-24-20 @ 05:37), Max: 98.7 (06-24-20 @ 00:14)  HR: 86 (06-24-20 @ 05:37)  BP: 167/86 (06-24-20 @ 05:37)  RR: 17 (06-24-20 @ 05:37)  SpO2: 100% (06-24-20 @ 05:37)  Wt(kg): --    06-23 @ 07:01  -  06-24 @ 07:00  --------------------------------------------------------  IN: 88 mL / OUT: 1000 mL / NET: -912 mL          PHYSICAL EXAM:  Constitutional: NAD  Neck: No JVD  Respiratory: CTAB, no wheezes, rales or rhonchi  Cardiovascular: S1, S2, RRR  Gastrointestinal: BS+, soft, NT/ND  Extremities: trace peripheral edema    Hospital Medications:   MEDICATIONS  (STANDING):  aspirin enteric coated 81 milliGRAM(s) Oral daily  atorvastatin 40 milliGRAM(s) Oral at bedtime  carvedilol 3.125 milliGRAM(s) Oral every 12 hours  chlorhexidine 4% Liquid 1 Application(s) Topical daily  clopidogrel Tablet 75 milliGRAM(s) Oral daily  dextrose 5%. 1000 milliLiter(s) (50 mL/Hr) IV Continuous <Continuous>  dextrose 50% Injectable 12.5 Gram(s) IV Push once  dextrose 50% Injectable 25 Gram(s) IV Push once  dextrose 50% Injectable 25 Gram(s) IV Push once  furosemide   Injectable 40 milliGRAM(s) IV Push two times a day  heparin  Infusion 800 Unit(s)/Hr (8 mL/Hr) IV Continuous <Continuous>  hydrALAZINE 25 milliGRAM(s) Oral three times a day  insulin glargine Injectable (LANTUS) 40 Unit(s) SubCutaneous at bedtime  insulin lispro (HumaLOG) corrective regimen sliding scale   SubCutaneous three times a day before meals  insulin lispro (HumaLOG) corrective regimen sliding scale   SubCutaneous at bedtime  insulin lispro Injectable (HumaLOG) 7 Unit(s) SubCutaneous three times a day before meals  potassium chloride    Tablet ER 40 milliEquivalent(s) Oral every 4 hours  risperiDONE   Tablet 1 milliGRAM(s) Oral daily      LABS:  06-24    140  |  97<L>  |  30<H>  ----------------------------<  99  3.4<L>   |  27  |  2.17<H>    Ca    9.1      24 Jun 2020 05:43  Phos  6.4     06-24  Mg     2.1     06-24    TPro  6.6  /  Alb  3.6  /  TBili  0.3  /  DBili      /  AST  18  /  ALT  13  /  AlkPhos  67  06-24    Creatinine Trend: 2.17 <--, 2.42 <--, 2.25 <--, 1.87 <--, 1.41 <--, 1.20 <--, 1.13 <--    Phosphorus Level, Serum: 6.4 mg/dL (06-24 @ 05:43)  Albumin, Serum: 3.6 g/dL (06-24 @ 05:43)  Ferritin, Serum: 72.33 ng/mL (06-24 @ 05:43)  Iron Total, Serum: 58 ug/dL (06-24 @ 05:43)                              11.2   7.03  )-----------( 324      ( 24 Jun 2020 05:43 )             35.6     Urine Studies:  Urinalysis - [06-22-20 @ 17:00]      Color COLORLESS / Appearance CLEAR / SG 1.009 / pH 8.0      Gluc NEGATIVE / Ketone NEGATIVE  / Bili NEGATIVE / Urobili NORMAL       Blood NEGATIVE / Protein 100 / Leuk Est NEGATIVE / Nitrite NEGATIVE      RBC 0-2 / WBC 0-2 / Hyaline NEGATIVE / Gran  / Sq Epi OCC / Non Sq Epi  / Bacteria FEW    Urine Creatinine 44.70      [06-23-20 @ 20:00]  Urine Protein 111.4      [06-23-20 @ 20:00]  Urine Urea Nitrogen 110      [06-22-20 @ 17:00]    Iron 58, TIBC 282, %sat --      [06-24-20 @ 05:43]  Ferritin 72.33      [06-24-20 @ 05:43]  HbA1c 8.8      [12-13-19 @ 06:00]  TSH 7.68      [06-21-20 @ 07:11]  Lipid: chol 197, , HDL 41,       [12-14-19 @ 03:50]        RADIOLOGY & ADDITIONAL STUDIES:

## 2020-06-24 NOTE — PROGRESS NOTE ADULT - SUBJECTIVE AND OBJECTIVE BOX
Chief complaint  Patient is a 74y old  Female who presents with a chief complaint of LE swelling (23 Jun 2020 18:45)   Review of systems  Patient in bed, looks comfortable, no fever, no hypoglycemia.    Labs and Fingersticks  CAPILLARY BLOOD GLUCOSE      POCT Blood Glucose.: 117 mg/dL (24 Jun 2020 08:00)  POCT Blood Glucose.: 151 mg/dL (24 Jun 2020 01:14)  POCT Blood Glucose.: 207 mg/dL (23 Jun 2020 21:48)  POCT Blood Glucose.: 134 mg/dL (23 Jun 2020 17:05)  POCT Blood Glucose.: 139 mg/dL (23 Jun 2020 12:24)      Anion Gap, Serum: 16 <H> (06-24 @ 05:43)  Anion Gap, Serum: 14 (06-23 @ 05:59)  Anion Gap, Serum: 12 (06-22 @ 16:55)      Calcium, Total Serum: 9.1 (06-24 @ 05:43)  Calcium, Total Serum: 9.3 (06-23 @ 05:59)  Calcium, Total Serum: 9.4 (06-22 @ 16:55)  Albumin, Serum: 3.6 (06-24 @ 05:43)  Albumin, Serum: 3.1 <L> (06-23 @ 05:59)    Alanine Aminotransferase (ALT/SGPT): 13 (06-24 @ 05:43)  Alanine Aminotransferase (ALT/SGPT): 11 (06-23 @ 05:59)  Alkaline Phosphatase, Serum: 67 (06-24 @ 05:43)  Alkaline Phosphatase, Serum: 62 (06-23 @ 05:59)  Aspartate Aminotransferase (AST/SGOT): 18 (06-24 @ 05:43)  Aspartate Aminotransferase (AST/SGOT): 18 (06-23 @ 05:59)        06-24    140  |  97<L>  |  30<H>  ----------------------------<  99  3.4<L>   |  27  |  2.17<H>    Ca    9.1      24 Jun 2020 05:43  Phos  6.4     06-24  Mg     2.1     06-24    TPro  6.6  /  Alb  3.6  /  TBili  0.3  /  DBili  x   /  AST  18  /  ALT  13  /  AlkPhos  67  06-24                        11.2   7.03  )-----------( 324      ( 24 Jun 2020 05:43 )             35.6     Medications  MEDICATIONS  (STANDING):  aspirin enteric coated 81 milliGRAM(s) Oral daily  atorvastatin 40 milliGRAM(s) Oral at bedtime  carvedilol 3.125 milliGRAM(s) Oral every 12 hours  chlorhexidine 4% Liquid 1 Application(s) Topical daily  clopidogrel Tablet 75 milliGRAM(s) Oral daily  dextrose 5%. 1000 milliLiter(s) (50 mL/Hr) IV Continuous <Continuous>  dextrose 50% Injectable 12.5 Gram(s) IV Push once  dextrose 50% Injectable 25 Gram(s) IV Push once  dextrose 50% Injectable 25 Gram(s) IV Push once  furosemide   Injectable 40 milliGRAM(s) IV Push two times a day  heparin  Infusion 800 Unit(s)/Hr (8 mL/Hr) IV Continuous <Continuous>  hydrALAZINE 25 milliGRAM(s) Oral three times a day  insulin glargine Injectable (LANTUS) 40 Unit(s) SubCutaneous at bedtime  insulin lispro (HumaLOG) corrective regimen sliding scale   SubCutaneous three times a day before meals  insulin lispro (HumaLOG) corrective regimen sliding scale   SubCutaneous at bedtime  insulin lispro Injectable (HumaLOG) 7 Unit(s) SubCutaneous three times a day before meals  potassium chloride    Tablet ER 40 milliEquivalent(s) Oral every 4 hours  risperiDONE   Tablet 1 milliGRAM(s) Oral daily      Physical Exam  General: Patient comfortable in bed  Vital Signs Last 12 Hrs  T(F): 98.6 (06-24-20 @ 05:37), Max: 98.7 (06-24-20 @ 00:14)  HR: 86 (06-24-20 @ 05:37) (86 - 91)  BP: 167/86 (06-24-20 @ 05:37) (167/86 - 183/88)  BP(mean): --  RR: 17 (06-24-20 @ 05:37) (17 - 17)  SpO2: 100% (06-24-20 @ 05:37) (99% - 100%)  Neck: No palpable thyroid nodules.  CVS: S1S2, No murmurs  Respiratory: No wheezing, no crepitations  GI: Abdomen soft, bowel sounds positive  Musculoskeletal:  edema lower extremities.   Skin: No skin rashes, no ecchymosis    Diagnostics

## 2020-06-24 NOTE — PHYSICAL THERAPY INITIAL EVALUATION ADULT - ADDITIONAL COMMENTS
Pt states she lives with her daughter in private home where there are no steps to negotiate. Pt states she would occasionally need assist with ADL. Pt denies using assistive device prior to admission.   Per  assessment, pt lives alone and was using assistive device prior to admission. Pt has home health aide who comes 7 days week for 6.5 hours day. Pt has flight to get to her bedroom/bathroom.

## 2020-06-24 NOTE — PROGRESS NOTE ADULT - SUBJECTIVE AND OBJECTIVE BOX
S: events noted; pt. alert and oriented this am; no chest pain or sob; Review of systems otherwise (-)  	  aspirin enteric coated 81 milliGRAM(s) Oral daily  atorvastatin 40 milliGRAM(s) Oral at bedtime  carvedilol 3.125 milliGRAM(s) Oral every 12 hours  chlorhexidine 4% Liquid 1 Application(s) Topical daily  clopidogrel Tablet 75 milliGRAM(s) Oral daily  dextrose 40% Gel 15 Gram(s) Oral once PRN  dextrose 5%. 1000 milliLiter(s) IV Continuous <Continuous>  dextrose 50% Injectable 12.5 Gram(s) IV Push once  dextrose 50% Injectable 25 Gram(s) IV Push once  dextrose 50% Injectable 25 Gram(s) IV Push once  furosemide   Injectable 40 milliGRAM(s) IV Push two times a day  glucagon  Injectable 1 milliGRAM(s) IntraMuscular once PRN  heparin  Infusion 800 Unit(s)/Hr IV Continuous <Continuous>  hydrALAZINE 25 milliGRAM(s) Oral three times a day  insulin glargine Injectable (LANTUS) 40 Unit(s) SubCutaneous at bedtime  insulin lispro (HumaLOG) corrective regimen sliding scale   SubCutaneous three times a day before meals  insulin lispro (HumaLOG) corrective regimen sliding scale   SubCutaneous at bedtime  insulin lispro Injectable (HumaLOG) 7 Unit(s) SubCutaneous three times a day before meals  risperiDONE   Tablet 1 milliGRAM(s) Oral daily                            11.2   7.03  )-----------( 324      ( 24 Jun 2020 05:43 )             35.6       06-24    140  |  97<L>  |  30<H>  ----------------------------<  99  3.4<L>   |  27  |  2.17<H>    Ca    9.1      24 Jun 2020 05:43  Phos  6.4     06-24  Mg     2.1     06-24    TPro  6.6  /  Alb  3.6  /  TBili  0.3  /  DBili  x   /  AST  18  /  ALT  13  /  AlkPhos  67  06-24      CARDIAC MARKERS ( 21 Jun 2020 16:04 )  x     / x     / 265 u/L / 14.09 ng/mL / x            T(C): 36.8 (06-24-20 @ 13:18), Max: 37.1 (06-24-20 @ 00:14)  HR: 90 (06-24-20 @ 13:18) (86 - 95)  BP: 150/76 (06-24-20 @ 13:18) (143/66 - 183/88)  RR: 17 (06-24-20 @ 13:18) (17 - 18)  SpO2: 100% (06-24-20 @ 13:18) (99% - 100%)  Wt(kg): --    I&O's Summary    23 Jun 2020 07:01  -  24 Jun 2020 07:00  --------------------------------------------------------  IN: 88 mL / OUT: 1000 mL / NET: -912 mL    24 Jun 2020 07:01  -  24 Jun 2020 13:20  --------------------------------------------------------  IN: 0 mL / OUT: 700 mL / NET: -700 mL      Gen: Appears well in NAD  HEENT:  (-)icterus (-)pallor  CV: N S1 S2 RRR (+)2 Pulses B/l  Resp:  Clear to auscultation  B/L, normal effort  GI: (+) BS Soft, NT, ND  Lymph:  (-)Edema, (-)obvious lymphadenopathy  Skin: Warm to touch, Normal turgor  Psych: Appropriate mood and affect; AO X 3    TELEMETRY: SR    < from: Transthoracic Echocardiogram (06.21.20 @ 12:47) >  CONCLUSIONS:  1. Mitral annular calcification, otherwise normal mitral  valve. Moderate mitral regurgitation.  2. Normal left ventricular internal dimensions and wall  thicknesses.  3. Hyperdynamic left ventricle.  4. The right ventricle is not well visualized; grossly  normal right ventricular systolic function.  5. Moderate pericardial effusion (measures about 1.3 cm  lateral to RA on apical four-chamber views).  6. Left pleural effusion.  ------------------------------------  < end of copied text >    < from: CT Angio Chest w/ IV Cont (06.20.20 @ 22:37) >    IMPRESSION:   No pulmonary embolism.  No gross CT evidence of pneumonia.  Mild left heart enlargement.  Mild interlobular septal thickening in both lungs may reflect interstitial pulmonary edema.  Small to moderate pleural effusions bilaterally.  Small pericardial effusion.    < end of copied text >      ASSESSMENT/PLAN: 	    74 year old female with history of HTN, HLD, DM, recent COVID infection in April of 2020 who presented to the ED with LE swelling for 1-2 weeks.    -pt. found to have elevated troponin with no chest pain or anginal symptoms  -will eventually need ischemic evaluation however given SUNIL and acute DVT, will hold off for now until medically optimized  -pt. with ? ams last night - upon evaluation, pt. was AO X 3 and CTH negative for ICH  -will consult neuro for further workup  -ac for DVT per heme  -continue with coreg for NSVT seen previously on tele  -EP follow up  -Renal follow up   -further cardiac workup pending above    Donis Jaquez MD

## 2020-06-24 NOTE — PHYSICAL THERAPY INITIAL EVALUATION ADULT - PERTINENT HX OF CURRENT PROBLEM, REHAB EVAL
Patient is a 75 y/o F PMH HTN, HLD, DM2, Depression, COVID-19 (4/20) p/w B/L LE swelling X1-2 weeks. Reports no other symptoms, no change in functional status, no change in diet, compliant with all her medications, follows up regularly with her PMD. Reports no changes in meds since admission in April 2020 for COVID-19.

## 2020-06-24 NOTE — CONSULT NOTE ADULT - SUBJECTIVE AND OBJECTIVE BOX
[facility] [place]        Patient is a 74y old  Female who presents with a chief complaint of LE swelling (2020 14:03)    Excerpt from H&P,"  HPI:  ***Patient is a poor historian***  Patient is a 73 y/o F PMH HTN, HLD, DM2, Depression, COVID-19 () p/w B/L LE swelling X1-2 weeks. Reports no other symptoms, no change in functional status, no change in diet, compliant with all her medications, follows up regularly with her PMD. Reports no changes in meds since admission in 2020 for COVID-19. (2020 23:05)           *****PAST MEDICAL / Surgical  HISTORY:  PAST MEDICAL & SURGICAL HISTORY:  Anxiety and depression  Hypercholesterolemia  Hypertension  Diabetes mellitus  No significant past surgical history           *****FAMILY HISTORY:  FAMILY HISTORY:  No pertinent family history in first degree relatives           *****SOCIAL HISTORY:  Alcohol: None  Smoking: None         *****ALLERGIES:   Allergies    peanuts (Short breath; Hives)  penicillin (Short breath; Hives)    Intolerances             *****MEDICATIONS: current medication reviewed and documented.   MEDICATIONS  (STANDING):  aspirin enteric coated 81 milliGRAM(s) Oral daily  atorvastatin 40 milliGRAM(s) Oral at bedtime  carvedilol 3.125 milliGRAM(s) Oral every 12 hours  chlorhexidine 4% Liquid 1 Application(s) Topical daily  clopidogrel Tablet 75 milliGRAM(s) Oral daily  dextrose 5%. 1000 milliLiter(s) (50 mL/Hr) IV Continuous <Continuous>  dextrose 50% Injectable 12.5 Gram(s) IV Push once  dextrose 50% Injectable 25 Gram(s) IV Push once  dextrose 50% Injectable 25 Gram(s) IV Push once  furosemide   Injectable 40 milliGRAM(s) IV Push two times a day  heparin  Infusion 800 Unit(s)/Hr (8 mL/Hr) IV Continuous <Continuous>  hydrALAZINE 25 milliGRAM(s) Oral three times a day  insulin glargine Injectable (LANTUS) 40 Unit(s) SubCutaneous at bedtime  insulin lispro (HumaLOG) corrective regimen sliding scale   SubCutaneous three times a day before meals  insulin lispro (HumaLOG) corrective regimen sliding scale   SubCutaneous at bedtime  insulin lispro Injectable (HumaLOG) 7 Unit(s) SubCutaneous three times a day before meals  risperiDONE   Tablet 1 milliGRAM(s) Oral daily    MEDICATIONS  (PRN):  dextrose 40% Gel 15 Gram(s) Oral once PRN Blood Glucose LESS THAN 70 milliGRAM(s)/deciLiter  glucagon  Injectable 1 milliGRAM(s) IntraMuscular once PRN Glucose <70 milliGRAM(s)/deciLiter           *****REVIEW OF SYSTEM:  GEN: no fever, no chills, no pain  RESP: no SOB, no cough, no sputum  CVS: no chest pain, no palpitations, no edema  GI: no abdominal pain, no nausea, no vomiting, no constipation, no diarrhea  : no dysurea, no frequency, no hematurea  Neuro: no headache, no dizziness  PSYCH: no anxiety, no depression  Derm : no itching, no rash         *****VITAL SIGNS:  T(C): 37.1 (20 @ 22:00), Max: 37.1 (20 @ 00:14)  HR: 93 (20 @ 22:00) (86 - 94)  BP: 135/55 (20 @ 22:00) (135/55 - 183/88)  RR: 18 (20 @ 22:00) (17 - 18)  SpO2: 100% (20 @ 22:00) (99% - 100%)  Wt(kg): --     @ 07:01  -   @ 07:00  --------------------------------------------------------  IN: 88 mL / OUT: 1000 mL / NET: -912 mL     @ 07:01  -   @ 23:55  --------------------------------------------------------  IN: 0 mL / OUT: 1100 mL / NET: -1100 mL             *****PHYSICAL EXAM:   Alert oriented x 2  Attention comprehension are poor  Able to name, repeat,   without any difficulty.   Able to follow1 step commands.     EOMI fundi not visualized,  VFF to confrontration  No facial asymmetry   Tongue is midline   Palate elevates symmetrically   Moving all 4 ext symmetrically antigravity   limited exam   sensation is grossly symmetric  Gait : not assessed.  B/L down going toes               *****LAB AND IMAGIN.2   7.03  )-----------( 324      ( 2020 05:43 )             35.6               06-24    138  |  98  |  41<H>  ----------------------------<  160<H>  4.4   |  26  |  2.28<H>    Ca    8.9      2020 22:00  Phos  6.4     06-24  Mg     2.1     06-24    TPro  6.6  /  Alb  3.6  /  TBili  0.3  /  DBili  x   /  AST  18  /  ALT  13  /  AlkPhos  67  06-24    PT/INR - ( 2020 05:43 )   PT: 11.3 SEC;   INR: 0.99          PTT - ( 2020 05:43 )  PTT:81.3 SEC                            [All pertinent recent Imaging reports reviewed]         *****A S S E S S M E N T   A N D   P L A N :        Patient is a 73 y/o F PMH HTN, HLD, DM2, Depression, COVID-19 () p/w B/L LE swelling X1-2 weeks. Reports no other symptoms, no change in functional status, no change in diet, compliant with all her medications, follows up regularly with her PMD. Reports no changes in meds since admission in 2020 for COVID-19. (2020 23:05)      Problem/Recommendations 1: ams of unclear etiology   likely underlying dementia related sundowning   can reorient often   avoid day time somnolence   depakote 125 if needed.     ct no acute path   will continue to monitor 	      ___________________________  Will follow with you.  Thank you,  Alice Kerr MD  Diplomate of the American Board of Neurology and Psychiatry.  Diplomate of the American Board of Vascular Neurology.   [facility]   Ph: [phone]    Differential diagnosis and plan of care discussed with patient after the evaluation.   Advanced care planning options discussed.   Pain assessed and judicious use of narcotics when appropriate was discussed.  Importance of Fall prevention discussed.  Counseling on Smoking and Alcohol cessation was offered when appropriate.  Counseling on Diet, exercise, and medication compliance was done.   83 minutes spent on the total encounter;  more than 50 % of the visit was spent on counseling  and or coordinating care by the attending physician.    Thank you for allowing me to participate in the care of this edel patient. Please do not hesitate to call me if you have any questions.     This and subsequent notes were partially created using voice recognition software and will  inherently be subject to errors including those of syntax and sound alike substitutions which may escape proofreading. In such instances original meaning may be extrapolated by contextual derivation.

## 2020-06-24 NOTE — PROGRESS NOTE ADULT - SUBJECTIVE AND OBJECTIVE BOX
INTERVAL HPI/OVERNIGHT EVENTS: I feel fine .  Vital Signs Last 24 Hrs  T(C): 37 (2020 05:37), Max: 37.1 (2020 00:14)  T(F): 98.6 (2020 05:37), Max: 98.7 (2020 00:14)  HR: 86 (2020 05:37) (86 - 95)  BP: 167/86 (2020 05:37) (143/66 - 183/88)  BP(mean): --  RR: 17 (2020 05:37) (17 - 18)  SpO2: 100% (2020 05:37) (99% - 100%)  I&O's Summary    2020 07:01  -  2020 07:00  --------------------------------------------------------  IN: 88 mL / OUT: 1000 mL / NET: -912 mL      MEDICATIONS  (STANDING):  aspirin enteric coated 81 milliGRAM(s) Oral daily  atorvastatin 40 milliGRAM(s) Oral at bedtime  carvedilol 3.125 milliGRAM(s) Oral every 12 hours  chlorhexidine 4% Liquid 1 Application(s) Topical daily  clopidogrel Tablet 75 milliGRAM(s) Oral daily  dextrose 5%. 1000 milliLiter(s) (50 mL/Hr) IV Continuous <Continuous>  dextrose 50% Injectable 12.5 Gram(s) IV Push once  dextrose 50% Injectable 25 Gram(s) IV Push once  dextrose 50% Injectable 25 Gram(s) IV Push once  furosemide   Injectable 40 milliGRAM(s) IV Push two times a day  heparin  Infusion 800 Unit(s)/Hr (8 mL/Hr) IV Continuous <Continuous>  hydrALAZINE 25 milliGRAM(s) Oral three times a day  insulin glargine Injectable (LANTUS) 40 Unit(s) SubCutaneous at bedtime  insulin lispro (HumaLOG) corrective regimen sliding scale   SubCutaneous three times a day before meals  insulin lispro (HumaLOG) corrective regimen sliding scale   SubCutaneous at bedtime  insulin lispro Injectable (HumaLOG) 7 Unit(s) SubCutaneous three times a day before meals  potassium chloride    Tablet ER 40 milliEquivalent(s) Oral every 4 hours  risperiDONE   Tablet 1 milliGRAM(s) Oral daily    MEDICATIONS  (PRN):  dextrose 40% Gel 15 Gram(s) Oral once PRN Blood Glucose LESS THAN 70 milliGRAM(s)/deciLiter  glucagon  Injectable 1 milliGRAM(s) IntraMuscular once PRN Glucose <70 milliGRAM(s)/deciLiter    LABS:                        11.2   7.03  )-----------( 324      ( 2020 05:43 )             35.6     06-24    140  |  97<L>  |  30<H>  ----------------------------<  99  3.4<L>   |  27  |  2.17<H>    Ca    9.1      2020 05:43  Phos  6.4     06-24  Mg     2.1     06-24    TPro  6.6  /  Alb  3.6  /  TBili  0.3  /  DBili  x   /  AST  18  /  ALT  13  /  AlkPhos  67  06-24    PT/INR - ( 2020 05:43 )   PT: 11.3 SEC;   INR: 0.99          PTT - ( 2020 05:43 )  PTT:81.3 SEC  Urinalysis Basic - ( 2020 17:00 )    Color: COLORLESS / Appearance: CLEAR / S.009 / pH: 8.0  Gluc: NEGATIVE / Ketone: NEGATIVE  / Bili: NEGATIVE / Urobili: NORMAL   Blood: NEGATIVE / Protein: 100 / Nitrite: NEGATIVE   Leuk Esterase: NEGATIVE / RBC: 0-2 / WBC 0-2   Sq Epi: OCC / Non Sq Epi: x / Bacteria: FEW      CAPILLARY BLOOD GLUCOSE      POCT Blood Glucose.: 141 mg/dL (2020 11:59)  POCT Blood Glucose.: 117 mg/dL (2020 08:00)  POCT Blood Glucose.: 151 mg/dL (2020 01:14)  POCT Blood Glucose.: 207 mg/dL (2020 21:48)  POCT Blood Glucose.: 134 mg/dL (2020 17:05)        Urinalysis Basic - ( 2020 17:00 )    Color: COLORLESS / Appearance: CLEAR / S.009 / pH: 8.0  Gluc: NEGATIVE / Ketone: NEGATIVE  / Bili: NEGATIVE / Urobili: NORMAL   Blood: NEGATIVE / Protein: 100 / Nitrite: NEGATIVE   Leuk Esterase: NEGATIVE / RBC: 0-2 / WBC 0-2   Sq Epi: OCC / Non Sq Epi: x / Bacteria: FEW      REVIEW OF SYSTEMS:  CONSTITUTIONAL: No fever, weight loss, or fatigue  EYES: No eye pain, visual disturbances, or discharge  ENMT:  No difficulty hearing, tinnitus, vertigo; No sinus or throat pain  NECK: No pain or stiffness  RESPIRATORY: No cough, wheezing, chills or hemoptysis; No shortness of breath  CARDIOVASCULAR: No chest pain, palpitations, dizziness, or leg swelling  GASTROINTESTINAL: No abdominal or epigastric pain. No nausea, vomiting, or hematemesis; No diarrhea or constipation. No melena or hematochezia.  GENITOURINARY: No dysuria, frequency, hematuria, or incontinence  NEUROLOGICAL: No headaches, memory loss, loss of strength, numbness, or tremors      Consultant(s) Notes Reviewed:  [x ] YES  [ ] NO    PHYSICAL EXAM:  GENERAL: NAD, well-groomed, well-developed, not in any distress ,  HEAD:  Atraumatic, Normocephalic  EYES: EOMI, PERRLA, conjunctiva and sclera clear  ENMT: No tonsillar erythema, exudates, or enlargement; Moist mucous membranes, Good dentition, No lesions  NECK: Supple, No JVD, Normal thyroid  NERVOUS SYSTEM:  Alert & Oriented X3, No focal deficit   CHEST/LUNG: Good air entry bilateral with no  rales, rhonchi, wheezing, or rubs  HEART: Regular rate and rhythm; No murmurs, rubs, or gallops  ABDOMEN: Soft, Nontender, Nondistended; Bowel sounds present  EXTREMITIES:  2+ Peripheral Pulses, No clubbing, cyanosis, or edema  SKIN: No rashes or lesions    Care Discussed with Consultants/Other Providers [ x] YES  [ ] NO

## 2020-06-25 LAB
ANION GAP SERPL CALC-SCNC: 11 MMO/L — SIGNIFICANT CHANGE UP (ref 7–14)
BASOPHILS # BLD AUTO: 0.03 K/UL — SIGNIFICANT CHANGE UP (ref 0–0.2)
BASOPHILS NFR BLD AUTO: 0.4 % — SIGNIFICANT CHANGE UP (ref 0–2)
BUN SERPL-MCNC: 45 MG/DL — HIGH (ref 7–23)
C3 SERPL-MCNC: 162.8 MG/DL — SIGNIFICANT CHANGE UP (ref 90–180)
C4 SERPL-MCNC: 40.2 MG/DL — HIGH (ref 10–40)
CALCIUM SERPL-MCNC: 8.5 MG/DL — SIGNIFICANT CHANGE UP (ref 8.4–10.5)
CHLORIDE SERPL-SCNC: 98 MMOL/L — SIGNIFICANT CHANGE UP (ref 98–107)
CO2 SERPL-SCNC: 25 MMOL/L — SIGNIFICANT CHANGE UP (ref 22–31)
CREAT SERPL-MCNC: 2.19 MG/DL — HIGH (ref 0.5–1.3)
EOSINOPHIL # BLD AUTO: 0.23 K/UL — SIGNIFICANT CHANGE UP (ref 0–0.5)
EOSINOPHIL NFR BLD AUTO: 3.4 % — SIGNIFICANT CHANGE UP (ref 0–6)
GAS PNL BLDMV: SIGNIFICANT CHANGE UP
GLUCOSE BLDC GLUCOMTR-MCNC: 104 MG/DL — HIGH (ref 70–99)
GLUCOSE BLDC GLUCOMTR-MCNC: 112 MG/DL — HIGH (ref 70–99)
GLUCOSE BLDC GLUCOMTR-MCNC: 189 MG/DL — HIGH (ref 70–99)
GLUCOSE BLDC GLUCOMTR-MCNC: 191 MG/DL — HIGH (ref 70–99)
GLUCOSE SERPL-MCNC: 211 MG/DL — HIGH (ref 70–99)
HBV SURFACE AG SER-ACNC: NEGATIVE — SIGNIFICANT CHANGE UP
HCT VFR BLD CALC: 31.6 % — LOW (ref 34.5–45)
HCV AB S/CO SERPL IA: 0.77 S/CO — SIGNIFICANT CHANGE UP (ref 0–0.99)
HCV AB SERPL-IMP: SIGNIFICANT CHANGE UP
HGB BLD-MCNC: 9.9 G/DL — LOW (ref 11.5–15.5)
IMM GRANULOCYTES NFR BLD AUTO: 0.3 % — SIGNIFICANT CHANGE UP (ref 0–1.5)
KAPPA FREE LIGHT CHAINS, SERUM: 7.52 MG/DL — HIGH (ref 0.33–1.94)
LAMBDA FREE LIGHT CHAINS, SERUM: 4.11 MG/DL — HIGH (ref 0.57–2.63)
LYMPHOCYTES # BLD AUTO: 2.52 K/UL — SIGNIFICANT CHANGE UP (ref 1–3.3)
LYMPHOCYTES # BLD AUTO: 37.6 % — SIGNIFICANT CHANGE UP (ref 13–44)
MAGNESIUM SERPL-MCNC: 2.1 MG/DL — SIGNIFICANT CHANGE UP (ref 1.6–2.6)
MCHC RBC-ENTMCNC: 25.4 PG — LOW (ref 27–34)
MCHC RBC-ENTMCNC: 31.3 % — LOW (ref 32–36)
MCV RBC AUTO: 81.2 FL — SIGNIFICANT CHANGE UP (ref 80–100)
MONOCYTES # BLD AUTO: 0.84 K/UL — SIGNIFICANT CHANGE UP (ref 0–0.9)
MONOCYTES NFR BLD AUTO: 12.5 % — SIGNIFICANT CHANGE UP (ref 2–14)
NEUTROPHILS # BLD AUTO: 3.07 K/UL — SIGNIFICANT CHANGE UP (ref 1.8–7.4)
NEUTROPHILS NFR BLD AUTO: 45.8 % — SIGNIFICANT CHANGE UP (ref 43–77)
NRBC # FLD: 0 K/UL — SIGNIFICANT CHANGE UP (ref 0–0)
PLATELET # BLD AUTO: 287 K/UL — SIGNIFICANT CHANGE UP (ref 150–400)
PMV BLD: 10.7 FL — SIGNIFICANT CHANGE UP (ref 7–13)
POTASSIUM SERPL-MCNC: 4.1 MMOL/L — SIGNIFICANT CHANGE UP (ref 3.5–5.3)
POTASSIUM SERPL-SCNC: 4.1 MMOL/L — SIGNIFICANT CHANGE UP (ref 3.5–5.3)
RBC # BLD: 3.89 M/UL — SIGNIFICANT CHANGE UP (ref 3.8–5.2)
RBC # FLD: 15.7 % — HIGH (ref 10.3–14.5)
SODIUM SERPL-SCNC: 134 MMOL/L — LOW (ref 135–145)
T PALLIDUM AB TITR SER: NEGATIVE — SIGNIFICANT CHANGE UP
WBC # BLD: 6.71 K/UL — SIGNIFICANT CHANGE UP (ref 3.8–10.5)
WBC # FLD AUTO: 6.71 K/UL — SIGNIFICANT CHANGE UP (ref 3.8–10.5)

## 2020-06-25 RX ADMIN — CHLORHEXIDINE GLUCONATE 1 APPLICATION(S): 213 SOLUTION TOPICAL at 11:26

## 2020-06-25 RX ADMIN — Medication 7 UNIT(S): at 16:34

## 2020-06-25 RX ADMIN — CLOPIDOGREL BISULFATE 75 MILLIGRAM(S): 75 TABLET, FILM COATED ORAL at 11:26

## 2020-06-25 RX ADMIN — Medication 25 MILLIGRAM(S): at 17:41

## 2020-06-25 RX ADMIN — Medication 25 MILLIGRAM(S): at 23:16

## 2020-06-25 RX ADMIN — Medication 7 UNIT(S): at 11:42

## 2020-06-25 RX ADMIN — Medication 2: at 09:18

## 2020-06-25 RX ADMIN — Medication 7 UNIT(S): at 09:18

## 2020-06-25 RX ADMIN — INSULIN GLARGINE 40 UNIT(S): 100 INJECTION, SOLUTION SUBCUTANEOUS at 23:16

## 2020-06-25 RX ADMIN — Medication 81 MILLIGRAM(S): at 11:26

## 2020-06-25 RX ADMIN — ATORVASTATIN CALCIUM 40 MILLIGRAM(S): 80 TABLET, FILM COATED ORAL at 23:16

## 2020-06-25 RX ADMIN — Medication 40 MILLIGRAM(S): at 17:47

## 2020-06-25 RX ADMIN — CARVEDILOL PHOSPHATE 3.12 MILLIGRAM(S): 80 CAPSULE, EXTENDED RELEASE ORAL at 17:47

## 2020-06-25 RX ADMIN — Medication 25 MILLIGRAM(S): at 06:15

## 2020-06-25 RX ADMIN — CARVEDILOL PHOSPHATE 3.12 MILLIGRAM(S): 80 CAPSULE, EXTENDED RELEASE ORAL at 06:15

## 2020-06-25 RX ADMIN — Medication 40 MILLIGRAM(S): at 06:15

## 2020-06-25 RX ADMIN — RISPERIDONE 1 MILLIGRAM(S): 4 TABLET ORAL at 11:26

## 2020-06-25 NOTE — PROGRESS NOTE ADULT - SUBJECTIVE AND OBJECTIVE BOX
INTERVAL HPI/OVERNIGHT EVENTS: I feel fine.   Vital Signs Last 24 Hrs  T(C): 36.4 (25 Jun 2020 11:23), Max: 37.1 (24 Jun 2020 22:00)  T(F): 97.5 (25 Jun 2020 11:23), Max: 98.8 (24 Jun 2020 22:00)  HR: 87 (25 Jun 2020 11:23) (87 - 96)  BP: 135/66 (25 Jun 2020 11:23) (135/55 - 156/78)  BP(mean): --  RR: 18 (25 Jun 2020 11:23) (18 - 19)  SpO2: 99% (25 Jun 2020 11:23) (97% - 100%)  I&O's Summary    24 Jun 2020 07:01  -  25 Jun 2020 07:00  --------------------------------------------------------  IN: 96 mL / OUT: 2050 mL / NET: -1954 mL    25 Jun 2020 07:01  -  25 Jun 2020 14:05  --------------------------------------------------------  IN: 0 mL / OUT: 400 mL / NET: -400 mL      MEDICATIONS  (STANDING):  aspirin enteric coated 81 milliGRAM(s) Oral daily  atorvastatin 40 milliGRAM(s) Oral at bedtime  carvedilol 3.125 milliGRAM(s) Oral every 12 hours  chlorhexidine 4% Liquid 1 Application(s) Topical daily  clopidogrel Tablet 75 milliGRAM(s) Oral daily  dextrose 5%. 1000 milliLiter(s) (50 mL/Hr) IV Continuous <Continuous>  dextrose 50% Injectable 12.5 Gram(s) IV Push once  dextrose 50% Injectable 25 Gram(s) IV Push once  dextrose 50% Injectable 25 Gram(s) IV Push once  furosemide   Injectable 40 milliGRAM(s) IV Push two times a day  heparin  Infusion 800 Unit(s)/Hr (8 mL/Hr) IV Continuous <Continuous>  hydrALAZINE 25 milliGRAM(s) Oral three times a day  insulin glargine Injectable (LANTUS) 40 Unit(s) SubCutaneous at bedtime  insulin lispro (HumaLOG) corrective regimen sliding scale   SubCutaneous three times a day before meals  insulin lispro (HumaLOG) corrective regimen sliding scale   SubCutaneous at bedtime  insulin lispro Injectable (HumaLOG) 7 Unit(s) SubCutaneous three times a day before meals  risperiDONE   Tablet 1 milliGRAM(s) Oral daily    MEDICATIONS  (PRN):  dextrose 40% Gel 15 Gram(s) Oral once PRN Blood Glucose LESS THAN 70 milliGRAM(s)/deciLiter  glucagon  Injectable 1 milliGRAM(s) IntraMuscular once PRN Glucose <70 milliGRAM(s)/deciLiter    LABS:                        9.9    6.71  )-----------( 287      ( 25 Jun 2020 05:50 )             31.6     06-25    134<L>  |  98  |  45<H>  ----------------------------<  211<H>  4.1   |  25  |  2.19<H>    Ca    8.5      25 Jun 2020 05:50  Phos  6.4     06-24  Mg     2.1     06-25    TPro  6.6  /  Alb  3.6  /  TBili  0.3  /  DBili  x   /  AST  18  /  ALT  13  /  AlkPhos  67  06-24    PT/INR - ( 24 Jun 2020 05:43 )   PT: 11.3 SEC;   INR: 0.99          PTT - ( 24 Jun 2020 05:43 )  PTT:81.3 SEC    CAPILLARY BLOOD GLUCOSE      POCT Blood Glucose.: 104 mg/dL (25 Jun 2020 12:31)  POCT Blood Glucose.: 189 mg/dL (25 Jun 2020 08:32)  POCT Blood Glucose.: 176 mg/dL (24 Jun 2020 22:56)  POCT Blood Glucose.: 175 mg/dL (24 Jun 2020 21:49)  POCT Blood Glucose.: 220 mg/dL (24 Jun 2020 17:20)          REVIEW OF SYSTEMS:  CONSTITUTIONAL: No fever, weight loss, or fatigue  EYES: No eye pain, visual disturbances, or discharge  ENMT:  No difficulty hearing, tinnitus, vertigo; No sinus or throat pain  RESPIRATORY: No cough, wheezing, chills or hemoptysis; No shortness of breath  CARDIOVASCULAR: No chest pain, palpitations, dizziness, or leg swelling  GASTROINTESTINAL: No abdominal or epigastric pain. No nausea, vomiting, or hematemesis; No diarrhea or constipation. No melena or hematochezia.  GENITOURINARY: No dysuria, frequency, hematuria, or incontinence  NEUROLOGICAL: No headaches, memory loss, loss of strength, numbness, or tremors      Consultant(s) Notes Reviewed:  [x ] YES  [ ] NO    PHYSICAL EXAM:  GENERAL: NAD, well-groomed, well-developed, not in any distress ,  HEAD:  Atraumatic, Normocephalic  EYES: EOMI, PERRLA, conjunctiva and sclera clear  ENMT: No tonsillar erythema, exudates, or enlargement; Moist mucous membranes, Good dentition, No lesions  NECK: Supple, No JVD, Normal thyroid  NERVOUS SYSTEM:  Alert & Oriented X3, No focal deficit   CHEST/LUNG: Good air entry bilateral with no  rales, rhonchi, wheezing, or rubs  HEART: Regular rate and rhythm; No murmurs, rubs, or gallops  ABDOMEN: Soft, Nontender, Nondistended; Bowel sounds present  EXTREMITIES:  2+ Peripheral Pulses, No clubbing, cyanosis, or edema    Care Discussed with Consultants/Other Providers [ x] YES  [ ] NO

## 2020-06-25 NOTE — PROGRESS NOTE ADULT - ASSESSMENT
Assessment  DMT2: 74y Female with DM T2 with hyperglycemia, on low dose insulin, blood sugars in acceptable range, no hypoglycemic episode,  eating meals, compliant with low carb diet.  CHF: on medications, no chest pain, stable, monitored.  Hypothyroidism/subclinical: Apparently no history of thyroid disease, asymptomatic, repeat TFTs euthyroid..  HTN:  Controlled,  on antihypertensive medications.    Eddie Smith MD  Cell: [phone]  Office: [phone] Assessment  DMT2: 74y Female with DM T2 with hyperglycemia, on insulin, blood sugars in acceptable range, no hypoglycemic episode,  eating meals, compliant with low carb diet.  CHF: on medications, no chest pain, stable, monitored.  Hypothyroidism/subclinical: Apparently no history of thyroid disease, asymptomatic, repeat TFTs euthyroid..  HTN:  Controlled,  on antihypertensive medications.    Eddie Smith MD  Cell: [phone]  Office: [phone]

## 2020-06-25 NOTE — PROGRESS NOTE ADULT - SUBJECTIVE AND OBJECTIVE BOX
S: no chest pain or sob; Review of systems otherwise (-)    aspirin enteric coated 81 milliGRAM(s) Oral daily  atorvastatin 40 milliGRAM(s) Oral at bedtime  carvedilol 3.125 milliGRAM(s) Oral every 12 hours  chlorhexidine 4% Liquid 1 Application(s) Topical daily  clopidogrel Tablet 75 milliGRAM(s) Oral daily  dextrose 40% Gel 15 Gram(s) Oral once PRN  dextrose 5%. 1000 milliLiter(s) IV Continuous <Continuous>  dextrose 50% Injectable 12.5 Gram(s) IV Push once  dextrose 50% Injectable 25 Gram(s) IV Push once  dextrose 50% Injectable 25 Gram(s) IV Push once  furosemide   Injectable 40 milliGRAM(s) IV Push two times a day  glucagon  Injectable 1 milliGRAM(s) IntraMuscular once PRN  heparin  Infusion 800 Unit(s)/Hr IV Continuous <Continuous>  hydrALAZINE 25 milliGRAM(s) Oral three times a day  insulin glargine Injectable (LANTUS) 40 Unit(s) SubCutaneous at bedtime  insulin lispro (HumaLOG) corrective regimen sliding scale   SubCutaneous three times a day before meals  insulin lispro (HumaLOG) corrective regimen sliding scale   SubCutaneous at bedtime  insulin lispro Injectable (HumaLOG) 7 Unit(s) SubCutaneous three times a day before meals  risperiDONE   Tablet 1 milliGRAM(s) Oral daily                            9.9    6.71  )-----------( 287      ( 25 Jun 2020 05:50 )             31.6       06-25    134<L>  |  98  |  45<H>  ----------------------------<  211<H>  4.1   |  25  |  2.19<H>    Ca    8.5      25 Jun 2020 05:50  Phos  6.4     06-24  Mg     2.1     06-25    TPro  6.6  /  Alb  3.6  /  TBili  0.3  /  DBili  x   /  AST  18  /  ALT  13  /  AlkPhos  67  06-24            T(C): 36.4 (06-25-20 @ 11:23), Max: 37.1 (06-24-20 @ 22:00)  HR: 87 (06-25-20 @ 11:23) (87 - 96)  BP: 135/66 (06-25-20 @ 11:23) (135/55 - 156/78)  RR: 18 (06-25-20 @ 11:23) (18 - 19)  SpO2: 99% (06-25-20 @ 11:23) (97% - 100%)  Wt(kg): --    I&O's Summary    24 Jun 2020 07:01  -  25 Jun 2020 07:00  --------------------------------------------------------  IN: 96 mL / OUT: 2050 mL / NET: -1954 mL    25 Jun 2020 07:01  -  25 Jun 2020 15:18  --------------------------------------------------------  IN: 0 mL / OUT: 400 mL / NET: -400 mL        Gen: Appears well in NAD  HEENT:  (-)icterus (-)pallor  CV: N S1 S2 RRR (+)2 Pulses B/l  Resp:  Clear to auscultation  B/L, normal effort  GI: (+) BS Soft, NT, ND  Lymph:  (-)Edema, (-)obvious lymphadenopathy  Skin: Warm to touch, Normal turgor  Psych: Appropriate mood and affect; AO X 3    TELEMETRY: SR    < from: Transthoracic Echocardiogram (06.21.20 @ 12:47) >  CONCLUSIONS:  1. Mitral annular calcification, otherwise normal mitral  valve. Moderate mitral regurgitation.  2. Normal left ventricular internal dimensions and wall  thicknesses.  3. Hyperdynamic left ventricle.  4. The right ventricle is not well visualized; grossly  normal right ventricular systolic function.  5. Moderate pericardial effusion (measures about 1.3 cm  lateral to RA on apical four-chamber views).  6. Left pleural effusion.  ------------------------------------  < end of copied text >    < from: CT Angio Chest w/ IV Cont (06.20.20 @ 22:37) >    IMPRESSION:   No pulmonary embolism.  No gross CT evidence of pneumonia.  Mild left heart enlargement.  Mild interlobular septal thickening in both lungs may reflect interstitial pulmonary edema.  Small to moderate pleural effusions bilaterally.  Small pericardial effusion.    < end of copied text >      ASSESSMENT/PLAN: 	    74 year old female with history of HTN, HLD, DM, recent COVID infection in April of 2020 who presented to the ED with LE swelling for 1-2 weeks.    -pt. found to have elevated troponin with no chest pain or anginal symptoms  -will eventually need ischemic evaluation however given SUNIL and acute DVT, will hold off for now until medically optimized  -neuro follow up   -ac for DVT per heme - H/H around baseline - pt. with no signs of bleeding - monitor cbc   -continue with coreg for NSVT seen previously on tele  -EP follow up  -Renal follow up   -further cardiac workup pending above    Donis Jaquez MD

## 2020-06-25 NOTE — PROGRESS NOTE ADULT - SUBJECTIVE AND OBJECTIVE BOX
[facility]      Seen earlier today, and examined.  - Today, patient is without complaints.           *****MEDICATIONS: Current medication reviewed and documented.    MEDICATIONS  (STANDING):  aspirin enteric coated 81 milliGRAM(s) Oral daily  atorvastatin 40 milliGRAM(s) Oral at bedtime  carvedilol 3.125 milliGRAM(s) Oral every 12 hours  chlorhexidine 4% Liquid 1 Application(s) Topical daily  clopidogrel Tablet 75 milliGRAM(s) Oral daily  dextrose 5%. 1000 milliLiter(s) (50 mL/Hr) IV Continuous <Continuous>  dextrose 50% Injectable 12.5 Gram(s) IV Push once  dextrose 50% Injectable 25 Gram(s) IV Push once  dextrose 50% Injectable 25 Gram(s) IV Push once  furosemide   Injectable 40 milliGRAM(s) IV Push two times a day  heparin  Infusion 800 Unit(s)/Hr (8 mL/Hr) IV Continuous <Continuous>  hydrALAZINE 25 milliGRAM(s) Oral three times a day  insulin glargine Injectable (LANTUS) 40 Unit(s) SubCutaneous at bedtime  insulin lispro (HumaLOG) corrective regimen sliding scale   SubCutaneous three times a day before meals  insulin lispro (HumaLOG) corrective regimen sliding scale   SubCutaneous at bedtime  insulin lispro Injectable (HumaLOG) 7 Unit(s) SubCutaneous three times a day before meals  risperiDONE   Tablet 1 milliGRAM(s) Oral daily    MEDICATIONS  (PRN):  dextrose 40% Gel 15 Gram(s) Oral once PRN Blood Glucose LESS THAN 70 milliGRAM(s)/deciLiter  glucagon  Injectable 1 milliGRAM(s) IntraMuscular once PRN Glucose <70 milliGRAM(s)/deciLiter          ***** VITAL SIGNS:  T(F): 98.7 (20 @ 17:23), Max: 98.8 (20 @ 22:00)  HR: 82 (20 @ 17:23) (82 - 96)  BP: 160/73 (20 @ 17:23) (135/55 - 160/73)  RR: 18 (20 @ 17:23) (18 - 19)  SpO2: 98% (20 @ 17:23) (97% - 100%)  Wt(kg): --  ,   I&O's Summary    2020 07:01  -  2020 07:00  --------------------------------------------------------  IN: 96 mL / OUT: 2050 mL / NET: -1954 mL    2020 07:01  -  2020 19:47  --------------------------------------------------------  IN: 0 mL / OUT: 1100 mL / NET: -1100 mL             *****PHYSICAL EXAM: Alert oriented x 2  Attention comprehension are poor  Able to name, repeat,   without any difficulty.   Able to follow1 step commands.     EOMI fundi not visualized,  VFF to confrontration  No facial asymmetry   Tongue is midline   Palate elevates symmetrically   Moving all 4 ext symmetrically antigravity   limited exam   sensation is grossly symmetric  Gait : not assessed.  B/L down going toes          *****LAB AND IMAGIN.9    6.71  )-----------( 287      ( 2020 05:50 )             31.6               06-25    134<L>  |  98  |  45<H>  ----------------------------<  211<H>  4.1   |  25  |  2.19<H>    Ca    8.5      2020 05:50  Phos  6.4     06-24  Mg     2.1     06-25    TPro  6.6  /  Alb  3.6  /  TBili  0.3  /  DBili  x   /  AST  18  /  ALT  13  /  AlkPhos  67  06-24    PT/INR - ( 2020 05:43 )   PT: 11.3 SEC;   INR: 0.99          PTT - ( 2020 05:43 )  PTT:81.3 SEC                     [All pertinent recent Imaging/Reports reviewed]           *****A S S E S S M E N T   A N D   P L A N :  Patient is a 73 y/o F PMH HTN, HLD, DM2, Depression, COVID-19 (4/20) p/w B/L LE swelling X1-2 weeks. Reports no other symptoms, no change in functional status, no change in diet, compliant with all her medications, follows up regularly with her PMD. Reports no changes in meds since admission in 2020 for COVID-19. (2020 23:05)      Problem/Recommendations 1: ams of unclear etiology   likely underlying dementia related sundowning   can reorient often   avoid day time somnolence   depakote 125 if needed.     ct no acute path   will continue to monitor 	            Thank you for allowing me to participate in the care of this patient. Please do not hesitate to call me if you have any  questions.        ________________  Alice Kerr MD  [facility]  [phone]      33 minutes spent on total encounter; more than 50 % of the visit was  spent counseling about plan of care, compliance to diet/exercise and medication regimen and or  coordinating care by the attending physician.      It is advised that stroke patients follow up with SATHISH Silveira @ [phone] in 1- 2 weeks.   Others please follow up with Dr. Michael Nissenbaum [phone]

## 2020-06-25 NOTE — PROGRESS NOTE ADULT - SUBJECTIVE AND OBJECTIVE BOX
feels well  no new complaints    aspirin enteric coated 81 milliGRAM(s) Oral daily  atorvastatin 40 milliGRAM(s) Oral at bedtime  carvedilol 3.125 milliGRAM(s) Oral every 12 hours  chlorhexidine 4% Liquid 1 Application(s) Topical daily  clopidogrel Tablet 75 milliGRAM(s) Oral daily  dextrose 40% Gel 15 Gram(s) Oral once PRN  dextrose 5%. 1000 milliLiter(s) IV Continuous <Continuous>  dextrose 50% Injectable 12.5 Gram(s) IV Push once  dextrose 50% Injectable 25 Gram(s) IV Push once  dextrose 50% Injectable 25 Gram(s) IV Push once  furosemide   Injectable 40 milliGRAM(s) IV Push two times a day  glucagon  Injectable 1 milliGRAM(s) IntraMuscular once PRN  heparin  Infusion 800 Unit(s)/Hr IV Continuous <Continuous>  hydrALAZINE 25 milliGRAM(s) Oral three times a day  insulin glargine Injectable (LANTUS) 40 Unit(s) SubCutaneous at bedtime  insulin lispro (HumaLOG) corrective regimen sliding scale   SubCutaneous three times a day before meals  insulin lispro (HumaLOG) corrective regimen sliding scale   SubCutaneous at bedtime  insulin lispro Injectable (HumaLOG) 7 Unit(s) SubCutaneous three times a day before meals  risperiDONE   Tablet 1 milliGRAM(s) Oral daily      peanuts (Short breath; Hives)  penicillin (Short breath; Hives)      ROS otherwise negative     T(C): 36.4 (06-25-20 @ 11:23), Max: 37.1 (06-24-20 @ 22:00)  HR: 87 (06-25-20 @ 11:23) (87 - 96)  BP: 135/66 (06-25-20 @ 11:23) (135/55 - 156/78)  RR: 18 (06-25-20 @ 11:23) (18 - 19)  SpO2: 99% (06-25-20 @ 11:23) (97% - 100%)  PHYSICAL EXAM  Gen:  laying in bed, nad  H:  anicteric, eomi  CV:  RRR, S1, S2  Lungs:  CTA b/l  Ab soft/nt/nd  Ext:  no edema                          9.9    6.71  )-----------( 287      ( 25 Jun 2020 05:50 )             31.6                         11.2   7.03  )-----------( 324      ( 24 Jun 2020 05:43 )             35.6                         10.2   7.39  )-----------( 304      ( 23 Jun 2020 05:59 )             32.4   106-25    134<L>  |  98  |  45<H>  ----------------------------<  211<H>  4.1   |  25  |  2.19<H>    Ca    8.5      25 Jun 2020 05:50  Phos  6.4     06-24  Mg     2.1     06-25    TPro  6.6  /  Alb  3.6  /  TBili  0.3  /  DBili  x   /  AST  18  /  ALT  13  /  AlkPhos  67  06-24      Vitamin B12, Serum: 281 pg/mL (06.24.20 @ 05:43)    Folate, Serum in AM (06.24.20 @ 05:43)    Folate, Serum: 11.7:   PLEASE NOTE NEW REFERENCE RANGE ng/mL    Iron with Total Binding Capacity in AM (06.24.20 @ 05:43)    Iron Total, Serum: 58 ug/dL    Unsaturated Iron Binding Capacity: 224.1 ug/dL    Total Iron Binding Capacity.: 282 ug/dL    Ferritin, Serum in AM (06.24.20 @ 05:43)    Ferritin, Serum: 72.33 ng/mL    Lupus Profile (06.24.20 @ 05:43)    DRVVT S/C Ratio: 0.91: RATIO > 2.0 LA IS STRONGLY PRESENT  RATIO 1.5 - 2.0 LA IS MODERATELY PRESENT  RATIO 1.2 - 1.5 LA IS WEAKLY PRESENT    Lupus Profile (06.24.20 @ 05:43)    Prothrombin Time, Plasma: 11.3 SEC    INR: 0.99: RECOMMENDED RANGES FOR THERAPEUTIC INR:    2.0-3.0 FOR MOST MEDICAL AND SURGICAL THROMBOEMBOLIC STATES  2.0-3.0 FOR ATRIAL FIBRILLATION  2.0-3.0 FOR BILEAFLET MECHANICAL VALVE IN AORTIC POSITION  2.5-3.5 FOR MECHANICAL HEART VALVES    CHEST 2004;126:[id number]    THE PRESENCE OF DIRECT THROMBIN INHIBITORS (ARGATROBAN,  REFLUDAN) MAY FALSELY INCREASE RESULTS.    Activated Partial Thromboplastin Time: 81.3: Recommended therapeutic heparin range (full dose) for APTT  is 58-99 seconds.  Recommended therapeutic argatroban range is 1.5 to 3.0 times  the baseline APTT value, not to exceed 100 seconds.  Recommended therapeutic refludan range is 1.5 to 2.5 times  the baseline APTT. SEC    Thrombin Time Assay: 159.7 SEC    DRVVT S/C Ratio: 0.91: RATIO > 2.0 LA IS STRONGLY PRESENT  RATIO 1.5 - 2.0 LA IS MODERATELY PRESENT  RATIO 1.2 - 1.5 LA IS WEAKLY PRESENT    Silica Clotting Time S/C Ratio: 1.00: NOTE: THE PRESENCE OF DIRECT THROMBIN INHIBITORS (SUCH AS  ARGATROBAN, REFLUDAN), UF HEPARIN >0.5 U/ml OR LMW HEPARIN  >1.0 U/ml MAY AFFECT RESULTS.  RATIO > 1.33 IS POSITIVE FOR LUPUS.  RATIO <= 1.33 IS NEGATIVE FOR LUPUS.    Protein C Functional Assay with Reflex: 129 % (06.24.20 @ 05:43)    Lactate Dehydrogenase, Serum: 302 U/L (06.24.20 @ 05:43)    Bilirubin Total, Serum: 0.3 mg/dL (06.24.20 @ 05:43)    Haptoglobin, Serum: 38 mg/dL (06.24.20 @ 05:43)    Reticulocyte Count in AM (06.24.20 @ 05:43)    Reticulocyte Percent: 1.9 %    Absolute Reticulocytes: 83 k/uL

## 2020-06-25 NOTE — PROGRESS NOTE ADULT - ATTENDING COMMENTS
EP ATTENDING    Agree with above. NSVT with normal LVEF. Continue beta blockers, f/u ischemia evaluation. No further inpatient EP workup needed.

## 2020-06-25 NOTE — DIETITIAN INITIAL EVALUATION ADULT. - ADD RECOMMEND
1- Continue current diet order, which remains appropriate at this time. 2- Monitor weights, labs, BM's, skin integrity, p.o. intake. 3- Please Encourage po intake, assist with meals and menu selections, provide alternatives PRN. 4- Suggest outpatient follow up with an endocrinologist to ensure long-term DM diet comprehension and compliance. 5- RD remains available, re-consult as needed. Hoa Hitchcock MS, RDN Pager #[phone]

## 2020-06-25 NOTE — DIETITIAN INITIAL EVALUATION ADULT. - PERTINENT MEDS FT
MEDICATIONS  (STANDING):  aspirin enteric coated 81 milliGRAM(s) Oral daily  atorvastatin 40 milliGRAM(s) Oral at bedtime  carvedilol 3.125 milliGRAM(s) Oral every 12 hours  chlorhexidine 4% Liquid 1 Application(s) Topical daily  clopidogrel Tablet 75 milliGRAM(s) Oral daily  dextrose 5%. 1000 milliLiter(s) (50 mL/Hr) IV Continuous <Continuous>  dextrose 50% Injectable 12.5 Gram(s) IV Push once  dextrose 50% Injectable 25 Gram(s) IV Push once  dextrose 50% Injectable 25 Gram(s) IV Push once  furosemide   Injectable 40 milliGRAM(s) IV Push two times a day  heparin  Infusion 800 Unit(s)/Hr (8 mL/Hr) IV Continuous <Continuous>  hydrALAZINE 25 milliGRAM(s) Oral three times a day  insulin glargine Injectable (LANTUS) 40 Unit(s) SubCutaneous at bedtime  insulin lispro (HumaLOG) corrective regimen sliding scale   SubCutaneous three times a day before meals  insulin lispro (HumaLOG) corrective regimen sliding scale   SubCutaneous at bedtime  insulin lispro Injectable (HumaLOG) 7 Unit(s) SubCutaneous three times a day before meals  risperiDONE   Tablet 1 milliGRAM(s) Oral daily

## 2020-06-25 NOTE — PROGRESS NOTE ADULT - SUBJECTIVE AND OBJECTIVE BOX
EP    tele: NSR, no further NSVT    she denies palpitations, syncope, nor angina, ROS otherwise -      MEDICATIONS  (STANDING):  aspirin enteric coated 81 milliGRAM(s) Oral daily  atorvastatin 40 milliGRAM(s) Oral at bedtime  carvedilol 3.125 milliGRAM(s) Oral every 12 hours  chlorhexidine 4% Liquid 1 Application(s) Topical daily  clopidogrel Tablet 75 milliGRAM(s) Oral daily  dextrose 5%. 1000 milliLiter(s) (50 mL/Hr) IV Continuous <Continuous>  dextrose 50% Injectable 12.5 Gram(s) IV Push once  dextrose 50% Injectable 25 Gram(s) IV Push once  dextrose 50% Injectable 25 Gram(s) IV Push once  furosemide   Injectable 40 milliGRAM(s) IV Push two times a day  heparin  Infusion 800 Unit(s)/Hr (8 mL/Hr) IV Continuous <Continuous>  hydrALAZINE 25 milliGRAM(s) Oral three times a day  insulin glargine Injectable (LANTUS) 40 Unit(s) SubCutaneous at bedtime  insulin lispro (HumaLOG) corrective regimen sliding scale   SubCutaneous three times a day before meals  insulin lispro (HumaLOG) corrective regimen sliding scale   SubCutaneous at bedtime  insulin lispro Injectable (HumaLOG) 7 Unit(s) SubCutaneous three times a day before meals  risperiDONE   Tablet 1 milliGRAM(s) Oral daily    MEDICATIONS  (PRN):  dextrose 40% Gel 15 Gram(s) Oral once PRN Blood Glucose LESS THAN 70 milliGRAM(s)/deciLiter  glucagon  Injectable 1 milliGRAM(s) IntraMuscular once PRN Glucose <70 milliGRAM(s)/deciLiter      LABS:                            9.9    6.71  )-----------( 287      ( 25 Jun 2020 05:50 )             31.6     Hemoglobin: 9.9 g/dL (06-25 @ 05:50)  Hemoglobin: 11.2 g/dL (06-24 @ 05:43)  Hemoglobin: 10.2 g/dL (06-23 @ 05:59)  Hemoglobin: 9.7 g/dL (06-22 @ 05:00)  Hemoglobin: 9.6 g/dL (06-21 @ 07:11)    06-25    134<L>  |  98  |  45<H>  ----------------------------<  211<H>  4.1   |  25  |  2.19<H>    Ca    8.5      25 Jun 2020 05:50  Phos  6.4     06-24  Mg     2.1     06-25    TPro  6.6  /  Alb  3.6  /  TBili  0.3  /  DBili  x   /  AST  18  /  ALT  13  /  AlkPhos  67  06-24    Creatinine Trend: 2.19<--, 2.28<--, 2.17<--, 2.42<--, 2.25<--, 1.87<--           PHYSICAL EXAM  Vital Signs Last 24 Hrs  T(C): 36.4 (25 Jun 2020 11:23), Max: 37.1 (24 Jun 2020 22:00)  T(F): 97.5 (25 Jun 2020 11:23), Max: 98.8 (24 Jun 2020 22:00)  HR: 87 (25 Jun 2020 11:23) (87 - 96)  BP: 135/66 (25 Jun 2020 11:23) (135/55 - 156/78)  BP(mean): --  RR: 18 (25 Jun 2020 11:23) (18 - 19)  SpO2: 99% (25 Jun 2020 11:23) (97% - 100%)      A&O x 3  neurologically intact  no JVD  RRR, no murmurs  CTAB  soft nt/nd  no c/c/e    echo: moderate MR, normal LVEF    A/P) 74 year old female PMH HTN, hyperlipidemia, DM admitted with LE edema. EP called for NSVT in setting of normal LVEF.    -continue beta blockers for NSVT  -recommend cath  -no further inpatient EP workup expected  -given normal LVEF there is no indication for an ICD nor AAD

## 2020-06-25 NOTE — PROGRESS NOTE ADULT - SUBJECTIVE AND OBJECTIVE BOX
Chief complaint  Patient is a 74y old  Female who presents with a chief complaint of LE swelling (24 Jun 2020 14:54)   Review of systems  Patient in bed, looks comfortable, no fever, no hypoglycemia.    Labs and Fingersticks  CAPILLARY BLOOD GLUCOSE      POCT Blood Glucose.: 189 mg/dL (25 Jun 2020 08:32)  POCT Blood Glucose.: 176 mg/dL (24 Jun 2020 22:56)  POCT Blood Glucose.: 175 mg/dL (24 Jun 2020 21:49)  POCT Blood Glucose.: 220 mg/dL (24 Jun 2020 17:20)  POCT Blood Glucose.: 141 mg/dL (24 Jun 2020 11:59)      Anion Gap, Serum: 11 (06-25 @ 05:50)  Anion Gap, Serum: 14 (06-24 @ 22:00)  Anion Gap, Serum: 16 <H> (06-24 @ 05:43)      Calcium, Total Serum: 8.5 (06-25 @ 05:50)  Calcium, Total Serum: 8.9 (06-24 @ 22:00)  Calcium, Total Serum: 9.1 (06-24 @ 05:43)  Albumin, Serum: 3.6 (06-24 @ 05:43)    Alanine Aminotransferase (ALT/SGPT): 13 (06-24 @ 05:43)  Alkaline Phosphatase, Serum: 67 (06-24 @ 05:43)  Aspartate Aminotransferase (AST/SGOT): 18 (06-24 @ 05:43)        06-25    134<L>  |  98  |  45<H>  ----------------------------<  211<H>  4.1   |  25  |  2.19<H>    Ca    8.5      25 Jun 2020 05:50  Phos  6.4     06-24  Mg     2.1     06-25    TPro  6.6  /  Alb  3.6  /  TBili  0.3  /  DBili  x   /  AST  18  /  ALT  13  /  AlkPhos  67  06-24                        9.9    6.71  )-----------( 287      ( 25 Jun 2020 05:50 )             31.6     Medications  MEDICATIONS  (STANDING):  aspirin enteric coated 81 milliGRAM(s) Oral daily  atorvastatin 40 milliGRAM(s) Oral at bedtime  carvedilol 3.125 milliGRAM(s) Oral every 12 hours  chlorhexidine 4% Liquid 1 Application(s) Topical daily  clopidogrel Tablet 75 milliGRAM(s) Oral daily  dextrose 5%. 1000 milliLiter(s) (50 mL/Hr) IV Continuous <Continuous>  dextrose 50% Injectable 12.5 Gram(s) IV Push once  dextrose 50% Injectable 25 Gram(s) IV Push once  dextrose 50% Injectable 25 Gram(s) IV Push once  furosemide   Injectable 40 milliGRAM(s) IV Push two times a day  heparin  Infusion 800 Unit(s)/Hr (8 mL/Hr) IV Continuous <Continuous>  hydrALAZINE 25 milliGRAM(s) Oral three times a day  insulin glargine Injectable (LANTUS) 40 Unit(s) SubCutaneous at bedtime  insulin lispro (HumaLOG) corrective regimen sliding scale   SubCutaneous three times a day before meals  insulin lispro (HumaLOG) corrective regimen sliding scale   SubCutaneous at bedtime  insulin lispro Injectable (HumaLOG) 7 Unit(s) SubCutaneous three times a day before meals  risperiDONE   Tablet 1 milliGRAM(s) Oral daily      Physical Exam  General: Patient comfortable in bed  Vital Signs Last 12 Hrs  T(F): 98.7 (06-25-20 @ 06:00), Max: 98.7 (06-25-20 @ 06:00)  HR: 91 (06-25-20 @ 06:00) (91 - 96)  BP: 142/96 (06-25-20 @ 06:00) (142/96 - 156/78)  BP(mean): --  RR: 19 (06-25-20 @ 06:00) (18 - 19)  SpO2: 97% (06-25-20 @ 06:00) (97% - 98%)  Neck: No palpable thyroid nodules.  CVS: S1S2, No murmurs  Respiratory: No wheezing, no crepitations  GI: Abdomen soft, bowel sounds positive  Musculoskeletal:  edema lower extremities.   Skin: No skin rashes, no ecchymosis    Diagnostics

## 2020-06-25 NOTE — PROGRESS NOTE ADULT - SUBJECTIVE AND OBJECTIVE BOX
[facility]   MD MICHAEL STOUT DO ANAM SIDDIQUI ANGELA WONG, PA    TEL:  OFFICE: [phone]  DR FLORES CELL: [phone]  DR. HUSSEIN CELL: [phone]  DR. HODGES CELL: [phone]  WESTLEY DAVE CELL: [phone]    From 5pm-7am Answering Service [phone]    Patient is a 74y old  Female who presents with a chief complaint of LE swelling (25 Jun 2020 10:37)      Patient seen and examined at bedside. No chest pain/sob    VITALS:  T(F): 98.7 (06-25-20 @ 06:00), Max: 98.8 (06-24-20 @ 22:00)  HR: 91 (06-25-20 @ 06:00)  BP: 142/96 (06-25-20 @ 06:00)  RR: 19 (06-25-20 @ 06:00)  SpO2: 97% (06-25-20 @ 06:00)  Wt(kg): --    06-24 @ 07:01  -  06-25 @ 07:00  --------------------------------------------------------  IN: 96 mL / OUT: 2050 mL / NET: -1954 mL          PHYSICAL EXAM:  Constitutional: NAD  Neck: No JVD  Respiratory: CTAB, no wheezes, rales or rhonchi  Cardiovascular: S1, S2, RRR  Gastrointestinal: BS+, soft, NT/ND  Extremities: trace peripheral edema    Hospital Medications:   MEDICATIONS  (STANDING):  aspirin enteric coated 81 milliGRAM(s) Oral daily  atorvastatin 40 milliGRAM(s) Oral at bedtime  carvedilol 3.125 milliGRAM(s) Oral every 12 hours  chlorhexidine 4% Liquid 1 Application(s) Topical daily  clopidogrel Tablet 75 milliGRAM(s) Oral daily  dextrose 5%. 1000 milliLiter(s) (50 mL/Hr) IV Continuous <Continuous>  dextrose 50% Injectable 12.5 Gram(s) IV Push once  dextrose 50% Injectable 25 Gram(s) IV Push once  dextrose 50% Injectable 25 Gram(s) IV Push once  furosemide   Injectable 40 milliGRAM(s) IV Push two times a day  heparin  Infusion 800 Unit(s)/Hr (8 mL/Hr) IV Continuous <Continuous>  hydrALAZINE 25 milliGRAM(s) Oral three times a day  insulin glargine Injectable (LANTUS) 40 Unit(s) SubCutaneous at bedtime  insulin lispro (HumaLOG) corrective regimen sliding scale   SubCutaneous three times a day before meals  insulin lispro (HumaLOG) corrective regimen sliding scale   SubCutaneous at bedtime  insulin lispro Injectable (HumaLOG) 7 Unit(s) SubCutaneous three times a day before meals  risperiDONE   Tablet 1 milliGRAM(s) Oral daily      LABS:  06-25    134<L>  |  98  |  45<H>  ----------------------------<  211<H>  4.1   |  25  |  2.19<H>    Ca    8.5      25 Jun 2020 05:50  Phos  6.4     06-24  Mg     2.1     06-25    TPro  6.6  /  Alb  3.6  /  TBili  0.3  /  DBili      /  AST  18  /  ALT  13  /  AlkPhos  67  06-24    Creatinine Trend: 2.19 <--, 2.28 <--, 2.17 <--, 2.42 <--, 2.25 <--, 1.87 <--, 1.41 <--, 1.20 <--, 1.13 <--                                9.9    6.71  )-----------( 287      ( 25 Jun 2020 05:50 )             31.6     Urine Studies:  Urinalysis - [06-22-20 @ 17:00]      Color COLORLESS / Appearance CLEAR / SG 1.009 / pH 8.0      Gluc NEGATIVE / Ketone NEGATIVE  / Bili NEGATIVE / Urobili NORMAL       Blood NEGATIVE / Protein 100 / Leuk Est NEGATIVE / Nitrite NEGATIVE      RBC 0-2 / WBC 0-2 / Hyaline NEGATIVE / Gran  / Sq Epi OCC / Non Sq Epi  / Bacteria FEW    Urine Creatinine 44.70      [06-23-20 @ 20:00]  Urine Protein 111.4      [06-23-20 @ 20:00]  Urine Urea Nitrogen 110      [06-22-20 @ 17:00]    Iron 58, TIBC 282, %sat --      [06-24-20 @ 05:43]  Ferritin 72.33      [06-24-20 @ 05:43]  HbA1c 8.8      [12-13-19 @ 06:00]  TSH 7.68      [06-21-20 @ 07:11]  Lipid: chol 197, , HDL 41,       [12-14-19 @ 03:50]    HBsAg NEGATIVE      [06-25-20 @ 05:50]    C3 Complement 162.8      [06-25-20 @ 05:50]  C4 Complement 40.2      [06-25-20 @ 05:50]    RADIOLOGY & ADDITIONAL STUDIES:

## 2020-06-25 NOTE — PROGRESS NOTE ADULT - ASSESSMENT
1. Acute Left Post Tibial DVT    -- agree w Unfractionated Heparin gtt in light of SUNIL  -- would AC for 3 mos or longer if bed bound  -- Options include coumadin and Apixaban;  for now on heparin gtt pending ischemic w/u plans  -- lupus anticoagulant negative  -- no evidence of protein C  -- check Prot S    2. Microcytic Anemia  -- iron studies normal  -- Hgb Electrophoresis unremarkable  -- unremarkable hemolysis labs  -- SPEP/LOI,   -- trend counts  -- contribution from SUNIL  -- B12/folate low end of normal; check methylmalonic acid and homocysteine level to further evaluate    Cory Okeefe MD  Hematology/Oncology  Cell:  [phone]  Office Phone: [phone]  Office Fax:  [phone] [address] [place] [address]

## 2020-06-25 NOTE — PROGRESS NOTE ADULT - ASSESSMENT
74 year old female with PMH of HTN, HLD, DM, recent COVID infection in April 2020 who presented with LE swelling for weeks, found to have HF exacerbation and treated with diuretics; patient was found to have Wenchebach on telemetry and had 23 beats of NSVT, as well as elevated cardiac enzymes, warranting transfer to CCU for continued monitoring and ischemic workup for likely NSTEMI.    SUNIL on CKD III  Baseline Scr 1 with GFR 45 12/2019   scr on admission 1.13, scr peaked 2.42  Scr improving slightly today  SUNIL likely sec to JUNIOR (CTA 6/20) and/or ATN   FEurea>50%  Pt non oliguric   Pt hypervolemic on lasix 40mg iv bid  possible need cath    renal function finally improving today, Patients needs eventual cath, consider wait 1-2 more days for renal function to improve further  Spoke with patient's daughter and son about risk and benefits. they expressed understanding.     Anemia  acceptable  Monitor at present     NSTEMI  continue lasix  monitor urine output, weight and bmp  f/u cardio    Proteinuria  UA with 100 protein  urine p/c ratio 2.5g/day  likely sec to DM/HTN  vasculitis work up is never done for this patient  hep b neg, c3 c4 wnl   pebdubg hep c antibody, hiv, rpr, shonda, anca, spep, serum immunofixation, immuno light chain    HYpokalemia  sec to diuresing  supplement per team  monitor

## 2020-06-25 NOTE — DIETITIAN INITIAL EVALUATION ADULT. - OTHER INFO
Initial Dietitian Evaluation 2/2 to extended length of stay. Patient is a 73 y/o F PMH HTN, HLD, DM2, Depression, COVID-19 (4/20) who p/w B/L LE swelling X1-2 weeks.  Of note, patient is a poor historian and with limited engagement during visit. No GI distress (nausea/vomiting/diarrhea/constipation.) No reported difficulties chewing and swallowing foods and fluids. Reports good PO intake and apatite at this time. She is not able to state her usual weight, current weight 68.4 kgs 6/21 which is consistent with weight obtained on a prior admission 67.4 kgs (4/14).   Diet education attempted, however, patient with questionable comprehension.  Endocrine following patient, Pt noted with HbA1c of 9.7% on prior admission 4/15 --> currently 9.3%.  Pt unable to report usual f/s prior to admission. RD remains available, re-consult as needed.

## 2020-06-25 NOTE — DIETITIAN INITIAL EVALUATION ADULT. - PERTINENT LABORATORY DATA
06-25 Na134 mmol/L<L> Glu 211 mg/dL<H> K+ 4.1 mmol/L Cr  2.19 mg/dL<H> BUN 45 mg/dL<H> 06-24 Phos 6.4 mg/dL<H> 06-24 Alb 3.6 g/dL

## 2020-06-25 NOTE — PROGRESS NOTE ADULT - ASSESSMENT
75 y/o F PMH HTN, HLD, DM2, Depression, COVID-19 (4/20) p/w B/L LE swelling X1-2 weeks. Reports no other symptoms, no change in functional status, no change in diet, compliant with all her medications, follows up regularly with her PMD. Reports no changes in meds since admission in April 2020 for COVID-19.     Problem/Recommendation - 1:  Problem: NSTEMI (non-ST elevated myocardial infarction). Recommendation: .ASA,Plavix , BB ,Statin and Heparin. Cardiology following .Ischemic work up and MRI pending.      Problem/Recommendation - 2:  ·  Problem: Ventricular tachycardia, non-sustained.  Recommendation: EP help appreciated.      Problem/Recommendation - 3:  ·  Problem:  Acute on chronic congestive heart failure, unspecified heart failure type.  Recommendation: IV Lasix.      Problem/Recommendation - 4:  ·  Problem: Diabetes mellitus type 2, noninsulin dependent.  Recommendation: Lantus and SSI for now. Endo helping.      Problem/Recommendation - 5:  ·  Problem: Hypertension.  Recommendation: BP meds with hold parameters .      Problem/Recommendation - 6:  Problem: Hypercholesterolemia. Recommendation: Statin.     Problem/Recommendation - 7:  Problem: SUNIL . Recommendation: Creatinine stable. Renal helping.      Problem/Recommendation - 8:  Problem: Acute DVT . Recommendation: On AC . Hematology helping.

## 2020-06-26 LAB
ANION GAP SERPL CALC-SCNC: 15 MMO/L — HIGH (ref 7–14)
APTT BLD: 91 SEC — HIGH (ref 27.5–36.3)
BASOPHILS # BLD AUTO: 0.02 K/UL — SIGNIFICANT CHANGE UP (ref 0–0.2)
BASOPHILS NFR BLD AUTO: 0.3 % — SIGNIFICANT CHANGE UP (ref 0–2)
BUN SERPL-MCNC: 48 MG/DL — HIGH (ref 7–23)
CALCIUM SERPL-MCNC: 8.9 MG/DL — SIGNIFICANT CHANGE UP (ref 8.4–10.5)
CHLORIDE SERPL-SCNC: 98 MMOL/L — SIGNIFICANT CHANGE UP (ref 98–107)
CO2 SERPL-SCNC: 23 MMOL/L — SIGNIFICANT CHANGE UP (ref 22–31)
CREAT SERPL-MCNC: 2.25 MG/DL — HIGH (ref 0.5–1.3)
EOSINOPHIL # BLD AUTO: 0.21 K/UL — SIGNIFICANT CHANGE UP (ref 0–0.5)
EOSINOPHIL NFR BLD AUTO: 3.3 % — SIGNIFICANT CHANGE UP (ref 0–6)
GLUCOSE BLDC GLUCOMTR-MCNC: 102 MG/DL — HIGH (ref 70–99)
GLUCOSE BLDC GLUCOMTR-MCNC: 135 MG/DL — HIGH (ref 70–99)
GLUCOSE BLDC GLUCOMTR-MCNC: 167 MG/DL — HIGH (ref 70–99)
GLUCOSE BLDC GLUCOMTR-MCNC: 76 MG/DL — SIGNIFICANT CHANGE UP (ref 70–99)
GLUCOSE SERPL-MCNC: 79 MG/DL — SIGNIFICANT CHANGE UP (ref 70–99)
HCT VFR BLD CALC: 32.4 % — LOW (ref 34.5–45)
HCYS SERPL-MCNC: 26.1 UMOL/L — HIGH
HGB BLD-MCNC: 10.3 G/DL — LOW (ref 11.5–15.5)
IMM GRANULOCYTES NFR BLD AUTO: 0.2 % — SIGNIFICANT CHANGE UP (ref 0–1.5)
LYMPHOCYTES # BLD AUTO: 2.56 K/UL — SIGNIFICANT CHANGE UP (ref 1–3.3)
LYMPHOCYTES # BLD AUTO: 39.9 % — SIGNIFICANT CHANGE UP (ref 13–44)
MAGNESIUM SERPL-MCNC: 2.2 MG/DL — SIGNIFICANT CHANGE UP (ref 1.6–2.6)
MCHC RBC-ENTMCNC: 25.2 PG — LOW (ref 27–34)
MCHC RBC-ENTMCNC: 31.8 % — LOW (ref 32–36)
MCV RBC AUTO: 79.2 FL — LOW (ref 80–100)
MONOCYTES # BLD AUTO: 0.75 K/UL — SIGNIFICANT CHANGE UP (ref 0–0.9)
MONOCYTES NFR BLD AUTO: 11.7 % — SIGNIFICANT CHANGE UP (ref 2–14)
NEUTROPHILS # BLD AUTO: 2.87 K/UL — SIGNIFICANT CHANGE UP (ref 1.8–7.4)
NEUTROPHILS NFR BLD AUTO: 44.6 % — SIGNIFICANT CHANGE UP (ref 43–77)
NRBC # FLD: 0 K/UL — SIGNIFICANT CHANGE UP (ref 0–0)
PLATELET # BLD AUTO: 313 K/UL — SIGNIFICANT CHANGE UP (ref 150–400)
PMV BLD: 10.7 FL — SIGNIFICANT CHANGE UP (ref 7–13)
POTASSIUM SERPL-MCNC: 3.5 MMOL/L — SIGNIFICANT CHANGE UP (ref 3.5–5.3)
POTASSIUM SERPL-SCNC: 3.5 MMOL/L — SIGNIFICANT CHANGE UP (ref 3.5–5.3)
PROT S FREE PPP-ACNC: 126.9 % — SIGNIFICANT CHANGE UP (ref 70–130)
RBC # BLD: 4.09 M/UL — SIGNIFICANT CHANGE UP (ref 3.8–5.2)
RBC # FLD: 15.4 % — HIGH (ref 10.3–14.5)
SODIUM SERPL-SCNC: 136 MMOL/L — SIGNIFICANT CHANGE UP (ref 135–145)
WBC # BLD: 6.42 K/UL — SIGNIFICANT CHANGE UP (ref 3.8–10.5)
WBC # FLD AUTO: 6.42 K/UL — SIGNIFICANT CHANGE UP (ref 3.8–10.5)

## 2020-06-26 RX ORDER — INSULIN LISPRO 100/ML
6 VIAL (ML) SUBCUTANEOUS
Refills: 0 | Status: DISCONTINUED | OUTPATIENT
Start: 2020-06-26 | End: 2020-07-03

## 2020-06-26 RX ORDER — FUROSEMIDE 40 MG
40 TABLET ORAL DAILY
Refills: 0 | Status: DISCONTINUED | OUTPATIENT
Start: 2020-06-27 | End: 2020-07-02

## 2020-06-26 RX ORDER — POTASSIUM CHLORIDE 20 MEQ
40 PACKET (EA) ORAL ONCE
Refills: 0 | Status: COMPLETED | OUTPATIENT
Start: 2020-06-26 | End: 2020-06-26

## 2020-06-26 RX ORDER — INSULIN GLARGINE 100 [IU]/ML
32 INJECTION, SOLUTION SUBCUTANEOUS AT BEDTIME
Refills: 0 | Status: DISCONTINUED | OUTPATIENT
Start: 2020-06-26 | End: 2020-07-03

## 2020-06-26 RX ADMIN — Medication 7 UNIT(S): at 09:17

## 2020-06-26 RX ADMIN — CARVEDILOL PHOSPHATE 3.12 MILLIGRAM(S): 80 CAPSULE, EXTENDED RELEASE ORAL at 17:27

## 2020-06-26 RX ADMIN — CLOPIDOGREL BISULFATE 75 MILLIGRAM(S): 75 TABLET, FILM COATED ORAL at 13:12

## 2020-06-26 RX ADMIN — INSULIN GLARGINE 32 UNIT(S): 100 INJECTION, SOLUTION SUBCUTANEOUS at 23:54

## 2020-06-26 RX ADMIN — Medication 25 MILLIGRAM(S): at 06:18

## 2020-06-26 RX ADMIN — Medication 40 MILLIEQUIVALENT(S): at 09:18

## 2020-06-26 RX ADMIN — Medication 81 MILLIGRAM(S): at 13:12

## 2020-06-26 RX ADMIN — RISPERIDONE 1 MILLIGRAM(S): 4 TABLET ORAL at 13:12

## 2020-06-26 RX ADMIN — ATORVASTATIN CALCIUM 40 MILLIGRAM(S): 80 TABLET, FILM COATED ORAL at 23:53

## 2020-06-26 RX ADMIN — Medication 2: at 17:38

## 2020-06-26 RX ADMIN — Medication 25 MILLIGRAM(S): at 23:54

## 2020-06-26 RX ADMIN — Medication 40 MILLIGRAM(S): at 06:18

## 2020-06-26 RX ADMIN — CHLORHEXIDINE GLUCONATE 1 APPLICATION(S): 213 SOLUTION TOPICAL at 13:10

## 2020-06-26 RX ADMIN — HEPARIN SODIUM 8 UNIT(S)/HR: 5000 INJECTION INTRAVENOUS; SUBCUTANEOUS at 13:14

## 2020-06-26 RX ADMIN — Medication 25 MILLIGRAM(S): at 13:12

## 2020-06-26 RX ADMIN — CARVEDILOL PHOSPHATE 3.12 MILLIGRAM(S): 80 CAPSULE, EXTENDED RELEASE ORAL at 06:18

## 2020-06-26 RX ADMIN — Medication 6 UNIT(S): at 17:38

## 2020-06-26 NOTE — PROGRESS NOTE ADULT - SUBJECTIVE AND OBJECTIVE BOX
EP ATTENDING    tele: NSR, no further NSVT    she denies palpitations, syncope, nor angina, ROS otherwise -    aspirin enteric coated 81 milliGRAM(s) Oral daily  atorvastatin 40 milliGRAM(s) Oral at bedtime  carvedilol 3.125 milliGRAM(s) Oral every 12 hours  chlorhexidine 4% Liquid 1 Application(s) Topical daily  clopidogrel Tablet 75 milliGRAM(s) Oral daily  dextrose 40% Gel 15 Gram(s) Oral once PRN  dextrose 5%. 1000 milliLiter(s) IV Continuous <Continuous>  dextrose 50% Injectable 12.5 Gram(s) IV Push once  dextrose 50% Injectable 25 Gram(s) IV Push once  dextrose 50% Injectable 25 Gram(s) IV Push once  furosemide   Injectable 40 milliGRAM(s) IV Push two times a day  glucagon  Injectable 1 milliGRAM(s) IntraMuscular once PRN  heparin  Infusion 800 Unit(s)/Hr IV Continuous <Continuous>  hydrALAZINE 25 milliGRAM(s) Oral three times a day  insulin glargine Injectable (LANTUS) 40 Unit(s) SubCutaneous at bedtime  insulin lispro (HumaLOG) corrective regimen sliding scale   SubCutaneous three times a day before meals  insulin lispro (HumaLOG) corrective regimen sliding scale   SubCutaneous at bedtime  insulin lispro Injectable (HumaLOG) 7 Unit(s) SubCutaneous three times a day before meals  risperiDONE   Tablet 1 milliGRAM(s) Oral daily                            10.3   6.42  )-----------( 313      ( 26 Jun 2020 05:49 )             32.4       06-26    136  |  98  |  48<H>  ----------------------------<  79  3.5   |  23  |  2.25<H>    Ca    8.9      26 Jun 2020 05:49  Mg     2.2     06-26              T(C): 36.6 (06-26-20 @ 06:15), Max: 37.1 (06-25-20 @ 17:23)  HR: 78 (06-26-20 @ 06:15) (78 - 87)  BP: 134/73 (06-26-20 @ 06:15) (134/73 - 160/73)  RR: 18 (06-26-20 @ 06:15) (18 - 18)  SpO2: 100% (06-26-20 @ 06:15) (98% - 100%)  Wt(kg): --    I&O's Summary    25 Jun 2020 07:01  -  26 Jun 2020 07:00  --------------------------------------------------------  IN: 96 mL / OUT: 2100 mL / NET: -2004 mL    26 Jun 2020 07:01  -  26 Jun 2020 10:17  --------------------------------------------------------  IN: 0 mL / OUT: 400 mL / NET: -400 mL         A&O x 3  neurologically intact  no JVD  RRR, no murmurs  CTAB  soft nt/nd  no c/c/e    echo: moderate MR, normal LVEF    A/P) 74 year old female PMH HTN, hyperlipidemia, DM admitted with LE edema. EP called for NSVT in setting of normal LVEF.    -continue beta blockers for NSVT  -recommend cath  -no further inpatient EP workup expected  -given normal LVEF there is no indication for an ICD nor AAD

## 2020-06-26 NOTE — PROGRESS NOTE ADULT - SUBJECTIVE AND OBJECTIVE BOX
INTERVAL HPI/OVERNIGHT EVENTS: i feel fine.   Vital Signs Last 24 Hrs  T(C): 36.7 (26 Jun 2020 17:25), Max: 36.7 (26 Jun 2020 17:25)  T(F): 98 (26 Jun 2020 17:25), Max: 98 (26 Jun 2020 17:25)  HR: 88 (26 Jun 2020 17:25) (78 - 88)  BP: 140/70 (26 Jun 2020 17:25) (134/73 - 143/72)  BP(mean): --  RR: 18 (26 Jun 2020 17:25) (18 - 18)  SpO2: 100% (26 Jun 2020 17:25) (100% - 100%)  I&O's Summary    25 Jun 2020 07:01  -  26 Jun 2020 07:00  --------------------------------------------------------  IN: 96 mL / OUT: 2100 mL / NET: -2004 mL    26 Jun 2020 07:01  -  26 Jun 2020 22:13  --------------------------------------------------------  IN: 0 mL / OUT: 1600 mL / NET: -1600 mL      MEDICATIONS  (STANDING):  aspirin enteric coated 81 milliGRAM(s) Oral daily  atorvastatin 40 milliGRAM(s) Oral at bedtime  carvedilol 3.125 milliGRAM(s) Oral every 12 hours  chlorhexidine 4% Liquid 1 Application(s) Topical daily  clopidogrel Tablet 75 milliGRAM(s) Oral daily  dextrose 5%. 1000 milliLiter(s) (50 mL/Hr) IV Continuous <Continuous>  dextrose 50% Injectable 12.5 Gram(s) IV Push once  dextrose 50% Injectable 25 Gram(s) IV Push once  dextrose 50% Injectable 25 Gram(s) IV Push once  heparin  Infusion 800 Unit(s)/Hr (8 mL/Hr) IV Continuous <Continuous>  hydrALAZINE 25 milliGRAM(s) Oral three times a day  insulin glargine Injectable (LANTUS) 32 Unit(s) SubCutaneous at bedtime  insulin lispro (HumaLOG) corrective regimen sliding scale   SubCutaneous three times a day before meals  insulin lispro (HumaLOG) corrective regimen sliding scale   SubCutaneous at bedtime  insulin lispro Injectable (HumaLOG) 6 Unit(s) SubCutaneous three times a day before meals  risperiDONE   Tablet 1 milliGRAM(s) Oral daily    MEDICATIONS  (PRN):  dextrose 40% Gel 15 Gram(s) Oral once PRN Blood Glucose LESS THAN 70 milliGRAM(s)/deciLiter  glucagon  Injectable 1 milliGRAM(s) IntraMuscular once PRN Glucose <70 milliGRAM(s)/deciLiter    LABS:                        10.3   6.42  )-----------( 313      ( 26 Jun 2020 05:49 )             32.4     06-26    136  |  98  |  48<H>  ----------------------------<  79  3.5   |  23  |  2.25<H>    Ca    8.9      26 Jun 2020 05:49  Mg     2.2     06-26      PTT - ( 26 Jun 2020 11:06 )  PTT:91.0 SEC    CAPILLARY BLOOD GLUCOSE      POCT Blood Glucose.: 167 mg/dL (26 Jun 2020 17:35)  POCT Blood Glucose.: 102 mg/dL (26 Jun 2020 11:57)  POCT Blood Glucose.: 76 mg/dL (26 Jun 2020 08:20)  POCT Blood Glucose.: 191 mg/dL (25 Jun 2020 22:24)          REVIEW OF SYSTEMS:  CONSTITUTIONAL: No fever, weight loss, or fatigue  EYES: No eye pain, visual disturbances, or discharge  ENMT:  No difficulty hearing, tinnitus, vertigo; No sinus or throat pain  RESPIRATORY: No cough, wheezing, chills or hemoptysis; No shortness of breath  CARDIOVASCULAR: No chest pain, palpitations, dizziness, or leg swelling  GASTROINTESTINAL: No abdominal or epigastric pain. No nausea, vomiting, or hematemesis; No diarrhea or constipation. No melena or hematochezia.  GENITOURINARY: No dysuria, frequency, hematuria, or incontinence  NEUROLOGICAL: No headaches, memory loss, loss of strength, numbness, or tremors      RADIOLOGY & ADDITIONAL TESTS:    Consultant(s) Notes Reviewed:  [x ] YES  [ ] NO    PHYSICAL EXAM:  GENERAL: NAD, well-groomed, well-developed, not in any distress ,  HEAD:  Atraumatic, Normocephalic  EYES: EOMI, PERRLA, conjunctiva and sclera clear  ENMT: No tonsillar erythema, exudates, or enlargement; Moist mucous membranes, Good dentition, No lesions  NECK: Supple, No JVD, Normal thyroid  NERVOUS SYSTEM:  Alert & Oriented X3, No focal deficit   CHEST/LUNG: Good air entry bilateral with no  rales, rhonchi, wheezing, or rubs  HEART: Regular rate and rhythm; No murmurs, rubs, or gallops  ABDOMEN: Soft, Nontender, Nondistended; Bowel sounds present  EXTREMITIES:  2+ Peripheral Pulses, No clubbing, cyanosis, or edema    Care Discussed with Consultants/Other Providers [ x] YES  [ ] NO

## 2020-06-26 NOTE — PROGRESS NOTE ADULT - SUBJECTIVE AND OBJECTIVE BOX
[facility] [place] [facility]   MD MICHAEL STOUT DO ANAM SIDDIQUI ANGELA WONG, PA    TEL:  OFFICE: [phone]  DR FLORES CELL: [phone]  DR. HUSSEIN CELL: [phone]  DR. HODGES CELL: [phone]  WESTLEY DAVE CELL: [phone]    From 5pm-7am Answering Service [phone]    Patient is a 74y old  Female who presents with a chief complaint of LE swelling (26 Jun 2020 10:17)      Patient seen and examined at bedside. No chest pain/sob    VITALS:  T(F): 97.9 (06-26-20 @ 06:15), Max: 98.7 (06-25-20 @ 17:23)  HR: 78 (06-26-20 @ 06:15)  BP: 134/73 (06-26-20 @ 06:15)  RR: 18 (06-26-20 @ 06:15)  SpO2: 100% (06-26-20 @ 06:15)  Wt(kg): --    06-25 @ 07:01  -  06-26 @ 07:00  --------------------------------------------------------  IN: 96 mL / OUT: 2100 mL / NET: -2004 mL    06-26 @ 07:01  -  06-26 @ 10:50  --------------------------------------------------------  IN: 0 mL / OUT: 400 mL / NET: -400 mL          PHYSICAL EXAM:  Constitutional: NAD  Neck: No JVD  Respiratory: CTAB, no wheezes, rales or rhonchi  Cardiovascular: S1, S2, RRR  Gastrointestinal: BS+, soft, NT/ND  Extremities: No peripheral edema    Hospital Medications:   MEDICATIONS  (STANDING):  aspirin enteric coated 81 milliGRAM(s) Oral daily  atorvastatin 40 milliGRAM(s) Oral at bedtime  carvedilol 3.125 milliGRAM(s) Oral every 12 hours  chlorhexidine 4% Liquid 1 Application(s) Topical daily  clopidogrel Tablet 75 milliGRAM(s) Oral daily  dextrose 5%. 1000 milliLiter(s) (50 mL/Hr) IV Continuous <Continuous>  dextrose 50% Injectable 12.5 Gram(s) IV Push once  dextrose 50% Injectable 25 Gram(s) IV Push once  dextrose 50% Injectable 25 Gram(s) IV Push once  heparin  Infusion 800 Unit(s)/Hr (8 mL/Hr) IV Continuous <Continuous>  hydrALAZINE 25 milliGRAM(s) Oral three times a day  insulin glargine Injectable (LANTUS) 40 Unit(s) SubCutaneous at bedtime  insulin lispro (HumaLOG) corrective regimen sliding scale   SubCutaneous three times a day before meals  insulin lispro (HumaLOG) corrective regimen sliding scale   SubCutaneous at bedtime  insulin lispro Injectable (HumaLOG) 7 Unit(s) SubCutaneous three times a day before meals  risperiDONE   Tablet 1 milliGRAM(s) Oral daily      LABS:  06-26    136  |  98  |  48<H>  ----------------------------<  79  3.5   |  23  |  2.25<H>    Ca    8.9      26 Jun 2020 05:49  Mg     2.2     06-26      Creatinine Trend: 2.25 <--, 2.19 <--, 2.28 <--, 2.17 <--, 2.42 <--, 2.25 <--, 1.87 <--, 1.41 <--, 1.20 <--, 1.13 <--                                10.3   6.42  )-----------( 313      ( 26 Jun 2020 05:49 )             32.4     Urine Studies:  Urinalysis - [06-22-20 @ 17:00]      Color COLORLESS / Appearance CLEAR / SG 1.009 / pH 8.0      Gluc NEGATIVE / Ketone NEGATIVE  / Bili NEGATIVE / Urobili NORMAL       Blood NEGATIVE / Protein 100 / Leuk Est NEGATIVE / Nitrite NEGATIVE      RBC 0-2 / WBC 0-2 / Hyaline NEGATIVE / Gran  / Sq Epi OCC / Non Sq Epi  / Bacteria FEW    Urine Creatinine 44.70      [06-23-20 @ 20:00]  Urine Protein 111.4      [06-23-20 @ 20:00]  Urine Urea Nitrogen 110      [06-22-20 @ 17:00]    Iron 58, TIBC 282, %sat --      [06-24-20 @ 05:43]  Ferritin 72.33      [06-24-20 @ 05:43]  HbA1c 8.8      [12-13-19 @ 06:00]  TSH 7.68      [06-21-20 @ 07:11]  Lipid: chol 197, , HDL 41,       [12-14-19 @ 03:50]    HBsAg NEGATIVE      [06-25-20 @ 05:50]  HCV 0.77, Nonreactive Hepatitis C AB  S/CO Ratio                        Interpretation  < 1.00                                   Non-Reactive  1.00 - 4.99                         Weakly-Reactive  >= 5.00                                Reactive  Non-Reactive: Aperson with a non-reactive HCV antibody  result is considered uninfected.  No further action is  needed unless recent infection is suspected.  In these  cases, consider repeat testing later to detect  seroconversion..  Weakly-Reactive: HCV antibody test is abnormal, HCV RNA  Qualitative test will follow.  Reactive: HCV antibody test is abnormal, HCV RNA  Qualitative test will follow.  Note: HCV antibody testing is performed on the [employer]   system.      [06-25-20 @ 05:50]    C3 Complement 162.8      [06-25-20 @ 05:50]  C4 Complement 40.2      [06-25-20 @ 05:50]  Syphilis Screen (Treponema Pallidum Ab) Negative      [06-25-20 @ 05:50]  Free Light Chains: kappa 7.52, lambda 4.11, ratio = --      [06-25 @ 05:50]    RADIOLOGY & ADDITIONAL STUDIES:

## 2020-06-26 NOTE — PROGRESS NOTE ADULT - SUBJECTIVE AND OBJECTIVE BOX
feels well  no new complaints    aspirin enteric coated 81 milliGRAM(s) Oral daily  atorvastatin 40 milliGRAM(s) Oral at bedtime  carvedilol 3.125 milliGRAM(s) Oral every 12 hours  chlorhexidine 4% Liquid 1 Application(s) Topical daily  clopidogrel Tablet 75 milliGRAM(s) Oral daily  dextrose 40% Gel 15 Gram(s) Oral once PRN  dextrose 5%. 1000 milliLiter(s) IV Continuous <Continuous>  dextrose 50% Injectable 12.5 Gram(s) IV Push once  dextrose 50% Injectable 25 Gram(s) IV Push once  dextrose 50% Injectable 25 Gram(s) IV Push once  furosemide   Injectable 40 milliGRAM(s) IV Push two times a day  glucagon  Injectable 1 milliGRAM(s) IntraMuscular once PRN  heparin  Infusion 800 Unit(s)/Hr IV Continuous <Continuous>  hydrALAZINE 25 milliGRAM(s) Oral three times a day  insulin glargine Injectable (LANTUS) 40 Unit(s) SubCutaneous at bedtime  insulin lispro (HumaLOG) corrective regimen sliding scale   SubCutaneous three times a day before meals  insulin lispro (HumaLOG) corrective regimen sliding scale   SubCutaneous at bedtime  insulin lispro Injectable (HumaLOG) 7 Unit(s) SubCutaneous three times a day before meals  risperiDONE   Tablet 1 milliGRAM(s) Oral daily      peanuts (Short breath; Hives)  penicillin (Short breath; Hives)      ROS otherwise negative     Vital Signs Last 24 Hrs  T(C): 36.1 (26 Jun 2020 11:44), Max: 37.1 (25 Jun 2020 17:23)  T(F): 97 (26 Jun 2020 11:44), Max: 98.7 (25 Jun 2020 17:23)  HR: 80 (26 Jun 2020 11:44) (78 - 86)  BP: 135/65 (26 Jun 2020 11:44) (134/73 - 160/73)  BP(mean): --  RR: 18 (26 Jun 2020 11:44) (18 - 18)  SpO2: 100% (26 Jun 2020 11:44) (98% - 100%)  PHYSICAL EXAM  Gen:  laying in bed, nad  H:  anicteric, eomi  CV:  RRR, S1, S2  Lungs:  CTA b/l  Ab soft/nt/nd  Ext:  no edema                          10.3   6.42  )-----------( 313      ( 26 Jun 2020 05:49 )             32.4                           9.9    6.71  )-----------( 287      ( 25 Jun 2020 05:50 )             31.6                         11.2   7.03  )-----------( 324      ( 24 Jun 2020 05:43 )             35.6                         10.2   7.39  )-----------( 304      ( 23 Jun 2020 05:59 )             32.4   106-25    134<L>  |  98  |  45<H>  ----------------------------<  211<H>  4.1   |  25  |  2.19<H>    Ca    8.5      25 Jun 2020 05:50  Phos  6.4     06-24  Mg     2.1     06-25    TPro  6.6  /  Alb  3.6  /  TBili  0.3  /  DBili  x   /  AST  18  /  ALT  13  /  AlkPhos  67  06-24      Vitamin B12, Serum: 281 pg/mL (06.24.20 @ 05:43)    Folate, Serum in AM (06.24.20 @ 05:43)    Folate, Serum: 11.7:   PLEASE NOTE NEW REFERENCE RANGE ng/mL    Iron with Total Binding Capacity in AM (06.24.20 @ 05:43)    Iron Total, Serum: 58 ug/dL    Unsaturated Iron Binding Capacity: 224.1 ug/dL    Total Iron Binding Capacity.: 282 ug/dL    Ferritin, Serum in AM (06.24.20 @ 05:43)    Ferritin, Serum: 72.33 ng/mL    Lupus Profile (06.24.20 @ 05:43)    DRVVT S/C Ratio: 0.91: RATIO > 2.0 LA IS STRONGLY PRESENT  RATIO 1.5 - 2.0 LA IS MODERATELY PRESENT  RATIO 1.2 - 1.5 LA IS WEAKLY PRESENT    Lupus Profile (06.24.20 @ 05:43)    Prothrombin Time, Plasma: 11.3 SEC    INR: 0.99: RECOMMENDED RANGES FOR THERAPEUTIC INR:    2.0-3.0 FOR MOST MEDICAL AND SURGICAL THROMBOEMBOLIC STATES  2.0-3.0 FOR ATRIAL FIBRILLATION  2.0-3.0 FOR BILEAFLET MECHANICAL VALVE IN AORTIC POSITION  2.5-3.5 FOR MECHANICAL HEART VALVES    CHEST 2004;126:[id number]    THE PRESENCE OF DIRECT THROMBIN INHIBITORS (ARGATROBAN,  REFLUDAN) MAY FALSELY INCREASE RESULTS.    Activated Partial Thromboplastin Time: 81.3: Recommended therapeutic heparin range (full dose) for APTT  is 58-99 seconds.  Recommended therapeutic argatroban range is 1.5 to 3.0 times  the baseline APTT value, not to exceed 100 seconds.  Recommended therapeutic refludan range is 1.5 to 2.5 times  the baseline APTT. SEC    Thrombin Time Assay: 159.7 SEC    DRVVT S/C Ratio: 0.91: RATIO > 2.0 LA IS STRONGLY PRESENT  RATIO 1.5 - 2.0 LA IS MODERATELY PRESENT  RATIO 1.2 - 1.5 LA IS WEAKLY PRESENT    Silica Clotting Time S/C Ratio: 1.00: NOTE: THE PRESENCE OF DIRECT THROMBIN INHIBITORS (SUCH AS  ARGATROBAN, REFLUDAN), UF HEPARIN >0.5 U/ml OR LMW HEPARIN  >1.0 U/ml MAY AFFECT RESULTS.  RATIO > 1.33 IS POSITIVE FOR LUPUS.  RATIO <= 1.33 IS NEGATIVE FOR LUPUS.    Protein C Functional Assay with Reflex: 129 % (06.24.20 @ 05:43)    Lactate Dehydrogenase, Serum: 302 U/L (06.24.20 @ 05:43)    Bilirubin Total, Serum: 0.3 mg/dL (06.24.20 @ 05:43)    Haptoglobin, Serum: 38 mg/dL (06.24.20 @ 05:43)    Reticulocyte Count in AM (06.24.20 @ 05:43)    Reticulocyte Percent: 1.9 %    Absolute Reticulocytes: 83 k/uL

## 2020-06-26 NOTE — PROGRESS NOTE ADULT - SUBJECTIVE AND OBJECTIVE BOX
S: no chest pain or sob; Review of systems otherwise (-)          aspirin enteric coated 81 milliGRAM(s) Oral daily  atorvastatin 40 milliGRAM(s) Oral at bedtime  carvedilol 3.125 milliGRAM(s) Oral every 12 hours  chlorhexidine 4% Liquid 1 Application(s) Topical daily  clopidogrel Tablet 75 milliGRAM(s) Oral daily  dextrose 40% Gel 15 Gram(s) Oral once PRN  dextrose 5%. 1000 milliLiter(s) IV Continuous <Continuous>  dextrose 50% Injectable 12.5 Gram(s) IV Push once  dextrose 50% Injectable 25 Gram(s) IV Push once  dextrose 50% Injectable 25 Gram(s) IV Push once  glucagon  Injectable 1 milliGRAM(s) IntraMuscular once PRN  heparin  Infusion 800 Unit(s)/Hr IV Continuous <Continuous>  hydrALAZINE 25 milliGRAM(s) Oral three times a day  insulin glargine Injectable (LANTUS) 32 Unit(s) SubCutaneous at bedtime  insulin lispro (HumaLOG) corrective regimen sliding scale   SubCutaneous three times a day before meals  insulin lispro (HumaLOG) corrective regimen sliding scale   SubCutaneous at bedtime  insulin lispro Injectable (HumaLOG) 6 Unit(s) SubCutaneous three times a day before meals  risperiDONE   Tablet 1 milliGRAM(s) Oral daily                            10.3   6.42  )-----------( 313      ( 26 Jun 2020 05:49 )             32.4       Hemoglobin: 10.3 g/dL (06-26 @ 05:49)  Hemoglobin: 9.9 g/dL (06-25 @ 05:50)  Hemoglobin: 11.2 g/dL (06-24 @ 05:43)  Hemoglobin: 10.2 g/dL (06-23 @ 05:59)  Hemoglobin: 9.7 g/dL (06-22 @ 05:00)      06-26    136  |  98  |  48<H>  ----------------------------<  79  3.5   |  23  |  2.25<H>    Ca    8.9      26 Jun 2020 05:49  Mg     2.2     06-26      Creatinine Trend: 2.25<--, 2.19<--, 2.28<--, 2.17<--, 2.42<--, 2.25<--    COAGS: PTT - ( 26 Jun 2020 11:06 )  PTT:91.0 SEC        PHYSICAL EXAM:  Vital Signs last 24 Hours   T(C): 36.1 (06-26-20 @ 11:44), Max: 37.1 (06-25-20 @ 17:23)  HR: 80 (06-26-20 @ 11:44) (78 - 86)  BP: 135/65 (06-26-20 @ 11:44) (134/73 - 160/73)  RR: 18 (06-26-20 @ 11:44) (18 - 18)  SpO2: 100% (06-26-20 @ 11:44) (98% - 100%)  Wt(kg): --    I&O's Summary    25 Jun 2020 07:01  -  26 Jun 2020 07:00  --------------------------------------------------------  IN: 96 mL / OUT: 2100 mL / NET: -2004 mL    26 Jun 2020 07:01  -  26 Jun 2020 15:00  --------------------------------------------------------  IN: 0 mL / OUT: 1100 mL / NET: -1100 mL          Gen: Appears well in NAD  HEENT:  (-)icterus (-)pallor  CV: N S1 S2 RRR (+)2 Pulses B/l  Resp:  Clear to auscultation  B/L, normal effort  GI: (+) BS Soft, NT, ND  Lymph:  (-)Edema, (-)obvious lymphadenopathy  Skin: Warm to touch, Normal turgor  Psych: Appropriate mood and affect; AO X 3    TELEMETRY:      SR, no further NSVT on tele     < from: Transthoracic Echocardiogram (06.21.20 @ 12:47) >  CONCLUSIONS:  1. Mitral annular calcification, otherwise normal mitral  valve. Moderate mitral regurgitation.  2. Normal left ventricular internal dimensions and wall  thicknesses.  3. Hyperdynamic left ventricle.  4. The right ventricle is not well visualized; grossly  normal right ventricular systolic function.  5. Moderate pericardial effusion (measures about 1.3 cm  lateral to RA on apical four-chamber views).  6. Left pleural effusion.  ------------------------------------  < end of copied text >    < from: CT Angio Chest w/ IV Cont (06.20.20 @ 22:37) >    IMPRESSION:   No pulmonary embolism.  No gross CT evidence of pneumonia.  Mild left heart enlargement.  Mild interlobular septal thickening in both lungs may reflect interstitial pulmonary edema.  Small to moderate pleural effusions bilaterally.  Small pericardial effusion.    < end of copied text >      ASSESSMENT/PLAN: 	    74 year old female with history of HTN, HLD, DM, recent COVID infection in April of 2020 who presented to the ED with LE swelling for 1-2 weeks.    -pt. found to have elevated troponin with no chest pain or anginal symptoms  -will eventually need ischemic evaluation however given SUNIL and acute DVT, will hold off for now until medically optimized  -neuro follow up appreciated   -ac for DVT per heme on heparin gtt - H/H around baseline - pt. with no signs of bleeding - monitor cbc   -continue with coreg for NSVT seen previously on tele, EP follow up  -plan is for cardiac cath when optimized   -Renal follow up appreciated; fluid volume improving now on daily IV lasix, reccs noted needs premeds prior to eventual cath        recommend mucomyst 1200 bid for 4 doses start 1 day prior to cath, last dose after cath, please hold lasix in the AM of the cath  -further cardiac workup pending above

## 2020-06-26 NOTE — PROGRESS NOTE ADULT - ASSESSMENT
73 y/o F PMH HTN, HLD, DM2, Depression, COVID-19 (4/20) p/w B/L LE swelling X1-2 weeks. Reports no other symptoms, no change in functional status, no change in diet, compliant with all her medications, follows up regularly with her PMD. Reports no changes in meds since admission in April 2020 for COVID-19.     Problem/Recommendation - 1:  Problem: NSTEMI (non-ST elevated myocardial infarction). Recommendation: .ASA, Plavix , BB ,Statin and Heparin.   Cardiology following .Ischemic work up and MRI pending.      Problem/Recommendation - 2:  ·  Problem: Ventricular tachycardia, non-sustained.  Recommendation: EP help appreciated.      Problem/Recommendation - 3:  ·  Problem:  Acute on chronic congestive heart failure, unspecified heart failure type.  Recommendation: IV Lasix.      Problem/Recommendation - 4:  ·  Problem: Diabetes mellitus type 2, noninsulin dependent.  Recommendation: Lantus and SSI for now. Endo helping.      Problem/Recommendation - 5:  ·  Problem: Hypertension.  Recommendation: BP meds with hold parameters .      Problem/Recommendation - 6:  Problem: Hypercholesterolemia. Recommendation: Statin.     Problem/Recommendation - 7:  Problem: SUNIL . Recommendation: Creatinine stable. Renal helping.      Problem/Recommendation - 8:  Problem: Acute DVT . Recommendation: On AC . Hematology helping.

## 2020-06-26 NOTE — PROGRESS NOTE ADULT - SUBJECTIVE AND OBJECTIVE BOX
[facility]      Seen earlier today, and examined.  - Today, patient is without complaints.           *****MEDICATIONS: Current medication reviewed and documented.    MEDICATIONS  (STANDING):  aspirin enteric coated 81 milliGRAM(s) Oral daily  atorvastatin 40 milliGRAM(s) Oral at bedtime  carvedilol 3.125 milliGRAM(s) Oral every 12 hours  chlorhexidine 4% Liquid 1 Application(s) Topical daily  clopidogrel Tablet 75 milliGRAM(s) Oral daily  dextrose 5%. 1000 milliLiter(s) (50 mL/Hr) IV Continuous <Continuous>  dextrose 50% Injectable 12.5 Gram(s) IV Push once  dextrose 50% Injectable 25 Gram(s) IV Push once  dextrose 50% Injectable 25 Gram(s) IV Push once  furosemide   Injectable 40 milliGRAM(s) IV Push daily  heparin  Infusion 800 Unit(s)/Hr (8 mL/Hr) IV Continuous <Continuous>  hydrALAZINE 25 milliGRAM(s) Oral three times a day  insulin glargine Injectable (LANTUS) 32 Unit(s) SubCutaneous at bedtime  insulin lispro (HumaLOG) corrective regimen sliding scale   SubCutaneous three times a day before meals  insulin lispro (HumaLOG) corrective regimen sliding scale   SubCutaneous at bedtime  insulin lispro Injectable (HumaLOG) 6 Unit(s) SubCutaneous three times a day before meals  risperiDONE   Tablet 1 milliGRAM(s) Oral daily    MEDICATIONS  (PRN):  dextrose 40% Gel 15 Gram(s) Oral once PRN Blood Glucose LESS THAN 70 milliGRAM(s)/deciLiter  glucagon  Injectable 1 milliGRAM(s) IntraMuscular once PRN Glucose <70 milliGRAM(s)/deciLiter          ***** VITAL SIGNS:  T(F): 97.9 (06-27-20 @ 05:20), Max: 98.1 (06-26-20 @ 21:15)  HR: 87 (06-27-20 @ 05:20) (81 - 88)  BP: 142/73 (06-27-20 @ 05:20) (138/69 - 142/73)  RR: 16 (06-27-20 @ 05:20) (16 - 18)  SpO2: 100% (06-26-20 @ 21:15) (100% - 100%)  Wt(kg): --  ,   I&O's Summary    26 Jun 2020 07:01  -  27 Jun 2020 07:00  --------------------------------------------------------  IN: 0 mL / OUT: 2800 mL / NET: -2800 mL             *****PHYSICAL EXAM:  Alert oriented x 2  Attention comprehension are poor  Able to name, repeat,   without any difficulty.   Able to follow1 step commands.     EOMI fundi not visualized,  VFF to confrontration  No facial asymmetry   Tongue is midline   Palate elevates symmetrically   Moving all 4 ext symmetrically antigravity   limited exam   sensation is grossly symmetric  Gait : not assessed.  B/L down going toes       *****LAB AND IMAGING:                        10.2   7.07  )-----------( 334      ( 27 Jun 2020 06:00 )             32.7               06-27    135  |  99  |  50<H>  ----------------------------<  133<H>  4.0   |  21<L>  |  1.87<H>    Ca    8.9      27 Jun 2020 06:00  Phos  4.8     06-27  Mg     2.4     06-27      PTT - ( 26 Jun 2020 11:06 )  PTT:91.0 SEC                     [All pertinent recent Imaging/Reports reviewed]           *****A S S E S S M E N T   A N D   P L A N :      Patient is a 73 y/o F PMH HTN, HLD, DM2, Depression, COVID-19 (4/20) p/w B/L LE swelling X1-2 weeks. Reports no other symptoms, no change in functional status, no change in diet, compliant with all her medications, follows up regularly with her PMD. Reports no changes in meds since admission in April 2020 for COVID-19. (20 Jun 2020 23:05)      Problem/Recommendations 1: ams of unclear etiology   likely underlying dementia related sundowning   can reorient often   avoid day time somnolence   depakote 125 if needed.     ct no acute path   will continue to monitor 	          Thank you for allowing me to participate in the care of this patient. Please do not hesitate to call me if you have any  questions.        ________________  Alice Kerr MD  [facility] ([facility]  [phone]      33 minutes spent on total encounter; more than 50 % of the visit was  spent counseling about plan of care, compliance to diet/exercise and medication regimen and or  coordinating care by the attending physician.      It is advised that stroke patients follow up with SATHISH Silveira @ [phone] in 1- 2 weeks.   Others please follow up with Dr. Michael Nissenbaum [phone]

## 2020-06-26 NOTE — PROGRESS NOTE ADULT - ASSESSMENT
1. Acute Left Post Tibial DVT    -- agree w Unfractionated Heparin gtt in light of SUNIL  -- would AC for 3 mos or longer if bed bound  -- Options include coumadin and Apixaban;  for now on heparin gtt pending cardiac catherization   -- lupus anticoagulant negative  -- no evidence of protein C  -- check Prot S    2. Microcytic Anemia  -- iron studies normal  -- Hgb Electrophoresis unremarkable  -- unremarkable hemolysis labs  -- SPEP/LOI,   -- trend counts  -- contribution from SUNIL  -- B12/folate low end of normal; check methylmalonic acid and homocysteine level to further evaluate    Leandro Callahan MD  Hematology/Oncology  [phone]

## 2020-06-26 NOTE — PROGRESS NOTE ADULT - ASSESSMENT
Assessment  DMT2: 74y Female with DM T2 with hyperglycemia, on low dose insulin, blood sugars  trending down now, no hypoglycemic episode,  eating partial meals, compliant with low carb diet.  CHF: on medications, no chest pain, stable, monitored.  Hypothyroidism/subclinical: Apparently no history of thyroid disease, asymptomatic, repeat TFTs euthyroid..  HTN:  Controlled,  on antihypertensive medications.    Eddie Smith MD  Cell: [phone]  Office: [phone] Assessment  DMT2: 74y Female with DM T2 with hyperglycemia, on basal bolus insulin, blood sugars  trending down now, no hypoglycemic episode,  eating partial meals, compliant with low carb diet.  CHF: on medications, no chest pain, stable, monitored.  Hypothyroidism/subclinical: Apparently no history of thyroid disease, asymptomatic, repeat TFTs euthyroid..  HTN:  Controlled,  on antihypertensive medications.    Eddie Smith MD  Cell: [phone]  Office: [phone]

## 2020-06-26 NOTE — PROGRESS NOTE ADULT - ASSESSMENT
74 year old female with PMH of HTN, HLD, DM, recent COVID infection in April 2020 who presented with LE swelling for weeks, found to have HF exacerbation and treated with diuretics; patient was found to have [place] on telemetry and had 23 beats of NSVT, as well as elevated cardiac enzymes, warranting transfer to CCU for continued monitoring and ischemic workup for likely NSTEMI.    SUNIL on CKD III  Baseline Scr 1 with GFR 45 12/2019   scr on admission 1.13, scr peaked 2.42  Scr stable today  SUNIL likely sec to JUNIOR (CTA 6/20) and/or ATN   FEurea>50%  Pt non oliguric   fluid status much better today. on lasix 40mg iv bid, consider decrease to daily today  possible need cath    renal function finally improving today, Patients needs eventual cath, consider wait 1-2 more days for renal function to improve further  Spoke with patient's daughter and son about risk and benefits. they expressed understanding.     recommend mucomyst 1200 bid for 4 doses start 1 day prior to cath, last dose after cath, please hold lasix in the AM of the cath    Anemia  acceptable  Monitor at present     NSTEMI  monitor urine output, weight and bmp  f/u cardio    Proteinuria  UA with 100 protein  urine p/c ratio 2.5g/day  likely sec to DM/HTN  vasculitis work up is never done for this patient  hep b neg, c3 c4 wnl, hep c neg, rpr neg, K/L ratio ok, SPeP neg  pending hiv, shonda, anca, serum immunofixation    HYpokalemia  sec to diuresing  supplemented  monitor

## 2020-06-26 NOTE — PROGRESS NOTE ADULT - SUBJECTIVE AND OBJECTIVE BOX
Chief complaint  Patient is a 74y old  Female who presents with a chief complaint of LE swelling (26 Jun 2020 10:50)   Review of systems  Patient in bed, looks comfortable, no fever, no hypoglycemia.    Labs and Fingersticks  CAPILLARY BLOOD GLUCOSE      POCT Blood Glucose.: 76 mg/dL (26 Jun 2020 08:20)  POCT Blood Glucose.: 191 mg/dL (25 Jun 2020 22:24)  POCT Blood Glucose.: 112 mg/dL (25 Jun 2020 17:51)  POCT Blood Glucose.: 104 mg/dL (25 Jun 2020 12:31)      Anion Gap, Serum: 15 <H> (06-26 @ 05:49)  Anion Gap, Serum: 11 (06-25 @ 05:50)  Anion Gap, Serum: 14 (06-24 @ 22:00)      Calcium, Total Serum: 8.9 (06-26 @ 05:49)  Calcium, Total Serum: 8.5 (06-25 @ 05:50)  Calcium, Total Serum: 8.9 (06-24 @ 22:00)          06-26    136  |  98  |  48<H>  ----------------------------<  79  3.5   |  23  |  2.25<H>    Ca    8.9      26 Jun 2020 05:49  Mg     2.2     06-26                          10.3   6.42  )-----------( 313      ( 26 Jun 2020 05:49 )             32.4     Medications  MEDICATIONS  (STANDING):  aspirin enteric coated 81 milliGRAM(s) Oral daily  atorvastatin 40 milliGRAM(s) Oral at bedtime  carvedilol 3.125 milliGRAM(s) Oral every 12 hours  chlorhexidine 4% Liquid 1 Application(s) Topical daily  clopidogrel Tablet 75 milliGRAM(s) Oral daily  dextrose 5%. 1000 milliLiter(s) (50 mL/Hr) IV Continuous <Continuous>  dextrose 50% Injectable 12.5 Gram(s) IV Push once  dextrose 50% Injectable 25 Gram(s) IV Push once  dextrose 50% Injectable 25 Gram(s) IV Push once  heparin  Infusion 800 Unit(s)/Hr (8 mL/Hr) IV Continuous <Continuous>  hydrALAZINE 25 milliGRAM(s) Oral three times a day  insulin glargine Injectable (LANTUS) 32 Unit(s) SubCutaneous at bedtime  insulin lispro (HumaLOG) corrective regimen sliding scale   SubCutaneous three times a day before meals  insulin lispro (HumaLOG) corrective regimen sliding scale   SubCutaneous at bedtime  insulin lispro Injectable (HumaLOG) 6 Unit(s) SubCutaneous three times a day before meals  risperiDONE   Tablet 1 milliGRAM(s) Oral daily      Physical Exam  General: Patient comfortable in bed  Vital Signs Last 12 Hrs  T(F): 97 (06-26-20 @ 11:44), Max: 97.9 (06-26-20 @ 06:15)  HR: 80 (06-26-20 @ 11:44) (78 - 80)  BP: 135/65 (06-26-20 @ 11:44) (134/73 - 135/65)  BP(mean): --  RR: 18 (06-26-20 @ 11:44) (18 - 18)  SpO2: 100% (06-26-20 @ 11:44) (100% - 100%)  Neck: No palpable thyroid nodules.  CVS: S1S2, No murmurs  Respiratory: No wheezing, no crepitations  GI: Abdomen soft, bowel sounds positive  Musculoskeletal:  edema lower extremities.   Skin: No skin rashes, no ecchymosis    Diagnostics

## 2020-06-27 LAB
ANA PAT FLD IF-IMP: HIGH
ANA TITR SER: HIGH
ANION GAP SERPL CALC-SCNC: 15 MMO/L — HIGH (ref 7–14)
APTT BLD: 65.9 SEC — HIGH (ref 27.5–36.3)
BUN SERPL-MCNC: 50 MG/DL — HIGH (ref 7–23)
C-ANCA SER-ACNC: NEGATIVE — SIGNIFICANT CHANGE UP
CALCIUM SERPL-MCNC: 8.9 MG/DL — SIGNIFICANT CHANGE UP (ref 8.4–10.5)
CHLORIDE SERPL-SCNC: 99 MMOL/L — SIGNIFICANT CHANGE UP (ref 98–107)
CO2 SERPL-SCNC: 21 MMOL/L — LOW (ref 22–31)
CREAT SERPL-MCNC: 1.87 MG/DL — HIGH (ref 0.5–1.3)
GLUCOSE BLDC GLUCOMTR-MCNC: 108 MG/DL — HIGH (ref 70–99)
GLUCOSE BLDC GLUCOMTR-MCNC: 118 MG/DL — HIGH (ref 70–99)
GLUCOSE BLDC GLUCOMTR-MCNC: 159 MG/DL — HIGH (ref 70–99)
GLUCOSE BLDC GLUCOMTR-MCNC: 190 MG/DL — HIGH (ref 70–99)
GLUCOSE SERPL-MCNC: 133 MG/DL — HIGH (ref 70–99)
HCT VFR BLD CALC: 32.7 % — LOW (ref 34.5–45)
HGB BLD-MCNC: 10.2 G/DL — LOW (ref 11.5–15.5)
MAGNESIUM SERPL-MCNC: 2.4 MG/DL — SIGNIFICANT CHANGE UP (ref 1.6–2.6)
MCHC RBC-ENTMCNC: 24.9 PG — LOW (ref 27–34)
MCHC RBC-ENTMCNC: 31.2 % — LOW (ref 32–36)
MCV RBC AUTO: 80 FL — SIGNIFICANT CHANGE UP (ref 80–100)
NRBC # FLD: 0 K/UL — SIGNIFICANT CHANGE UP (ref 0–0)
P-ANCA SER-ACNC: NEGATIVE — SIGNIFICANT CHANGE UP
PHOSPHATE SERPL-MCNC: 4.8 MG/DL — HIGH (ref 2.5–4.5)
PLATELET # BLD AUTO: 334 K/UL — SIGNIFICANT CHANGE UP (ref 150–400)
PMV BLD: 10.9 FL — SIGNIFICANT CHANGE UP (ref 7–13)
POTASSIUM SERPL-MCNC: 4 MMOL/L — SIGNIFICANT CHANGE UP (ref 3.5–5.3)
POTASSIUM SERPL-SCNC: 4 MMOL/L — SIGNIFICANT CHANGE UP (ref 3.5–5.3)
RBC # BLD: 4.09 M/UL — SIGNIFICANT CHANGE UP (ref 3.8–5.2)
RBC # FLD: 15.7 % — HIGH (ref 10.3–14.5)
SODIUM SERPL-SCNC: 135 MMOL/L — SIGNIFICANT CHANGE UP (ref 135–145)
WBC # BLD: 7.07 K/UL — SIGNIFICANT CHANGE UP (ref 3.8–10.5)
WBC # FLD AUTO: 7.07 K/UL — SIGNIFICANT CHANGE UP (ref 3.8–10.5)

## 2020-06-27 RX ORDER — HYDRALAZINE HCL 50 MG
50 TABLET ORAL THREE TIMES A DAY
Refills: 0 | Status: DISCONTINUED | OUTPATIENT
Start: 2020-06-27 | End: 2020-07-03

## 2020-06-27 RX ADMIN — Medication 25 MILLIGRAM(S): at 12:37

## 2020-06-27 RX ADMIN — Medication 2: at 12:35

## 2020-06-27 RX ADMIN — Medication 50 MILLIGRAM(S): at 22:09

## 2020-06-27 RX ADMIN — CLOPIDOGREL BISULFATE 75 MILLIGRAM(S): 75 TABLET, FILM COATED ORAL at 12:35

## 2020-06-27 RX ADMIN — CARVEDILOL PHOSPHATE 3.12 MILLIGRAM(S): 80 CAPSULE, EXTENDED RELEASE ORAL at 06:19

## 2020-06-27 RX ADMIN — Medication 6 UNIT(S): at 08:45

## 2020-06-27 RX ADMIN — ATORVASTATIN CALCIUM 40 MILLIGRAM(S): 80 TABLET, FILM COATED ORAL at 22:08

## 2020-06-27 RX ADMIN — Medication 25 MILLIGRAM(S): at 06:19

## 2020-06-27 RX ADMIN — CHLORHEXIDINE GLUCONATE 1 APPLICATION(S): 213 SOLUTION TOPICAL at 12:34

## 2020-06-27 RX ADMIN — Medication 6 UNIT(S): at 12:35

## 2020-06-27 RX ADMIN — Medication 6 UNIT(S): at 18:02

## 2020-06-27 RX ADMIN — Medication 40 MILLIGRAM(S): at 06:19

## 2020-06-27 RX ADMIN — INSULIN GLARGINE 32 UNIT(S): 100 INJECTION, SOLUTION SUBCUTANEOUS at 22:10

## 2020-06-27 RX ADMIN — Medication 81 MILLIGRAM(S): at 12:35

## 2020-06-27 RX ADMIN — CARVEDILOL PHOSPHATE 3.12 MILLIGRAM(S): 80 CAPSULE, EXTENDED RELEASE ORAL at 18:02

## 2020-06-27 RX ADMIN — Medication 2: at 08:44

## 2020-06-27 RX ADMIN — RISPERIDONE 1 MILLIGRAM(S): 4 TABLET ORAL at 12:35

## 2020-06-27 NOTE — PROGRESS NOTE ADULT - SUBJECTIVE AND OBJECTIVE BOX
INTERVAL HPI/OVERNIGHT EVENTS: No new concerns. Daughter in room.   Vital Signs Last 24 Hrs  T(C): 37.4 (27 Jun 2020 17:59), Max: 37.4 (27 Jun 2020 17:59)  T(F): 99.4 (27 Jun 2020 17:59), Max: 99.4 (27 Jun 2020 17:59)  HR: 86 (27 Jun 2020 17:59) (81 - 87)  BP: 166/76 (27 Jun 2020 17:59) (138/69 - 166/76)  BP(mean): --  RR: 18 (27 Jun 2020 17:59) (16 - 18)  SpO2: 99% (27 Jun 2020 17:59) (99% - 100%)  I&O's Summary    26 Jun 2020 07:01  -  27 Jun 2020 07:00  --------------------------------------------------------  IN: 0 mL / OUT: 2800 mL / NET: -2800 mL    27 Jun 2020 07:01  -  27 Jun 2020 20:09  --------------------------------------------------------  IN: 350 mL / OUT: 1000 mL / NET: -650 mL      MEDICATIONS  (STANDING):  aspirin enteric coated 81 milliGRAM(s) Oral daily  atorvastatin 40 milliGRAM(s) Oral at bedtime  carvedilol 3.125 milliGRAM(s) Oral every 12 hours  chlorhexidine 4% Liquid 1 Application(s) Topical daily  clopidogrel Tablet 75 milliGRAM(s) Oral daily  dextrose 5%. 1000 milliLiter(s) (50 mL/Hr) IV Continuous <Continuous>  dextrose 50% Injectable 12.5 Gram(s) IV Push once  dextrose 50% Injectable 25 Gram(s) IV Push once  dextrose 50% Injectable 25 Gram(s) IV Push once  furosemide   Injectable 40 milliGRAM(s) IV Push daily  heparin  Infusion 800 Unit(s)/Hr (8 mL/Hr) IV Continuous <Continuous>  hydrALAZINE 50 milliGRAM(s) Oral three times a day  insulin glargine Injectable (LANTUS) 32 Unit(s) SubCutaneous at bedtime  insulin lispro (HumaLOG) corrective regimen sliding scale   SubCutaneous three times a day before meals  insulin lispro (HumaLOG) corrective regimen sliding scale   SubCutaneous at bedtime  insulin lispro Injectable (HumaLOG) 6 Unit(s) SubCutaneous three times a day before meals  risperiDONE   Tablet 1 milliGRAM(s) Oral daily    MEDICATIONS  (PRN):  dextrose 40% Gel 15 Gram(s) Oral once PRN Blood Glucose LESS THAN 70 milliGRAM(s)/deciLiter  glucagon  Injectable 1 milliGRAM(s) IntraMuscular once PRN Glucose <70 milliGRAM(s)/deciLiter    LABS:                        10.2   7.07  )-----------( 334      ( 27 Jun 2020 06:00 )             32.7     06-27    135  |  99  |  50<H>  ----------------------------<  133<H>  4.0   |  21<L>  |  1.87<H>    Ca    8.9      27 Jun 2020 06:00  Phos  4.8     06-27  Mg     2.4     06-27      PTT - ( 27 Jun 2020 17:15 )  PTT:65.9 SEC    CAPILLARY BLOOD GLUCOSE      POCT Blood Glucose.: 108 mg/dL (27 Jun 2020 17:43)  POCT Blood Glucose.: 190 mg/dL (27 Jun 2020 12:19)  POCT Blood Glucose.: 159 mg/dL (27 Jun 2020 08:35)  POCT Blood Glucose.: 135 mg/dL (26 Jun 2020 22:50)          REVIEW OF SYSTEMS:  CONSTITUTIONAL: No fever, weight loss, or fatigue  EYES: No eye pain, visual disturbances, or discharge  ENMT:  No difficulty hearing, tinnitus, vertigo; No sinus or throat pain  NECK: No pain or stiffness  RESPIRATORY: No cough, wheezing, chills or hemoptysis; No shortness of breath  CARDIOVASCULAR: No chest pain, palpitations, dizziness, or leg swelling  GASTROINTESTINAL: No abdominal or epigastric pain. No nausea, vomiting, or hematemesis; No diarrhea or constipation. No melena or hematochezia.  GENITOURINARY: No dysuria, frequency, hematuria, or incontinence  NEUROLOGICAL: No headaches, memory loss, loss of strength, numbness, or tremors      RADIOLOGY & ADDITIONAL TESTS:    Consultant(s) Notes Reviewed:  [x ] YES  [ ] NO    PHYSICAL EXAM:  GENERAL: NAD, well-groomed, well-developed,not in any distress ,  HEAD:  Atraumatic, Normocephalic  EYES: EOMI, PERRLA, conjunctiva and sclera clear  ENMT: No tonsillar erythema, exudates, or enlargement; Moist mucous membranes, Good dentition, No lesions  NECK: Supple, No JVD, Normal thyroid  NERVOUS SYSTEM:  Alert & Oriented X3, No focal deficit   CHEST/LUNG: Good air entry bilateral with no  rales, rhonchi, wheezing, or rubs  HEART: Regular rate and rhythm; No murmurs, rubs, or gallops  ABDOMEN: Soft, Nontender, Nondistended; Bowel sounds present  EXTREMITIES:  2+ Peripheral Pulses, No clubbing, cyanosis, or edema  SKIN: No rashes or lesions    Care Discussed with Consultants/Other Providers [ x] YES  [ ] NO

## 2020-06-27 NOTE — PROGRESS NOTE ADULT - ASSESSMENT
75 y/o F PMH HTN, HLD, DM2, Depression, COVID-19 (4/20) p/w B/L LE swelling X1-2 weeks. Reports no other symptoms, no change in functional status, no change in diet, compliant with all her medications, follows up regularly with her PMD. Reports no changes in meds since admission in April 2020 for COVID-19.     Problem/Recommendation - 1:  Problem: NSTEMI (non-ST elevated myocardial infarction). Recommendation: .ASA, Plavix , BB ,Statin and Heparin.   Cardiology following .Ischemic work up and MRI pending.      Problem/Recommendation - 2:  ·  Problem: Ventricular tachycardia, non-sustained.  Recommendation: EP help appreciated.      Problem/Recommendation - 3:  ·  Problem:  Acute on chronic congestive heart failure, unspecified heart failure type.  Recommendation: IV Lasix.      Problem/Recommendation - 4:  ·  Problem: Diabetes mellitus type 2, noninsulin dependent.  Recommendation: Lantus and SSI for now. Endo helping.      Problem/Recommendation - 5:  ·  Problem: Hypertension.  Recommendation: BP meds with hold parameters .      Problem/Recommendation - 6:  Problem: Hypercholesterolemia. Recommendation: Statin.     Problem/Recommendation - 7:  Problem: SUNIL . Recommendation: Creatinine better .  Renal helping.      Problem/Recommendation - 8:  Problem: Acute DVT . Recommendation: On AC . Hematology helping.

## 2020-06-27 NOTE — PROGRESS NOTE ADULT - ASSESSMENT
Assessment  DMT2: 74y Female with DM T2 with hyperglycemia, on low dose insulin, blood sugars improved, no hypoglycemic episode,  eating partial meals, compliant with low carb diet.  CHF: on medications, no chest pain, stable, monitored.  Hypothyroidism/subclinical: Apparently no history of thyroid disease, asymptomatic, repeat TFTs euthyroid..  HTN:  Controlled,  on antihypertensive medications.    Eddie Smith MD  Cell: [phone]  Office: [phone] Assessment  DMT2: 74y Female with DM T2 with hyperglycemia, on insulin,  blood sugars improved, no hypoglycemic episode,  eating partial meals, compliant with low carb diet.  CHF: on medications, no chest pain, stable, monitored.  Hypothyroidism/subclinical: Apparently no history of thyroid disease, asymptomatic, repeat TFTs euthyroid..  HTN:  Controlled,  on antihypertensive medications.    Eddie Smith MD  Cell: [phone]  Office: [phone]

## 2020-06-27 NOTE — PROGRESS NOTE ADULT - ASSESSMENT
1. Acute Left Post Tibial DVT    -- agree w Unfractionated Heparin gtt in light of SUNIL  -- would AC for 3 mos or longer if bed bound  -- Options include coumadin and Apixaban;  for now on heparin gtt pending cardiac catherization   -- lupus anticoagulant negative  -- no evidence of protein C  --f/u protein S level    2. Microcytic Anemia  -- iron studies normal  -- Hgb Electrophoresis unremarkable  -- unremarkable hemolysis labs  -- SPEP/LOI,   -- trend counts  -- contribution from SUNIL  -- B12/folate low end of normal; check methylmalonic acid and homocysteine level to further evaluate    David Clayton MD  Hematology/Oncology  [phone]

## 2020-06-27 NOTE — PROGRESS NOTE ADULT - SUBJECTIVE AND OBJECTIVE BOX
Patient seen and comfortable      MEDICATIONS  (STANDING):  aspirin enteric coated 81 milliGRAM(s) Oral daily  atorvastatin 40 milliGRAM(s) Oral at bedtime  carvedilol 3.125 milliGRAM(s) Oral every 12 hours  chlorhexidine 4% Liquid 1 Application(s) Topical daily  clopidogrel Tablet 75 milliGRAM(s) Oral daily  dextrose 5%. 1000 milliLiter(s) (50 mL/Hr) IV Continuous <Continuous>  dextrose 50% Injectable 12.5 Gram(s) IV Push once  dextrose 50% Injectable 25 Gram(s) IV Push once  dextrose 50% Injectable 25 Gram(s) IV Push once  furosemide   Injectable 40 milliGRAM(s) IV Push daily  heparin  Infusion 800 Unit(s)/Hr (8 mL/Hr) IV Continuous <Continuous>  hydrALAZINE 25 milliGRAM(s) Oral three times a day  insulin glargine Injectable (LANTUS) 32 Unit(s) SubCutaneous at bedtime  insulin lispro (HumaLOG) corrective regimen sliding scale   SubCutaneous three times a day before meals  insulin lispro (HumaLOG) corrective regimen sliding scale   SubCutaneous at bedtime  insulin lispro Injectable (HumaLOG) 6 Unit(s) SubCutaneous three times a day before meals  risperiDONE   Tablet 1 milliGRAM(s) Oral daily    MEDICATIONS  (PRN):  dextrose 40% Gel 15 Gram(s) Oral once PRN Blood Glucose LESS THAN 70 milliGRAM(s)/deciLiter  glucagon  Injectable 1 milliGRAM(s) IntraMuscular once PRN Glucose <70 milliGRAM(s)/deciLiter      ROS  No fever, sweats, chills  No epistaxis, HA, sore throat  No CP, SOB, cough, sputum  No n/v/d, abd pain, melena, hematochezia  No edema  No rash  No anxiety  No back pain, joint pain  No bleeding, bruising  No dysuria, hematuria    Vital Signs Last 24 Hrs  T(C): 36.8 (27 Jun 2020 12:33), Max: 36.8 (27 Jun 2020 12:33)  T(F): 98.3 (27 Jun 2020 12:33), Max: 98.3 (27 Jun 2020 12:33)  HR: 81 (27 Jun 2020 12:33) (81 - 88)  BP: 146/73 (27 Jun 2020 12:33) (138/69 - 146/73)  BP(mean): --  RR: 18 (27 Jun 2020 12:33) (16 - 18)  SpO2: 99% (27 Jun 2020 12:33) (99% - 100%)    PE  NAD  Awake, alert  Anicteric, MMM  RRR  CTAB  Abd soft, NT, ND  No c/c/e  No rash grossly  FROM                          10.2   7.07  )-----------( 334      ( 27 Jun 2020 06:00 )             32.7       06-27    135  |  99  |  50<H>  ----------------------------<  133<H>  4.0   |  21<L>  |  1.87<H>    Ca    8.9      27 Jun 2020 06:00  Phos  4.8     06-27  Mg     2.4     06-27

## 2020-06-27 NOTE — PROGRESS NOTE ADULT - SUBJECTIVE AND OBJECTIVE BOX
[facility] [place] [facility]   MD MICHAEL STOUT DO ANAM SIDDIQUI ANGELA WONG, PA    TEL:  OFFICE: [phone]  DR FLORES CELL: [phone]  DR. HUSSEIN CELL: [phone]  DR. HODGES CELL: [phone]  WESTLEY DAVE CELL: [phone]    From 5pm-7am Answering Service [phone]    Patient is a 74y old  Female who presents with a chief complaint of LE swelling (27 Jun 2020 12:05)      Patient seen and examined at bedside. No chest pain/sob    VITALS:  T(F): 98.3 (06-27-20 @ 12:33), Max: 98.3 (06-27-20 @ 12:33)  HR: 81 (06-27-20 @ 12:33)  BP: 146/73 (06-27-20 @ 12:33)  RR: 18 (06-27-20 @ 12:33)  SpO2: 99% (06-27-20 @ 12:33)  Wt(kg): --    06-26 @ 07:01  -  06-27 @ 07:00  --------------------------------------------------------  IN: 0 mL / OUT: 2800 mL / NET: -2800 mL          PHYSICAL EXAM:  Constitutional: NAD  Neck: No JVD  Respiratory: CTAB, no wheezes, rales or rhonchi  Cardiovascular: S1, S2, RRR  Gastrointestinal: BS+, soft, NT/ND  Extremities: No peripheral edema    Hospital Medications:   MEDICATIONS  (STANDING):  aspirin enteric coated 81 milliGRAM(s) Oral daily  atorvastatin 40 milliGRAM(s) Oral at bedtime  carvedilol 3.125 milliGRAM(s) Oral every 12 hours  chlorhexidine 4% Liquid 1 Application(s) Topical daily  clopidogrel Tablet 75 milliGRAM(s) Oral daily  dextrose 5%. 1000 milliLiter(s) (50 mL/Hr) IV Continuous <Continuous>  dextrose 50% Injectable 12.5 Gram(s) IV Push once  dextrose 50% Injectable 25 Gram(s) IV Push once  dextrose 50% Injectable 25 Gram(s) IV Push once  furosemide   Injectable 40 milliGRAM(s) IV Push daily  heparin  Infusion 800 Unit(s)/Hr (8 mL/Hr) IV Continuous <Continuous>  hydrALAZINE 25 milliGRAM(s) Oral three times a day  insulin glargine Injectable (LANTUS) 32 Unit(s) SubCutaneous at bedtime  insulin lispro (HumaLOG) corrective regimen sliding scale   SubCutaneous three times a day before meals  insulin lispro (HumaLOG) corrective regimen sliding scale   SubCutaneous at bedtime  insulin lispro Injectable (HumaLOG) 6 Unit(s) SubCutaneous three times a day before meals  risperiDONE   Tablet 1 milliGRAM(s) Oral daily      LABS:  06-27    135  |  99  |  50<H>  ----------------------------<  133<H>  4.0   |  21<L>  |  1.87<H>    Ca    8.9      27 Jun 2020 06:00  Phos  4.8     06-27  Mg     2.4     06-27      Creatinine Trend: 1.87 <--, 2.25 <--, 2.19 <--, 2.28 <--, 2.17 <--, 2.42 <--, 2.25 <--, 1.87 <--, 1.41 <--, 1.20 <--, 1.13 <--    Phosphorus Level, Serum: 4.8 mg/dL (06-27 @ 06:00)                              10.2   7.07  )-----------( 334      ( 27 Jun 2020 06:00 )             32.7     Urine Studies:  Urinalysis - [06-22-20 @ 17:00]      Color COLORLESS / Appearance CLEAR / SG 1.009 / pH 8.0      Gluc NEGATIVE / Ketone NEGATIVE  / Bili NEGATIVE / Urobili NORMAL       Blood NEGATIVE / Protein 100 / Leuk Est NEGATIVE / Nitrite NEGATIVE      RBC 0-2 / WBC 0-2 / Hyaline NEGATIVE / Gran  / Sq Epi OCC / Non Sq Epi  / Bacteria FEW    Urine Creatinine 44.70      [06-23-20 @ 20:00]  Urine Protein 111.4      [06-23-20 @ 20:00]  Urine Urea Nitrogen 110      [06-22-20 @ 17:00]    Iron 58, TIBC 282, %sat --      [06-24-20 @ 05:43]  Ferritin 72.33      [06-24-20 @ 05:43]  HbA1c 8.8      [12-13-19 @ 06:00]  TSH 7.68      [06-21-20 @ 07:11]  Lipid: chol 197, , HDL 41,       [12-14-19 @ 03:50]    HBsAg NEGATIVE      [06-25-20 @ 05:50]  HCV 0.77, Nonreactive Hepatitis C AB  S/CO Ratio                        Interpretation  < 1.00                                   Non-Reactive  1.00 - 4.99                         Weakly-Reactive  >= 5.00                                Reactive  Non-Reactive: Aperson with a non-reactive HCV antibody  result is considered uninfected.  No further action is  needed unless recent infection is suspected.  In these  cases, consider repeat testing later to detect  seroconversion..  Weakly-Reactive: HCV antibody test is abnormal, HCV RNA  Qualitative test will follow.  Reactive: HCV antibody test is abnormal, HCV RNA  Qualitative test will follow.  Note: HCV antibody testing is performed on the [employer]   system.      [06-25-20 @ 05:50]    C3 Complement 162.8      [06-25-20 @ 05:50]  C4 Complement 40.2      [06-25-20 @ 05:50]  Syphilis Screen (Treponema Pallidum Ab) Negative      [06-25-20 @ 05:50]  Free Light Chains: kappa 7.52, lambda 4.11, ratio = --      [06-25 @ 05:50]    RADIOLOGY & ADDITIONAL STUDIES:

## 2020-06-27 NOTE — PROGRESS NOTE ADULT - SUBJECTIVE AND OBJECTIVE BOX
Patient denies CP, SOB Review of systems otherwise (-)    aspirin enteric coated 81 milliGRAM(s) Oral daily  atorvastatin 40 milliGRAM(s) Oral at bedtime  carvedilol 3.125 milliGRAM(s) Oral every 12 hours  chlorhexidine 4% Liquid 1 Application(s) Topical daily  clopidogrel Tablet 75 milliGRAM(s) Oral daily  dextrose 40% Gel 15 Gram(s) Oral once PRN  dextrose 5%. 1000 milliLiter(s) IV Continuous <Continuous>  dextrose 50% Injectable 12.5 Gram(s) IV Push once  dextrose 50% Injectable 25 Gram(s) IV Push once  dextrose 50% Injectable 25 Gram(s) IV Push once  furosemide   Injectable 40 milliGRAM(s) IV Push daily  glucagon  Injectable 1 milliGRAM(s) IntraMuscular once PRN  heparin  Infusion 800 Unit(s)/Hr IV Continuous <Continuous>  hydrALAZINE 50 milliGRAM(s) Oral three times a day  insulin glargine Injectable (LANTUS) 32 Unit(s) SubCutaneous at bedtime  insulin lispro (HumaLOG) corrective regimen sliding scale   SubCutaneous three times a day before meals  insulin lispro (HumaLOG) corrective regimen sliding scale   SubCutaneous at bedtime  insulin lispro Injectable (HumaLOG) 6 Unit(s) SubCutaneous three times a day before meals  risperiDONE   Tablet 1 milliGRAM(s) Oral daily                            10.2   7.07  )-----------( 334      ( 27 Jun 2020 06:00 )             32.7       Hemoglobin: 10.2 g/dL (06-27 @ 06:00)  Hemoglobin: 10.3 g/dL (06-26 @ 05:49)  Hemoglobin: 9.9 g/dL (06-25 @ 05:50)  Hemoglobin: 11.2 g/dL (06-24 @ 05:43)  Hemoglobin: 10.2 g/dL (06-23 @ 05:59)      06-27    135  |  99  |  50<H>  ----------------------------<  133<H>  4.0   |  21<L>  |  1.87<H>    Ca    8.9      27 Jun 2020 06:00  Phos  4.8     06-27  Mg     2.4     06-27      Creatinine Trend: 1.87<--, 2.25<--, 2.19<--, 2.28<--, 2.17<--, 2.42<--    COAGS: PTT - ( 27 Jun 2020 17:15 )  PTT:65.9 SEC          T(C): 37.4 (06-27-20 @ 17:59), Max: 37.4 (06-27-20 @ 17:59)  HR: 86 (06-27-20 @ 17:59) (81 - 87)  BP: 166/76 (06-27-20 @ 17:59) (138/69 - 166/76)  RR: 18 (06-27-20 @ 17:59) (16 - 18)  SpO2: 99% (06-27-20 @ 17:59) (99% - 100%)  Wt(kg): --    I&O's Summary    26 Jun 2020 07:01  -  27 Jun 2020 07:00  --------------------------------------------------------  IN: 0 mL / OUT: 2800 mL / NET: -2800 mL    27 Jun 2020 07:01  -  27 Jun 2020 18:22  --------------------------------------------------------  IN: 350 mL / OUT: 1000 mL / NET: -650 mL          Gen: Appears well in NAD  HEENT:  (-)icterus (-)pallor  CV: N S1 S2 RRR (+)2 Pulses B/l  Resp:  Clear to auscultation  B/L, normal effort  GI: (+) BS Soft, NT, ND  Lymph:  (-)Edema, (-)obvious lymphadenopathy  Skin: Warm to touch, Normal turgor  Psych: Appropriate mood and affect; AO X 3    TELEMETRY:      SR, no further NSVT on tele     < from: Transthoracic Echocardiogram (06.21.20 @ 12:47) >  CONCLUSIONS:  1. Mitral annular calcification, otherwise normal mitral  valve. Moderate mitral regurgitation.  2. Normal left ventricular internal dimensions and wall  thicknesses.  3. Hyperdynamic left ventricle.  4. The right ventricle is not well visualized; grossly  normal right ventricular systolic function.  5. Moderate pericardial effusion (measures about 1.3 cm  lateral to RA on apical four-chamber views).  6. Left pleural effusion.  ------------------------------------  < end of copied text >    < from: CT Angio Chest w/ IV Cont (06.20.20 @ 22:37) >    IMPRESSION:   No pulmonary embolism.  No gross CT evidence of pneumonia.  Mild left heart enlargement.  Mild interlobular septal thickening in both lungs may reflect interstitial pulmonary edema.  Small to moderate pleural effusions bilaterally.  Small pericardial effusion.    < end of copied text >      ASSESSMENT/PLAN: 	    74 year old female with history of HTN, HLD, DM, recent COVID infection in April of 2020 who presented to the ED with LE swelling for 1-2 weeks.    -pt. found to have elevated troponin with no chest pain or anginal symptoms  -will eventually need ischemic evaluation however given SUNIL and acute DVT, will hold off for now until medically optimized  -neuro follow up appreciated   -ac for DVT per heme on heparin gtt - H/H around baseline - pt. with no signs of bleeding - monitor cbc   -continue with coreg for NSVT seen previously on tele, EP follow up  -plan is for cardiac cath when optimized   -Renal follow up appreciated; fluid volume improving now on daily IV lasix, reccs noted needs premeds prior to eventual cath        recommend mucomyst 1200 bid for 4 doses start 1 day prior to cath, last dose after cath, please hold lasix in the AM of the cath      Reji Arnold MD, [facility]  BEEPER [phone]

## 2020-06-27 NOTE — PROGRESS NOTE ADULT - SUBJECTIVE AND OBJECTIVE BOX
EP     tele: NSR, no further NSVT    she denies palpitations, syncope, nor angina, ROS otherwise -      MEDICATIONS  (STANDING):  aspirin enteric coated 81 milliGRAM(s) Oral daily  atorvastatin 40 milliGRAM(s) Oral at bedtime  carvedilol 3.125 milliGRAM(s) Oral every 12 hours  chlorhexidine 4% Liquid 1 Application(s) Topical daily  clopidogrel Tablet 75 milliGRAM(s) Oral daily  dextrose 5%. 1000 milliLiter(s) (50 mL/Hr) IV Continuous <Continuous>  dextrose 50% Injectable 12.5 Gram(s) IV Push once  dextrose 50% Injectable 25 Gram(s) IV Push once  dextrose 50% Injectable 25 Gram(s) IV Push once  furosemide   Injectable 40 milliGRAM(s) IV Push daily  heparin  Infusion 800 Unit(s)/Hr (8 mL/Hr) IV Continuous <Continuous>  hydrALAZINE 25 milliGRAM(s) Oral three times a day  insulin glargine Injectable (LANTUS) 32 Unit(s) SubCutaneous at bedtime  insulin lispro (HumaLOG) corrective regimen sliding scale   SubCutaneous three times a day before meals  insulin lispro (HumaLOG) corrective regimen sliding scale   SubCutaneous at bedtime  insulin lispro Injectable (HumaLOG) 6 Unit(s) SubCutaneous three times a day before meals  risperiDONE   Tablet 1 milliGRAM(s) Oral daily    MEDICATIONS  (PRN):  dextrose 40% Gel 15 Gram(s) Oral once PRN Blood Glucose LESS THAN 70 milliGRAM(s)/deciLiter  glucagon  Injectable 1 milliGRAM(s) IntraMuscular once PRN Glucose <70 milliGRAM(s)/deciLiter      LABS:                            10.2   7.07  )-----------( 334      ( 27 Jun 2020 06:00 )             32.7     135  |  99  |  50<H>  ----------------------------<  133<H>  4.0   |  21<L>  |  1.87<H>    Ca    8.9      27 Jun 2020 06:00  Phos  4.8     06-27  Mg     2.4     06-27      Creatinine Trend: 1.87<--, 2.25<--, 2.19<--, 2.28<--, 2.17<--, 2.42<--     PHYSICAL EXAM  Vital Signs Last 24 Hrs  T(C): 36.6 (27 Jun 2020 05:20), Max: 36.7 (26 Jun 2020 17:25)  T(F): 97.9 (27 Jun 2020 05:20), Max: 98.1 (26 Jun 2020 21:15)  HR: 87 (27 Jun 2020 05:20) (81 - 88)  BP: 142/73 (27 Jun 2020 05:20) (138/69 - 142/73)  RR: 16 (27 Jun 2020 05:20) (16 - 18)  SpO2: 100% (26 Jun 2020 21:15) (100% - 100%)       A&O x 3  neurologically intact  no JVD  RRR, no murmurs  CTAB  soft nt/nd  no c/c/e    echo: moderate MR, normal LVEF    A/P) 74 year old female PMH HTN, hyperlipidemia, DM admitted with LE edema. EP called for NSVT in setting of normal LVEF.    -continue beta blockers for NSVT  -recommend cath  -no further inpatient EP workup expected  -given normal LVEF there is no indication for an ICD nor AAD

## 2020-06-27 NOTE — PROGRESS NOTE ADULT - ASSESSMENT
74 year old female with PMH of HTN, HLD, DM, recent COVID infection in April 2020 who presented with LE swelling for weeks, found to have HF exacerbation and treated with diuretics; patient was found to have Wenchebach on telemetry and had 23 beats of NSVT, as well as elevated cardiac enzymes, warranting transfer to CCU for continued monitoring and ischemic workup for likely NSTEMI.    SUNIL on CKD III  Baseline Scr 1 with GFR 45 12/2019   scr on admission 1.13, scr peaked 2.42  Scr better  SUNIL likely sec to JUNIOR (CTA 6/20) and/or ATN   FEurea>50%  Pt non oliguric   lasix decreased to daily 6/26    renal function finally improving today, Patients needs eventual cath. likely monday  Spoke with patient's daughter and son about risk and benefits. they expressed understanding.     recommend mucomyst 1200 bid for 4 doses start 1 day prior to cath, last dose after cath, please hold lasix in the AM of the cath    Anemia  acceptable  Monitor at present     NSTEMI  monitor urine output, weight and bmp  f/u cardio    Proteinuria  UA with 100 protein  urine p/c ratio 2.5g/day  likely sec to DM/HTN  vasculitis work up is never done for this patient  hep b neg, c3 c4 wnl, hep c neg, rpr neg, K/L ratio ok, SPeP neg  pending hiv, shonda, anca, serum immunofixation    HYpokalemia  sec to diuresing  supplemented  monitor    Hyperphosphatemia  low phos diet  monitor 74 year old female with PMH of HTN, HLD, DM, recent COVID infection in April 2020 who presented with LE swelling for weeks, found to have HF exacerbation and treated with diuretics; patient was found to have Wenchebach on telemetry and had 23 beats of NSVT, as well as elevated cardiac enzymes, warranting transfer to CCU for continued monitoring and ischemic workup for likely NSTEMI.    SUNIL on CKD III  Baseline Scr 1 with GFR 45 12/2019   scr on admission 1.13, scr peaked 2.42  Scr better  SUNIL likely sec to JUNIOR (CTA 6/20) and/or ATN   FEurea>50%  Pt non oliguric   lasix decreased to daily 6/26    renal function improving today, Patients needs eventual cath. likely monday  Spoke with patient's daughter and son about risk and benefits. they expressed understanding.     recommend mucomyst 1200 bid for 4 doses start 1 day prior to cath, last dose after cath, please hold lasix in the AM of the cath    Anemia  acceptable  Monitor at present     NSTEMI  monitor urine output, weight and bmp  f/u cardio    Proteinuria  UA with 100 protein  urine p/c ratio 2.5g/day  likely sec to DM/HTN  vasculitis work up is never done for this patient  hep b neg, c3 c4 wnl, hep c neg, rpr neg, K/L ratio ok, SPeP neg  pending hiv, shonda, anca, serum immunofixation    HYpokalemia  sec to diuresing  supplemented  monitor    Hyperphosphatemia  low phos diet  monitor

## 2020-06-27 NOTE — PROGRESS NOTE ADULT - SUBJECTIVE AND OBJECTIVE BOX
Chief complaint  Patient is a 74y old  Female who presents with a chief complaint of LE swelling (26 Jun 2020 14:59)   Review of systems  Patient in bed, looks comfortable, no fever, no hypoglycemia.    Labs and Fingersticks  CAPILLARY BLOOD GLUCOSE      POCT Blood Glucose.: 135 mg/dL (26 Jun 2020 22:50)  POCT Blood Glucose.: 167 mg/dL (26 Jun 2020 17:35)  POCT Blood Glucose.: 102 mg/dL (26 Jun 2020 11:57)  POCT Blood Glucose.: 76 mg/dL (26 Jun 2020 08:20)      Anion Gap, Serum: 15 <H> (06-26 @ 05:49)      Calcium, Total Serum: 8.9 (06-26 @ 05:49)          06-26    136  |  98  |  48<H>  ----------------------------<  79  3.5   |  23  |  2.25<H>    Ca    8.9      26 Jun 2020 05:49  Mg     2.2     06-26                          10.2   7.07  )-----------( 334      ( 27 Jun 2020 06:00 )             32.7     Medications  MEDICATIONS  (STANDING):  aspirin enteric coated 81 milliGRAM(s) Oral daily  atorvastatin 40 milliGRAM(s) Oral at bedtime  carvedilol 3.125 milliGRAM(s) Oral every 12 hours  chlorhexidine 4% Liquid 1 Application(s) Topical daily  clopidogrel Tablet 75 milliGRAM(s) Oral daily  dextrose 5%. 1000 milliLiter(s) (50 mL/Hr) IV Continuous <Continuous>  dextrose 50% Injectable 12.5 Gram(s) IV Push once  dextrose 50% Injectable 25 Gram(s) IV Push once  dextrose 50% Injectable 25 Gram(s) IV Push once  furosemide   Injectable 40 milliGRAM(s) IV Push daily  heparin  Infusion 800 Unit(s)/Hr (8 mL/Hr) IV Continuous <Continuous>  hydrALAZINE 25 milliGRAM(s) Oral three times a day  insulin glargine Injectable (LANTUS) 32 Unit(s) SubCutaneous at bedtime  insulin lispro (HumaLOG) corrective regimen sliding scale   SubCutaneous three times a day before meals  insulin lispro (HumaLOG) corrective regimen sliding scale   SubCutaneous at bedtime  insulin lispro Injectable (HumaLOG) 6 Unit(s) SubCutaneous three times a day before meals  risperiDONE   Tablet 1 milliGRAM(s) Oral daily      Physical Exam  General: Patient comfortable in bed  Vital Signs Last 12 Hrs  T(F): 97.9 (06-27-20 @ 05:20), Max: 98.1 (06-26-20 @ 21:15)  HR: 87 (06-27-20 @ 05:20) (81 - 87)  BP: 142/73 (06-27-20 @ 05:20) (138/69 - 142/73)  BP(mean): --  RR: 16 (06-27-20 @ 05:20) (16 - 16)  SpO2: 100% (06-26-20 @ 21:15) (100% - 100%)  Neck: No palpable thyroid nodules.  CVS: S1S2, No murmurs  Respiratory: No wheezing, no crepitations  GI: Abdomen soft, bowel sounds positive  Musculoskeletal:  edema lower extremities.   Skin: No skin rashes, no ecchymosis    Diagnostics

## 2020-06-28 LAB
ANION GAP SERPL CALC-SCNC: 14 MMO/L — SIGNIFICANT CHANGE UP (ref 7–14)
APTT BLD: 90 SEC — HIGH (ref 27.5–36.3)
BUN SERPL-MCNC: 48 MG/DL — HIGH (ref 7–23)
CALCIUM SERPL-MCNC: 9.2 MG/DL — SIGNIFICANT CHANGE UP (ref 8.4–10.5)
CHLORIDE SERPL-SCNC: 103 MMOL/L — SIGNIFICANT CHANGE UP (ref 98–107)
CO2 SERPL-SCNC: 21 MMOL/L — LOW (ref 22–31)
CREAT SERPL-MCNC: 1.88 MG/DL — HIGH (ref 0.5–1.3)
GLUCOSE BLDC GLUCOMTR-MCNC: 126 MG/DL — HIGH (ref 70–99)
GLUCOSE BLDC GLUCOMTR-MCNC: 134 MG/DL — HIGH (ref 70–99)
GLUCOSE BLDC GLUCOMTR-MCNC: 139 MG/DL — HIGH (ref 70–99)
GLUCOSE BLDC GLUCOMTR-MCNC: 147 MG/DL — HIGH (ref 70–99)
GLUCOSE BLDC GLUCOMTR-MCNC: 148 MG/DL — HIGH (ref 70–99)
GLUCOSE SERPL-MCNC: 113 MG/DL — HIGH (ref 70–99)
HCT VFR BLD CALC: 31 % — LOW (ref 34.5–45)
HGB BLD-MCNC: 10 G/DL — LOW (ref 11.5–15.5)
MAGNESIUM SERPL-MCNC: 2.3 MG/DL — SIGNIFICANT CHANGE UP (ref 1.6–2.6)
MCHC RBC-ENTMCNC: 26 PG — LOW (ref 27–34)
MCHC RBC-ENTMCNC: 32.3 % — SIGNIFICANT CHANGE UP (ref 32–36)
MCV RBC AUTO: 80.5 FL — SIGNIFICANT CHANGE UP (ref 80–100)
NRBC # FLD: 0 K/UL — SIGNIFICANT CHANGE UP (ref 0–0)
PHOSPHATE SERPL-MCNC: 4.4 MG/DL — SIGNIFICANT CHANGE UP (ref 2.5–4.5)
PLATELET # BLD AUTO: 341 K/UL — SIGNIFICANT CHANGE UP (ref 150–400)
PMV BLD: 11.1 FL — SIGNIFICANT CHANGE UP (ref 7–13)
POTASSIUM SERPL-MCNC: 4 MMOL/L — SIGNIFICANT CHANGE UP (ref 3.5–5.3)
POTASSIUM SERPL-SCNC: 4 MMOL/L — SIGNIFICANT CHANGE UP (ref 3.5–5.3)
RBC # BLD: 3.85 M/UL — SIGNIFICANT CHANGE UP (ref 3.8–5.2)
RBC # FLD: 15.5 % — HIGH (ref 10.3–14.5)
SODIUM SERPL-SCNC: 138 MMOL/L — SIGNIFICANT CHANGE UP (ref 135–145)
WBC # BLD: 6.22 K/UL — SIGNIFICANT CHANGE UP (ref 3.8–10.5)
WBC # FLD AUTO: 6.22 K/UL — SIGNIFICANT CHANGE UP (ref 3.8–10.5)

## 2020-06-28 RX ORDER — WATER FOR INHALATION
1000 VIAL, NEBULIZER (ML) INHALATION
Refills: 0 | Status: COMPLETED | OUTPATIENT
Start: 2020-06-28 | End: 2020-06-28

## 2020-06-28 RX ORDER — WATER FOR INHALATION
1000 VIAL, NEBULIZER (ML) INHALATION
Refills: 0 | Status: DISCONTINUED | OUTPATIENT
Start: 2020-06-28 | End: 2020-06-29

## 2020-06-28 RX ORDER — ACETYLCYSTEINE 200 MG/ML
1200 VIAL (ML) MISCELLANEOUS
Refills: 0 | Status: COMPLETED | OUTPATIENT
Start: 2020-06-28 | End: 2020-06-30

## 2020-06-28 RX ADMIN — CHLORHEXIDINE GLUCONATE 1 APPLICATION(S): 213 SOLUTION TOPICAL at 12:49

## 2020-06-28 RX ADMIN — HEPARIN SODIUM 8 UNIT(S)/HR: 5000 INJECTION INTRAVENOUS; SUBCUTANEOUS at 06:34

## 2020-06-28 RX ADMIN — ATORVASTATIN CALCIUM 40 MILLIGRAM(S): 80 TABLET, FILM COATED ORAL at 21:30

## 2020-06-28 RX ADMIN — Medication 6 UNIT(S): at 18:15

## 2020-06-28 RX ADMIN — Medication 6 UNIT(S): at 09:00

## 2020-06-28 RX ADMIN — CARVEDILOL PHOSPHATE 3.12 MILLIGRAM(S): 80 CAPSULE, EXTENDED RELEASE ORAL at 18:15

## 2020-06-28 RX ADMIN — CARVEDILOL PHOSPHATE 3.12 MILLIGRAM(S): 80 CAPSULE, EXTENDED RELEASE ORAL at 06:07

## 2020-06-28 RX ADMIN — CLOPIDOGREL BISULFATE 75 MILLIGRAM(S): 75 TABLET, FILM COATED ORAL at 12:48

## 2020-06-28 RX ADMIN — RISPERIDONE 1 MILLIGRAM(S): 4 TABLET ORAL at 12:48

## 2020-06-28 RX ADMIN — INSULIN GLARGINE 32 UNIT(S): 100 INJECTION, SOLUTION SUBCUTANEOUS at 21:29

## 2020-06-28 RX ADMIN — Medication 50 MILLIGRAM(S): at 14:15

## 2020-06-28 RX ADMIN — Medication 40 MILLIGRAM(S): at 06:07

## 2020-06-28 RX ADMIN — Medication 6 UNIT(S): at 12:48

## 2020-06-28 RX ADMIN — Medication 50 MILLIGRAM(S): at 21:30

## 2020-06-28 RX ADMIN — Medication 0: at 21:28

## 2020-06-28 RX ADMIN — Medication 50 MILLIGRAM(S): at 06:07

## 2020-06-28 RX ADMIN — Medication 81 MILLIGRAM(S): at 12:48

## 2020-06-28 NOTE — PROGRESS NOTE ADULT - SUBJECTIVE AND OBJECTIVE BOX
EP Nurse Practitioner     tele: NSR    she denies palpitations, syncope, nor angina, ROS otherwise -         aspirin enteric coated 81 milliGRAM(s) Oral daily  atorvastatin 40 milliGRAM(s) Oral at bedtime  carvedilol 3.125 milliGRAM(s) Oral every 12 hours  chlorhexidine 4% Liquid 1 Application(s) Topical daily  clopidogrel Tablet 75 milliGRAM(s) Oral daily  dextrose 40% Gel 15 Gram(s) Oral once PRN  dextrose 5%. 1000 milliLiter(s) IV Continuous <Continuous>  dextrose 50% Injectable 12.5 Gram(s) IV Push once  dextrose 50% Injectable 25 Gram(s) IV Push once  dextrose 50% Injectable 25 Gram(s) IV Push once  furosemide   Injectable 40 milliGRAM(s) IV Push daily  glucagon  Injectable 1 milliGRAM(s) IntraMuscular once PRN  heparin  Infusion 800 Unit(s)/Hr IV Continuous <Continuous>  hydrALAZINE 50 milliGRAM(s) Oral three times a day  insulin glargine Injectable (LANTUS) 32 Unit(s) SubCutaneous at bedtime  insulin lispro (HumaLOG) corrective regimen sliding scale   SubCutaneous three times a day before meals  insulin lispro (HumaLOG) corrective regimen sliding scale   SubCutaneous at bedtime  insulin lispro Injectable (HumaLOG) 6 Unit(s) SubCutaneous three times a day before meals  risperiDONE   Tablet 1 milliGRAM(s) Oral daily                            10.0   6.22  )-----------( 341      ( 28 Jun 2020 05:10 )             31.0       Hemoglobin: 10.0 g/dL (06-28 @ 05:10)  Hemoglobin: 10.2 g/dL (06-27 @ 06:00)  Hemoglobin: 10.3 g/dL (06-26 @ 05:49)  Hemoglobin: 9.9 g/dL (06-25 @ 05:50)  Hemoglobin: 11.2 g/dL (06-24 @ 05:43)      06-27    135  |  99  |  50<H>  ----------------------------<  133<H>  4.0   |  21<L>  |  1.87<H>    Ca    8.9      27 Jun 2020 06:00  Phos  4.8     06-27  Mg     2.4     06-27      Creatinine Trend: 1.87<--, 2.25<--, 2.19<--, 2.28<--, 2.17<--, 2.42<--    COAGS: PTT - ( 28 Jun 2020 05:10 )  PTT:90.0 SEC          T(C): 37.2 (06-28-20 @ 06:06), Max: 37.4 (06-27-20 @ 17:59)  HR: 86 (06-28-20 @ 06:17) (81 - 86)  BP: 164/82 (06-28-20 @ 06:17) (144/68 - 167/81)  RR: 18 (06-28-20 @ 06:06) (18 - 18)  SpO2: 97% (06-28-20 @ 06:06) (97% - 99%)  Wt(kg): --    I&O's Summary    26 Jun 2020 07:01  -  27 Jun 2020 07:00  --------------------------------------------------------  IN: 0 mL / OUT: 2800 mL / NET: -2800 mL    27 Jun 2020 07:01  -  28 Jun 2020 06:34  --------------------------------------------------------  IN: 950 mL / OUT: 2400 mL / NET: -1450 mL       A&O x 3  neurologically intact  no JVD  RRR, no murmurs  CTAB  soft nt/nd  no c/c/e    echo: moderate MR, normal LVEF    A/P) 74 year old female PMH HTN, hyperlipidemia, DM admitted with LE edema. EP called for NSVT in setting of normal LVEF.    -continue beta blockers for NSVT  - Cath pending   -no further inpatient EP workup expected  -given normal LVEF there is no indication for an ICD nor AAD  D/W Dr Patton

## 2020-06-28 NOTE — PROGRESS NOTE ADULT - SUBJECTIVE AND OBJECTIVE BOX
INTERVAL HPI/OVERNIGHT EVENTS: No new concerns.   Vital Signs Last 24 Hrs  T(C): 36.9 (28 Jun 2020 12:44), Max: 37.4 (27 Jun 2020 17:59)  T(F): 98.5 (28 Jun 2020 12:44), Max: 99.4 (27 Jun 2020 17:59)  HR: 85 (28 Jun 2020 12:44) (85 - 86)  BP: 137/54 (28 Jun 2020 12:44) (137/54 - 167/81)  BP(mean): --  RR: 16 (28 Jun 2020 12:44) (16 - 18)  SpO2: 98% (28 Jun 2020 12:44) (97% - 99%)  I&O's Summary    27 Jun 2020 07:01  -  28 Jun 2020 07:00  --------------------------------------------------------  IN: 950 mL / OUT: 2400 mL / NET: -1450 mL    28 Jun 2020 07:01  -  28 Jun 2020 13:53  --------------------------------------------------------  IN: 0 mL / OUT: 850 mL / NET: -850 mL      MEDICATIONS  (STANDING):  acetylcysteine  Oral Solution 1200 milliGRAM(s) Oral two times a day  aspirin enteric coated 81 milliGRAM(s) Oral daily  atorvastatin 40 milliGRAM(s) Oral at bedtime  carvedilol 3.125 milliGRAM(s) Oral every 12 hours  chlorhexidine 4% Liquid 1 Application(s) Topical daily  clopidogrel Tablet 75 milliGRAM(s) Oral daily  dextrose 5%. 1000 milliLiter(s) (50 mL/Hr) IV Continuous <Continuous>  dextrose 50% Injectable 12.5 Gram(s) IV Push once  dextrose 50% Injectable 25 Gram(s) IV Push once  dextrose 50% Injectable 25 Gram(s) IV Push once  furosemide   Injectable 40 milliGRAM(s) IV Push daily  heparin  Infusion 800 Unit(s)/Hr (8 mL/Hr) IV Continuous <Continuous>  hydrALAZINE 50 milliGRAM(s) Oral three times a day  insulin glargine Injectable (LANTUS) 32 Unit(s) SubCutaneous at bedtime  insulin lispro (HumaLOG) corrective regimen sliding scale   SubCutaneous three times a day before meals  insulin lispro (HumaLOG) corrective regimen sliding scale   SubCutaneous at bedtime  insulin lispro Injectable (HumaLOG) 6 Unit(s) SubCutaneous three times a day before meals  risperiDONE   Tablet 1 milliGRAM(s) Oral daily    MEDICATIONS  (PRN):  dextrose 40% Gel 15 Gram(s) Oral once PRN Blood Glucose LESS THAN 70 milliGRAM(s)/deciLiter  glucagon  Injectable 1 milliGRAM(s) IntraMuscular once PRN Glucose <70 milliGRAM(s)/deciLiter    LABS:                        10.0   6.22  )-----------( 341      ( 28 Jun 2020 05:10 )             31.0     06-28    138  |  103  |  48<H>  ----------------------------<  113<H>  4.0   |  21<L>  |  1.88<H>    Ca    9.2      28 Jun 2020 05:10  Phos  4.4     06-28  Mg     2.3     06-28      PTT - ( 28 Jun 2020 05:10 )  PTT:90.0 SEC    CAPILLARY BLOOD GLUCOSE      POCT Blood Glucose.: 126 mg/dL (28 Jun 2020 12:11)  POCT Blood Glucose.: 134 mg/dL (28 Jun 2020 08:38)  POCT Blood Glucose.: 118 mg/dL (27 Jun 2020 22:07)  POCT Blood Glucose.: 108 mg/dL (27 Jun 2020 17:43)          REVIEW OF SYSTEMS:  CONSTITUTIONAL: No fever, weight loss, or fatigue  EYES: No eye pain, visual disturbances, or discharge  ENMT:  No difficulty hearing, tinnitus, vertigo; No sinus or throat pain  NECK: No pain or stiffness  RESPIRATORY: No cough, wheezing, chills or hemoptysis; No shortness of breath  CARDIOVASCULAR: No chest pain, palpitations, dizziness, or leg swelling  GASTROINTESTINAL: No abdominal or epigastric pain. No nausea, vomiting, or hematemesis; No diarrhea or constipation. No melena or hematochezia.  GENITOURINARY: No dysuria, frequency, hematuria, or incontinence  NEUROLOGICAL: No headaches, memory loss, loss of strength, numbness, or tremors      Consultant(s) Notes Reviewed:  [x ] YES  [ ] NO    PHYSICAL EXAM:  GENERAL: NAD, well-groomed, well-developed,not in any distress ,  HEAD:  Atraumatic, Normocephalic  EYES: EOMI, PERRLA, conjunctiva and sclera clear  ENMT: No tonsillar erythema, exudates, or enlargement; Moist mucous membranes, Good dentition, No lesions  NECK: Supple, No JVD, Normal thyroid  NERVOUS SYSTEM:  Alert & Oriented X3, No focal deficit   CHEST/LUNG: Good air entry bilateral with no  rales, rhonchi, wheezing, or rubs  HEART: Regular rate and rhythm; No murmurs, rubs, or gallops  ABDOMEN: Soft, Nontender, Nondistended; Bowel sounds present  EXTREMITIES:  2+ Peripheral Pulses, No clubbing, cyanosis, or edema  SKIN: No rashes or lesions    Care Discussed with Consultants/Other Providers [ x] YES  [ ] NO

## 2020-06-28 NOTE — PROGRESS NOTE ADULT - ASSESSMENT
75 y/o F PMH HTN, HLD, DM2, Depression, COVID-19 (4/20) p/w B/L LE swelling X1-2 weeks. Reports no other symptoms, no change in functional status, no change in diet, compliant with all her medications, follows up regularly with her PMD. Reports no changes in meds since admission in April 2020 for COVID-19.     Problem/Recommendation - 1:  Problem: NSTEMI (non-ST elevated myocardial infarction). Recommendation: .ASA, Plavix , BB ,Statin and Heparin.   Cardiology following . Cardiac cath  and MRI pending.      Problem/Recommendation - 2:  ·  Problem: Ventricular tachycardia, non-sustained.  Recommendation: EP help appreciated.      Problem/Recommendation - 3:  ·  Problem:  Acute on chronic congestive heart failure, unspecified heart failure type.  Recommendation: IV Lasix.      Problem/Recommendation - 4:  ·  Problem: Diabetes mellitus type 2, noninsulin dependent.  Recommendation: Lantus and SSI for now. Endo helping.      Problem/Recommendation - 5:  ·  Problem: Hypertension.  Recommendation: BP meds with hold parameters .      Problem/Recommendation - 6:  Problem: Hypercholesterolemia. Recommendation: Statin.     Problem/Recommendation - 7:  Problem: SUNIL . Recommendation: Creatinine better .  Renal helping.      Problem/Recommendation - 8:  Problem: Acute DVT . Recommendation: On AC . Hematology helping.

## 2020-06-28 NOTE — PROGRESS NOTE ADULT - SUBJECTIVE AND OBJECTIVE BOX
Patient seen and examined at bedside. No chest pain/sob    VITALS:  T(F): 98.5 (06-28-20 @ 12:44), Max: 99.4 (06-27-20 @ 17:59)  HR: 85 (06-28-20 @ 12:44)  BP: 137/54 (06-28-20 @ 12:44)  RR: 16 (06-28-20 @ 12:44)  SpO2: 98% (06-28-20 @ 12:44)  Wt(kg): --    06-27 @ 07:01  -  06-28 @ 07:00  --------------------------------------------------------  IN: 950 mL / OUT: 2400 mL / NET: -1450 mL    06-28 @ 07:01  -  06-28 @ 14:51  --------------------------------------------------------  IN: 0 mL / OUT: 850 mL / NET: -850 mL          PHYSICAL EXAM:  Constitutional: NAD  Neck: No JVD  Respiratory: CTAB, no wheezes, rales or rhonchi  Cardiovascular: S1, S2, RRR  Gastrointestinal: BS+, soft, NT/ND  Extremities: No peripheral edema    Hospital Medications:   MEDICATIONS  (STANDING):  acetylcysteine  Oral Solution 1200 milliGRAM(s) Oral two times a day  aspirin enteric coated 81 milliGRAM(s) Oral daily  atorvastatin 40 milliGRAM(s) Oral at bedtime  carvedilol 3.125 milliGRAM(s) Oral every 12 hours  chlorhexidine 4% Liquid 1 Application(s) Topical daily  clopidogrel Tablet 75 milliGRAM(s) Oral daily  dextrose 5%. 1000 milliLiter(s) (50 mL/Hr) IV Continuous <Continuous>  dextrose 50% Injectable 12.5 Gram(s) IV Push once  dextrose 50% Injectable 25 Gram(s) IV Push once  dextrose 50% Injectable 25 Gram(s) IV Push once  furosemide   Injectable 40 milliGRAM(s) IV Push daily  heparin  Infusion 800 Unit(s)/Hr (8 mL/Hr) IV Continuous <Continuous>  hydrALAZINE 50 milliGRAM(s) Oral three times a day  insulin glargine Injectable (LANTUS) 32 Unit(s) SubCutaneous at bedtime  insulin lispro (HumaLOG) corrective regimen sliding scale   SubCutaneous three times a day before meals  insulin lispro (HumaLOG) corrective regimen sliding scale   SubCutaneous at bedtime  insulin lispro Injectable (HumaLOG) 6 Unit(s) SubCutaneous three times a day before meals  risperiDONE   Tablet 1 milliGRAM(s) Oral daily      LABS:  06-28    138  |  103  |  48<H>  ----------------------------<  113<H>  4.0   |  21<L>  |  1.88<H>    Ca    9.2      28 Jun 2020 05:10  Phos  4.4     06-28  Mg     2.3     06-28      Creatinine Trend: 1.88 <--, 1.87 <--, 2.25 <--, 2.19 <--, 2.28 <--, 2.17 <--, 2.42 <--, 2.25 <--, 1.87 <--, 1.41 <--  Phosphorus Level, Serum: 4.4 mg/dL (06-28 @ 05:10)                              10.0   6.22  )-----------( 341      ( 28 Jun 2020 05:10 )             31.0     Urine Studies:  Creatinine, Random Urine: 44.70 mg/dL (06-23 @ 20:00)  Protein, Random Urine: 111.4 mg/dL (06-23 @ 20:00)  Creatinine, Random Urine: 21.30 mg/dL (06-22 @ 17:00)    Creatinine, Random Urine: 44.70 mg/dL (06-23 @ 20:00)  Protein, Random Urine: 111.4 mg/dL (06-23 @ 20:00)  Creatinine, Random Urine: 21.30 mg/dL (06-22 @ 17:00)    Urinalysis - [06-22-20 @ 17:00]      Color COLORLESS / Appearance CLEAR / SG 1.009 / pH 8.0      Gluc NEGATIVE / Ketone NEGATIVE  / Bili NEGATIVE / Urobili NORMAL       Blood NEGATIVE / Protein 100 / Leuk Est NEGATIVE / Nitrite NEGATIVE      RBC 0-2 / WBC 0-2 / Hyaline NEGATIVE / Gran  / Sq Epi OCC / Non Sq Epi  / Bacteria FEW    Urine Creatinine 44.70      [06-23-20 @ 20:00]  Urine Protein 111.4      [06-23-20 @ 20:00]  Urine Urea Nitrogen 110      [06-22-20 @ 17:00]    Iron 58, TIBC 282, %sat --      [06-24-20 @ 05:43]  Ferritin 72.33      [06-24-20 @ 05:43]  HbA1c 8.8      [12-13-19 @ 06:00]  TSH 7.68      [06-21-20 @ 07:11]  Lipid: chol 197, , HDL 41,       [12-14-19 @ 03:50]    HBsAg NEGATIVE      [06-25-20 @ 05:50]  HCV 0.77, Nonreactive Hepatitis C AB  S/CO Ratio                        Interpretation  < 1.00                                   Non-Reactive  1.00 - 4.99                         Weakly-Reactive  >= 5.00                                Reactive  Non-Reactive: Aperson with a non-reactive HCV antibody  result is considered uninfected.  No further action is  needed unless recent infection is suspected.  In these  cases, consider repeat testing later to detect  seroconversion..  Weakly-Reactive: HCV antibody test is abnormal, HCV RNA  Qualitative test will follow.  Reactive: HCV antibody test is abnormal, HCV RNA  Qualitative test will follow.  Note: HCV antibody testing is performed on the [employer]   system.      [06-25-20 @ 05:50]    CHRYSTAL: titer 1:80, pattern Speckled      [06-25-20 @ 05:50]  C3 Complement 162.8      [06-25-20 @ 05:50]  C4 Complement 40.2      [06-25-20 @ 05:50]  ANCA: cANCA Negative, pANCA Negative, atypical ANCA --      [06-25-20 @ 05:50]  Syphilis Screen (Treponema Pallidum Ab) Negative      [06-25-20 @ 05:50]  Free Light Chains: kappa 7.52, lambda 4.11, ratio = --      [06-25 @ 05:50]    RADIOLOGY & ADDITIONAL STUDIES:

## 2020-06-28 NOTE — PROGRESS NOTE ADULT - ASSESSMENT
Assessment  DMT2: 74y Female with DM T2 with hyperglycemia, on low dose insulin, blood sugars improved, no hypoglycemic episode,  eating partial meals, compliant with low carb diet.  CHF: on medications, no chest pain, stable, monitored.  Hypothyroidism/subclinical: Apparently no history of thyroid disease, asymptomatic, repeat TFTs euthyroid..  HTN:  Controlled,  on antihypertensive medications.    Eddie Smith MD  Cell: [phone]  Office: [phone] Assessment  DMT2: 74y Female with DM T2 with hyperglycemia, on basal bolus insulin, blood sugars improved, no hypoglycemic episode,  eating partial meals, compliant with low carb diet.  CHF: on medications, no chest pain, stable, monitored.  Hypothyroidism/subclinical: Apparently no history of thyroid disease, asymptomatic, repeat TFTs euthyroid..  HTN:  Controlled,  on antihypertensive medications.    Eddie Smith MD  Cell: [phone]  Office: [phone]

## 2020-06-28 NOTE — PROGRESS NOTE ADULT - ASSESSMENT
74 year old female with PMH of HTN, HLD, DM, recent COVID infection in April 2020 who presented with LE swelling for weeks, found to have HF exacerbation and treated with diuretics; patient was found to have [place]bach on telemetry and had 23 beats of NSVT, as well as elevated cardiac enzymes, warranting transfer to CCU for continued monitoring and ischemic workup for likely NSTEMI.    SUNIL on CKD III  Baseline Scr 1 with GFR 45 12/2019   scr on admission 1.13, renal function worsening  SUNIL possible sec to JUNIOR (CTA 6/20) and/or ATN   check urine urea, cr, UA  check bladder scan to r/o obstruction  hypervolemic on lasix 40mg iv bid  possible need cath/MRI with linda.   renal function is stable today. Contrast not recommended given worsening renal function. However benefit seems to be greater than risk, will clear patient for the procedure.   Spoke with patient's daughter and son about risk and benefits. they expressed understanding.   Mucomyst is already ordered. Added NaHCO3 150 mEq to be infused at 200 ml/hr x 1 hr, before cardiac cath, and 75 ml/r x 6 hrs after cardiac cath  Hold Lasix in AM    Anemia  acceptable  Monitor at present     NSTEMI  care per CCU  continue lasix  monitor urine output, weight and bmp 74 year old female with PMH of HTN, HLD, DM, recent COVID infection in April 2020 who presented with LE swelling for weeks, found to have HF exacerbation and treated with diuretics; patient was found to have Wenchebach on telemetry and had 23 beats of NSVT, as well as elevated cardiac enzymes, warranting transfer to CCU for continued monitoring and ischemic workup for likely NSTEMI.    SUNIL on CKD III  Baseline Scr 1 with GFR 45 12/2019   scr on admission 1.13, renal function worsening  SUNIL possible sec to JUNIOR (CTA 6/20) and/or ATN   check urine urea, cr, UA  check bladder scan to r/o obstruction  hypervolemic on lasix 40mg iv bid  possible need cath/MRI with linda.   renal function is stable today. She is planned for cardiac cath. She is high risk for contrast nephropathy. However benefit seems to be greater than risk, will clear patient for the procedure.   Spoke with patient's daughter and son about risk and benefits. they expressed understanding.   Mucomyst is already ordered. Added NaHCO3 150 mEq to be infused at 200 ml/hr x 1 hr, before cardiac cath, and 75 ml/r x 6 hrs after cardiac cath  Hold Lasix in AM    Anemia  acceptable  Monitor at present     NSTEMI  care per CCU  continue lasix  monitor urine output, weight and bmp

## 2020-06-28 NOTE — PROGRESS NOTE ADULT - SUBJECTIVE AND OBJECTIVE BOX
Patient denies CP, SOB Review of systems otherwise (-)    acetylcysteine  Oral Solution 1200 milliGRAM(s) Oral two times a day  aspirin enteric coated 81 milliGRAM(s) Oral daily  atorvastatin 40 milliGRAM(s) Oral at bedtime  carvedilol 3.125 milliGRAM(s) Oral every 12 hours  chlorhexidine 4% Liquid 1 Application(s) Topical daily  clopidogrel Tablet 75 milliGRAM(s) Oral daily  dextrose 40% Gel 15 Gram(s) Oral once PRN  dextrose 5%. 1000 milliLiter(s) IV Continuous <Continuous>  dextrose 50% Injectable 12.5 Gram(s) IV Push once  dextrose 50% Injectable 25 Gram(s) IV Push once  dextrose 50% Injectable 25 Gram(s) IV Push once  furosemide   Injectable 40 milliGRAM(s) IV Push daily  glucagon  Injectable 1 milliGRAM(s) IntraMuscular once PRN  heparin  Infusion 800 Unit(s)/Hr IV Continuous <Continuous>  hydrALAZINE 50 milliGRAM(s) Oral three times a day  insulin glargine Injectable (LANTUS) 32 Unit(s) SubCutaneous at bedtime  insulin lispro (HumaLOG) corrective regimen sliding scale   SubCutaneous three times a day before meals  insulin lispro (HumaLOG) corrective regimen sliding scale   SubCutaneous at bedtime  insulin lispro Injectable (HumaLOG) 6 Unit(s) SubCutaneous three times a day before meals  risperiDONE   Tablet 1 milliGRAM(s) Oral daily  sterile water 1000 milliLiter(s) IV Continuous <Continuous>  sterile water 1000 milliLiter(s) IV Continuous <Continuous>                            10.0   6.22  )-----------( 341      ( 28 Jun 2020 05:10 )             31.0       Hemoglobin: 10.0 g/dL (06-28 @ 05:10)  Hemoglobin: 10.2 g/dL (06-27 @ 06:00)  Hemoglobin: 10.3 g/dL (06-26 @ 05:49)  Hemoglobin: 9.9 g/dL (06-25 @ 05:50)  Hemoglobin: 11.2 g/dL (06-24 @ 05:43)      06-28    138  |  103  |  48<H>  ----------------------------<  113<H>  4.0   |  21<L>  |  1.88<H>    Ca    9.2      28 Jun 2020 05:10  Phos  4.4     06-28  Mg     2.3     06-28      Creatinine Trend: 1.88<--, 1.87<--, 2.25<--, 2.19<--, 2.28<--, 2.17<--    COAGS: PTT - ( 28 Jun 2020 05:10 )  PTT:90.0 SEC          T(C): 36.9 (06-28-20 @ 12:44), Max: 37.4 (06-27-20 @ 17:59)  HR: 85 (06-28-20 @ 12:44) (85 - 86)  BP: 137/54 (06-28-20 @ 12:44) (137/54 - 167/81)  RR: 16 (06-28-20 @ 12:44) (16 - 18)  SpO2: 98% (06-28-20 @ 12:44) (97% - 99%)  Wt(kg): --    I&O's Summary    27 Jun 2020 07:01  -  28 Jun 2020 07:00  --------------------------------------------------------  IN: 950 mL / OUT: 2400 mL / NET: -1450 mL    28 Jun 2020 07:01  -  28 Jun 2020 17:58  --------------------------------------------------------  IN: 0 mL / OUT: 850 mL / NET: -850 mL          Gen: Appears well in NAD  HEENT:  (-)icterus (-)pallor  CV: N S1 S2 RRR (+)2 Pulses B/l  Resp:  Clear to auscultation  B/L, normal effort  GI: (+) BS Soft, NT, ND  Lymph:  (-)Edema, (-)obvious lymphadenopathy  Skin: Warm to touch, Normal turgor  Psych: Appropriate mood and affect; AO X 3    TELEMETRY:      SR, no further NSVT on tele     < from: Transthoracic Echocardiogram (06.21.20 @ 12:47) >  CONCLUSIONS:  1. Mitral annular calcification, otherwise normal mitral  valve. Moderate mitral regurgitation.  2. Normal left ventricular internal dimensions and wall  thicknesses.  3. Hyperdynamic left ventricle.  4. The right ventricle is not well visualized; grossly  normal right ventricular systolic function.  5. Moderate pericardial effusion (measures about 1.3 cm  lateral to RA on apical four-chamber views).  6. Left pleural effusion.  ------------------------------------  < end of copied text >    < from: CT Angio Chest w/ IV Cont (06.20.20 @ 22:37) >    IMPRESSION:   No pulmonary embolism.  No gross CT evidence of pneumonia.  Mild left heart enlargement.  Mild interlobular septal thickening in both lungs may reflect interstitial pulmonary edema.  Small to moderate pleural effusions bilaterally.  Small pericardial effusion.    < end of copied text >      ASSESSMENT/PLAN: 	    74 year old female with history of HTN, HLD, DM, recent COVID infection in April of 2020 who presented to the ED with LE swelling for 1-2 weeks.    -pt. found to have elevated troponin with no chest pain or anginal symptoms  -neuro follow up appreciated   -ac for DVT per heme on heparin gtt - H/H around baseline - pt. with no signs of bleeding - monitor cbc   -continue with coreg for NSVT seen previously on tele, EP follow up  -Renal follow up appreciated; fluid volume improving now on daily IV lasix, reccs noted needs premeds prior to eventual cath        recommend mucomyst 1200 bid for 4 doses start 1 day prior to cath, last dose after cath, please hold lasix in the AM of the cath  -  For Left heart Cath in AM    Reji Arnold MD, FACC  BEEPER [phone]

## 2020-06-28 NOTE — PROGRESS NOTE ADULT - SUBJECTIVE AND OBJECTIVE BOX
Chief complaint  Patient is a 74y old  Female who presents with a chief complaint of LE swelling (28 Jun 2020 14:50)   Review of systems  Patient in bed, looks comfortable, no fever, no hypoglycemia.    Labs and Fingersticks  CAPILLARY BLOOD GLUCOSE      POCT Blood Glucose.: 139 mg/dL (28 Jun 2020 16:45)  POCT Blood Glucose.: 126 mg/dL (28 Jun 2020 12:11)  POCT Blood Glucose.: 134 mg/dL (28 Jun 2020 08:38)  POCT Blood Glucose.: 118 mg/dL (27 Jun 2020 22:07)      Anion Gap, Serum: 14 (06-28 @ 05:10)  Anion Gap, Serum: 15 <H> (06-27 @ 06:00)      Calcium, Total Serum: 9.2 (06-28 @ 05:10)  Calcium, Total Serum: 8.9 (06-27 @ 06:00)          06-28    138  |  103  |  48<H>  ----------------------------<  113<H>  4.0   |  21<L>  |  1.88<H>    Ca    9.2      28 Jun 2020 05:10  Phos  4.4     06-28  Mg     2.3     06-28                          10.0   6.22  )-----------( 341      ( 28 Jun 2020 05:10 )             31.0     Medications  MEDICATIONS  (STANDING):  acetylcysteine  Oral Solution 1200 milliGRAM(s) Oral two times a day  aspirin enteric coated 81 milliGRAM(s) Oral daily  atorvastatin 40 milliGRAM(s) Oral at bedtime  carvedilol 3.125 milliGRAM(s) Oral every 12 hours  chlorhexidine 4% Liquid 1 Application(s) Topical daily  clopidogrel Tablet 75 milliGRAM(s) Oral daily  dextrose 5%. 1000 milliLiter(s) (50 mL/Hr) IV Continuous <Continuous>  dextrose 50% Injectable 12.5 Gram(s) IV Push once  dextrose 50% Injectable 25 Gram(s) IV Push once  dextrose 50% Injectable 25 Gram(s) IV Push once  furosemide   Injectable 40 milliGRAM(s) IV Push daily  heparin  Infusion 800 Unit(s)/Hr (8 mL/Hr) IV Continuous <Continuous>  hydrALAZINE 50 milliGRAM(s) Oral three times a day  insulin glargine Injectable (LANTUS) 32 Unit(s) SubCutaneous at bedtime  insulin lispro (HumaLOG) corrective regimen sliding scale   SubCutaneous three times a day before meals  insulin lispro (HumaLOG) corrective regimen sliding scale   SubCutaneous at bedtime  insulin lispro Injectable (HumaLOG) 6 Unit(s) SubCutaneous three times a day before meals  risperiDONE   Tablet 1 milliGRAM(s) Oral daily  sterile water 1000 milliLiter(s) (200 mL/Hr) IV Continuous <Continuous>  sterile water 1000 milliLiter(s) (75 mL/Hr) IV Continuous <Continuous>      Physical Exam  General: Patient comfortable in bed  Vital Signs Last 12 Hrs  T(F): 98.5 (06-28-20 @ 12:44), Max: 98.9 (06-28-20 @ 06:06)  HR: 85 (06-28-20 @ 12:44) (85 - 86)  BP: 137/54 (06-28-20 @ 12:44) (137/54 - 167/81)  BP(mean): --  RR: 16 (06-28-20 @ 12:44) (16 - 18)  SpO2: 98% (06-28-20 @ 12:44) (97% - 98%)  Neck: No palpable thyroid nodules.  CVS: S1S2, No murmurs  Respiratory: No wheezing, no crepitations  GI: Abdomen soft, bowel sounds positive  Musculoskeletal:  edema lower extremities.   Skin: No skin rashes, no ecchymosis    Diagnostics

## 2020-06-29 ENCOUNTER — APPOINTMENT (OUTPATIENT)
Dept: CARDIOLOGY | Facility: HOSPITAL | Age: 75
End: 2020-06-29

## 2020-06-29 LAB
ANION GAP SERPL CALC-SCNC: 14 MMO/L — SIGNIFICANT CHANGE UP (ref 7–14)
APTT BLD: 115.9 SEC — HIGH (ref 27.5–36.3)
BUN SERPL-MCNC: 48 MG/DL — HIGH (ref 7–23)
CALCIUM SERPL-MCNC: 9.3 MG/DL — SIGNIFICANT CHANGE UP (ref 8.4–10.5)
CHLORIDE SERPL-SCNC: 98 MMOL/L — SIGNIFICANT CHANGE UP (ref 98–107)
CO2 SERPL-SCNC: 23 MMOL/L — SIGNIFICANT CHANGE UP (ref 22–31)
CREAT SERPL-MCNC: 1.78 MG/DL — HIGH (ref 0.5–1.3)
GLUCOSE BLDC GLUCOMTR-MCNC: 118 MG/DL — HIGH (ref 70–99)
GLUCOSE BLDC GLUCOMTR-MCNC: 136 MG/DL — HIGH (ref 70–99)
GLUCOSE BLDC GLUCOMTR-MCNC: 160 MG/DL — HIGH (ref 70–99)
GLUCOSE BLDC GLUCOMTR-MCNC: 208 MG/DL — HIGH (ref 70–99)
GLUCOSE SERPL-MCNC: 124 MG/DL — HIGH (ref 70–99)
HCT VFR BLD CALC: 33.8 % — LOW (ref 34.5–45)
HGB BLD-MCNC: 10.7 G/DL — LOW (ref 11.5–15.5)
KAPPA/LAMBDA FREE LIGHT CHAIN RATIO, SERUM: SIGNIFICANT CHANGE UP
MAGNESIUM SERPL-MCNC: 2.3 MG/DL — SIGNIFICANT CHANGE UP (ref 1.6–2.6)
MCHC RBC-ENTMCNC: 25.2 PG — LOW (ref 27–34)
MCHC RBC-ENTMCNC: 31.7 % — LOW (ref 32–36)
MCV RBC AUTO: 79.7 FL — LOW (ref 80–100)
NRBC # FLD: 0 K/UL — SIGNIFICANT CHANGE UP (ref 0–0)
PHOSPHATE SERPL-MCNC: 5.3 MG/DL — HIGH (ref 2.5–4.5)
PLATELET # BLD AUTO: 366 K/UL — SIGNIFICANT CHANGE UP (ref 150–400)
PMV BLD: 10.5 FL — SIGNIFICANT CHANGE UP (ref 7–13)
POTASSIUM SERPL-MCNC: 3.9 MMOL/L — SIGNIFICANT CHANGE UP (ref 3.5–5.3)
POTASSIUM SERPL-SCNC: 3.9 MMOL/L — SIGNIFICANT CHANGE UP (ref 3.5–5.3)
RBC # BLD: 4.24 M/UL — SIGNIFICANT CHANGE UP (ref 3.8–5.2)
RBC # FLD: 15.4 % — HIGH (ref 10.3–14.5)
SODIUM SERPL-SCNC: 135 MMOL/L — SIGNIFICANT CHANGE UP (ref 135–145)
WBC # BLD: 7.19 K/UL — SIGNIFICANT CHANGE UP (ref 3.8–10.5)
WBC # FLD AUTO: 7.19 K/UL — SIGNIFICANT CHANGE UP (ref 3.8–10.5)

## 2020-06-29 PROCEDURE — 93458 L HRT ARTERY/VENTRICLE ANGIO: CPT | Mod: 26,59

## 2020-06-29 PROCEDURE — 99152 MOD SED SAME PHYS/QHP 5/>YRS: CPT

## 2020-06-29 PROCEDURE — 92941 PRQ TRLML REVSC TOT OCCL AMI: CPT | Mod: RC

## 2020-06-29 PROCEDURE — 93010 ELECTROCARDIOGRAM REPORT: CPT

## 2020-06-29 RX ORDER — HEPARIN SODIUM 5000 [USP'U]/ML
6 INJECTION INTRAVENOUS; SUBCUTANEOUS
Qty: 25000 | Refills: 0 | Status: DISCONTINUED | OUTPATIENT
Start: 2020-06-29 | End: 2020-06-29

## 2020-06-29 RX ORDER — WATER FOR INHALATION
1000 VIAL, NEBULIZER (ML) INHALATION
Refills: 0 | Status: DISCONTINUED | OUTPATIENT
Start: 2020-06-29 | End: 2020-06-29

## 2020-06-29 RX ORDER — HEPARIN SODIUM 5000 [USP'U]/ML
600 INJECTION INTRAVENOUS; SUBCUTANEOUS
Qty: 25000 | Refills: 0 | Status: DISCONTINUED | OUTPATIENT
Start: 2020-06-29 | End: 2020-06-29

## 2020-06-29 RX ORDER — WATER FOR INHALATION
1000 VIAL, NEBULIZER (ML) INHALATION
Refills: 0 | Status: DISCONTINUED | OUTPATIENT
Start: 2020-06-29 | End: 2020-07-03

## 2020-06-29 RX ADMIN — Medication 75 MILLILITER(S): at 17:44

## 2020-06-29 RX ADMIN — CLOPIDOGREL BISULFATE 75 MILLIGRAM(S): 75 TABLET, FILM COATED ORAL at 12:10

## 2020-06-29 RX ADMIN — Medication 50 MILLIGRAM(S): at 06:33

## 2020-06-29 RX ADMIN — Medication 6 UNIT(S): at 08:52

## 2020-06-29 RX ADMIN — Medication 40 MILLIGRAM(S): at 06:33

## 2020-06-29 RX ADMIN — ATORVASTATIN CALCIUM 40 MILLIGRAM(S): 80 TABLET, FILM COATED ORAL at 22:03

## 2020-06-29 RX ADMIN — Medication 200 MILLILITER(S): at 13:14

## 2020-06-29 RX ADMIN — Medication 81 MILLIGRAM(S): at 12:10

## 2020-06-29 RX ADMIN — Medication 1200 MILLIGRAM(S): at 06:33

## 2020-06-29 RX ADMIN — INSULIN GLARGINE 32 UNIT(S): 100 INJECTION, SOLUTION SUBCUTANEOUS at 22:03

## 2020-06-29 RX ADMIN — CARVEDILOL PHOSPHATE 3.12 MILLIGRAM(S): 80 CAPSULE, EXTENDED RELEASE ORAL at 17:28

## 2020-06-29 RX ADMIN — RISPERIDONE 1 MILLIGRAM(S): 4 TABLET ORAL at 12:10

## 2020-06-29 RX ADMIN — Medication 50 MILLIGRAM(S): at 12:10

## 2020-06-29 RX ADMIN — CHLORHEXIDINE GLUCONATE 1 APPLICATION(S): 213 SOLUTION TOPICAL at 12:11

## 2020-06-29 RX ADMIN — Medication 1200 MILLIGRAM(S): at 17:28

## 2020-06-29 RX ADMIN — Medication 0: at 21:45

## 2020-06-29 RX ADMIN — Medication 50 MILLIGRAM(S): at 22:03

## 2020-06-29 RX ADMIN — CARVEDILOL PHOSPHATE 3.12 MILLIGRAM(S): 80 CAPSULE, EXTENDED RELEASE ORAL at 06:34

## 2020-06-29 RX ADMIN — HEPARIN SODIUM 6 UNIT(S)/HR: 5000 INJECTION INTRAVENOUS; SUBCUTANEOUS at 10:39

## 2020-06-29 NOTE — PROGRESS NOTE ADULT - SUBJECTIVE AND OBJECTIVE BOX
Patient seen. comfortable. no bleeding    MEDICATIONS  (STANDING):  acetylcysteine  Oral Solution 1200 milliGRAM(s) Oral two times a day  aspirin enteric coated 81 milliGRAM(s) Oral daily  atorvastatin 40 milliGRAM(s) Oral at bedtime  carvedilol 3.125 milliGRAM(s) Oral every 12 hours  chlorhexidine 4% Liquid 1 Application(s) Topical daily  clopidogrel Tablet 75 milliGRAM(s) Oral daily  dextrose 5%. 1000 milliLiter(s) (50 mL/Hr) IV Continuous <Continuous>  dextrose 50% Injectable 12.5 Gram(s) IV Push once  dextrose 50% Injectable 25 Gram(s) IV Push once  dextrose 50% Injectable 25 Gram(s) IV Push once  furosemide   Injectable 40 milliGRAM(s) IV Push daily  heparin  Infusion 600 Unit(s)/Hr (6 mL/Hr) IV Continuous <Continuous>  hydrALAZINE 50 milliGRAM(s) Oral three times a day  insulin glargine Injectable (LANTUS) 32 Unit(s) SubCutaneous at bedtime  insulin lispro (HumaLOG) corrective regimen sliding scale   SubCutaneous three times a day before meals  insulin lispro (HumaLOG) corrective regimen sliding scale   SubCutaneous at bedtime  insulin lispro Injectable (HumaLOG) 6 Unit(s) SubCutaneous three times a day before meals  risperiDONE   Tablet 1 milliGRAM(s) Oral daily  sterile water 1000 milliLiter(s) (200 mL/Hr) IV Continuous <Continuous>  sterile water 1000 milliLiter(s) (75 mL/Hr) IV Continuous <Continuous>    MEDICATIONS  (PRN):  dextrose 40% Gel 15 Gram(s) Oral once PRN Blood Glucose LESS THAN 70 milliGRAM(s)/deciLiter  glucagon  Injectable 1 milliGRAM(s) IntraMuscular once PRN Glucose <70 milliGRAM(s)/deciLiter      ROS  No fever, sweats, chills  No epistaxis, HA, sore throat  No CP, SOB, cough, sputum  No n/v/d, abd pain, melena, hematochezia  No edema  No rash  No anxiety  No back pain, joint pain  No bleeding, bruising  No dysuria, hematuria    Vital Signs Last 24 Hrs  T(C): 36.6 (29 Jun 2020 12:02), Max: 37.2 (29 Jun 2020 06:30)  T(F): 97.9 (29 Jun 2020 12:02), Max: 98.9 (29 Jun 2020 06:30)  HR: 89 (29 Jun 2020 12:02) (83 - 91)  BP: 161/79 (29 Jun 2020 12:02) (142/59 - 161/79)  BP(mean): --  RR: 16 (29 Jun 2020 12:02) (15 - 16)  SpO2: 100% (29 Jun 2020 12:02) (98% - 100%)    PE  NAD  Awake, alert  Anicteric, MMM  RRR  CTAB  Abd soft, NT, ND  No c/c/e  No rash grossly  FROM                          10.7   7.19  )-----------( 366      ( 29 Jun 2020 06:47 )             33.8       06-29    135  |  98  |  48<H>  ----------------------------<  124<H>  3.9   |  23  |  1.78<H>    Ca    9.3      29 Jun 2020 06:47  Phos  5.3     06-29  Mg     2.3     06-29

## 2020-06-29 NOTE — PROGRESS NOTE ADULT - ASSESSMENT
74 year old female with PMH of HTN, HLD, DM, recent COVID infection in April 2020 who presented with LE swelling for weeks, found to have HF exacerbation and treated with diuretics; patient was found to have Wenchebach on telemetry and had 23 beats of NSVT, as well as elevated cardiac enzymes, warranting transfer to CCU for continued monitoring and ischemic workup for likely NSTEMI.    SUNIL on CKD III  Baseline Scr 1 with GFR 45 12/2019   scr on admission 1.13, renal function worsening  SUNIL possible sec to JUNIOR (CTA 6/20) and/or ATN   possible need cath/MRI with linda.   renal function is stable today. She is planned for cardiac cath. She is high risk for contrast nephropathy. However benefit seems to be greater than risk, will clear patient for the procedure.   Spoke with patient's daughter and son about risk and benefits. they expressed understanding.   Mucomyst is already ordered. Added NaHCO3 150 mEq to be infused at 200 ml/hr x 1 hr, before cardiac cath, and 75 ml/r x 6 hrs after cardiac cath  lasix on hold today  monitor bmp    Anemia  acceptable  Monitor at present     NSTEMI  care per CCU  planning for cath  monitor urine output, weight and bmp  f.u cardio    HTN  controlled  monitor    Proteinuria  UA with 100 protein  urine p/c ratio 2.5g/day  likely sec to DM/HTN  pending vasculitis work up  hep b neg, c3 c4 wnl, hep c neg, rpr neg, K/L ratio ok, SPeP neg, Anca neg  shonda 1:80  pending hiv, serum immunofixation 74 year old female with PMH of HTN, HLD, DM, recent COVID infection in April 2020 who presented with LE swelling for weeks, found to have HF exacerbation and treated with diuretics; patient was found to have Wenchebach on telemetry and had 23 beats of NSVT, as well as elevated cardiac enzymes, warranting transfer to CCU for continued monitoring and ischemic workup for likely NSTEMI.    SUNIL on CKD III  Baseline Scr 1 with GFR 45 12/2019   scr on admission 1.13, renal function worsening  SUNIL possible sec to JUNIOR (CTA 6/20) and/or ATN   possible need cath/MRI with linda.   renal function is stable today. She is planned for cardiac cath. She is high risk for contrast nephropathy. However benefit seems to be greater than risk, will clear patient for the procedure.   Spoke with patient's daughter and son about risk and benefits. they expressed understanding.   Mucomyst is already ordered. Added NaHCO3 150 mEq in sterile water to be infused at 200 ml/hr x 1 hr, before cardiac cath, and 75 ml/hr x 6 hrs only after cardiac cath  lasix on hold today  monitor bmp    Anemia  acceptable  Monitor at present     NSTEMI  care per CCU  planning for cath  monitor urine output, weight and bmp  f.u cardio    HTN  controlled  monitor    Proteinuria  UA with 100 protein  urine p/c ratio 2.5g/day  likely sec to DM/HTN  pending vasculitis work up  hep b neg, c3 c4 wnl, hep c neg, rpr neg, K/L ratio ok, SPeP neg, Anca neg  shonda 1:80  pending hiv, serum immunofixation

## 2020-06-29 NOTE — CHART NOTE - NSCHARTNOTEFT_GEN_A_CORE
JOSE SAGASTUME 74y Female s/p cath Right radial site check. Dressing is clear/dry/intact. Site is without hematoma or bleeding. Cap refill<2 sec  Patient denies pain, numbness, tingling, CP, SOB. VSS. Will continue to monitor.       Vital Signs Last 24 Hrs  T(C): 37.1 (29 Jun 2020 19:51), Max: 37.2 (29 Jun 2020 06:30)  T(F): 98.7 (29 Jun 2020 19:51), Max: 98.9 (29 Jun 2020 06:30)  HR: 85 (29 Jun 2020 19:51) (83 - 89)  BP: 168/81 (29 Jun 2020 19:51) (155/799 - 168/81)  BP(mean): --  RR: 16 (29 Jun 2020 19:51) (16 - 16)  SpO2: 100% (29 Jun 2020 19:51) (100% - 100%)  Pulses palpable, cap refill <2 sec. JOSE SAGASTUME 74y Female s/p cath Right radial site check. Dressing is clear/dry/intact. Site is without hematoma or bleeding.   Patient denies pain, numbness, tingling, CP, SOB. VSS. Will continue to monitor.       Vital Signs Last 24 Hrs  T(C): 37.1 (29 Jun 2020 19:51), Max: 37.2 (29 Jun 2020 06:30)  T(F): 98.7 (29 Jun 2020 19:51), Max: 98.9 (29 Jun 2020 06:30)  HR: 85 (29 Jun 2020 19:51) (83 - 89)  BP: 168/81 (29 Jun 2020 19:51) (155/799 - 168/81)  BP(mean): --  RR: 16 (29 Jun 2020 19:51) (16 - 16)  SpO2: 100% (29 Jun 2020 19:51) (100% - 100%)    Pulses palpable, cap refill <2 sec.

## 2020-06-29 NOTE — PROGRESS NOTE ADULT - ASSESSMENT
· Assessment		  1. Acute Left Post Tibial DVT    -- agree w Unfractionated Heparin gtt in light of SUNIL  -- would AC for 3 mos or longer if bed bound  -- Options include coumadin and Apixaban;  for now on heparin gtt pending cardiac catherization - for cath today  -- lupus anticoagulant negative  -- no evidence of protein C  --f/u protein S level    2. Microcytic Anemia  -- iron studies normal  -- Hgb Electrophoresis unremarkable  -- unremarkable hemolysis labs  -- SPEP/LOI,   -- trend counts  -- contribution from SUNIL  -- B12/folate low end of normal; check methylmalonic acid and homocysteine level to further evaluate    David Clayton MD  Hematology/Oncology  [phone]

## 2020-06-29 NOTE — PROGRESS NOTE ADULT - SUBJECTIVE AND OBJECTIVE BOX
EP     tele: NSR, no further NSVT    she denies palpitations, syncope, nor angina, ROS otherwise -      MEDICATIONS  (STANDING):  acetylcysteine  Oral Solution 1200 milliGRAM(s) Oral two times a day  aspirin enteric coated 81 milliGRAM(s) Oral daily  atorvastatin 40 milliGRAM(s) Oral at bedtime  carvedilol 3.125 milliGRAM(s) Oral every 12 hours  chlorhexidine 4% Liquid 1 Application(s) Topical daily  clopidogrel Tablet 75 milliGRAM(s) Oral daily  dextrose 5%. 1000 milliLiter(s) (50 mL/Hr) IV Continuous <Continuous>  dextrose 50% Injectable 12.5 Gram(s) IV Push once  dextrose 50% Injectable 25 Gram(s) IV Push once  dextrose 50% Injectable 25 Gram(s) IV Push once  furosemide   Injectable 40 milliGRAM(s) IV Push daily  heparin  Infusion 600 Unit(s)/Hr (6 mL/Hr) IV Continuous <Continuous>  hydrALAZINE 50 milliGRAM(s) Oral three times a day  insulin glargine Injectable (LANTUS) 32 Unit(s) SubCutaneous at bedtime  insulin lispro (HumaLOG) corrective regimen sliding scale   SubCutaneous three times a day before meals  insulin lispro (HumaLOG) corrective regimen sliding scale   SubCutaneous at bedtime  insulin lispro Injectable (HumaLOG) 6 Unit(s) SubCutaneous three times a day before meals  risperiDONE   Tablet 1 milliGRAM(s) Oral daily  sterile water 1000 milliLiter(s) (200 mL/Hr) IV Continuous <Continuous>  sterile water 1000 milliLiter(s) (75 mL/Hr) IV Continuous <Continuous>    MEDICATIONS  (PRN):  dextrose 40% Gel 15 Gram(s) Oral once PRN Blood Glucose LESS THAN 70 milliGRAM(s)/deciLiter  glucagon  Injectable 1 milliGRAM(s) IntraMuscular once PRN Glucose <70 milliGRAM(s)/deciLiter      LABS:                            10.7   7.19  )-----------( 366      ( 29 Jun 2020 06:47 )             33.8     Hemoglobin: 10.7 g/dL (06-29 @ 06:47)  Hemoglobin: 10.0 g/dL (06-28 @ 05:10)  Hemoglobin: 10.2 g/dL (06-27 @ 06:00)  Hemoglobin: 10.3 g/dL (06-26 @ 05:49)  Hemoglobin: 9.9 g/dL (06-25 @ 05:50)    06-29    135  |  98  |  48<H>  ----------------------------<  124<H>  3.9   |  23  |  1.78<H>    Ca    9.3      29 Jun 2020 06:47  Phos  5.3     06-29  Mg     2.3     06-29      Creatinine Trend: 1.78<--, 1.88<--, 1.87<--, 2.25<--, 2.19<--, 2.28<--   PTT - ( 29 Jun 2020 06:47 )  PTT:115.9 SEC        PHYSICAL EXAM  Vital Signs Last 24 Hrs  T(C): 36.6 (29 Jun 2020 12:02), Max: 37.2 (29 Jun 2020 06:30)  T(F): 97.9 (29 Jun 2020 12:02), Max: 98.9 (29 Jun 2020 06:30)  HR: 89 (29 Jun 2020 12:02) (83 - 91)  BP: 161/79 (29 Jun 2020 12:02) (137/54 - 161/79)  BP(mean): --  RR: 16 (29 Jun 2020 12:02) (15 - 16)  SpO2: 100% (29 Jun 2020 12:02) (98% - 100%)      A&O x 3  neurologically intact  no JVD  RRR, no murmurs  CTAB  soft nt/nd  no c/c/e    echo: moderate MR, normal LVEF    A/P) 74 year old female PMH HTN, hyperlipidemia, DM admitted with LE edema. EP called for NSVT in setting of normal LVEF.    -continue beta blockers for NSVT  -await cath today  -no further inpatient EP workup expected  -given normal LVEF there is no indication for an ICD nor AAD

## 2020-06-29 NOTE — PROGRESS NOTE ADULT - SUBJECTIVE AND OBJECTIVE BOX
[facility]   MD MICHAEL STOUT DO ANAM SIDDIQUI ANGELA WONG, PA    TEL:  OFFICE: [phone]  DR FLORES CELL: [phone]  DR. HUSSEIN CELL: [phone]  DR. HODGES CELL: [phone]  WESTLEY DAVE CELL: [phone]    From 5pm-7am Answering Service [phone]    Patient is a 74y old  Female who presents with a chief complaint of LE swelling (28 Jun 2020 17:58)      Patient seen and examined at bedside. No chest pain/sob    VITALS:  T(F): 98.9 (06-29-20 @ 06:30), Max: 98.9 (06-29-20 @ 06:30)  HR: 83 (06-29-20 @ 06:30)  BP: 155/799 (06-29-20 @ 06:30)  RR: 16 (06-29-20 @ 06:30)  SpO2: 100% (06-29-20 @ 06:30)  Wt(kg): --    06-28 @ 07:01  -  06-29 @ 07:00  --------------------------------------------------------  IN: 0 mL / OUT: 850 mL / NET: -850 mL          PHYSICAL EXAM:  Constitutional: NAD  Neck: No JVD  Respiratory: CTAB, no wheezes, rales or rhonchi  Cardiovascular: S1, S2, RRR  Gastrointestinal: BS+, soft, NT/ND  Extremities: No peripheral edema    Hospital Medications:   MEDICATIONS  (STANDING):  acetylcysteine  Oral Solution 1200 milliGRAM(s) Oral two times a day  aspirin enteric coated 81 milliGRAM(s) Oral daily  atorvastatin 40 milliGRAM(s) Oral at bedtime  carvedilol 3.125 milliGRAM(s) Oral every 12 hours  chlorhexidine 4% Liquid 1 Application(s) Topical daily  clopidogrel Tablet 75 milliGRAM(s) Oral daily  dextrose 5%. 1000 milliLiter(s) (50 mL/Hr) IV Continuous <Continuous>  dextrose 50% Injectable 12.5 Gram(s) IV Push once  dextrose 50% Injectable 25 Gram(s) IV Push once  dextrose 50% Injectable 25 Gram(s) IV Push once  furosemide   Injectable 40 milliGRAM(s) IV Push daily  heparin  Infusion 600 Unit(s)/Hr (6 mL/Hr) IV Continuous <Continuous>  hydrALAZINE 50 milliGRAM(s) Oral three times a day  insulin glargine Injectable (LANTUS) 32 Unit(s) SubCutaneous at bedtime  insulin lispro (HumaLOG) corrective regimen sliding scale   SubCutaneous three times a day before meals  insulin lispro (HumaLOG) corrective regimen sliding scale   SubCutaneous at bedtime  insulin lispro Injectable (HumaLOG) 6 Unit(s) SubCutaneous three times a day before meals  risperiDONE   Tablet 1 milliGRAM(s) Oral daily  sterile water 1000 milliLiter(s) (200 mL/Hr) IV Continuous <Continuous>  sterile water 1000 milliLiter(s) (75 mL/Hr) IV Continuous <Continuous>      LABS:  06-29    135  |  98  |  48<H>  ----------------------------<  124<H>  3.9   |  23  |  1.78<H>    Ca    9.3      29 Jun 2020 06:47  Phos  5.3     06-29  Mg     2.3     06-29      Creatinine Trend: 1.78 <--, 1.88 <--, 1.87 <--, 2.25 <--, 2.19 <--, 2.28 <--, 2.17 <--, 2.42 <--, 2.25 <--    Phosphorus Level, Serum: 5.3 mg/dL (06-29 @ 06:47)                              10.7   7.19  )-----------( 366      ( 29 Jun 2020 06:47 )             33.8     Urine Studies:  Urinalysis - [06-22-20 @ 17:00]      Color COLORLESS / Appearance CLEAR / SG 1.009 / pH 8.0      Gluc NEGATIVE / Ketone NEGATIVE  / Bili NEGATIVE / Urobili NORMAL       Blood NEGATIVE / Protein 100 / Leuk Est NEGATIVE / Nitrite NEGATIVE      RBC 0-2 / WBC 0-2 / Hyaline NEGATIVE / Gran  / Sq Epi OCC / Non Sq Epi  / Bacteria FEW    Urine Creatinine 44.70      [06-23-20 @ 20:00]  Urine Protein 111.4      [06-23-20 @ 20:00]  Urine Urea Nitrogen 110      [06-22-20 @ 17:00]    Iron 58, TIBC 282, %sat --      [06-24-20 @ 05:43]  Ferritin 72.33      [06-24-20 @ 05:43]  HbA1c 8.8      [12-13-19 @ 06:00]  TSH 7.68      [06-21-20 @ 07:11]  Lipid: chol 197, , HDL 41,       [12-14-19 @ 03:50]    HBsAg NEGATIVE      [06-25-20 @ 05:50]  HCV 0.77, Nonreactive Hepatitis C AB  S/CO Ratio                        Interpretation  < 1.00                                   Non-Reactive  1.00 - 4.99                         Weakly-Reactive  >= 5.00                                Reactive  Non-Reactive: Aperson with a non-reactive HCV antibody  result is considered uninfected.  No further action is  needed unless recent infection is suspected.  In these  cases, consider repeat testing later to detect  seroconversion..  Weakly-Reactive: HCV antibody test is abnormal, HCV RNA  Qualitative test will follow.  Reactive: HCV antibody test is abnormal, HCV RNA  Qualitative test will follow.  Note: HCV antibody testing is performed on the [employer]   system.      [06-25-20 @ 05:50]    CHRYSTAL: titer 1:80, pattern Speckled      [06-25-20 @ 05:50]  C3 Complement 162.8      [06-25-20 @ 05:50]  C4 Complement 40.2      [06-25-20 @ 05:50]  ANCA: cANCA Negative, pANCA Negative, atypical ANCA --      [06-25-20 @ 05:50]  Syphilis Screen (Treponema Pallidum Ab) Negative      [06-25-20 @ 05:50]  Free Light Chains: kappa 7.52, lambda 4.11, ratio = --      [06-25 @ 05:50]    RADIOLOGY & ADDITIONAL STUDIES:

## 2020-06-29 NOTE — PROGRESS NOTE ADULT - ASSESSMENT
Assessment  DMT2: 74y Female with DM T2 with hyperglycemia, on low dose insulin, blood sugars improved, no hypoglycemic episode, eating meals, cath today, compliant with low carb diet.  CHF: on medications, no chest pain, stable, monitored.  Hypothyroidism/subclinical: Apparently no history of thyroid disease, asymptomatic, repeat TFTs euthyroid..  HTN:  Controlled,  on antihypertensive medications.    Eddie Smith MD  Cell: [phone]  Office: [phone] Assessment  DMT2: 74y Female with DM T2 with hyperglycemia, on basal bolus insulin, blood sugars improved, no hypoglycemic episode, eating meals, cath today, compliant with low carb diet.  CHF: on medications, no chest pain, stable, monitored.  Hypothyroidism/subclinical: Apparently no history of thyroid disease, asymptomatic, repeat TFTs euthyroid..  HTN:  Controlled,  on antihypertensive medications.    Eddie Smith MD  Cell: [phone]  Office: [phone]

## 2020-06-29 NOTE — PROGRESS NOTE ADULT - SUBJECTIVE AND OBJECTIVE BOX
S: no CP, SOB Review of systems otherwise (-)      acetylcysteine  Oral Solution 1200 milliGRAM(s) Oral two times a day  aspirin enteric coated 81 milliGRAM(s) Oral daily  atorvastatin 40 milliGRAM(s) Oral at bedtime  carvedilol 3.125 milliGRAM(s) Oral every 12 hours  chlorhexidine 4% Liquid 1 Application(s) Topical daily  clopidogrel Tablet 75 milliGRAM(s) Oral daily  dextrose 40% Gel 15 Gram(s) Oral once PRN  dextrose 5%. 1000 milliLiter(s) IV Continuous <Continuous>  dextrose 50% Injectable 12.5 Gram(s) IV Push once  dextrose 50% Injectable 25 Gram(s) IV Push once  dextrose 50% Injectable 25 Gram(s) IV Push once  furosemide   Injectable 40 milliGRAM(s) IV Push daily  glucagon  Injectable 1 milliGRAM(s) IntraMuscular once PRN  heparin  Infusion 600 Unit(s)/Hr IV Continuous <Continuous>  hydrALAZINE 50 milliGRAM(s) Oral three times a day  insulin glargine Injectable (LANTUS) 32 Unit(s) SubCutaneous at bedtime  insulin lispro (HumaLOG) corrective regimen sliding scale   SubCutaneous three times a day before meals  insulin lispro (HumaLOG) corrective regimen sliding scale   SubCutaneous at bedtime  insulin lispro Injectable (HumaLOG) 6 Unit(s) SubCutaneous three times a day before meals  risperiDONE   Tablet 1 milliGRAM(s) Oral daily  sterile water 1000 milliLiter(s) IV Continuous <Continuous>  sterile water 1000 milliLiter(s) IV Continuous <Continuous>                            10.7   7.19  )-----------( 366      ( 29 Jun 2020 06:47 )             33.8       06-29    135  |  98  |  48<H>  ----------------------------<  124<H>  3.9   |  23  |  1.78<H>    Ca    9.3      29 Jun 2020 06:47  Phos  5.3     06-29  Mg     2.3     06-29              T(C): 36.6 (06-29-20 @ 12:02), Max: 37.2 (06-29-20 @ 06:30)  HR: 89 (06-29-20 @ 12:02) (83 - 91)  BP: 161/79 (06-29-20 @ 12:02) (142/59 - 161/79)  RR: 16 (06-29-20 @ 12:02) (15 - 16)  SpO2: 100% (06-29-20 @ 12:02) (98% - 100%)  Wt(kg): --    I&O's Summary    28 Jun 2020 07:01  -  29 Jun 2020 07:00  --------------------------------------------------------  IN: 0 mL / OUT: 850 mL / NET: -850 mL          Gen: Appears well in NAD  HEENT:  (-)icterus (-)pallor  CV: N S1 S2 RRR (+)2 Pulses B/l  Resp:  Clear to auscultation  B/L, normal effort  GI: (+) BS Soft, NT, ND  Lymph:  (-)Edema, (-)obvious lymphadenopathy  Skin: Warm to touch, Normal turgor  Psych: Appropriate mood and affect; AO X 3    TELEMETRY: SR    < from: Transthoracic Echocardiogram (06.21.20 @ 12:47) >  CONCLUSIONS:  1. Mitral annular calcification, otherwise normal mitral  valve. Moderate mitral regurgitation.  2. Normal left ventricular internal dimensions and wall  thicknesses.  3. Hyperdynamic left ventricle.  4. The right ventricle is not well visualized; grossly  normal right ventricular systolic function.  5. Moderate pericardial effusion (measures about 1.3 cm  lateral to RA on apical four-chamber views).  6. Left pleural effusion.  ------------------------------------  < end of copied text >    < from: CT Angio Chest w/ IV Cont (06.20.20 @ 22:37) >    IMPRESSION:   No pulmonary embolism.  No gross CT evidence of pneumonia.  Mild left heart enlargement.  Mild interlobular septal thickening in both lungs may reflect interstitial pulmonary edema.  Small to moderate pleural effusions bilaterally.  Small pericardial effusion.    < end of copied text >      ASSESSMENT/PLAN: 	    74 year old female with history of HTN, HLD, DM, recent COVID infection in April of 2020 who presented to the ED with LE swelling for 1-2 weeks.    -pt. found to have elevated troponin with no chest pain or anginal symptoms  -neuro follow up appreciated   -ac for DVT per heme on heparin gtt - H/H around baseline - pt. with no signs of bleeding - monitor cbc   -continue with beta blockers for NSVT seen on tele  -plans for cath today  -appreciate renal follow up and optimization    Donis Jaquez MD

## 2020-06-29 NOTE — PROGRESS NOTE ADULT - SUBJECTIVE AND OBJECTIVE BOX
Chief complaint  Patient is a 74y old  Female who presents with a chief complaint of LE swelling (29 Jun 2020 13:02)   Review of systems  Patient in bed, looks comfortable, no fever, no hypoglycemia.    Labs and Fingersticks  CAPILLARY BLOOD GLUCOSE      POCT Blood Glucose.: 136 mg/dL (29 Jun 2020 12:27)  POCT Blood Glucose.: 160 mg/dL (29 Jun 2020 08:44)  POCT Blood Glucose.: 148 mg/dL (28 Jun 2020 21:23)  POCT Blood Glucose.: 147 mg/dL (28 Jun 2020 17:59)  POCT Blood Glucose.: 139 mg/dL (28 Jun 2020 16:45)      Anion Gap, Serum: 14 (06-29 @ 06:47)  Anion Gap, Serum: 14 (06-28 @ 05:10)      Calcium, Total Serum: 9.3 (06-29 @ 06:47)  Calcium, Total Serum: 9.2 (06-28 @ 05:10)          06-29    135  |  98  |  48<H>  ----------------------------<  124<H>  3.9   |  23  |  1.78<H>    Ca    9.3      29 Jun 2020 06:47  Phos  5.3     06-29  Mg     2.3     06-29                          10.7   7.19  )-----------( 366      ( 29 Jun 2020 06:47 )             33.8     Medications  MEDICATIONS  (STANDING):  acetylcysteine  Oral Solution 1200 milliGRAM(s) Oral two times a day  aspirin enteric coated 81 milliGRAM(s) Oral daily  atorvastatin 40 milliGRAM(s) Oral at bedtime  carvedilol 3.125 milliGRAM(s) Oral every 12 hours  chlorhexidine 4% Liquid 1 Application(s) Topical daily  clopidogrel Tablet 75 milliGRAM(s) Oral daily  dextrose 5%. 1000 milliLiter(s) (50 mL/Hr) IV Continuous <Continuous>  dextrose 50% Injectable 12.5 Gram(s) IV Push once  dextrose 50% Injectable 25 Gram(s) IV Push once  dextrose 50% Injectable 25 Gram(s) IV Push once  furosemide   Injectable 40 milliGRAM(s) IV Push daily  hydrALAZINE 50 milliGRAM(s) Oral three times a day  insulin glargine Injectable (LANTUS) 32 Unit(s) SubCutaneous at bedtime  insulin lispro (HumaLOG) corrective regimen sliding scale   SubCutaneous three times a day before meals  insulin lispro (HumaLOG) corrective regimen sliding scale   SubCutaneous at bedtime  insulin lispro Injectable (HumaLOG) 6 Unit(s) SubCutaneous three times a day before meals  risperiDONE   Tablet 1 milliGRAM(s) Oral daily  sterile water 1000 milliLiter(s) (75 mL/Hr) IV Continuous <Continuous>      Physical Exam  General: Patient comfortable in bed  Vital Signs Last 12 Hrs  T(F): 97.9 (06-29-20 @ 12:02), Max: 98.9 (06-29-20 @ 06:30)  HR: 89 (06-29-20 @ 12:02) (83 - 89)  BP: 161/79 (06-29-20 @ 12:02) (155/799 - 161/79)  BP(mean): --  RR: 16 (06-29-20 @ 12:02) (16 - 16)  SpO2: 100% (06-29-20 @ 12:02) (100% - 100%)  Neck: No palpable thyroid nodules.  CVS: S1S2, No murmurs  Respiratory: No wheezing, no crepitations  GI: Abdomen soft, bowel sounds positive  Musculoskeletal:  edema lower extremities.   Skin: No skin rashes, no ecchymosis    Diagnostics

## 2020-06-29 NOTE — PROGRESS NOTE ADULT - ASSESSMENT
75 y/o F PMH HTN, HLD, DM2, Depression, COVID-19 (4/20) p/w B/L LE swelling X1-2 weeks. Reports no other symptoms, no change in functional status, no change in diet, compliant with all her medications, follows up regularly with her PMD. Reports no changes in meds since admission in April 2020 for COVID-19.     Problem/Recommendation - 1:  Problem: NSTEMI (non-ST elevated myocardial infarction). Recommendation: .ASA, Plavix , BB ,Statin and Heparin.   Cardiology following . Cardiac cath today  and MRI pending.      Problem/Recommendation - 2:  ·  Problem: Ventricular tachycardia, non-sustained.  Recommendation: EP help appreciated.      Problem/Recommendation - 3:  ·  Problem:  Acute on chronic congestive heart failure, unspecified heart failure type.  Recommendation: IV Lasix.      Problem/Recommendation - 4:  ·  Problem: Diabetes mellitus type 2, noninsulin dependent.  Recommendation: Lantus and SSI for now. Endo helping.      Problem/Recommendation - 5:  ·  Problem: Hypertension.  Recommendation: BP meds with hold parameters .      Problem/Recommendation - 6:  Problem: Hypercholesterolemia. Recommendation: Statin.     Problem/Recommendation - 7:  Problem: SUNIL . Recommendation: Creatinine better .  Renal helping.      Problem/Recommendation - 8:  Problem: Acute DVT . Recommendation: On AC . Hematology helping.

## 2020-06-29 NOTE — PROGRESS NOTE ADULT - SUBJECTIVE AND OBJECTIVE BOX
INTERVAL HPI/OVERNIGHT EVENTS: No new concerns.   Vital Signs Last 24 Hrs  T(C): 36.6 (29 Jun 2020 12:02), Max: 37.2 (29 Jun 2020 06:30)  T(F): 97.9 (29 Jun 2020 12:02), Max: 98.9 (29 Jun 2020 06:30)  HR: 89 (29 Jun 2020 12:02) (83 - 91)  BP: 161/79 (29 Jun 2020 12:02) (142/59 - 161/79)  BP(mean): --  RR: 16 (29 Jun 2020 12:02) (15 - 16)  SpO2: 100% (29 Jun 2020 12:02) (98% - 100%)  I&O's Summary    28 Jun 2020 07:01  -  29 Jun 2020 07:00  --------------------------------------------------------  IN: 0 mL / OUT: 850 mL / NET: -850 mL    29 Jun 2020 07:01  -  29 Jun 2020 14:34  --------------------------------------------------------  IN: 0 mL / OUT: 550 mL / NET: -550 mL      MEDICATIONS  (STANDING):  acetylcysteine  Oral Solution 1200 milliGRAM(s) Oral two times a day  aspirin enteric coated 81 milliGRAM(s) Oral daily  atorvastatin 40 milliGRAM(s) Oral at bedtime  carvedilol 3.125 milliGRAM(s) Oral every 12 hours  chlorhexidine 4% Liquid 1 Application(s) Topical daily  clopidogrel Tablet 75 milliGRAM(s) Oral daily  dextrose 5%. 1000 milliLiter(s) (50 mL/Hr) IV Continuous <Continuous>  dextrose 50% Injectable 12.5 Gram(s) IV Push once  dextrose 50% Injectable 25 Gram(s) IV Push once  dextrose 50% Injectable 25 Gram(s) IV Push once  furosemide   Injectable 40 milliGRAM(s) IV Push daily  heparin  Infusion 600 Unit(s)/Hr (6 mL/Hr) IV Continuous <Continuous>  hydrALAZINE 50 milliGRAM(s) Oral three times a day  insulin glargine Injectable (LANTUS) 32 Unit(s) SubCutaneous at bedtime  insulin lispro (HumaLOG) corrective regimen sliding scale   SubCutaneous three times a day before meals  insulin lispro (HumaLOG) corrective regimen sliding scale   SubCutaneous at bedtime  insulin lispro Injectable (HumaLOG) 6 Unit(s) SubCutaneous three times a day before meals  risperiDONE   Tablet 1 milliGRAM(s) Oral daily  sterile water 1000 milliLiter(s) (75 mL/Hr) IV Continuous <Continuous>    MEDICATIONS  (PRN):  dextrose 40% Gel 15 Gram(s) Oral once PRN Blood Glucose LESS THAN 70 milliGRAM(s)/deciLiter  glucagon  Injectable 1 milliGRAM(s) IntraMuscular once PRN Glucose <70 milliGRAM(s)/deciLiter    LABS:                        10.7   7.19  )-----------( 366      ( 29 Jun 2020 06:47 )             33.8     06-29    135  |  98  |  48<H>  ----------------------------<  124<H>  3.9   |  23  |  1.78<H>    Ca    9.3      29 Jun 2020 06:47  Phos  5.3     06-29  Mg     2.3     06-29      PTT - ( 29 Jun 2020 06:47 )  PTT:115.9 SEC    CAPILLARY BLOOD GLUCOSE      POCT Blood Glucose.: 136 mg/dL (29 Jun 2020 12:27)  POCT Blood Glucose.: 160 mg/dL (29 Jun 2020 08:44)  POCT Blood Glucose.: 148 mg/dL (28 Jun 2020 21:23)  POCT Blood Glucose.: 147 mg/dL (28 Jun 2020 17:59)  POCT Blood Glucose.: 139 mg/dL (28 Jun 2020 16:45)          REVIEW OF SYSTEMS:  CONSTITUTIONAL: No fever, weight loss, or fatigue  EYES: No eye pain, visual disturbances, or discharge  ENMT:  No difficulty hearing, tinnitus, vertigo; No sinus or throat pain  NECK: No pain or stiffness  RESPIRATORY: No cough, wheezing, chills or hemoptysis; No shortness of breath  CARDIOVASCULAR: No chest pain, palpitations, dizziness, or leg swelling  GASTROINTESTINAL: No abdominal or epigastric pain. No nausea, vomiting, or hematemesis; No diarrhea or constipation. No melena or hematochezia.  GENITOURINARY: No dysuria, frequency, hematuria, or incontinence  NEUROLOGICAL: No headaches, memory loss, loss of strength, numbness, or tremors      Consultant(s) Notes Reviewed:  [x ] YES  [ ] NO    PHYSICAL EXAM:  GENERAL: NAD, well-groomed, well-developed, not in any distress ,  HEAD:  Atraumatic, Normocephalic  EYES: EOMI, PERRLA, conjunctiva and sclera clear  ENMT: No tonsillar erythema, exudates, or enlargement; Moist mucous membranes, Good dentition, No lesions  NECK: Supple, No JVD, Normal thyroid  NERVOUS SYSTEM:  Alert & Oriented X3, No focal deficit   CHEST/LUNG: Good air entry bilateral with no  rales, rhonchi, wheezing, or rubs  HEART: Regular rate and rhythm; No murmurs, rubs, or gallops  ABDOMEN: Soft, Nontender, Nondistended; Bowel sounds present  EXTREMITIES:  2+ Peripheral Pulses, No clubbing, cyanosis, or edema    Care Discussed with Consultants/Other Providers [ x] YES  [ ] NO

## 2020-06-29 NOTE — PROGRESS NOTE ADULT - SUBJECTIVE AND OBJECTIVE BOX
[facility]      Seen earlier today, and examined.  - Today, patient is without complaints.           *****MEDICATIONS: Current medication reviewed and documented.    MEDICATIONS  (STANDING):  acetylcysteine  Oral Solution 1200 milliGRAM(s) Oral two times a day  aspirin enteric coated 81 milliGRAM(s) Oral daily  atorvastatin 40 milliGRAM(s) Oral at bedtime  carvedilol 3.125 milliGRAM(s) Oral every 12 hours  chlorhexidine 4% Liquid 1 Application(s) Topical daily  clopidogrel Tablet 75 milliGRAM(s) Oral daily  dextrose 5%. 1000 milliLiter(s) (50 mL/Hr) IV Continuous <Continuous>  dextrose 50% Injectable 12.5 Gram(s) IV Push once  dextrose 50% Injectable 25 Gram(s) IV Push once  dextrose 50% Injectable 25 Gram(s) IV Push once  furosemide   Injectable 40 milliGRAM(s) IV Push daily  hydrALAZINE 50 milliGRAM(s) Oral three times a day  insulin glargine Injectable (LANTUS) 32 Unit(s) SubCutaneous at bedtime  insulin lispro (HumaLOG) corrective regimen sliding scale   SubCutaneous three times a day before meals  insulin lispro (HumaLOG) corrective regimen sliding scale   SubCutaneous at bedtime  insulin lispro Injectable (HumaLOG) 6 Unit(s) SubCutaneous three times a day before meals  risperiDONE   Tablet 1 milliGRAM(s) Oral daily  sterile water 1000 milliLiter(s) (75 mL/Hr) IV Continuous <Continuous>    MEDICATIONS  (PRN):  dextrose 40% Gel 15 Gram(s) Oral once PRN Blood Glucose LESS THAN 70 milliGRAM(s)/deciLiter  glucagon  Injectable 1 milliGRAM(s) IntraMuscular once PRN Glucose <70 milliGRAM(s)/deciLiter          ***** VITAL SIGNS:  T(F): 98.7 (06-29-20 @ 19:51), Max: 98.9 (06-29-20 @ 06:30)  HR: 85 (06-29-20 @ 19:51) (83 - 90)  BP: 168/81 (06-29-20 @ 19:51) (155/799 - 168/81)  RR: 16 (06-29-20 @ 19:51) (15 - 16)  SpO2: 100% (06-29-20 @ 19:51) (99% - 100%)  Wt(kg): --  ,   I&O's Summary    28 Jun 2020 07:01  -  29 Jun 2020 07:00  --------------------------------------------------------  IN: 0 mL / OUT: 850 mL / NET: -850 mL    29 Jun 2020 07:01  -  29 Jun 2020 20:32  --------------------------------------------------------  IN: 240 mL / OUT: 550 mL / NET: -310 mL             *****PHYSICAL EXAM:   Alert oriented x 2  Attention comprehension are poor  Able to name, repeat,   without any difficulty.   Able to follow1 step commands.     EOMI fundi not visualized,  VFF to confrontration  No facial asymmetry   Tongue is midline   Palate elevates symmetrically   Moving all 4 ext symmetrically antigravity   limited exam   sensation is grossly symmetric  Gait : not assessed.  B/L down going toes           *****LAB AND IMAGING:                        10.7   7.19  )-----------( 366      ( 29 Jun 2020 06:47 )             33.8               06-29    135  |  98  |  48<H>  ----------------------------<  124<H>  3.9   |  23  |  1.78<H>    Ca    9.3      29 Jun 2020 06:47  Phos  5.3     06-29  Mg     2.3     06-29      PTT - ( 29 Jun 2020 06:47 )  PTT:115.9 SEC                     [All pertinent recent Imaging/Reports reviewed]           *****A S S E S S M E N T   A N D   P L A N :    Patient is a 73 y/o F PMH HTN, HLD, DM2, Depression, COVID-19 (4/20) p/w B/L LE swelling X1-2 weeks. Reports no other symptoms, no change in functional status, no change in diet, compliant with all her medications, follows up regularly with her PMD. Reports no changes in meds since admission in April 2020 for COVID-19. (20 Jun 2020 23:05)      Problem/Recommendations 1: ams resolved.   likely underlying dementia related sundowning   can reorient often   avoid day time somnolence   depakote 125 if needed.     ct no acute path   will continue to monitor 	          Thank you for allowing me to participate in the care of this patient. Please do not hesitate to call me if you have any  questions.        ________________  Alice Kerr MD  [facility] [place] ([facility]  [phone]      33 minutes spent on total encounter; more than 50 % of the visit was  spent counseling about plan of care, compliance to diet/exercise and medication regimen and or  coordinating care by the attending physician.      It is advised that stroke patients follow up with SATHISH Silveira @ [phone] in 1- 2 weeks.   Others please follow up with Dr. Michael Nissenbaum [phone]

## 2020-06-30 LAB
ANION GAP SERPL CALC-SCNC: 17 MMO/L — HIGH (ref 7–14)
APTT BLD: 171.1 SEC — CRITICAL HIGH (ref 27–36.3)
BUN SERPL-MCNC: 43 MG/DL — HIGH (ref 7–23)
CALCIUM SERPL-MCNC: 9.1 MG/DL — SIGNIFICANT CHANGE UP (ref 8.4–10.5)
CHLORIDE SERPL-SCNC: 92 MMOL/L — LOW (ref 98–107)
CO2 SERPL-SCNC: 24 MMOL/L — SIGNIFICANT CHANGE UP (ref 22–31)
CREAT SERPL-MCNC: 1.56 MG/DL — HIGH (ref 0.5–1.3)
GLUCOSE BLDC GLUCOMTR-MCNC: 141 MG/DL — HIGH (ref 70–99)
GLUCOSE BLDC GLUCOMTR-MCNC: 143 MG/DL — HIGH (ref 70–99)
GLUCOSE BLDC GLUCOMTR-MCNC: 162 MG/DL — HIGH (ref 70–99)
GLUCOSE BLDC GLUCOMTR-MCNC: 191 MG/DL — HIGH (ref 70–99)
GLUCOSE BLDC GLUCOMTR-MCNC: 196 MG/DL — HIGH (ref 70–99)
GLUCOSE BLDC GLUCOMTR-MCNC: 212 MG/DL — HIGH (ref 70–99)
GLUCOSE BLDC GLUCOMTR-MCNC: 55 MG/DL — LOW (ref 70–99)
GLUCOSE BLDC GLUCOMTR-MCNC: 67 MG/DL — LOW (ref 70–99)
GLUCOSE SERPL-MCNC: 168 MG/DL — HIGH (ref 70–99)
HCT VFR BLD CALC: 32 % — LOW (ref 34.5–45)
HGB BLD-MCNC: 10.6 G/DL — LOW (ref 11.5–15.5)
MAGNESIUM SERPL-MCNC: 2 MG/DL — SIGNIFICANT CHANGE UP (ref 1.6–2.6)
MCHC RBC-ENTMCNC: 26 PG — LOW (ref 27–34)
MCHC RBC-ENTMCNC: 33.1 % — SIGNIFICANT CHANGE UP (ref 32–36)
MCV RBC AUTO: 78.4 FL — LOW (ref 80–100)
METHYLMALONATE SERPL-SCNC: 1528 NMOL/L — HIGH (ref 0–378)
NRBC # FLD: 0 K/UL — SIGNIFICANT CHANGE UP (ref 0–0)
PLATELET # BLD AUTO: 366 K/UL — SIGNIFICANT CHANGE UP (ref 150–400)
PMV BLD: 10.3 FL — SIGNIFICANT CHANGE UP (ref 7–13)
POTASSIUM SERPL-MCNC: 3.8 MMOL/L — SIGNIFICANT CHANGE UP (ref 3.5–5.3)
POTASSIUM SERPL-SCNC: 3.8 MMOL/L — SIGNIFICANT CHANGE UP (ref 3.5–5.3)
RBC # BLD: 4.08 M/UL — SIGNIFICANT CHANGE UP (ref 3.8–5.2)
RBC # FLD: 15.3 % — HIGH (ref 10.3–14.5)
SODIUM SERPL-SCNC: 133 MMOL/L — LOW (ref 135–145)
WBC # BLD: 6.86 K/UL — SIGNIFICANT CHANGE UP (ref 3.8–10.5)
WBC # FLD AUTO: 6.86 K/UL — SIGNIFICANT CHANGE UP (ref 3.8–10.5)

## 2020-06-30 PROCEDURE — 93010 ELECTROCARDIOGRAM REPORT: CPT

## 2020-06-30 PROCEDURE — 70450 CT HEAD/BRAIN W/O DYE: CPT | Mod: 26

## 2020-06-30 RX ORDER — SODIUM CHLORIDE 9 MG/ML
500 INJECTION INTRAMUSCULAR; INTRAVENOUS; SUBCUTANEOUS
Refills: 0 | Status: DISCONTINUED | OUTPATIENT
Start: 2020-06-30 | End: 2020-07-03

## 2020-06-30 RX ORDER — HEPARIN SODIUM 5000 [USP'U]/ML
INJECTION INTRAVENOUS; SUBCUTANEOUS
Qty: 25000 | Refills: 0 | Status: DISCONTINUED | OUTPATIENT
Start: 2020-06-30 | End: 2020-06-30

## 2020-06-30 RX ORDER — HEPARIN SODIUM 5000 [USP'U]/ML
2500 INJECTION INTRAVENOUS; SUBCUTANEOUS EVERY 6 HOURS
Refills: 0 | Status: DISCONTINUED | OUTPATIENT
Start: 2020-06-30 | End: 2020-06-30

## 2020-06-30 RX ORDER — HEPARIN SODIUM 5000 [USP'U]/ML
5500 INJECTION INTRAVENOUS; SUBCUTANEOUS EVERY 6 HOURS
Refills: 0 | Status: DISCONTINUED | OUTPATIENT
Start: 2020-06-30 | End: 2020-06-30

## 2020-06-30 RX ORDER — ACETAMINOPHEN 500 MG
650 TABLET ORAL EVERY 6 HOURS
Refills: 0 | Status: DISCONTINUED | OUTPATIENT
Start: 2020-06-30 | End: 2020-07-03

## 2020-06-30 RX ADMIN — CHLORHEXIDINE GLUCONATE 1 APPLICATION(S): 213 SOLUTION TOPICAL at 11:43

## 2020-06-30 RX ADMIN — ATORVASTATIN CALCIUM 40 MILLIGRAM(S): 80 TABLET, FILM COATED ORAL at 21:14

## 2020-06-30 RX ADMIN — Medication 50 MILLIGRAM(S): at 11:42

## 2020-06-30 RX ADMIN — CLOPIDOGREL BISULFATE 75 MILLIGRAM(S): 75 TABLET, FILM COATED ORAL at 11:41

## 2020-06-30 RX ADMIN — Medication 50 MILLIGRAM(S): at 21:14

## 2020-06-30 RX ADMIN — Medication 50 MILLIGRAM(S): at 05:27

## 2020-06-30 RX ADMIN — HEPARIN SODIUM 1000 UNIT(S)/HR: 5000 INJECTION INTRAVENOUS; SUBCUTANEOUS at 08:44

## 2020-06-30 RX ADMIN — HEPARIN SODIUM 0 UNIT(S)/HR: 5000 INJECTION INTRAVENOUS; SUBCUTANEOUS at 07:44

## 2020-06-30 RX ADMIN — CARVEDILOL PHOSPHATE 3.12 MILLIGRAM(S): 80 CAPSULE, EXTENDED RELEASE ORAL at 05:27

## 2020-06-30 RX ADMIN — Medication 40 MILLIGRAM(S): at 05:27

## 2020-06-30 RX ADMIN — RISPERIDONE 1 MILLIGRAM(S): 4 TABLET ORAL at 11:41

## 2020-06-30 RX ADMIN — HEPARIN SODIUM 1200 UNIT(S)/HR: 5000 INJECTION INTRAVENOUS; SUBCUTANEOUS at 01:03

## 2020-06-30 RX ADMIN — Medication 1200 MILLIGRAM(S): at 05:27

## 2020-06-30 RX ADMIN — Medication 81 MILLIGRAM(S): at 11:41

## 2020-06-30 RX ADMIN — Medication 650 MILLIGRAM(S): at 11:41

## 2020-06-30 RX ADMIN — SODIUM CHLORIDE 75 MILLILITER(S): 9 INJECTION INTRAMUSCULAR; INTRAVENOUS; SUBCUTANEOUS at 16:43

## 2020-06-30 RX ADMIN — CARVEDILOL PHOSPHATE 3.12 MILLIGRAM(S): 80 CAPSULE, EXTENDED RELEASE ORAL at 18:11

## 2020-06-30 RX ADMIN — INSULIN GLARGINE 32 UNIT(S): 100 INJECTION, SOLUTION SUBCUTANEOUS at 23:15

## 2020-06-30 NOTE — PROGRESS NOTE ADULT - SUBJECTIVE AND OBJECTIVE BOX
Chief complaint  Patient is a 74y old  Female who presents with a chief complaint of LE swelling (30 Jun 2020 11:44)   Review of systems  Patient in bed, looks comfortable, no fever, no hypoglycemia.    Labs and Fingersticks  CAPILLARY BLOOD GLUCOSE      POCT Blood Glucose.: 196 mg/dL (30 Jun 2020 12:08)  POCT Blood Glucose.: 143 mg/dL (30 Jun 2020 09:48)  POCT Blood Glucose.: 141 mg/dL (30 Jun 2020 09:47)  POCT Blood Glucose.: 67 mg/dL (30 Jun 2020 08:41)  POCT Blood Glucose.: 55 mg/dL (30 Jun 2020 08:39)  POCT Blood Glucose.: 208 mg/dL (29 Jun 2020 21:39)  POCT Blood Glucose.: 118 mg/dL (29 Jun 2020 17:15)      Anion Gap, Serum: 17 <H> (06-30 @ 10:37)  Anion Gap, Serum: 14 (06-29 @ 06:47)      Calcium, Total Serum: 9.1 (06-30 @ 10:37)  Calcium, Total Serum: 9.3 (06-29 @ 06:47)          06-30    133<L>  |  92<L>  |  43<H>  ----------------------------<  168<H>  3.8   |  24  |  1.56<H>    Ca    9.1      30 Jun 2020 10:37  Phos  5.3     06-29  Mg     2.0     06-30                          10.6   6.86  )-----------( 366      ( 30 Jun 2020 06:42 )             32.0     Medications  MEDICATIONS  (STANDING):  aspirin enteric coated 81 milliGRAM(s) Oral daily  atorvastatin 40 milliGRAM(s) Oral at bedtime  carvedilol 3.125 milliGRAM(s) Oral every 12 hours  chlorhexidine 4% Liquid 1 Application(s) Topical daily  clopidogrel Tablet 75 milliGRAM(s) Oral daily  dextrose 5%. 1000 milliLiter(s) (50 mL/Hr) IV Continuous <Continuous>  dextrose 50% Injectable 12.5 Gram(s) IV Push once  dextrose 50% Injectable 25 Gram(s) IV Push once  dextrose 50% Injectable 25 Gram(s) IV Push once  furosemide   Injectable 40 milliGRAM(s) IV Push daily  heparin  Infusion.  Unit(s)/Hr (12 mL/Hr) IV Continuous <Continuous>  hydrALAZINE 50 milliGRAM(s) Oral three times a day  insulin glargine Injectable (LANTUS) 32 Unit(s) SubCutaneous at bedtime  insulin lispro (HumaLOG) corrective regimen sliding scale   SubCutaneous three times a day before meals  insulin lispro (HumaLOG) corrective regimen sliding scale   SubCutaneous at bedtime  insulin lispro Injectable (HumaLOG) 6 Unit(s) SubCutaneous three times a day before meals  risperiDONE   Tablet 1 milliGRAM(s) Oral daily  sterile water 1000 milliLiter(s) (75 mL/Hr) IV Continuous <Continuous>      Physical Exam  General: Patient comfortable in bed  Vital Signs Last 12 Hrs  T(F): 99.1 (06-30-20 @ 11:36), Max: 99.1 (06-30-20 @ 11:36)  HR: 86 (06-30-20 @ 11:36) (78 - 86)  BP: 163/76 (06-30-20 @ 11:36) (159/70 - 163/76)  BP(mean): --  RR: 16 (06-30-20 @ 11:36) (14 - 16)  SpO2: 100% (06-30-20 @ 11:36) (100% - 100%)  Neck: No palpable thyroid nodules.  CVS: S1S2, No murmurs  Respiratory: No wheezing, no crepitations  GI: Abdomen soft, bowel sounds positive  Musculoskeletal:  edema lower extremities.   Skin: No skin rashes, no ecchymosis    Diagnostics

## 2020-06-30 NOTE — PROGRESS NOTE ADULT - SUBJECTIVE AND OBJECTIVE BOX
INTERVAL HPI/OVERNIGHT EVENTS: I have right sided headache   Vital Signs Last 24 Hrs  T(C): 37.3 (30 Jun 2020 11:36), Max: 37.3 (30 Jun 2020 11:36)  T(F): 99.1 (30 Jun 2020 11:36), Max: 99.1 (30 Jun 2020 11:36)  HR: 86 (30 Jun 2020 11:36) (78 - 89)  BP: 163/76 (30 Jun 2020 11:36) (159/70 - 168/81)  BP(mean): --  RR: 16 (30 Jun 2020 11:36) (14 - 16)  SpO2: 100% (30 Jun 2020 11:36) (97% - 100%)  I&O's Summary    29 Jun 2020 07:01  -  30 Jun 2020 07:00  --------------------------------------------------------  IN: 240 mL / OUT: 550 mL / NET: -310 mL    30 Jun 2020 07:01  -  30 Jun 2020 11:44  --------------------------------------------------------  IN: 240 mL / OUT: 1150 mL / NET: -910 mL      MEDICATIONS  (STANDING):  aspirin enteric coated 81 milliGRAM(s) Oral daily  atorvastatin 40 milliGRAM(s) Oral at bedtime  carvedilol 3.125 milliGRAM(s) Oral every 12 hours  chlorhexidine 4% Liquid 1 Application(s) Topical daily  clopidogrel Tablet 75 milliGRAM(s) Oral daily  dextrose 5%. 1000 milliLiter(s) (50 mL/Hr) IV Continuous <Continuous>  dextrose 50% Injectable 12.5 Gram(s) IV Push once  dextrose 50% Injectable 25 Gram(s) IV Push once  dextrose 50% Injectable 25 Gram(s) IV Push once  furosemide   Injectable 40 milliGRAM(s) IV Push daily  heparin  Infusion.  Unit(s)/Hr (12 mL/Hr) IV Continuous <Continuous>  hydrALAZINE 50 milliGRAM(s) Oral three times a day  insulin glargine Injectable (LANTUS) 32 Unit(s) SubCutaneous at bedtime  insulin lispro (HumaLOG) corrective regimen sliding scale   SubCutaneous three times a day before meals  insulin lispro (HumaLOG) corrective regimen sliding scale   SubCutaneous at bedtime  insulin lispro Injectable (HumaLOG) 6 Unit(s) SubCutaneous three times a day before meals  risperiDONE   Tablet 1 milliGRAM(s) Oral daily  sterile water 1000 milliLiter(s) (75 mL/Hr) IV Continuous <Continuous>    MEDICATIONS  (PRN):  acetaminophen   Tablet .. 650 milliGRAM(s) Oral every 6 hours PRN Mild Pain (1 - 3), Moderate Pain (4 - 6)  dextrose 40% Gel 15 Gram(s) Oral once PRN Blood Glucose LESS THAN 70 milliGRAM(s)/deciLiter  glucagon  Injectable 1 milliGRAM(s) IntraMuscular once PRN Glucose <70 milliGRAM(s)/deciLiter  heparin   Injectable 5500 Unit(s) IV Push every 6 hours PRN For aPTT less than 40  heparin   Injectable 2500 Unit(s) IV Push every 6 hours PRN For aPTT between 40 - 57    LABS:                        10.6   6.86  )-----------( 366      ( 30 Jun 2020 06:42 )             32.0     06-30    133<L>  |  92<L>  |  43<H>  ----------------------------<  168<H>  3.8   |  24  |  1.56<H>    Ca    9.1      30 Jun 2020 10:37  Phos  5.3     06-29  Mg     2.0     06-30      PTT - ( 30 Jun 2020 06:42 )  PTT:171.1 SEC    CAPILLARY BLOOD GLUCOSE      POCT Blood Glucose.: 143 mg/dL (30 Jun 2020 09:48)  POCT Blood Glucose.: 141 mg/dL (30 Jun 2020 09:47)  POCT Blood Glucose.: 67 mg/dL (30 Jun 2020 08:41)  POCT Blood Glucose.: 55 mg/dL (30 Jun 2020 08:39)  POCT Blood Glucose.: 208 mg/dL (29 Jun 2020 21:39)  POCT Blood Glucose.: 118 mg/dL (29 Jun 2020 17:15)  POCT Blood Glucose.: 136 mg/dL (29 Jun 2020 12:27)          REVIEW OF SYSTEMS:  CONSTITUTIONAL: No fever, weight loss, or fatigue  EYES: No eye pain, visual disturbances, or discharge  ENMT:  No difficulty hearing, tinnitus, vertigo; No sinus or throat pain  NECK: No pain or stiffness  RESPIRATORY: No cough, wheezing, chills or hemoptysis; No shortness of breath  CARDIOVASCULAR: No chest pain, palpitations, dizziness, or leg swelling  GASTROINTESTINAL: No abdominal or epigastric pain. No nausea, vomiting, or hematemesis; No diarrhea or constipation. No melena or hematochezia.  GENITOURINARY: No dysuria, frequency, hematuria, or incontinence  NEUROLOGICAL: No headaches, memory loss, loss of strength, numbness, or tremors      Consultant(s) Notes Reviewed:  [x ] YES  [ ] NO    PHYSICAL EXAM:  GENERAL: NAD, well-groomed, well-developed,not in any distress ,  HEAD:  Atraumatic, Normocephalic  EYES: EOMI, PERRLA, conjunctiva and sclera clear  ENMT: No tonsillar erythema, exudates, or enlargement; Moist mucous membranes, Good dentition, No lesions  NECK: Supple, No JVD, Normal thyroid  NERVOUS SYSTEM:  Alert & Oriented X3, No focal deficit   CHEST/LUNG: Good air entry bilateral with no  rales, rhonchi, wheezing, or rubs  HEART: Regular rate and rhythm; No murmurs, rubs, or gallops  ABDOMEN: Soft, Nontender, Nondistended; Bowel sounds present  EXTREMITIES:  2+ Peripheral Pulses, No clubbing, cyanosis, or edema      Care Discussed with Consultants/Other Providers [ x] YES  [ ] NO

## 2020-06-30 NOTE — PROGRESS NOTE ADULT - SUBJECTIVE AND OBJECTIVE BOX
[facility]   MD MICHAEL STOUT DO ANAM SIDDIQUI ANGELA WONG, PA    TEL:  OFFICE: [phone]  DR FLORES CELL: [phone]  DR. HUSSEIN CELL: [phone]  DR. HODGES CELL: [phone]  WESTLEY DAVE CELL: [phone]    From 5pm-7am Answering Service [phone]    Patient is a 74y old  Female who presents with a chief complaint of LE swelling (30 Jun 2020 12:41)      Patient seen and examined at bedside. No chest pain/sob. + headache    VITALS:  T(F): 99.1 (06-30-20 @ 11:36), Max: 99.1 (06-30-20 @ 11:36)  HR: 86 (06-30-20 @ 11:36)  BP: 163/76 (06-30-20 @ 11:36)  RR: 16 (06-30-20 @ 11:36)  SpO2: 100% (06-30-20 @ 11:36)  Wt(kg): --    06-29 @ 07:01  -  06-30 @ 07:00  --------------------------------------------------------  IN: 240 mL / OUT: 550 mL / NET: -310 mL    06-30 @ 07:01  -  06-30 @ 15:12  --------------------------------------------------------  IN: 240 mL / OUT: 1150 mL / NET: -910 mL          PHYSICAL EXAM:  Constitutional: NAD  Neck: No JVD  Respiratory: CTAB, no wheezes, rales or rhonchi  Cardiovascular: S1, S2, RRR  Gastrointestinal: BS+, soft, NT/ND  Extremities: No peripheral edema    Hospital Medications:   MEDICATIONS  (STANDING):  aspirin enteric coated 81 milliGRAM(s) Oral daily  atorvastatin 40 milliGRAM(s) Oral at bedtime  carvedilol 3.125 milliGRAM(s) Oral every 12 hours  chlorhexidine 4% Liquid 1 Application(s) Topical daily  clopidogrel Tablet 75 milliGRAM(s) Oral daily  dextrose 5%. 1000 milliLiter(s) (50 mL/Hr) IV Continuous <Continuous>  dextrose 50% Injectable 12.5 Gram(s) IV Push once  dextrose 50% Injectable 25 Gram(s) IV Push once  dextrose 50% Injectable 25 Gram(s) IV Push once  furosemide   Injectable 40 milliGRAM(s) IV Push daily  heparin  Infusion.  Unit(s)/Hr (12 mL/Hr) IV Continuous <Continuous>  hydrALAZINE 50 milliGRAM(s) Oral three times a day  insulin glargine Injectable (LANTUS) 32 Unit(s) SubCutaneous at bedtime  insulin lispro (HumaLOG) corrective regimen sliding scale   SubCutaneous three times a day before meals  insulin lispro (HumaLOG) corrective regimen sliding scale   SubCutaneous at bedtime  insulin lispro Injectable (HumaLOG) 6 Unit(s) SubCutaneous three times a day before meals  risperiDONE   Tablet 1 milliGRAM(s) Oral daily  sterile water 1000 milliLiter(s) (75 mL/Hr) IV Continuous <Continuous>      LABS:  06-30    133<L>  |  92<L>  |  43<H>  ----------------------------<  168<H>  3.8   |  24  |  1.56<H>    Ca    9.1      30 Jun 2020 10:37  Phos  5.3     06-29  Mg     2.0     06-30      Creatinine Trend: 1.56 <--, 1.78 <--, 1.88 <--, 1.87 <--, 2.25 <--, 2.19 <--, 2.28 <--, 2.17 <--                                10.6   6.86  )-----------( 366      ( 30 Jun 2020 06:42 )             32.0     Urine Studies:  Urinalysis - [06-22-20 @ 17:00]      Color COLORLESS / Appearance CLEAR / SG 1.009 / pH 8.0      Gluc NEGATIVE / Ketone NEGATIVE  / Bili NEGATIVE / Urobili NORMAL       Blood NEGATIVE / Protein 100 / Leuk Est NEGATIVE / Nitrite NEGATIVE      RBC 0-2 / WBC 0-2 / Hyaline NEGATIVE / Gran  / Sq Epi OCC / Non Sq Epi  / Bacteria FEW    Urine Creatinine 44.70      [06-23-20 @ 20:00]  Urine Protein 111.4      [06-23-20 @ 20:00]    Iron 58, TIBC 282, %sat --      [06-24-20 @ 05:43]  Ferritin 72.33      [06-24-20 @ 05:43]  HbA1c 8.8      [12-13-19 @ 06:00]  TSH 7.68      [06-21-20 @ 07:11]  Lipid: chol 197, , HDL 41,       [12-14-19 @ 03:50]    HBsAg NEGATIVE      [06-25-20 @ 05:50]  HCV 0.77, Nonreactive Hepatitis C AB  S/CO Ratio                        Interpretation  < 1.00                                   Non-Reactive  1.00 - 4.99                         Weakly-Reactive  >= 5.00                                Reactive  Non-Reactive: Aperson with a non-reactive HCV antibody  result is considered uninfected.  No further action is  needed unless recent infection is suspected.  In these  cases, consider repeat testing later to detect  seroconversion..  Weakly-Reactive: HCV antibody test is abnormal, HCV RNA  Qualitative test will follow.  Reactive: HCV antibody test is abnormal, HCV RNA  Qualitative test will follow.  Note: HCV antibody testing is performed on the [employer]   system.      [06-25-20 @ 05:50]    CHRYSTAL: titer 1:80, pattern Speckled      [06-25-20 @ 05:50]  C3 Complement 162.8      [06-25-20 @ 05:50]  C4 Complement 40.2      [06-25-20 @ 05:50]  ANCA: cANCA Negative, pANCA Negative, atypical ANCA --      [06-25-20 @ 05:50]  Syphilis Screen (Treponema Pallidum Ab) Negative      [06-25-20 @ 05:50]  Free Light Chains: kappa 7.52, lambda 4.11, ratio = 1.83 H      [06-25 @ 05:50]    RADIOLOGY & ADDITIONAL STUDIES:

## 2020-06-30 NOTE — PROGRESS NOTE ADULT - SUBJECTIVE AND OBJECTIVE BOX
S: no CP, SOB; Review of systems otherwise (-)    acetaminophen   Tablet .. 650 milliGRAM(s) Oral every 6 hours PRN  aspirin enteric coated 81 milliGRAM(s) Oral daily  atorvastatin 40 milliGRAM(s) Oral at bedtime  carvedilol 3.125 milliGRAM(s) Oral every 12 hours  chlorhexidine 4% Liquid 1 Application(s) Topical daily  clopidogrel Tablet 75 milliGRAM(s) Oral daily  dextrose 40% Gel 15 Gram(s) Oral once PRN  dextrose 5%. 1000 milliLiter(s) IV Continuous <Continuous>  dextrose 50% Injectable 12.5 Gram(s) IV Push once  dextrose 50% Injectable 25 Gram(s) IV Push once  dextrose 50% Injectable 25 Gram(s) IV Push once  furosemide   Injectable 40 milliGRAM(s) IV Push daily  glucagon  Injectable 1 milliGRAM(s) IntraMuscular once PRN  heparin   Injectable 5500 Unit(s) IV Push every 6 hours PRN  heparin   Injectable 2500 Unit(s) IV Push every 6 hours PRN  heparin  Infusion.  Unit(s)/Hr IV Continuous <Continuous>  hydrALAZINE 50 milliGRAM(s) Oral three times a day  insulin glargine Injectable (LANTUS) 32 Unit(s) SubCutaneous at bedtime  insulin lispro (HumaLOG) corrective regimen sliding scale   SubCutaneous three times a day before meals  insulin lispro (HumaLOG) corrective regimen sliding scale   SubCutaneous at bedtime  insulin lispro Injectable (HumaLOG) 6 Unit(s) SubCutaneous three times a day before meals  risperiDONE   Tablet 1 milliGRAM(s) Oral daily  sterile water 1000 milliLiter(s) IV Continuous <Continuous>                            10.6   6.86  )-----------( 366      ( 30 Jun 2020 06:42 )             32.0       06-30    133<L>  |  92<L>  |  43<H>  ----------------------------<  168<H>  3.8   |  24  |  1.56<H>    Ca    9.1      30 Jun 2020 10:37  Phos  5.3     06-29  Mg     2.0     06-30              T(C): 37.3 (06-30-20 @ 11:36), Max: 37.3 (06-30-20 @ 11:36)  HR: 86 (06-30-20 @ 11:36) (78 - 86)  BP: 163/76 (06-30-20 @ 11:36) (159/70 - 168/81)  RR: 16 (06-30-20 @ 11:36) (14 - 16)  SpO2: 100% (06-30-20 @ 11:36) (97% - 100%)  Wt(kg): --    I&O's Summary    29 Jun 2020 07:01  -  30 Jun 2020 07:00  --------------------------------------------------------  IN: 240 mL / OUT: 550 mL / NET: -310 mL    30 Jun 2020 07:01  -  30 Jun 2020 12:42  --------------------------------------------------------  IN: 240 mL / OUT: 1150 mL / NET: -910 mL        Gen: Appears well in NAD  HEENT:  (-)icterus (-)pallor  CV: N S1 S2 RRR (+)2 Pulses B/l  Resp:  Clear to auscultation  B/L, normal effort  GI: (+) BS Soft, NT, ND  Lymph:  (-)Edema, (-)obvious lymphadenopathy  Skin: Warm to touch, Normal turgor  Psych: Appropriate mood and affect; AO X 3    TELEMETRY: SR    < from: Transthoracic Echocardiogram (06.21.20 @ 12:47) >  CONCLUSIONS:  1. Mitral annular calcification, otherwise normal mitral  valve. Moderate mitral regurgitation.  2. Normal left ventricular internal dimensions and wall  thicknesses.  3. Hyperdynamic left ventricle.  4. The right ventricle is not well visualized; grossly  normal right ventricular systolic function.  5. Moderate pericardial effusion (measures about 1.3 cm  lateral to RA on apical four-chamber views).  6. Left pleural effusion.  ------------------------------------  < end of copied text >    < from: CT Angio Chest w/ IV Cont (06.20.20 @ 22:37) >    IMPRESSION:   No pulmonary embolism.  No gross CT evidence of pneumonia.  Mild left heart enlargement.  Mild interlobular septal thickening in both lungs may reflect interstitial pulmonary edema.  Small to moderate pleural effusions bilaterally.  Small pericardial effusion.    < end of copied text >      ASSESSMENT/PLAN: 	    74 year old female with history of HTN, HLD, DM, recent COVID infection in April of 2020 who presented to the ED with LE swelling for 1-2 weeks.    -pt. found to have elevated troponin with no chest pain or anginal symptoms  -neuro follow up appreciated   -ac for DVT per heme on heparin gtt - H/H around baseline - pt. with no signs of bleeding - monitor cbc   -continue with beta blockers for NSVT seen on tele  -cath performed yesterday showing severe stenoses in the RCA and LCx - pt. now s/p KIERSTEN to RCA  -follow up CTH to eval HA  -staged PCI to LCx today if CTH negative   -appreciate renal follow up and optimization - optimized from renal perspective per renal for staged PCI today    Donis Jaquez MD

## 2020-06-30 NOTE — PROGRESS NOTE ADULT - SUBJECTIVE AND OBJECTIVE BOX
[facility]      Seen earlier today, and examined.  - Today, patient is without complaints.           *****MEDICATIONS: Current medication reviewed and documented.    MEDICATIONS  (STANDING):  aspirin enteric coated 81 milliGRAM(s) Oral daily  atorvastatin 40 milliGRAM(s) Oral at bedtime  carvedilol 3.125 milliGRAM(s) Oral every 12 hours  chlorhexidine 4% Liquid 1 Application(s) Topical daily  clopidogrel Tablet 75 milliGRAM(s) Oral daily  dextrose 5%. 1000 milliLiter(s) (50 mL/Hr) IV Continuous <Continuous>  dextrose 50% Injectable 12.5 Gram(s) IV Push once  dextrose 50% Injectable 25 Gram(s) IV Push once  dextrose 50% Injectable 25 Gram(s) IV Push once  furosemide   Injectable 40 milliGRAM(s) IV Push daily  hydrALAZINE 50 milliGRAM(s) Oral three times a day  insulin glargine Injectable (LANTUS) 32 Unit(s) SubCutaneous at bedtime  insulin lispro (HumaLOG) corrective regimen sliding scale   SubCutaneous three times a day before meals  insulin lispro (HumaLOG) corrective regimen sliding scale   SubCutaneous at bedtime  insulin lispro Injectable (HumaLOG) 6 Unit(s) SubCutaneous three times a day before meals  risperiDONE   Tablet 1 milliGRAM(s) Oral daily  sodium chloride 0.9%. 500 milliLiter(s) (75 mL/Hr) IV Continuous <Continuous>  sterile water 1000 milliLiter(s) (75 mL/Hr) IV Continuous <Continuous>    MEDICATIONS  (PRN):  acetaminophen   Tablet .. 650 milliGRAM(s) Oral every 6 hours PRN Mild Pain (1 - 3), Moderate Pain (4 - 6)  dextrose 40% Gel 15 Gram(s) Oral once PRN Blood Glucose LESS THAN 70 milliGRAM(s)/deciLiter  glucagon  Injectable 1 milliGRAM(s) IntraMuscular once PRN Glucose <70 milliGRAM(s)/deciLiter          ***** VITAL SIGNS:  T(F): 99.1 (06-30-20 @ 11:36), Max: 99.1 (06-30-20 @ 11:36)  HR: 86 (06-30-20 @ 11:36) (78 - 86)  BP: 163/76 (06-30-20 @ 11:36) (159/70 - 168/81)  RR: 16 (06-30-20 @ 11:36) (14 - 16)  SpO2: 100% (06-30-20 @ 11:36) (97% - 100%)  Wt(kg): --  ,   I&O's Summary    29 Jun 2020 07:01  -  30 Jun 2020 07:00  --------------------------------------------------------  IN: 240 mL / OUT: 550 mL / NET: -310 mL    30 Jun 2020 07:01  -  30 Jun 2020 18:03  --------------------------------------------------------  IN: 240 mL / OUT: 1750 mL / NET: -1510 mL             *****PHYSICAL EXAM:    Alert oriented x 2.5  Attention comprehension are poor  Able to name, repeat,   without any difficulty.   Able to follow1 step commands.     EOMI fundi not visualized,  VFF to confrontration  No facial asymmetry   Tongue is midline   Palate elevates symmetrically   Moving all 4 ext symmetrically antigravity   limited exam   sensation is grossly symmetric  Gait : not assessed.  B/L down going toes           *****LAB AND IMAGING:                        10.6   6.86  )-----------( 366      ( 30 Jun 2020 06:42 )             32.0               06-30    133<L>  |  92<L>  |  43<H>  ----------------------------<  168<H>  3.8   |  24  |  1.56<H>    Ca    9.1      30 Jun 2020 10:37  Phos  5.3     06-29  Mg     2.0     06-30      PTT - ( 30 Jun 2020 06:42 )  PTT:171.1 SEC                     [All pertinent recent Imaging/Reports reviewed]           *****A S S E S S M E N T   A N D   P L A N :      Patient is a 75 y/o F PMH HTN, HLD, DM2, Depression, COVID-19 (4/20) p/w B/L LE swelling X1-2 weeks. Reports no other symptoms, no change in functional status, no change in diet, compliant with all her medications, follows up regularly with her PMD. Reports no changes in meds since admission in April 2020 for COVID-19. (20 Jun 2020 23:05)      Problem/Recommendations 1: ams resolved.   likely underlying dementia related sundowning   can reorient often   avoid day time somnolence   depakote 125 if needed.     ct no acute path   will continue to monitor 	      cath today       Thank you for allowing me to participate in the care of this patient. Please do not hesitate to call me if you have any  questions.        ________________  Alice Kerr MD  [facility]  [phone]      33 minutes spent on total encounter; more than 50 % of the visit was  spent counseling about plan of care, compliance to diet/exercise and medication regimen and or  coordinating care by the attending physician.      It is advised that stroke patients follow up with SATHISH Silveira @ [phone] in 1- 2 weeks.   Others please follow up with Dr. Michael Nissenbaum [phone]

## 2020-06-30 NOTE — PROVIDER CONTACT NOTE (OTHER) - ASSESSMENT
dressing at the access site is saturated with blood
patient is asymptomatic, /76, HR 92, o2 98% on room air, afebrile
pt asleep, asymptomatic
pt bp was high on addmission

## 2020-06-30 NOTE — PROVIDER CONTACT NOTE (OTHER) - ACTION/TREATMENT ORDERED:
IVY ordonez made aware
ACP will order bp med. Waiting for order. Will continue to monitor pt.
IVY Coles notified and will contact attending physician for further recommendations
Will continue to monitor. No new orders

## 2020-06-30 NOTE — PROVIDER CONTACT NOTE (OTHER) - RECOMMENDATIONS
dressing change and place pressure on the site
ACP made aware. Night time bp meds given.
Made ACP aware.
patient asymptomatic continue to monitor

## 2020-06-30 NOTE — PROGRESS NOTE ADULT - ASSESSMENT
74 year old female with PMH of HTN, HLD, DM, recent COVID infection in April 2020 who presented with LE swelling for weeks, found to have HF exacerbation and treated with diuretics; patient was found to have Wenchebach on telemetry and had 23 beats of NSVT, as well as elevated cardiac enzymes, warranting transfer to CCU for continued monitoring and ischemic workup for likely NSTEMI.    SUNIL on CKD III  Baseline Scr 1 with GFR 45 12/2019   scr on admission 1.13, renal function worsen and peaked 2.28  SUNIL possible sec to JUNIOR (CTA 6/20) and/or ATN   renal function improving  s/p LHC 6/29 now need stage PCI today  She is high risk for contrast nephropathy. However benefit seems to be greater than risk, will clear patient for the procedure.   Spoke with patient's daughter and son about risk and benefits. they expressed understanding.   monitor bmp    Anemia  acceptable  Monitor at present     NSTEMI  s/p cath  monitor urine output, weight and bmp  f.u cardio    HTN  controlled  monitor    Proteinuria  UA with 100 protein  urine p/c ratio 2.5g/day  likely sec to DM/HTN  pending vasculitis work up  hep b neg, c3 c4 wnl, hep c neg, rpr neg, K/L ratio ok, SPeP neg, Anca neg  shonda 1:80  pending hiv, serum immunofixation 74 year old female with PMH of HTN, HLD, DM, recent COVID infection in April 2020 who presented with LE swelling for weeks, found to have HF exacerbation and treated with diuretics; patient was found to have Wenchebach on telemetry and had 23 beats of NSVT, as well as elevated cardiac enzymes, warranting transfer to CCU for continued monitoring and ischemic workup for likely NSTEMI.    SUNIL on CKD III  Baseline Scr 1 with GFR 45 12/2019   scr on admission 1.13, renal function worsen and peaked 2.28  SUNIL possible sec to JUNIOR (CTA 6/20) and/or ATN   renal function improving  s/p LHC 6/29 now need stage PCI today  She is high risk for contrast nephropathy. However benefit is greater then risk, will clear patient for the procedure.   Spoke with patient's daughter and son about risk and benefits. they expressed understanding.   monitor bmp    Anemia  acceptable  Monitor at present     NSTEMI  s/p cath  monitor urine output, weight and bmp  f.u cardio    HTN  controlled  monitor    Proteinuria  UA with 100 protein  urine p/c ratio 2.5g/day  likely sec to DM/HTN  pending vasculitis work up  hep b neg, c3 c4 wnl, hep c neg, rpr neg, K/L ratio ok, SPeP neg, Anca neg  shonda 1:80  pending hiv, serum immunofixation

## 2020-06-30 NOTE — PROGRESS NOTE ADULT - ASSESSMENT
73 y/o F PMH HTN, HLD, DM2, Depression, COVID-19 (4/20) p/w B/L LE swelling X1-2 weeks. Reports no other symptoms, no change in functional status, no change in diet, compliant with all her medications, follows up regularly with her PMD. Reports no changes in meds since admission in April 2020 for COVID-19.     Problem/Recommendation - 1:  Problem: NSTEMI (non-ST elevated myocardial infarction). Recommendation: .ASA, Plavix , BB ,Statin and Heparin.   Cardiology following . Cardiac cath with Stent RCA  and  awaiting staged PCI.       Problem/Recommendation - 2:  ·  Problem: Ventricular tachycardia, non-sustained.  Recommendation: EP help appreciated.      Problem/Recommendation - 3:  ·  Problem:  Acute on chronic congestive heart failure, unspecified heart failure type.  Recommendation: IV Lasix.      Problem/Recommendation - 4:  ·  Problem: Diabetes mellitus type 2, noninsulin dependent.  Recommendation: Lantus and SSI for now. Endo helping.      Problem/Recommendation - 5:  ·  Problem: Hypertension.  Recommendation: BP meds with hold parameters .      Problem/Recommendation - 6:  Problem: Hypercholesterolemia. Recommendation: Statin.     Problem/Recommendation - 7:  Problem: SUNIL . Recommendation: Creatinine better .  Renal helping.      Problem/Recommendation - 8:  Problem: Acute DVT . Recommendation: On AC . Hematology helping.      Problem/Recommendation - 9:  Problem: Right side Headache . Recommendation: CT head ordered.

## 2020-06-30 NOTE — CHART NOTE - NSCHARTNOTEFT_GEN_A_CORE
called to patients bedside for right forearm puffiness. Patient sitting up in bed, comfortable, no complaints. Right radial site with clean dressing intact, no hematoma, nontender, good capillary refill. Forearm with puffiness but very soft, nontender, no hematoma. S/p RRA access x2 on this admit for cath.   Elevated and warm or cold compress pending patient preference for comfort care

## 2020-06-30 NOTE — PROGRESS NOTE ADULT - SUBJECTIVE AND OBJECTIVE BOX
EP Nurse Practitioner     tele: NSR,           aspirin enteric coated 81 milliGRAM(s) Oral daily  atorvastatin 40 milliGRAM(s) Oral at bedtime  carvedilol 3.125 milliGRAM(s) Oral every 12 hours  chlorhexidine 4% Liquid 1 Application(s) Topical daily  clopidogrel Tablet 75 milliGRAM(s) Oral daily  dextrose 40% Gel 15 Gram(s) Oral once PRN  dextrose 5%. 1000 milliLiter(s) IV Continuous <Continuous>  dextrose 50% Injectable 12.5 Gram(s) IV Push once  dextrose 50% Injectable 25 Gram(s) IV Push once  dextrose 50% Injectable 25 Gram(s) IV Push once  furosemide   Injectable 40 milliGRAM(s) IV Push daily  glucagon  Injectable 1 milliGRAM(s) IntraMuscular once PRN  heparin   Injectable 5500 Unit(s) IV Push every 6 hours PRN  heparin   Injectable 2500 Unit(s) IV Push every 6 hours PRN  heparin  Infusion.  Unit(s)/Hr IV Continuous <Continuous>  hydrALAZINE 50 milliGRAM(s) Oral three times a day  insulin glargine Injectable (LANTUS) 32 Unit(s) SubCutaneous at bedtime  insulin lispro (HumaLOG) corrective regimen sliding scale   SubCutaneous three times a day before meals  insulin lispro (HumaLOG) corrective regimen sliding scale   SubCutaneous at bedtime  insulin lispro Injectable (HumaLOG) 6 Unit(s) SubCutaneous three times a day before meals  risperiDONE   Tablet 1 milliGRAM(s) Oral daily  sterile water 1000 milliLiter(s) IV Continuous <Continuous>                            10.7   7.19  )-----------( 366      ( 29 Jun 2020 06:47 )             33.8       Hemoglobin: 10.7 g/dL (06-29 @ 06:47)  Hemoglobin: 10.0 g/dL (06-28 @ 05:10)  Hemoglobin: 10.2 g/dL (06-27 @ 06:00)  Hemoglobin: 10.3 g/dL (06-26 @ 05:49)      06-29    135  |  98  |  48<H>  ----------------------------<  124<H>  3.9   |  23  |  1.78<H>    Ca    9.3      29 Jun 2020 06:47  Phos  5.3     06-29  Mg     2.3     06-29      Creatinine Trend: 1.78<--, 1.88<--, 1.87<--, 2.25<--, 2.19<--, 2.28<--    COAGS:           T(C): 36.9 (06-30-20 @ 05:00), Max: 37.1 (06-29-20 @ 19:51)  HR: 78 (06-30-20 @ 05:00) (78 - 89)  BP: 159/70 (06-30-20 @ 05:00) (159/70 - 168/81)  RR: 14 (06-30-20 @ 05:00) (14 - 16)  SpO2: 100% (06-30-20 @ 05:00) (97% - 100%)  Wt(kg): --    I&O's Summary    28 Jun 2020 07:01  -  29 Jun 2020 07:00  --------------------------------------------------------  IN: 0 mL / OUT: 850 mL / NET: -850 mL    29 Jun 2020 07:01  -  30 Jun 2020 06:34  --------------------------------------------------------  IN: 240 mL / OUT: 550 mL / NET: -310 mL        A&O x 3  neurologically intact  no JVD  RRR, no murmurs  CTAB  soft nt/nd  no c/c/e    echo: moderate MR, normal LVEF  cath: < from: Cardiac Cath Lab - Adult (06.29.20 @ 15:45) >  COMPLICATIONS: There were no complications.  DIAGNOSTIC IMPRESSIONS: Successful PCI to severe stenosis proximal RCA  using [place] KIERSTEN  Severe stenosis LCx, plan for staged PCI  DIAGNOSTIC RECOMMENDATIONS: Continue DAPT  Staged PCI LCx  INTERVENTIONAL IMPRESSIONS: Successful PCI to severe stenosis proximal RCA  using Tab KIERSTEN  Severe stenosis LCx, plan for staged PCI  INTERVENTIONAL RECOMMENDATIONS: Continue DAPT  Staged PCI LCx  Prepared and signed by  Mai Mccord M.D.     end of copied text >    A/P) 74 year old female PMH HTN, hyperlipidemia, DM admitted with LE edema. EP called for NSVT in setting of normal LVEF.    -continue beta blockers for NSVT  - Cath noted with stent to RCA, and staged LCX pending .   -no further inpatient EP workup expected  -given normal LVEF there is no indication for an ICD nor AAD  - DAPT per cards  - statin , tolerating BB  D/W Dr Patton

## 2020-06-30 NOTE — PROVIDER CONTACT NOTE (CHANGE IN STATUS NOTIFICATION) - SITUATION
pt initial FS is 55 @0839, repeat is 67 @0841, pt without symptoms. PA Denisha aware, okay to have light bfast and apple juice. repeat FS is 141 @0947 and repeat is 143 @0948.

## 2020-06-30 NOTE — PROGRESS NOTE ADULT - ASSESSMENT
Assessment  DMT2: 74y Female with DM T2 with hyperglycemia, on basal bolus insulin, sugars in acceptable range, no hypoglycemic episode, eating meals, compliant with low carb diet.  CHF: on medications, no chest pain, stable, monitored.  Hypothyroidism/subclinical: Apparently no history of thyroid disease, asymptomatic, repeat TFTs euthyroid..  HTN:  Controlled,  on antihypertensive medications.    Eddie Smith MD  Cell: [phone]  Office: [phone]

## 2020-07-01 LAB
ANION GAP SERPL CALC-SCNC: 18 MMO/L — HIGH (ref 7–14)
APTT BLD: 114.7 SEC — HIGH (ref 27–36.3)
APTT BLD: 33.3 SEC — SIGNIFICANT CHANGE UP (ref 27–36.3)
BASOPHILS # BLD AUTO: 0.02 K/UL — SIGNIFICANT CHANGE UP (ref 0–0.2)
BASOPHILS NFR BLD AUTO: 0.2 % — SIGNIFICANT CHANGE UP (ref 0–2)
BUN SERPL-MCNC: 37 MG/DL — HIGH (ref 7–23)
CALCIUM SERPL-MCNC: 9.1 MG/DL — SIGNIFICANT CHANGE UP (ref 8.4–10.5)
CARDIOLIPIN IGM SER-MCNC: 22.9 GPL — SIGNIFICANT CHANGE UP (ref 0–23)
CARDIOLIPIN IGM SER-MCNC: 3.69 MPL — SIGNIFICANT CHANGE UP (ref 0–11)
CHLORIDE SERPL-SCNC: 95 MMOL/L — LOW (ref 98–107)
CO2 SERPL-SCNC: 18 MMOL/L — LOW (ref 22–31)
CREAT SERPL-MCNC: 1.43 MG/DL — HIGH (ref 0.5–1.3)
EOSINOPHIL # BLD AUTO: 0.07 K/UL — SIGNIFICANT CHANGE UP (ref 0–0.5)
EOSINOPHIL NFR BLD AUTO: 0.9 % — SIGNIFICANT CHANGE UP (ref 0–6)
GLUCOSE BLDC GLUCOMTR-MCNC: 105 MG/DL — HIGH (ref 70–99)
GLUCOSE BLDC GLUCOMTR-MCNC: 139 MG/DL — HIGH (ref 70–99)
GLUCOSE BLDC GLUCOMTR-MCNC: 240 MG/DL — HIGH (ref 70–99)
GLUCOSE BLDC GLUCOMTR-MCNC: 85 MG/DL — SIGNIFICANT CHANGE UP (ref 70–99)
GLUCOSE SERPL-MCNC: 109 MG/DL — HIGH (ref 70–99)
HCT VFR BLD CALC: 27.6 % — LOW (ref 34.5–45)
HCT VFR BLD CALC: 29.7 % — LOW (ref 34.5–45)
HCT VFR BLD CALC: 29.7 % — LOW (ref 34.5–45)
HGB BLD-MCNC: 8.9 G/DL — LOW (ref 11.5–15.5)
HGB BLD-MCNC: 9.6 G/DL — LOW (ref 11.5–15.5)
HGB BLD-MCNC: 9.6 G/DL — LOW (ref 11.5–15.5)
IMM GRANULOCYTES NFR BLD AUTO: 0.2 % — SIGNIFICANT CHANGE UP (ref 0–1.5)
LYMPHOCYTES # BLD AUTO: 1.88 K/UL — SIGNIFICANT CHANGE UP (ref 1–3.3)
LYMPHOCYTES # BLD AUTO: 23.2 % — SIGNIFICANT CHANGE UP (ref 13–44)
MAGNESIUM SERPL-MCNC: 2.1 MG/DL — SIGNIFICANT CHANGE UP (ref 1.6–2.6)
MCHC RBC-ENTMCNC: 25.2 PG — LOW (ref 27–34)
MCHC RBC-ENTMCNC: 25.4 PG — LOW (ref 27–34)
MCHC RBC-ENTMCNC: 25.4 PG — LOW (ref 27–34)
MCHC RBC-ENTMCNC: 32.2 % — SIGNIFICANT CHANGE UP (ref 32–36)
MCHC RBC-ENTMCNC: 32.3 % — SIGNIFICANT CHANGE UP (ref 32–36)
MCHC RBC-ENTMCNC: 32.3 % — SIGNIFICANT CHANGE UP (ref 32–36)
MCV RBC AUTO: 78.2 FL — LOW (ref 80–100)
MCV RBC AUTO: 78.6 FL — LOW (ref 80–100)
MCV RBC AUTO: 78.6 FL — LOW (ref 80–100)
MONOCYTES # BLD AUTO: 1.03 K/UL — HIGH (ref 0–0.9)
MONOCYTES NFR BLD AUTO: 12.7 % — SIGNIFICANT CHANGE UP (ref 2–14)
NEUTROPHILS # BLD AUTO: 5.1 K/UL — SIGNIFICANT CHANGE UP (ref 1.8–7.4)
NEUTROPHILS NFR BLD AUTO: 62.8 % — SIGNIFICANT CHANGE UP (ref 43–77)
NRBC # FLD: 0 K/UL — SIGNIFICANT CHANGE UP (ref 0–0)
OSMOLALITY UR: 336 MOSMO/KG — SIGNIFICANT CHANGE UP (ref 50–1200)
PLATELET # BLD AUTO: 333 K/UL — SIGNIFICANT CHANGE UP (ref 150–400)
PLATELET # BLD AUTO: 342 K/UL — SIGNIFICANT CHANGE UP (ref 150–400)
PLATELET # BLD AUTO: 342 K/UL — SIGNIFICANT CHANGE UP (ref 150–400)
PMV BLD: 10.1 FL — SIGNIFICANT CHANGE UP (ref 7–13)
PMV BLD: 10.1 FL — SIGNIFICANT CHANGE UP (ref 7–13)
PMV BLD: 10.4 FL — SIGNIFICANT CHANGE UP (ref 7–13)
POTASSIUM SERPL-MCNC: 4.3 MMOL/L — SIGNIFICANT CHANGE UP (ref 3.5–5.3)
POTASSIUM SERPL-SCNC: 4.3 MMOL/L — SIGNIFICANT CHANGE UP (ref 3.5–5.3)
RBC # BLD: 3.53 M/UL — LOW (ref 3.8–5.2)
RBC # BLD: 3.78 M/UL — LOW (ref 3.8–5.2)
RBC # BLD: 3.78 M/UL — LOW (ref 3.8–5.2)
RBC # FLD: 15.3 % — HIGH (ref 10.3–14.5)
RBC # FLD: 15.3 % — HIGH (ref 10.3–14.5)
RBC # FLD: 15.4 % — HIGH (ref 10.3–14.5)
SODIUM SERPL-SCNC: 131 MMOL/L — LOW (ref 135–145)
SODIUM UR-SCNC: 95 MMOL/L — SIGNIFICANT CHANGE UP
WBC # BLD: 7.7 K/UL — SIGNIFICANT CHANGE UP (ref 3.8–10.5)
WBC # BLD: 8.12 K/UL — SIGNIFICANT CHANGE UP (ref 3.8–10.5)
WBC # BLD: 8.12 K/UL — SIGNIFICANT CHANGE UP (ref 3.8–10.5)
WBC # FLD AUTO: 7.7 K/UL — SIGNIFICANT CHANGE UP (ref 3.8–10.5)
WBC # FLD AUTO: 8.12 K/UL — SIGNIFICANT CHANGE UP (ref 3.8–10.5)
WBC # FLD AUTO: 8.12 K/UL — SIGNIFICANT CHANGE UP (ref 3.8–10.5)

## 2020-07-01 RX ORDER — APIXABAN 2.5 MG/1
2 TABLET, FILM COATED ORAL
Qty: 70 | Refills: 0
Start: 2020-07-01 | End: 2020-07-07

## 2020-07-01 RX ORDER — APIXABAN 2.5 MG/1
10 TABLET, FILM COATED ORAL EVERY 12 HOURS
Refills: 0 | Status: DISCONTINUED | OUTPATIENT
Start: 2020-07-01 | End: 2020-07-01

## 2020-07-01 RX ORDER — HEPARIN SODIUM 5000 [USP'U]/ML
1000 INJECTION INTRAVENOUS; SUBCUTANEOUS
Qty: 25000 | Refills: 0 | Status: DISCONTINUED | OUTPATIENT
Start: 2020-07-01 | End: 2020-07-02

## 2020-07-01 RX ORDER — HEPARIN SODIUM 5000 [USP'U]/ML
5500 INJECTION INTRAVENOUS; SUBCUTANEOUS EVERY 6 HOURS
Refills: 0 | Status: DISCONTINUED | OUTPATIENT
Start: 2020-07-01 | End: 2020-07-01

## 2020-07-01 RX ORDER — HEPARIN SODIUM 5000 [USP'U]/ML
5500 INJECTION INTRAVENOUS; SUBCUTANEOUS EVERY 6 HOURS
Refills: 0 | Status: DISCONTINUED | OUTPATIENT
Start: 2020-07-01 | End: 2020-07-02

## 2020-07-01 RX ORDER — HEPARIN SODIUM 5000 [USP'U]/ML
2500 INJECTION INTRAVENOUS; SUBCUTANEOUS EVERY 6 HOURS
Refills: 0 | Status: DISCONTINUED | OUTPATIENT
Start: 2020-07-01 | End: 2020-07-01

## 2020-07-01 RX ORDER — HEPARIN SODIUM 5000 [USP'U]/ML
2500 INJECTION INTRAVENOUS; SUBCUTANEOUS EVERY 6 HOURS
Refills: 0 | Status: DISCONTINUED | OUTPATIENT
Start: 2020-07-01 | End: 2020-07-02

## 2020-07-01 RX ORDER — HEPARIN SODIUM 5000 [USP'U]/ML
INJECTION INTRAVENOUS; SUBCUTANEOUS
Qty: 25000 | Refills: 0 | Status: DISCONTINUED | OUTPATIENT
Start: 2020-07-01 | End: 2020-07-01

## 2020-07-01 RX ADMIN — HEPARIN SODIUM 1000 UNIT(S)/HR: 5000 INJECTION INTRAVENOUS; SUBCUTANEOUS at 17:20

## 2020-07-01 RX ADMIN — ATORVASTATIN CALCIUM 40 MILLIGRAM(S): 80 TABLET, FILM COATED ORAL at 21:25

## 2020-07-01 RX ADMIN — Medication 50 MILLIGRAM(S): at 21:25

## 2020-07-01 RX ADMIN — Medication 50 MILLIGRAM(S): at 14:10

## 2020-07-01 RX ADMIN — Medication 50 MILLIGRAM(S): at 05:36

## 2020-07-01 RX ADMIN — CHLORHEXIDINE GLUCONATE 1 APPLICATION(S): 213 SOLUTION TOPICAL at 14:10

## 2020-07-01 RX ADMIN — Medication 40 MILLIGRAM(S): at 05:36

## 2020-07-01 RX ADMIN — RISPERIDONE 1 MILLIGRAM(S): 4 TABLET ORAL at 14:10

## 2020-07-01 RX ADMIN — INSULIN GLARGINE 32 UNIT(S): 100 INJECTION, SOLUTION SUBCUTANEOUS at 22:42

## 2020-07-01 RX ADMIN — Medication 6 UNIT(S): at 12:49

## 2020-07-01 RX ADMIN — CARVEDILOL PHOSPHATE 3.12 MILLIGRAM(S): 80 CAPSULE, EXTENDED RELEASE ORAL at 05:36

## 2020-07-01 RX ADMIN — CLOPIDOGREL BISULFATE 75 MILLIGRAM(S): 75 TABLET, FILM COATED ORAL at 14:11

## 2020-07-01 RX ADMIN — Medication 81 MILLIGRAM(S): at 14:11

## 2020-07-01 RX ADMIN — CARVEDILOL PHOSPHATE 3.12 MILLIGRAM(S): 80 CAPSULE, EXTENDED RELEASE ORAL at 17:49

## 2020-07-01 RX ADMIN — Medication 6 UNIT(S): at 18:04

## 2020-07-01 NOTE — PROGRESS NOTE ADULT - SUBJECTIVE AND OBJECTIVE BOX
INTERVAL HPI/OVERNIGHT EVENTS: I feel better.   Vital Signs Last 24 Hrs  T(C): 36.9 (01 Jul 2020 14:08), Max: 37.2 (30 Jun 2020 20:16)  T(F): 98.5 (01 Jul 2020 14:08), Max: 99 (01 Jul 2020 05:32)  HR: 92 (01 Jul 2020 14:08) (89 - 93)  BP: 178/72 (01 Jul 2020 14:08) (155/67 - 178/80)  BP(mean): --  RR: 18 (01 Jul 2020 14:08) (16 - 18)  SpO2: 100% (01 Jul 2020 14:08) (100% - 100%)  I&O's Summary    30 Jun 2020 07:01  -  01 Jul 2020 07:00  --------------------------------------------------------  IN: 1040 mL / OUT: 2250 mL / NET: -1210 mL      MEDICATIONS  (STANDING):  aspirin enteric coated 81 milliGRAM(s) Oral daily  atorvastatin 40 milliGRAM(s) Oral at bedtime  carvedilol 3.125 milliGRAM(s) Oral every 12 hours  chlorhexidine 4% Liquid 1 Application(s) Topical daily  clopidogrel Tablet 75 milliGRAM(s) Oral daily  dextrose 5%. 1000 milliLiter(s) (50 mL/Hr) IV Continuous <Continuous>  dextrose 50% Injectable 12.5 Gram(s) IV Push once  dextrose 50% Injectable 25 Gram(s) IV Push once  dextrose 50% Injectable 25 Gram(s) IV Push once  furosemide   Injectable 40 milliGRAM(s) IV Push daily  heparin  Infusion. 1000 Unit(s)/Hr (10 mL/Hr) IV Continuous <Continuous>  hydrALAZINE 50 milliGRAM(s) Oral three times a day  insulin glargine Injectable (LANTUS) 32 Unit(s) SubCutaneous at bedtime  insulin lispro (HumaLOG) corrective regimen sliding scale   SubCutaneous three times a day before meals  insulin lispro (HumaLOG) corrective regimen sliding scale   SubCutaneous at bedtime  insulin lispro Injectable (HumaLOG) 6 Unit(s) SubCutaneous three times a day before meals  risperiDONE   Tablet 1 milliGRAM(s) Oral daily  sodium chloride 0.9%. 500 milliLiter(s) (75 mL/Hr) IV Continuous <Continuous>  sterile water 1000 milliLiter(s) (75 mL/Hr) IV Continuous <Continuous>    MEDICATIONS  (PRN):  acetaminophen   Tablet .. 650 milliGRAM(s) Oral every 6 hours PRN Mild Pain (1 - 3), Moderate Pain (4 - 6)  dextrose 40% Gel 15 Gram(s) Oral once PRN Blood Glucose LESS THAN 70 milliGRAM(s)/deciLiter  glucagon  Injectable 1 milliGRAM(s) IntraMuscular once PRN Glucose <70 milliGRAM(s)/deciLiter  heparin   Injectable 5500 Unit(s) IV Push every 6 hours PRN For aPTT less than 40  heparin   Injectable 2500 Unit(s) IV Push every 6 hours PRN For aPTT between 40 - 57    LABS:                        9.6    8.12  )-----------( 342      ( 01 Jul 2020 05:05 )             29.7     07-01    131<L>  |  95<L>  |  37<H>  ----------------------------<  109<H>  4.3   |  18<L>  |  1.43<H>    Ca    9.1      01 Jul 2020 05:05  Mg     2.1     07-01      PTT - ( 01 Jul 2020 12:40 )  PTT:33.3 SEC    CAPILLARY BLOOD GLUCOSE      POCT Blood Glucose.: 105 mg/dL (01 Jul 2020 12:11)  POCT Blood Glucose.: 85 mg/dL (01 Jul 2020 08:31)  POCT Blood Glucose.: 191 mg/dL (30 Jun 2020 22:24)          REVIEW OF SYSTEMS:  CONSTITUTIONAL: No fever, weight loss, or fatigue  EYES: No eye pain, visual disturbances, or discharge  ENMT:  No difficulty hearing, tinnitus, vertigo; No sinus or throat pain  NECK: No pain or stiffness  RESPIRATORY: No cough, wheezing, chills or hemoptysis; No shortness of breath  CARDIOVASCULAR: No chest pain, palpitations, dizziness, or leg swelling  GASTROINTESTINAL: No abdominal or epigastric pain. No nausea, vomiting, or hematemesis; No diarrhea or constipation. No melena or hematochezia.  GENITOURINARY: No dysuria, frequency, hematuria, or incontinence  NEUROLOGICAL: No headaches, memory loss, loss of strength, numbness, or tremors      RADIOLOGY & ADDITIONAL TESTS:    Consultant(s) Notes Reviewed:  [x ] YES  [ ] NO    PHYSICAL EXAM:  GENERAL: NAD, well-groomed, well-developed, not in any distress ,  HEAD:  Atraumatic, Normocephalic  EYES: EOMI, PERRLA, conjunctiva and sclera clear  ENMT: No tonsillar erythema, exudates, or enlargement; Moist mucous membranes, Good dentition, No lesions  NECK: Supple, No JVD, Normal thyroid  NERVOUS SYSTEM:  Alert & Oriented X3, No focal deficit   CHEST/LUNG: Good air entry bilateral with no  rales, rhonchi, wheezing, or rubs  HEART: Regular rate and rhythm; No murmurs, rubs, or gallops  ABDOMEN: Soft, Nontender, Nondistended; Bowel sounds present  EXTREMITIES:  2+ Peripheral Pulses, No clubbing, cyanosis, or edema      Care Discussed with Consultants/Other Providers [ x] YES  [ ] NO

## 2020-07-01 NOTE — CHART NOTE - NSCHARTNOTEFT_GEN_A_CORE
JOSE SAGASTUME 74y Female s/p cath. Right radial site check. Alerted by RN that Dressing was saturated w/ Blood @ 21:00, Pt immediately assessed at bedside. Dressing removed and site slightly bleeding. Pressure held for 10 minutes w/ no subsequent bleeding, dressing reapplied. Site checked s/p 1 hour and remains c/d/i. Pt ASx throughout, denies pain, numbness, tingling, CP, SOB. VSS.     Vital Signs Last 24 Hrs  T(C): 36.7 (30 Jun 2020 21:11), Max: 37.3 (30 Jun 2020 11:36)  T(F): 98.1 (30 Jun 2020 21:11), Max: 99.1 (30 Jun 2020 11:36)  HR: 91 (30 Jun 2020 21:11) (78 - 91)  BP: 178/80 (30 Jun 2020 21:11) (159/70 - 178/80)  RR: 16 (30 Jun 2020 21:11) (14 - 16)  SpO2: 100% (30 Jun 2020 21:11) (100% - 100%)    Pulses palpable, cap refill <2 sec.    Will continue to monitor.

## 2020-07-01 NOTE — PROGRESS NOTE ADULT - SUBJECTIVE AND OBJECTIVE BOX
EP     tele: NSR, no further NSVT    she denies palpitations, syncope, nor angina, ROS otherwise -      MEDICATIONS  (STANDING):  aspirin enteric coated 81 milliGRAM(s) Oral daily  atorvastatin 40 milliGRAM(s) Oral at bedtime  carvedilol 3.125 milliGRAM(s) Oral every 12 hours  chlorhexidine 4% Liquid 1 Application(s) Topical daily  clopidogrel Tablet 75 milliGRAM(s) Oral daily  dextrose 5%. 1000 milliLiter(s) (50 mL/Hr) IV Continuous <Continuous>  dextrose 50% Injectable 12.5 Gram(s) IV Push once  dextrose 50% Injectable 25 Gram(s) IV Push once  dextrose 50% Injectable 25 Gram(s) IV Push once  furosemide   Injectable 40 milliGRAM(s) IV Push daily  hydrALAZINE 50 milliGRAM(s) Oral three times a day  insulin glargine Injectable (LANTUS) 32 Unit(s) SubCutaneous at bedtime  insulin lispro (HumaLOG) corrective regimen sliding scale   SubCutaneous three times a day before meals  insulin lispro (HumaLOG) corrective regimen sliding scale   SubCutaneous at bedtime  insulin lispro Injectable (HumaLOG) 6 Unit(s) SubCutaneous three times a day before meals  risperiDONE   Tablet 1 milliGRAM(s) Oral daily  sodium chloride 0.9%. 500 milliLiter(s) (75 mL/Hr) IV Continuous <Continuous>  sterile water 1000 milliLiter(s) (75 mL/Hr) IV Continuous <Continuous>    MEDICATIONS  (PRN):  acetaminophen   Tablet .. 650 milliGRAM(s) Oral every 6 hours PRN Mild Pain (1 - 3), Moderate Pain (4 - 6)  dextrose 40% Gel 15 Gram(s) Oral once PRN Blood Glucose LESS THAN 70 milliGRAM(s)/deciLiter  glucagon  Injectable 1 milliGRAM(s) IntraMuscular once PRN Glucose <70 milliGRAM(s)/deciLiter      LABS:                            9.6    8.12  )-----------( 342      ( 01 Jul 2020 05:05 )             29.7     131<L>  |  95<L>  |  37<H>  ----------------------------<  109<H>  4.3   |  18<L>  |  1.43<H>    Ca    9.1      01 Jul 2020 05:05  Mg     2.1     07-01      Creatinine Trend: 1.43<--, 1.56<--, 1.78<--, 1.88<--, 1.87<--, 2.25<--   PTT - ( 01 Jul 2020 12:40 )  PTT:33.3 SEC      PHYSICAL EXAM  Vital Signs Last 24 Hrs  T(C): 37.2 (01 Jul 2020 05:32), Max: 37.2 (30 Jun 2020 20:16)  T(F): 99 (01 Jul 2020 05:32), Max: 99 (01 Jul 2020 05:32)  HR: 93 (01 Jul 2020 05:32) (89 - 93)  BP: 155/67 (01 Jul 2020 05:32) (155/67 - 178/80)  RR: 16 (01 Jul 2020 05:32) (16 - 16)  SpO2: 100% (01 Jul 2020 05:32) (100% - 100%)    A&O x 3  neurologically intact  no JVD  RRR, no murmurs  CTAB  soft nt/nd  no c/c/e    echo: moderate MR, normal LVEF    A/P) 74 year old female PMH HTN, hyperlipidemia, DM admitted with LE edema. EP called for NSVT in setting of normal LVEF.    -continue beta blockers for NSVT  -s/p LHC and PCI to LCx  -no further inpatient EP workup expected  -given normal LVEF there is no indication for an ICD nor AAD

## 2020-07-01 NOTE — PROGRESS NOTE ADULT - ASSESSMENT
Assessment  DMT2: 74y Female with DM T2 with hyperglycemia, on basal bolus insulin, FS improved, no hypoglycemic episode, eating meals, compliant with low carb diet.  CHF: on medications, no chest pain, stable, monitored.  Hypothyroidism/subclinical: Apparently no history of thyroid disease, asymptomatic, repeat TFTs euthyroid..  HTN:  Controlled,  on antihypertensive medications.    Eddie Smith MD  Cell: [phone]  Office: [phone]

## 2020-07-01 NOTE — PROGRESS NOTE ADULT - SUBJECTIVE AND OBJECTIVE BOX
[facility]      Seen earlier today, and examined.  - Today, patient is without complaints.  complains of pain in the right hand          *****MEDICATIONS: Current medication reviewed and documented.    MEDICATIONS  (STANDING):  aspirin enteric coated 81 milliGRAM(s) Oral daily  atorvastatin 40 milliGRAM(s) Oral at bedtime  carvedilol 3.125 milliGRAM(s) Oral every 12 hours  chlorhexidine 4% Liquid 1 Application(s) Topical daily  clopidogrel Tablet 75 milliGRAM(s) Oral daily  dextrose 5%. 1000 milliLiter(s) (50 mL/Hr) IV Continuous <Continuous>  dextrose 50% Injectable 12.5 Gram(s) IV Push once  dextrose 50% Injectable 25 Gram(s) IV Push once  dextrose 50% Injectable 25 Gram(s) IV Push once  furosemide   Injectable 40 milliGRAM(s) IV Push daily  heparin  Infusion. 1000 Unit(s)/Hr (10 mL/Hr) IV Continuous <Continuous>  hydrALAZINE 50 milliGRAM(s) Oral three times a day  insulin glargine Injectable (LANTUS) 32 Unit(s) SubCutaneous at bedtime  insulin lispro (HumaLOG) corrective regimen sliding scale   SubCutaneous three times a day before meals  insulin lispro (HumaLOG) corrective regimen sliding scale   SubCutaneous at bedtime  insulin lispro Injectable (HumaLOG) 6 Unit(s) SubCutaneous three times a day before meals  risperiDONE   Tablet 1 milliGRAM(s) Oral daily  sodium chloride 0.9%. 500 milliLiter(s) (75 mL/Hr) IV Continuous <Continuous>  sterile water 1000 milliLiter(s) (75 mL/Hr) IV Continuous <Continuous>    MEDICATIONS  (PRN):  acetaminophen   Tablet .. 650 milliGRAM(s) Oral every 6 hours PRN Mild Pain (1 - 3), Moderate Pain (4 - 6)  dextrose 40% Gel 15 Gram(s) Oral once PRN Blood Glucose LESS THAN 70 milliGRAM(s)/deciLiter  glucagon  Injectable 1 milliGRAM(s) IntraMuscular once PRN Glucose <70 milliGRAM(s)/deciLiter  heparin   Injectable 5500 Unit(s) IV Push every 6 hours PRN For aPTT less than 40  heparin   Injectable 2500 Unit(s) IV Push every 6 hours PRN For aPTT between 40 - 57          ***** VITAL SIGNS:  T(F): 99 (07-01-20 @ 21:24), Max: 99 (20 @ 05:32)  HR: 91 (20 @ 21:24) (91 - 93)  BP: 153/77 (20 @ 21:24) (153/64 - 178/72)  RR: 19 (20 @ 21:24) (16 - 19)  SpO2: 98% (20 @ 21:24) (98% - 100%)  Wt(kg): --  ,   I&O's Summary    2020 07:  -  2020 07:00  --------------------------------------------------------  IN: 1040 mL / OUT: 2250 mL / NET: -1210 mL    2020 07:  -  2020 22:55  --------------------------------------------------------  IN: 520 mL / OUT: 1200 mL / NET: -680 mL             *****PHYSICAL EXAM:   Alert oriented x 2.5  Attention comprehension are poor  Able to name, repeat,   without any difficulty.   Able to follow1 step commands.     EOMI fundi not visualized,  VFF to confrontration  No facial asymmetry   Tongue is midline   Palate elevates symmetrically   Moving all 4 ext symmetrically antigravity   limited exam   good radial pulses   pain on palpation  sensation is grossly symmetric  Gait : not assessed.  B/L down going toes           *****LAB AND IMAGIN.6    8.12  )-----------( 342      ( 2020 05:05 )             29.7               07-01    131<L>  |  95<L>  |  37<H>  ----------------------------<  109<H>  4.3   |  18<L>  |  1.43<H>    Ca    9.1      2020 05:05  Mg     2.1     07-01      PTT - ( 2020 12:40 )  PTT:33.3 SEC                     [All pertinent recent Imaging/Reports reviewed]           *****A S S E S S M E N T   A N D   P L A N :      Patient is a 75 y/o F PMH HTN, HLD, DM2, Depression, COVID-19 () p/w B/L LE swelling X1-2 weeks. Reports no other symptoms, no change in functional status, no change in diet, compliant with all her medications, follows up regularly with her PMD. Reports no changes in meds since admission in 2020 for COVID-19. (2020 23:05)      Problem/Recommendations 1: ams resolved.   likely underlying dementia related sundowning   can reorient often   avoid day time somnolence   depakote 125 if needed.     ct no acute path   will continue to monitor 	      right hand pain, good radial pulses if persistent can consider ultrasound of the rue   swelling improved.            Thank you for allowing me to participate in the care of this patient. Please do not hesitate to call me if you have any  questions.        ________________  Alice Kerr MD  [facility] ([facility]  [phone]      33 minutes spent on total encounter; more than 50 % of the visit was  spent counseling about plan of care, compliance to diet/exercise and medication regimen and or  coordinating care by the attending physician.      It is advised that stroke patients follow up with SATHISH Silveira @ [phone] in 1- 2 weeks.   Others please follow up with Dr. Michael Nissenbaum [phone]

## 2020-07-01 NOTE — PROGRESS NOTE ADULT - SUBJECTIVE AND OBJECTIVE BOX
Pt seen. resting    MEDICATIONS  (STANDING):  aspirin enteric coated 81 milliGRAM(s) Oral daily  atorvastatin 40 milliGRAM(s) Oral at bedtime  carvedilol 3.125 milliGRAM(s) Oral every 12 hours  chlorhexidine 4% Liquid 1 Application(s) Topical daily  clopidogrel Tablet 75 milliGRAM(s) Oral daily  dextrose 5%. 1000 milliLiter(s) (50 mL/Hr) IV Continuous <Continuous>  dextrose 50% Injectable 12.5 Gram(s) IV Push once  dextrose 50% Injectable 25 Gram(s) IV Push once  dextrose 50% Injectable 25 Gram(s) IV Push once  furosemide   Injectable 40 milliGRAM(s) IV Push daily  hydrALAZINE 50 milliGRAM(s) Oral three times a day  insulin glargine Injectable (LANTUS) 32 Unit(s) SubCutaneous at bedtime  insulin lispro (HumaLOG) corrective regimen sliding scale   SubCutaneous three times a day before meals  insulin lispro (HumaLOG) corrective regimen sliding scale   SubCutaneous at bedtime  insulin lispro Injectable (HumaLOG) 6 Unit(s) SubCutaneous three times a day before meals  risperiDONE   Tablet 1 milliGRAM(s) Oral daily  sodium chloride 0.9%. 500 milliLiter(s) (75 mL/Hr) IV Continuous <Continuous>  sterile water 1000 milliLiter(s) (75 mL/Hr) IV Continuous <Continuous>    MEDICATIONS  (PRN):  acetaminophen   Tablet .. 650 milliGRAM(s) Oral every 6 hours PRN Mild Pain (1 - 3), Moderate Pain (4 - 6)  dextrose 40% Gel 15 Gram(s) Oral once PRN Blood Glucose LESS THAN 70 milliGRAM(s)/deciLiter  glucagon  Injectable 1 milliGRAM(s) IntraMuscular once PRN Glucose <70 milliGRAM(s)/deciLiter      ROS  No fever, sweats, chills  No epistaxis, HA, sore throat  No CP, SOB, cough, sputum  No n/v/d, abd pain, melena, hematochezia  No edema  No rash  No anxiety  No back pain, joint pain  No bleeding, bruising  No dysuria, hematuria    Vital Signs Last 24 Hrs  T(C): 37.2 (01 Jul 2020 05:32), Max: 37.2 (30 Jun 2020 20:16)  T(F): 99 (01 Jul 2020 05:32), Max: 99 (01 Jul 2020 05:32)  HR: 93 (01 Jul 2020 05:32) (89 - 93)  BP: 155/67 (01 Jul 2020 05:32) (155/67 - 178/80)  BP(mean): --  RR: 16 (01 Jul 2020 05:32) (16 - 16)  SpO2: 100% (01 Jul 2020 05:32) (100% - 100%)    PE  NAD  Awake, alert  Anicteric, MMM  RRR  CTAB  Abd soft, NT, ND  No c/c/e  No rash grossly  FROM                          9.6    8.12  )-----------( 342      ( 01 Jul 2020 05:05 )             29.7       07-01    131<L>  |  95<L>  |  37<H>  ----------------------------<  109<H>  4.3   |  18<L>  |  1.43<H>    Ca    9.1      01 Jul 2020 05:05  Mg     2.1     07-01

## 2020-07-01 NOTE — PROGRESS NOTE ADULT - SUBJECTIVE AND OBJECTIVE BOX
Chief complaint  Patient is a 74y old  Female who presents with a chief complaint of LE swelling (30 Jun 2020 15:12)   Review of systems  Patient in bed, looks comfortable, no fever, no hypoglycemia.    Labs and Fingersticks  CAPILLARY BLOOD GLUCOSE      POCT Blood Glucose.: 191 mg/dL (30 Jun 2020 22:24)  POCT Blood Glucose.: 162 mg/dL (30 Jun 2020 15:40)  POCT Blood Glucose.: 212 mg/dL (30 Jun 2020 14:09)  POCT Blood Glucose.: 196 mg/dL (30 Jun 2020 12:08)  POCT Blood Glucose.: 143 mg/dL (30 Jun 2020 09:48)  POCT Blood Glucose.: 141 mg/dL (30 Jun 2020 09:47)  POCT Blood Glucose.: 67 mg/dL (30 Jun 2020 08:41)  POCT Blood Glucose.: 55 mg/dL (30 Jun 2020 08:39)      Anion Gap, Serum: 18 <H> (07-01 @ 05:05)  Anion Gap, Serum: 17 <H> (06-30 @ 10:37)      Calcium, Total Serum: 9.1 (07-01 @ 05:05)  Calcium, Total Serum: 9.1 (06-30 @ 10:37)          07-01    131<L>  |  95<L>  |  37<H>  ----------------------------<  109<H>  4.3   |  18<L>  |  1.43<H>    Ca    9.1      01 Jul 2020 05:05  Mg     2.1     07-01                          9.6    8.12  )-----------( 342      ( 01 Jul 2020 05:05 )             29.7     Medications  MEDICATIONS  (STANDING):  apixaban 10 milliGRAM(s) Oral every 12 hours  aspirin enteric coated 81 milliGRAM(s) Oral daily  atorvastatin 40 milliGRAM(s) Oral at bedtime  carvedilol 3.125 milliGRAM(s) Oral every 12 hours  chlorhexidine 4% Liquid 1 Application(s) Topical daily  clopidogrel Tablet 75 milliGRAM(s) Oral daily  dextrose 5%. 1000 milliLiter(s) (50 mL/Hr) IV Continuous <Continuous>  dextrose 50% Injectable 12.5 Gram(s) IV Push once  dextrose 50% Injectable 25 Gram(s) IV Push once  dextrose 50% Injectable 25 Gram(s) IV Push once  furosemide   Injectable 40 milliGRAM(s) IV Push daily  hydrALAZINE 50 milliGRAM(s) Oral three times a day  insulin glargine Injectable (LANTUS) 32 Unit(s) SubCutaneous at bedtime  insulin lispro (HumaLOG) corrective regimen sliding scale   SubCutaneous three times a day before meals  insulin lispro (HumaLOG) corrective regimen sliding scale   SubCutaneous at bedtime  insulin lispro Injectable (HumaLOG) 6 Unit(s) SubCutaneous three times a day before meals  risperiDONE   Tablet 1 milliGRAM(s) Oral daily  sodium chloride 0.9%. 500 milliLiter(s) (75 mL/Hr) IV Continuous <Continuous>  sterile water 1000 milliLiter(s) (75 mL/Hr) IV Continuous <Continuous>      Physical Exam  General: Patient comfortable in bed  Vital Signs Last 12 Hrs  T(F): 99 (07-01-20 @ 05:32), Max: 99 (07-01-20 @ 05:32)  HR: 93 (07-01-20 @ 05:32) (91 - 93)  BP: 155/67 (07-01-20 @ 05:32) (155/67 - 178/80)  BP(mean): --  RR: 16 (07-01-20 @ 05:32) (16 - 16)  SpO2: 100% (07-01-20 @ 05:32) (100% - 100%)  Neck: No palpable thyroid nodules.  CVS: S1S2, No murmurs  Respiratory: No wheezing, no crepitations  GI: Abdomen soft, bowel sounds positive  Musculoskeletal:  edema lower extremities.   Skin: No skin rashes, no ecchymosis    Diagnostics

## 2020-07-01 NOTE — PROGRESS NOTE ADULT - SUBJECTIVE AND OBJECTIVE BOX
[facility]   MD MICHAEL STOUT DO ANAM SIDDIQUI ANGELA WONG, PA    TEL:  OFFICE: [phone]  DR FLORES CELL: [phone]  DR. HUSSEIN CELL: [phone]  DR. HODGES CELL: [phone]  WESTLEY DAVE CELL: [phone]    From 5pm-7am Answering Service [phone]    Patient is a 74y old  Female who presents with a chief complaint of LE swelling (01 Jul 2020 08:26)      Patient seen and examined at bedside. No chest pain/sob    VITALS:  T(F): 99 (07-01-20 @ 05:32), Max: 99.1 (06-30-20 @ 11:36)  HR: 93 (07-01-20 @ 05:32)  BP: 155/67 (07-01-20 @ 05:32)  RR: 16 (07-01-20 @ 05:32)  SpO2: 100% (07-01-20 @ 05:32)  Wt(kg): --    06-30 @ 07:01  -  07-01 @ 07:00  --------------------------------------------------------  IN: 1040 mL / OUT: 2250 mL / NET: -1210 mL          PHYSICAL EXAM:  Constitutional: NAD  Neck: No JVD  Respiratory: CTAB, no wheezes, rales or rhonchi  Cardiovascular: S1, S2, RRR  Gastrointestinal: BS+, soft, NT/ND  Extremities: No peripheral edema    Hospital Medications:   MEDICATIONS  (STANDING):  apixaban 10 milliGRAM(s) Oral every 12 hours  aspirin enteric coated 81 milliGRAM(s) Oral daily  atorvastatin 40 milliGRAM(s) Oral at bedtime  carvedilol 3.125 milliGRAM(s) Oral every 12 hours  chlorhexidine 4% Liquid 1 Application(s) Topical daily  clopidogrel Tablet 75 milliGRAM(s) Oral daily  dextrose 5%. 1000 milliLiter(s) (50 mL/Hr) IV Continuous <Continuous>  dextrose 50% Injectable 12.5 Gram(s) IV Push once  dextrose 50% Injectable 25 Gram(s) IV Push once  dextrose 50% Injectable 25 Gram(s) IV Push once  furosemide   Injectable 40 milliGRAM(s) IV Push daily  hydrALAZINE 50 milliGRAM(s) Oral three times a day  insulin glargine Injectable (LANTUS) 32 Unit(s) SubCutaneous at bedtime  insulin lispro (HumaLOG) corrective regimen sliding scale   SubCutaneous three times a day before meals  insulin lispro (HumaLOG) corrective regimen sliding scale   SubCutaneous at bedtime  insulin lispro Injectable (HumaLOG) 6 Unit(s) SubCutaneous three times a day before meals  risperiDONE   Tablet 1 milliGRAM(s) Oral daily  sodium chloride 0.9%. 500 milliLiter(s) (75 mL/Hr) IV Continuous <Continuous>  sterile water 1000 milliLiter(s) (75 mL/Hr) IV Continuous <Continuous>      LABS:  07-01    131<L>  |  95<L>  |  37<H>  ----------------------------<  109<H>  4.3   |  18<L>  |  1.43<H>    Ca    9.1      01 Jul 2020 05:05  Mg     2.1     07-01      Creatinine Trend: 1.43 <--, 1.56 <--, 1.78 <--, 1.88 <--, 1.87 <--, 2.25 <--, 2.19 <--, 2.28 <--                                9.6    8.12  )-----------( 342      ( 01 Jul 2020 05:05 )             29.7     Urine Studies:  Urinalysis - [06-22-20 @ 17:00]      Color COLORLESS / Appearance CLEAR / SG 1.009 / pH 8.0      Gluc NEGATIVE / Ketone NEGATIVE  / Bili NEGATIVE / Urobili NORMAL       Blood NEGATIVE / Protein 100 / Leuk Est NEGATIVE / Nitrite NEGATIVE      RBC 0-2 / WBC 0-2 / Hyaline NEGATIVE / Gran  / Sq Epi OCC / Non Sq Epi  / Bacteria FEW      Iron 58, TIBC 282, %sat --      [06-24-20 @ 05:43]  Ferritin 72.33      [06-24-20 @ 05:43]  HbA1c 8.8      [12-13-19 @ 06:00]  TSH 7.68      [06-21-20 @ 07:11]  Lipid: chol 197, , HDL 41,       [12-14-19 @ 03:50]    HBsAg NEGATIVE      [06-25-20 @ 05:50]  HCV 0.77, Nonreactive Hepatitis C AB  S/CO Ratio                        Interpretation  < 1.00                                   Non-Reactive  1.00 - 4.99                         Weakly-Reactive  >= 5.00                                Reactive  Non-Reactive: Aperson with a non-reactive HCV antibody  result is considered uninfected.  No further action is  needed unless recent infection is suspected.  In these  cases, consider repeat testing later to detect  seroconversion..  Weakly-Reactive: HCV antibody test is abnormal, HCV RNA  Qualitative test will follow.  Reactive: HCV antibody test is abnormal, HCV RNA  Qualitative test will follow.  Note: HCV antibody testing is performed on the Abbott   system.      [06-25-20 @ 05:50]    CHRYSTAL: titer 1:80, pattern Speckled      [06-25-20 @ 05:50]  C3 Complement 162.8      [06-25-20 @ 05:50]  C4 Complement 40.2      [06-25-20 @ 05:50]  ANCA: cANCA Negative, pANCA Negative, atypical ANCA --      [06-25-20 @ 05:50]  Syphilis Screen (Treponema Pallidum Ab) Negative      [06-25-20 @ 05:50]  Free Light Chains: kappa 7.52, lambda 4.11, ratio = 1.83 H      [06-25 @ 05:50]    RADIOLOGY & ADDITIONAL STUDIES:

## 2020-07-01 NOTE — PROGRESS NOTE ADULT - SUBJECTIVE AND OBJECTIVE BOX
S: no CP, SOB; Review of systems otherwise (-)  acetaminophen   Tablet .. 650 milliGRAM(s) Oral every 6 hours PRN  aspirin enteric coated 81 milliGRAM(s) Oral daily  atorvastatin 40 milliGRAM(s) Oral at bedtime  carvedilol 3.125 milliGRAM(s) Oral every 12 hours  chlorhexidine 4% Liquid 1 Application(s) Topical daily  clopidogrel Tablet 75 milliGRAM(s) Oral daily  dextrose 40% Gel 15 Gram(s) Oral once PRN  dextrose 5%. 1000 milliLiter(s) IV Continuous <Continuous>  dextrose 50% Injectable 12.5 Gram(s) IV Push once  dextrose 50% Injectable 25 Gram(s) IV Push once  dextrose 50% Injectable 25 Gram(s) IV Push once  furosemide   Injectable 40 milliGRAM(s) IV Push daily  glucagon  Injectable 1 milliGRAM(s) IntraMuscular once PRN  heparin   Injectable 5500 Unit(s) IV Push every 6 hours PRN  heparin   Injectable 2500 Unit(s) IV Push every 6 hours PRN  heparin  Infusion. 1000 Unit(s)/Hr IV Continuous <Continuous>  hydrALAZINE 50 milliGRAM(s) Oral three times a day  insulin glargine Injectable (LANTUS) 32 Unit(s) SubCutaneous at bedtime  insulin lispro (HumaLOG) corrective regimen sliding scale   SubCutaneous three times a day before meals  insulin lispro (HumaLOG) corrective regimen sliding scale   SubCutaneous at bedtime  insulin lispro Injectable (HumaLOG) 6 Unit(s) SubCutaneous three times a day before meals  risperiDONE   Tablet 1 milliGRAM(s) Oral daily  sodium chloride 0.9%. 500 milliLiter(s) IV Continuous <Continuous>  sterile water 1000 milliLiter(s) IV Continuous <Continuous>                            9.6    8.12  )-----------( 342      ( 01 Jul 2020 05:05 )             29.7       07-01    131<L>  |  95<L>  |  37<H>  ----------------------------<  109<H>  4.3   |  18<L>  |  1.43<H>    Ca    9.1      01 Jul 2020 05:05  Mg     2.1     07-01              T(C): 36.9 (07-01-20 @ 14:08), Max: 37.2 (06-30-20 @ 20:16)  HR: 92 (07-01-20 @ 14:08) (89 - 93)  BP: 178/72 (07-01-20 @ 14:08) (155/67 - 178/80)  RR: 18 (07-01-20 @ 14:08) (16 - 18)  SpO2: 100% (07-01-20 @ 14:08) (100% - 100%)  Wt(kg): --    I&O's Summary    30 Jun 2020 07:01  -  01 Jul 2020 07:00  --------------------------------------------------------  IN: 1040 mL / OUT: 2250 mL / NET: -1210 mL        Gen: Appears well in NAD  HEENT:  (-)icterus (-)pallor  CV: N S1 S2 RRR (+)2 Pulses B/l  Resp:  Clear to auscultation  B/L, normal effort  GI: (+) BS Soft, NT, ND  Lymph:  (-)Edema, (-)obvious lymphadenopathy  Skin: Warm to touch, Normal turgor  Psych: Appropriate mood and affect; AO X 3  Radial artery site: mild swelling; 2+ pulses     TELEMETRY: SR    < from: Transthoracic Echocardiogram (06.21.20 @ 12:47) >  CONCLUSIONS:  1. Mitral annular calcification, otherwise normal mitral  valve. Moderate mitral regurgitation.  2. Normal left ventricular internal dimensions and wall  thicknesses.  3. Hyperdynamic left ventricle.  4. The right ventricle is not well visualized; grossly  normal right ventricular systolic function.  5. Moderate pericardial effusion (measures about 1.3 cm  lateral to RA on apical four-chamber views).  6. Left pleural effusion.  ------------------------------------  < end of copied text >    < from: CT Angio Chest w/ IV Cont (06.20.20 @ 22:37) >    IMPRESSION:   No pulmonary embolism.  No gross CT evidence of pneumonia.  Mild left heart enlargement.  Mild interlobular septal thickening in both lungs may reflect interstitial pulmonary edema.  Small to moderate pleural effusions bilaterally.  Small pericardial effusion.    < end of copied text >      ASSESSMENT/PLAN: 	    74 year old female with history of HTN, HLD, DM, recent COVID infection in April of 2020 who presented to the ED with LE swelling for 1-2 weeks.    -pt. found to have elevated troponin with no chest pain or anginal symptoms  -neuro follow up appreciated   -ac for DVT per heme on heparin gtt - H/H around baseline - pt. with no signs of bleeding - monitor cbc   -continue with beta blockers for NSVT seen on tele  -cath performed showing severe stenoses in the RCA and LCx - pt. now s/p KIERSTEN to RCA and Lcx  -continue with dapt  -on hep gtt for dvt - monitor radial site on ac    Donis Jaquez MD

## 2020-07-01 NOTE — PROGRESS NOTE ADULT - ASSESSMENT
74 year old female with PMH of HTN, HLD, DM, recent COVID infection in April 2020 who presented with LE swelling for weeks, found to have HF exacerbation and treated with diuretics; patient was found to have Wenchebach on telemetry and had 23 beats of NSVT, as well as elevated cardiac enzymes, warranting transfer to CCU for continued monitoring and ischemic workup for likely NSTEMI.    SUNIL on CKD III  Baseline Scr 1 with GFR 45 12/2019   scr on admission 1.13, renal function worsen and peaked 2.28  SUNIL possible sec to JUNIOR (CTA 6/20) and/or ATN   renal function improving  s/p PCI 6/29 and stage PCI 6/30  renal function improving  monitor bmp    Anemia  acceptable  Monitor at present     NSTEMI  s/p cath  monitor urine output, weight and bmp  f/u cardio    HTN  controlled  monitor    Proteinuria  UA with 100 protein  urine p/c ratio 2.5g/day  likely sec to DM/HTN  vasculitis work up in progress   hep b neg, c3 c4 wnl, hep c neg, rpr neg, K/L ratio ok, SPeP neg, Anca neg  shonda 1:80  pending hiv, serum immunofixation    Hyponatremia  ? etiology  CHF on lasix, received NS post cath6/30  check urine na, osmo  free water restriction <1L/day  monitor

## 2020-07-01 NOTE — PROGRESS NOTE ADULT - ASSESSMENT
1. Acute Left Post Tibial DVT    -- now on eliquis as procedure completed  -- would AC for 3 mos or longer if bed bound  --Hypercoag testing negative    2. Microcytic Anemia  -- iron studies normal, suspect she has alpha thal trait- will send testing outpt  -b12 low with elev homocysteine/mma- c/w b12 deficiency, will give IM b12 outpt    f/u with us outpt. ok for dc from heme standpoint    David Clayton MD  Hematology/Oncology  [phone]

## 2020-07-01 NOTE — CHART NOTE - NSCHARTNOTEFT_GEN_A_CORE
Patient seen and examined at bedside for R radial site check. Patient with hematoma at cath site and on heparin gtt. Sensation/pulses and movement intact.  She denies any numbness/tingling. Will outline with skin marker to monitor closely. Patient seen and examined at bedside for R radial site check. Patient with hematoma at cath site and on heparin gtt. Sensation/pulses and movement intact.  She denies any numbness/tingling. Will outline with skin marker and monitor closely.

## 2020-07-01 NOTE — PROGRESS NOTE ADULT - ASSESSMENT
75 y/o F PMH HTN, HLD, DM2, Depression, COVID-19 (4/20) p/w B/L LE swelling X1-2 weeks. Reports no other symptoms, no change in functional status, no change in diet, compliant with all her medications, follows up regularly with her PMD. Reports no changes in meds since admission in April 2020 for COVID-19.     Problem/Recommendation - 1:  Problem: NSTEMI (non-ST elevated myocardial infarction). Recommendation: .ASA, Plavix , BB ,Statin and Heparin.   Cardiology following . Cardiac cath with KIERSTEN Stent RCA  and   Prox cirx .       Problem/Recommendation - 2:  ·  Problem: Ventricular tachycardia, non-sustained.  Recommendation: EP help appreciated.      Problem/Recommendation - 3:  ·  Problem:  Acute on chronic congestive heart failure, unspecified heart failure type.  Recommendation: IV Lasix.      Problem/Recommendation - 4:  ·  Problem: Diabetes mellitus type 2, noninsulin dependent.  Recommendation: Lantus and SSI for now. Endo helping.      Problem/Recommendation - 5:  ·  Problem: Hypertension.  Recommendation: BP meds with hold parameters .      Problem/Recommendation - 6:  Problem: Hypercholesterolemia. Recommendation: Statin.     Problem/Recommendation - 7:  Problem: SUNIL . Recommendation: Creatinine better .  Renal helping.      Problem/Recommendation - 8:  Problem: Acute DVT . Recommendation: On AC . Hematology helping.      Problem/Recommendation - 9:  Problem: Right side Headache . Recommendation: CT head ordered.

## 2020-07-01 NOTE — CHART NOTE - NSCHARTNOTEFT_GEN_A_CORE
Source: Patient [ ]    Family [ ]     other [ ]    Current Diet : Diet, Regular:   Consistent Carbohydrate {No Snacks} (CSTCHO)  DASH/TLC {Sodium & Cholesterol Restricted} (DASH)  Nut Restricted (06-20-20 @ 23:03)     Current Weight: 68.2 kgs 7/1, reflects 1+ dependent edema  Admission weight; 68.4 kg    Nutrition follow-up 2/2 extended length of stay. Patient is a 73 y/o F PMH HTN, HLD, DM2, Depression, COVID-19 (4/20) p/w B/L LE swelling X1-2 weeks a/w HF. RDN met with patient at bedside. Patient denies any nausea/vomiting/diarrhea/constipation or difficulty chewing and swallowing. She reports good PO intake and appetite throughout this admission. Recommended diet reinforced. Again, questionable comprehension. RD remains available, re-consult as needed.       MEDICATIONS  (STANDING):  apixaban 10 milliGRAM(s) Oral every 12 hours  aspirin enteric coated 81 milliGRAM(s) Oral daily  atorvastatin 40 milliGRAM(s) Oral at bedtime  carvedilol 3.125 milliGRAM(s) Oral every 12 hours  chlorhexidine 4% Liquid 1 Application(s) Topical daily  clopidogrel Tablet 75 milliGRAM(s) Oral daily  dextrose 5%. 1000 milliLiter(s) (50 mL/Hr) IV Continuous <Continuous>  dextrose 50% Injectable 12.5 Gram(s) IV Push once  dextrose 50% Injectable 25 Gram(s) IV Push once  dextrose 50% Injectable 25 Gram(s) IV Push once  furosemide   Injectable 40 milliGRAM(s) IV Push daily  hydrALAZINE 50 milliGRAM(s) Oral three times a day  insulin glargine Injectable (LANTUS) 32 Unit(s) SubCutaneous at bedtime  insulin lispro (HumaLOG) corrective regimen sliding scale   SubCutaneous three times a day before meals  insulin lispro (HumaLOG) corrective regimen sliding scale   SubCutaneous at bedtime  insulin lispro Injectable (HumaLOG) 6 Unit(s) SubCutaneous three times a day before meals  risperiDONE   Tablet 1 milliGRAM(s) Oral daily  sodium chloride 0.9%. 500 milliLiter(s) (75 mL/Hr) IV Continuous <Continuous>  sterile water 1000 milliLiter(s) (75 mL/Hr) IV Continuous <Continuous>    MEDICATIONS  (PRN):  acetaminophen   Tablet .. 650 milliGRAM(s) Oral every 6 hours PRN Mild Pain (1 - 3), Moderate Pain (4 - 6)  dextrose 40% Gel 15 Gram(s) Oral once PRN Blood Glucose LESS THAN 70 milliGRAM(s)/deciLiter  glucagon  Injectable 1 milliGRAM(s) IntraMuscular once PRN Glucose <70 milliGRAM(s)/deciLiter      __________________ Pertinent Labs__________________   07-01 Na131 mmol/L<L> Glu 109 mg/dL<H> K+ 4.3 mmol/L Cr  1.43 mg/dL<H> BUN 37 mg/dL<H> 06-29 Phos 5.3 mg/dL<H>          Estimated Needs:   [X ] no change since previous assessment  [ ] recalculated:     Nutrition Recommendations:  1- Continue current diet order, which remains appropriate at this time.   2- Monitor weights, labs, BM's, skin integrity, p.o. intake.  3- Please Encourage po intake, assist with meals and menu selections, provide alternatives PRN.   4- Suggest outpatient follow up with an endocrinologist to ensure long-term DM diet comprehension and compliance.   5- RD remains available, re-consult as needed.

## 2020-07-02 ENCOUNTER — TRANSCRIPTION ENCOUNTER (OUTPATIENT)
Age: 75
End: 2020-07-02

## 2020-07-02 LAB
ANION GAP SERPL CALC-SCNC: 14 MMO/L — SIGNIFICANT CHANGE UP (ref 7–14)
APTT BLD: 143.8 SEC — CRITICAL HIGH (ref 27–36.3)
BASOPHILS # BLD AUTO: 0.02 K/UL — SIGNIFICANT CHANGE UP (ref 0–0.2)
BASOPHILS NFR BLD AUTO: 0.3 % — SIGNIFICANT CHANGE UP (ref 0–2)
BUN SERPL-MCNC: 44 MG/DL — HIGH (ref 7–23)
CALCIUM SERPL-MCNC: 9.4 MG/DL — SIGNIFICANT CHANGE UP (ref 8.4–10.5)
CHLORIDE SERPL-SCNC: 95 MMOL/L — LOW (ref 98–107)
CO2 SERPL-SCNC: 23 MMOL/L — SIGNIFICANT CHANGE UP (ref 22–31)
CREAT SERPL-MCNC: 1.48 MG/DL — HIGH (ref 0.5–1.3)
EOSINOPHIL # BLD AUTO: 0.15 K/UL — SIGNIFICANT CHANGE UP (ref 0–0.5)
EOSINOPHIL NFR BLD AUTO: 2 % — SIGNIFICANT CHANGE UP (ref 0–6)
GAS PNL BLDMV: SIGNIFICANT CHANGE UP
GLUCOSE BLDC GLUCOMTR-MCNC: 163 MG/DL — HIGH (ref 70–99)
GLUCOSE BLDC GLUCOMTR-MCNC: 164 MG/DL — HIGH (ref 70–99)
GLUCOSE BLDC GLUCOMTR-MCNC: 195 MG/DL — HIGH (ref 70–99)
GLUCOSE BLDC GLUCOMTR-MCNC: 98 MG/DL — SIGNIFICANT CHANGE UP (ref 70–99)
GLUCOSE SERPL-MCNC: 174 MG/DL — HIGH (ref 70–99)
HCT VFR BLD CALC: 29.8 % — LOW (ref 34.5–45)
HCT VFR BLD CALC: 29.8 % — LOW (ref 34.5–45)
HGB BLD-MCNC: 9.8 G/DL — LOW (ref 11.5–15.5)
HGB BLD-MCNC: 9.8 G/DL — LOW (ref 11.5–15.5)
IMM GRANULOCYTES NFR BLD AUTO: 0.3 % — SIGNIFICANT CHANGE UP (ref 0–1.5)
LYMPHOCYTES # BLD AUTO: 2.81 K/UL — SIGNIFICANT CHANGE UP (ref 1–3.3)
LYMPHOCYTES # BLD AUTO: 38.4 % — SIGNIFICANT CHANGE UP (ref 13–44)
MAGNESIUM SERPL-MCNC: 2.2 MG/DL — SIGNIFICANT CHANGE UP (ref 1.6–2.6)
MCHC RBC-ENTMCNC: 26.2 PG — LOW (ref 27–34)
MCHC RBC-ENTMCNC: 26.2 PG — LOW (ref 27–34)
MCHC RBC-ENTMCNC: 32.9 % — SIGNIFICANT CHANGE UP (ref 32–36)
MCHC RBC-ENTMCNC: 32.9 % — SIGNIFICANT CHANGE UP (ref 32–36)
MCV RBC AUTO: 79.7 FL — LOW (ref 80–100)
MCV RBC AUTO: 79.7 FL — LOW (ref 80–100)
MONOCYTES # BLD AUTO: 1.04 K/UL — HIGH (ref 0–0.9)
MONOCYTES NFR BLD AUTO: 14.2 % — HIGH (ref 2–14)
NEUTROPHILS # BLD AUTO: 3.28 K/UL — SIGNIFICANT CHANGE UP (ref 1.8–7.4)
NEUTROPHILS NFR BLD AUTO: 44.8 % — SIGNIFICANT CHANGE UP (ref 43–77)
NRBC # FLD: 0 K/UL — SIGNIFICANT CHANGE UP (ref 0–0)
NRBC # FLD: 0 K/UL — SIGNIFICANT CHANGE UP (ref 0–0)
PLATELET # BLD AUTO: 367 K/UL — SIGNIFICANT CHANGE UP (ref 150–400)
PLATELET # BLD AUTO: 367 K/UL — SIGNIFICANT CHANGE UP (ref 150–400)
PMV BLD: 11.1 FL — SIGNIFICANT CHANGE UP (ref 7–13)
PMV BLD: 11.1 FL — SIGNIFICANT CHANGE UP (ref 7–13)
POTASSIUM SERPL-MCNC: 3.8 MMOL/L — SIGNIFICANT CHANGE UP (ref 3.5–5.3)
POTASSIUM SERPL-SCNC: 3.8 MMOL/L — SIGNIFICANT CHANGE UP (ref 3.5–5.3)
RBC # BLD: 3.74 M/UL — LOW (ref 3.8–5.2)
RBC # BLD: 3.74 M/UL — LOW (ref 3.8–5.2)
RBC # FLD: 15.1 % — HIGH (ref 10.3–14.5)
RBC # FLD: 15.1 % — HIGH (ref 10.3–14.5)
SODIUM SERPL-SCNC: 132 MMOL/L — LOW (ref 135–145)
WBC # BLD: 7.32 K/UL — SIGNIFICANT CHANGE UP (ref 3.8–10.5)
WBC # BLD: 7.32 K/UL — SIGNIFICANT CHANGE UP (ref 3.8–10.5)
WBC # FLD AUTO: 7.32 K/UL — SIGNIFICANT CHANGE UP (ref 3.8–10.5)
WBC # FLD AUTO: 7.32 K/UL — SIGNIFICANT CHANGE UP (ref 3.8–10.5)

## 2020-07-02 RX ORDER — APIXABAN 2.5 MG/1
5 TABLET, FILM COATED ORAL EVERY 12 HOURS
Refills: 0 | Status: DISCONTINUED | OUTPATIENT
Start: 2020-07-02 | End: 2020-07-03

## 2020-07-02 RX ORDER — FUROSEMIDE 40 MG
1 TABLET ORAL
Qty: 30 | Refills: 0
Start: 2020-07-02 | End: 2020-07-31

## 2020-07-02 RX ORDER — ATORVASTATIN CALCIUM 80 MG/1
1 TABLET, FILM COATED ORAL
Qty: 30 | Refills: 0
Start: 2020-07-02 | End: 2020-07-31

## 2020-07-02 RX ORDER — FUROSEMIDE 40 MG
40 TABLET ORAL DAILY
Refills: 0 | Status: DISCONTINUED | OUTPATIENT
Start: 2020-07-03 | End: 2020-07-03

## 2020-07-02 RX ORDER — HYDRALAZINE HCL 50 MG
1 TABLET ORAL
Qty: 90 | Refills: 0
Start: 2020-07-02 | End: 2020-07-31

## 2020-07-02 RX ORDER — CARVEDILOL PHOSPHATE 80 MG/1
1 CAPSULE, EXTENDED RELEASE ORAL
Qty: 60 | Refills: 0
Start: 2020-07-02 | End: 2020-07-31

## 2020-07-02 RX ORDER — APIXABAN 2.5 MG/1
1 TABLET, FILM COATED ORAL
Qty: 60 | Refills: 0
Start: 2020-07-02 | End: 2020-07-31

## 2020-07-02 RX ORDER — CLOPIDOGREL BISULFATE 75 MG/1
1 TABLET, FILM COATED ORAL
Qty: 30 | Refills: 0
Start: 2020-07-02 | End: 2020-07-31

## 2020-07-02 RX ADMIN — CLOPIDOGREL BISULFATE 75 MILLIGRAM(S): 75 TABLET, FILM COATED ORAL at 12:32

## 2020-07-02 RX ADMIN — ATORVASTATIN CALCIUM 40 MILLIGRAM(S): 80 TABLET, FILM COATED ORAL at 21:24

## 2020-07-02 RX ADMIN — INSULIN GLARGINE 32 UNIT(S): 100 INJECTION, SOLUTION SUBCUTANEOUS at 22:21

## 2020-07-02 RX ADMIN — Medication 2: at 08:54

## 2020-07-02 RX ADMIN — HEPARIN SODIUM 700 UNIT(S)/HR: 5000 INJECTION INTRAVENOUS; SUBCUTANEOUS at 07:57

## 2020-07-02 RX ADMIN — Medication 81 MILLIGRAM(S): at 12:32

## 2020-07-02 RX ADMIN — HEPARIN SODIUM 900 UNIT(S)/HR: 5000 INJECTION INTRAVENOUS; SUBCUTANEOUS at 00:03

## 2020-07-02 RX ADMIN — Medication 6 UNIT(S): at 18:04

## 2020-07-02 RX ADMIN — APIXABAN 5 MILLIGRAM(S): 2.5 TABLET, FILM COATED ORAL at 15:59

## 2020-07-02 RX ADMIN — Medication 50 MILLIGRAM(S): at 06:32

## 2020-07-02 RX ADMIN — Medication 50 MILLIGRAM(S): at 13:11

## 2020-07-02 RX ADMIN — Medication 6 UNIT(S): at 08:54

## 2020-07-02 RX ADMIN — RISPERIDONE 1 MILLIGRAM(S): 4 TABLET ORAL at 12:32

## 2020-07-02 RX ADMIN — HEPARIN SODIUM 0 UNIT(S)/HR: 5000 INJECTION INTRAVENOUS; SUBCUTANEOUS at 06:54

## 2020-07-02 RX ADMIN — Medication 2: at 12:33

## 2020-07-02 RX ADMIN — Medication 6 UNIT(S): at 12:33

## 2020-07-02 RX ADMIN — CARVEDILOL PHOSPHATE 3.12 MILLIGRAM(S): 80 CAPSULE, EXTENDED RELEASE ORAL at 06:32

## 2020-07-02 RX ADMIN — CARVEDILOL PHOSPHATE 3.12 MILLIGRAM(S): 80 CAPSULE, EXTENDED RELEASE ORAL at 18:04

## 2020-07-02 RX ADMIN — Medication 50 MILLIGRAM(S): at 21:24

## 2020-07-02 RX ADMIN — CHLORHEXIDINE GLUCONATE 1 APPLICATION(S): 213 SOLUTION TOPICAL at 12:31

## 2020-07-02 RX ADMIN — Medication 40 MILLIGRAM(S): at 06:33

## 2020-07-02 NOTE — PROGRESS NOTE ADULT - SUBJECTIVE AND OBJECTIVE BOX
[facility]   MD MICHAEL STOUT DO ANAM SIDDIQUI ANGELA WONG, PA    TEL:  OFFICE: [phone]  DR FLORES CELL: [phone]  DR. HUSSEIN CELL: [phone]  DR. HODGES CELL: [phone]  WESTLEY DAVE CELL: [phone]    From 5pm-7am Answering Service [phone]    Patient is a 74y old  Female who presents with a chief complaint of LE swelling (02 Jul 2020 13:23)      Patient seen and examined at bedside. No chest pain/sob    VITALS:  T(F): 98.5 (07-02-20 @ 13:00), Max: 99 (07-01-20 @ 21:24)  HR: 79 (07-02-20 @ 13:00)  BP: 112/75 (07-02-20 @ 13:00)  RR: 18 (07-02-20 @ 13:00)  SpO2: 100% (07-02-20 @ 13:00)  Wt(kg): --    07-01 @ 07:01  -  07-02 @ 07:00  --------------------------------------------------------  IN: 520 mL / OUT: 1800 mL / NET: -1280 mL          PHYSICAL EXAM:  Constitutional: NAD  Neck: No JVD  Respiratory: CTAB, no wheezes, rales or rhonchi  Cardiovascular: S1, S2, RRR  Gastrointestinal: BS+, soft, NT/ND  Extremities: No peripheral edema    Hospital Medications:   MEDICATIONS  (STANDING):  aspirin enteric coated 81 milliGRAM(s) Oral daily  atorvastatin 40 milliGRAM(s) Oral at bedtime  carvedilol 3.125 milliGRAM(s) Oral every 12 hours  chlorhexidine 4% Liquid 1 Application(s) Topical daily  clopidogrel Tablet 75 milliGRAM(s) Oral daily  dextrose 5%. 1000 milliLiter(s) (50 mL/Hr) IV Continuous <Continuous>  dextrose 50% Injectable 12.5 Gram(s) IV Push once  dextrose 50% Injectable 25 Gram(s) IV Push once  dextrose 50% Injectable 25 Gram(s) IV Push once  furosemide   Injectable 40 milliGRAM(s) IV Push daily  heparin  Infusion. 1000 Unit(s)/Hr (10 mL/Hr) IV Continuous <Continuous>  hydrALAZINE 50 milliGRAM(s) Oral three times a day  insulin glargine Injectable (LANTUS) 32 Unit(s) SubCutaneous at bedtime  insulin lispro (HumaLOG) corrective regimen sliding scale   SubCutaneous three times a day before meals  insulin lispro (HumaLOG) corrective regimen sliding scale   SubCutaneous at bedtime  insulin lispro Injectable (HumaLOG) 6 Unit(s) SubCutaneous three times a day before meals  risperiDONE   Tablet 1 milliGRAM(s) Oral daily  sodium chloride 0.9%. 500 milliLiter(s) (75 mL/Hr) IV Continuous <Continuous>  sterile water 1000 milliLiter(s) (75 mL/Hr) IV Continuous <Continuous>      LABS:  07-02    132<L>  |  95<L>  |  44<H>  ----------------------------<  174<H>  3.8   |  23  |  1.48<H>    Ca    9.4      02 Jul 2020 05:48  Mg     2.2     07-02      Creatinine Trend: 1.48 <--, 1.43 <--, 1.56 <--, 1.78 <--, 1.88 <--, 1.87 <--, 2.25 <--                                9.8    7.32  )-----------( 367      ( 02 Jul 2020 05:48 )             29.8     Urine Studies:  Urinalysis - [06-22-20 @ 17:00]      Color COLORLESS / Appearance CLEAR / SG 1.009 / pH 8.0      Gluc NEGATIVE / Ketone NEGATIVE  / Bili NEGATIVE / Urobili NORMAL       Blood NEGATIVE / Protein 100 / Leuk Est NEGATIVE / Nitrite NEGATIVE      RBC 0-2 / WBC 0-2 / Hyaline NEGATIVE / Gran  / Sq Epi OCC / Non Sq Epi  / Bacteria FEW    Urine Sodium 95      [07-01-20 @ 12:15]  Urine Osmolality 336      [07-01-20 @ 12:15]    Iron 58, TIBC 282, %sat --      [06-24-20 @ 05:43]  Ferritin 72.33      [06-24-20 @ 05:43]  HbA1c 8.8      [12-13-19 @ 06:00]  TSH 7.68      [06-21-20 @ 07:11]  Lipid: chol 197, , HDL 41,       [12-14-19 @ 03:50]    HBsAg NEGATIVE      [06-25-20 @ 05:50]  HCV 0.77, Nonreactive Hepatitis C AB  S/CO Ratio                        Interpretation  < 1.00                                   Non-Reactive  1.00 - 4.99                         Weakly-Reactive  >= 5.00                                Reactive  Non-Reactive: Aperson with a non-reactive HCV antibody  result is considered uninfected.  No further action is  needed unless recent infection is suspected.  In these  cases, consider repeat testing later to detect  seroconversion..  Weakly-Reactive: HCV antibody test is abnormal, HCV RNA  Qualitative test will follow.  Reactive: HCV antibody test is abnormal, HCV RNA  Qualitative test will follow.  Note: HCV antibody testing is performed on the [employer]   system.      [06-25-20 @ 05:50]    CHRYSTAL: titer 1:80, pattern Speckled      [06-25-20 @ 05:50]  C3 Complement 162.8      [06-25-20 @ 05:50]  C4 Complement 40.2      [06-25-20 @ 05:50]  ANCA: cANCA Negative, pANCA Negative, atypical ANCA --      [06-25-20 @ 05:50]  Syphilis Screen (Treponema Pallidum Ab) Negative      [06-25-20 @ 05:50]  Free Light Chains: kappa 7.52, lambda 4.11, ratio = 1.83 H      [06-25 @ 05:50]    RADIOLOGY & ADDITIONAL STUDIES:

## 2020-07-02 NOTE — PROGRESS NOTE ADULT - ASSESSMENT
74 year old female with PMH of HTN, HLD, DM, recent COVID infection in April 2020 who presented with LE swelling for weeks, found to have HF exacerbation and treated with diuretics; patient was found to have Wenchebach on telemetry and had 23 beats of NSVT, as well as elevated cardiac enzymes, warranting transfer to CCU for continued monitoring and ischemic workup for likely NSTEMI.    SUNIL on CKD III  Baseline Scr 1 with GFR 45 12/2019   scr on admission 1.13, renal function worsen and peaked 2.28  SUNIL possible sec to JUNIOR (CTA 6/20) and/or ATN   renal function stable today. patient advise to follow up with dr. ramos in 1-2 weeks  consider transition to po lasix   s/p PCI 6/29 and stage PCI 6/30  renal function improving  monitor bmp    Anemia  acceptable  Monitor at present     NSTEMI  s/p cath  monitor urine output, weight and bmp  f/u cardio    HTN  controlled  monitor    Proteinuria  UA with 100 protein  urine p/c ratio 2.5g/day  likely sec to DM/HTN  vasculitis work up in progress   hep b neg, c3 c4 wnl, hep c neg, rpr neg, K/L ratio ok, SPeP neg, Anca neg  shonda 1:80  pending hiv, serum immunofixation    Hyponatremia  ? etiology  CHF on lasix, received NS post cath6/30  urine work up suggested siadh  free water restriction <1L/day  monitor 74 year old female with PMH of HTN, HLD, DM, recent COVID infection in April 2020 who presented with LE swelling for weeks, found to have HF exacerbation and treated with diuretics; patient was found to have Wenchebach on telemetry and had 23 beats of NSVT, as well as elevated cardiac enzymes, warranting transfer to CCU for continued monitoring and ischemic workup for likely NSTEMI.    SUNIL on CKD III  Baseline Scr 1 with GFR 45 12/2019   scr on admission 1.13, renal function worsen and peaked 2.28  SUNIL possible sec to JUNIOR (CTA 6/20) and/or ATN   renal function stable today. patient advise to follow up with Dr. Schulz in 1-2 weeks  consider transition to po lasix   s/p PCI 6/29 and stage PCI 6/30  renal function improving  monitor bmp    Anemia  acceptable  Monitor at present     NSTEMI  s/p cath  monitor urine output, weight and bmp  f/u cardio    HTN  controlled  monitor    Proteinuria  UA with 100 protein  urine p/c ratio 2.5g/day  likely sec to DM/HTN  vasculitis work up in progress   hep b neg, c3 c4 wnl, hep c neg, rpr neg, K/L ratio ok, SPeP neg, Anca neg  shonda 1:80  pending hiv, serum immunofixation    Hyponatremia  ? etiology  CHF on lasix, received NS post cath6/30  urine work up suggested siadh  free water restriction <1L/day  monitor

## 2020-07-02 NOTE — PROGRESS NOTE ADULT - SUBJECTIVE AND OBJECTIVE BOX
S: no CP, SOB; Review of systems otherwise (-)      acetaminophen   Tablet .. 650 milliGRAM(s) Oral every 6 hours PRN  apixaban 5 milliGRAM(s) Oral every 12 hours  aspirin enteric coated 81 milliGRAM(s) Oral daily  atorvastatin 40 milliGRAM(s) Oral at bedtime  carvedilol 3.125 milliGRAM(s) Oral every 12 hours  chlorhexidine 4% Liquid 1 Application(s) Topical daily  clopidogrel Tablet 75 milliGRAM(s) Oral daily  dextrose 40% Gel 15 Gram(s) Oral once PRN  dextrose 5%. 1000 milliLiter(s) IV Continuous <Continuous>  dextrose 50% Injectable 12.5 Gram(s) IV Push once  dextrose 50% Injectable 25 Gram(s) IV Push once  dextrose 50% Injectable 25 Gram(s) IV Push once  furosemide   Injectable 40 milliGRAM(s) IV Push daily  glucagon  Injectable 1 milliGRAM(s) IntraMuscular once PRN  hydrALAZINE 50 milliGRAM(s) Oral three times a day  insulin glargine Injectable (LANTUS) 32 Unit(s) SubCutaneous at bedtime  insulin lispro (HumaLOG) corrective regimen sliding scale   SubCutaneous three times a day before meals  insulin lispro (HumaLOG) corrective regimen sliding scale   SubCutaneous at bedtime  insulin lispro Injectable (HumaLOG) 6 Unit(s) SubCutaneous three times a day before meals  risperiDONE   Tablet 1 milliGRAM(s) Oral daily  sodium chloride 0.9%. 500 milliLiter(s) IV Continuous <Continuous>  sterile water 1000 milliLiter(s) IV Continuous <Continuous>                            9.8    7.32  )-----------( 367      ( 02 Jul 2020 05:48 )             29.8       07-02    132<L>  |  95<L>  |  44<H>  ----------------------------<  174<H>  3.8   |  23  |  1.48<H>    Ca    9.4      02 Jul 2020 05:48  Mg     2.2     07-02              T(C): 36.9 (07-02-20 @ 13:00), Max: 37.2 (07-01-20 @ 21:24)  HR: 79 (07-02-20 @ 13:00) (79 - 93)  BP: 112/75 (07-02-20 @ 13:00) (112/75 - 153/77)  RR: 18 (07-02-20 @ 13:00) (18 - 19)  SpO2: 100% (07-02-20 @ 13:00) (98% - 100%)  Wt(kg): --    I&O's Summary    01 Jul 2020 07:01  -  02 Jul 2020 07:00  --------------------------------------------------------  IN: 520 mL / OUT: 1800 mL / NET: -1280 mL          Gen: Appears well in NAD  HEENT:  (-)icterus (-)pallor  CV: N S1 S2 RRR (+)2 Pulses B/l  Resp:  Clear to auscultation  B/L, normal effort  GI: (+) BS Soft, NT, ND  Lymph:  (-)Edema, (-)obvious lymphadenopathy  Skin: Warm to touch, Normal turgor  Psych: Appropriate mood and affect; AO X 3  Radial artery site: + ecchymoses;  swelling improved; 2+ pulses,     TELEMETRY: SR    < from: Transthoracic Echocardiogram (06.21.20 @ 12:47) >  CONCLUSIONS:  1. Mitral annular calcification, otherwise normal mitral  valve. Moderate mitral regurgitation.  2. Normal left ventricular internal dimensions and wall  thicknesses.  3. Hyperdynamic left ventricle.  4. The right ventricle is not well visualized; grossly  normal right ventricular systolic function.  5. Moderate pericardial effusion (measures about 1.3 cm  lateral to RA on apical four-chamber views).  6. Left pleural effusion.  ------------------------------------  < end of copied text >    < from: CT Angio Chest w/ IV Cont (06.20.20 @ 22:37) >    IMPRESSION:   No pulmonary embolism.  No gross CT evidence of pneumonia.  Mild left heart enlargement.  Mild interlobular septal thickening in both lungs may reflect interstitial pulmonary edema.  Small to moderate pleural effusions bilaterally.  Small pericardial effusion.    < end of copied text >      ASSESSMENT/PLAN: 	    74 year old female with history of HTN, HLD, DM, recent COVID infection in April of 2020 who presented to the ED with LE swelling for 1-2 weeks.    -pt. found to have elevated troponin with no chest pain or anginal symptoms  -neuro follow up appreciated   -ac for DVT per heme on heparin gtt - H/H around baseline - pt. with no signs of bleeding - monitor cbc   -continue with beta blockers for NSVT seen on tele  -cath performed showing severe stenoses in the RCA and LCx - pt. now s/p KIERSTEN to RCA and Lcx  -continue with dapt  -radial site swelling improved; radial pulses 2+, hand warm, no significant tenderness  -will change ac to eliquis 5mg PO bid as per heme recommendations  -Cr improved  -no further cardiac workup needed at this time  -dc home with outpatient follow up with Dr. Santana on 07/06/2020 at 10:15 am at [id number], [place], [place]  -discussed with daughter Katie (HCP) at patient's request in detail    Donis Jaquez MD

## 2020-07-02 NOTE — PROGRESS NOTE ADULT - SUBJECTIVE AND OBJECTIVE BOX
INTERVAL HPI/OVERNIGHT EVENTS: I feel fine.   Vital Signs Last 24 Hrs  T(C): 37.4 (02 Jul 2020 21:23), Max: 37.4 (02 Jul 2020 21:23)  T(F): 99.4 (02 Jul 2020 21:23), Max: 99.4 (02 Jul 2020 21:23)  HR: 82 (02 Jul 2020 21:23) (79 - 82)  BP: 148/65 (02 Jul 2020 21:23) (112/75 - 148/65)  BP(mean): --  RR: 18 (02 Jul 2020 21:23) (18 - 19)  SpO2: 99% (02 Jul 2020 21:23) (98% - 100%)  I&O's Summary    01 Jul 2020 07:01  -  02 Jul 2020 07:00  --------------------------------------------------------  IN: 520 mL / OUT: 1800 mL / NET: -1280 mL      MEDICATIONS  (STANDING):  apixaban 5 milliGRAM(s) Oral every 12 hours  aspirin enteric coated 81 milliGRAM(s) Oral daily  atorvastatin 40 milliGRAM(s) Oral at bedtime  carvedilol 3.125 milliGRAM(s) Oral every 12 hours  chlorhexidine 4% Liquid 1 Application(s) Topical daily  clopidogrel Tablet 75 milliGRAM(s) Oral daily  dextrose 5%. 1000 milliLiter(s) (50 mL/Hr) IV Continuous <Continuous>  dextrose 50% Injectable 12.5 Gram(s) IV Push once  dextrose 50% Injectable 25 Gram(s) IV Push once  dextrose 50% Injectable 25 Gram(s) IV Push once  hydrALAZINE 50 milliGRAM(s) Oral three times a day  insulin glargine Injectable (LANTUS) 32 Unit(s) SubCutaneous at bedtime  insulin lispro (HumaLOG) corrective regimen sliding scale   SubCutaneous three times a day before meals  insulin lispro (HumaLOG) corrective regimen sliding scale   SubCutaneous at bedtime  insulin lispro Injectable (HumaLOG) 6 Unit(s) SubCutaneous three times a day before meals  risperiDONE   Tablet 1 milliGRAM(s) Oral daily  sodium chloride 0.9%. 500 milliLiter(s) (75 mL/Hr) IV Continuous <Continuous>  sterile water 1000 milliLiter(s) (75 mL/Hr) IV Continuous <Continuous>    MEDICATIONS  (PRN):  acetaminophen   Tablet .. 650 milliGRAM(s) Oral every 6 hours PRN Mild Pain (1 - 3), Moderate Pain (4 - 6)  dextrose 40% Gel 15 Gram(s) Oral once PRN Blood Glucose LESS THAN 70 milliGRAM(s)/deciLiter  glucagon  Injectable 1 milliGRAM(s) IntraMuscular once PRN Glucose <70 milliGRAM(s)/deciLiter    LABS:                        9.8    7.32  )-----------( 367      ( 02 Jul 2020 05:48 )             29.8     07-02    132<L>  |  95<L>  |  44<H>  ----------------------------<  174<H>  3.8   |  23  |  1.48<H>    Ca    9.4      02 Jul 2020 05:48  Mg     2.2     07-02      PTT - ( 02 Jul 2020 05:34 )  PTT:143.8 SEC    CAPILLARY BLOOD GLUCOSE      POCT Blood Glucose.: 163 mg/dL (02 Jul 2020 21:38)  POCT Blood Glucose.: 98 mg/dL (02 Jul 2020 17:24)  POCT Blood Glucose.: 195 mg/dL (02 Jul 2020 12:15)  POCT Blood Glucose.: 164 mg/dL (02 Jul 2020 08:42)          REVIEW OF SYSTEMS:  CONSTITUTIONAL: No fever, weight loss, or fatigue  EYES: No eye pain, visual disturbances, or discharge  ENMT:  No difficulty hearing, tinnitus, vertigo; No sinus or throat pain  NECK: No pain or stiffness  RESPIRATORY: No cough, wheezing, chills or hemoptysis; No shortness of breath  CARDIOVASCULAR: No chest pain, palpitations, dizziness, or leg swelling  GASTROINTESTINAL: No abdominal or epigastric pain. No nausea, vomiting, or hematemesis; No diarrhea or constipation. No melena or hematochezia.  GENITOURINARY: No dysuria, frequency, hematuria, or incontinence  NEUROLOGICAL: No headaches, memory loss, loss of strength, numbness, or tremors    RADIOLOGY & ADDITIONAL TESTS:    Consultant(s) Notes Reviewed:  [x ] YES  [ ] NO    PHYSICAL EXAM:  GENERAL: NAD, well-groomed, well-developed, not in any distress ,  HEAD:  Atraumatic, Normocephalic  EYES: EOMI, PERRLA, conjunctiva and sclera clear  ENMT: No tonsillar erythema, exudates, or enlargement; Moist mucous membranes, Good dentition, No lesions  NECK: Supple, No JVD, Normal thyroid  NERVOUS SYSTEM:  Alert & Oriented X3, No focal deficit   CHEST/LUNG: Good air entry bilateral with no  rales, rhonchi, wheezing, or rubs  HEART: Regular rate and rhythm; No murmurs, rubs, or gallops  ABDOMEN: Soft, Nontender, Nondistended; Bowel sounds present  EXTREMITIES:  2+ Peripheral Pulses, No clubbing, cyanosis, or edema    Care Discussed with Consultants/Other Providers [ x] YES  [ ] NO

## 2020-07-02 NOTE — PROGRESS NOTE ADULT - SUBJECTIVE AND OBJECTIVE BOX
[facility]      Seen earlier today, and examined.  - Today, patient is without complaints.           *****MEDICATIONS: Current medication reviewed and documented.    MEDICATIONS  (STANDING):  aspirin enteric coated 81 milliGRAM(s) Oral daily  atorvastatin 40 milliGRAM(s) Oral at bedtime  carvedilol 3.125 milliGRAM(s) Oral every 12 hours  chlorhexidine 4% Liquid 1 Application(s) Topical daily  clopidogrel Tablet 75 milliGRAM(s) Oral daily  dextrose 5%. 1000 milliLiter(s) (50 mL/Hr) IV Continuous <Continuous>  dextrose 50% Injectable 12.5 Gram(s) IV Push once  dextrose 50% Injectable 25 Gram(s) IV Push once  dextrose 50% Injectable 25 Gram(s) IV Push once  furosemide   Injectable 40 milliGRAM(s) IV Push daily  heparin  Infusion. 1000 Unit(s)/Hr (10 mL/Hr) IV Continuous <Continuous>  hydrALAZINE 50 milliGRAM(s) Oral three times a day  insulin glargine Injectable (LANTUS) 32 Unit(s) SubCutaneous at bedtime  insulin lispro (HumaLOG) corrective regimen sliding scale   SubCutaneous three times a day before meals  insulin lispro (HumaLOG) corrective regimen sliding scale   SubCutaneous at bedtime  insulin lispro Injectable (HumaLOG) 6 Unit(s) SubCutaneous three times a day before meals  risperiDONE   Tablet 1 milliGRAM(s) Oral daily  sodium chloride 0.9%. 500 milliLiter(s) (75 mL/Hr) IV Continuous <Continuous>  sterile water 1000 milliLiter(s) (75 mL/Hr) IV Continuous <Continuous>    MEDICATIONS  (PRN):  acetaminophen   Tablet .. 650 milliGRAM(s) Oral every 6 hours PRN Mild Pain (1 - 3), Moderate Pain (4 - 6)  dextrose 40% Gel 15 Gram(s) Oral once PRN Blood Glucose LESS THAN 70 milliGRAM(s)/deciLiter  glucagon  Injectable 1 milliGRAM(s) IntraMuscular once PRN Glucose <70 milliGRAM(s)/deciLiter  heparin   Injectable 5500 Unit(s) IV Push every 6 hours PRN For aPTT less than 40  heparin   Injectable 2500 Unit(s) IV Push every 6 hours PRN For aPTT between 40 - 57          ***** VITAL SIGNS:  T(F): 98.5 (20 @ 13:00), Max: 99 (20 @ 21:24)  HR: 79 (20 @ 13:00) (79 - 93)  BP: 112/75 (20 @ 13:00) (112/75 - 178/72)  RR: 18 (20 @ 13:00) (18 - 19)  SpO2: 100% (20 @ 13:00) (98% - 100%)  Wt(kg): --  ,   I&O's Summary    2020 07:01  -  2020 07:00  --------------------------------------------------------  IN: 520 mL / OUT: 1800 mL / NET: -1280 mL             *****PHYSICAL EXAM:   alert oriented x 3 attention comprehension are fair.  Able to name, repeat.   EOmi fundi not visualized   no nystagmus VFF to confrontation  Tongue is midline  Palate elevates symmetrically   Moving all 4 ext spontaneously no drift appreciated    Gait not assessed.            *****LAB AND IMAGIN.8    7.32  )-----------( 367      ( 2020 05:48 )             29.8               07-02    132<L>  |  95<L>  |  44<H>  ----------------------------<  174<H>  3.8   |  23  |  1.48<H>    Ca    9.4      2020 05:48  Mg     2.2     07-02      PTT - ( 2020 05:34 )  PTT:143.8 SEC                     [All pertinent recent Imaging/Reports reviewed]           *****A S S E S S M E N T   A N D   P L A N :      Patient is a 73 y/o F PMH HTN, HLD, DM2, Depression, COVID-19 () p/w B/L LE swelling X1-2 weeks. Reports no other symptoms, no change in functional status, no change in diet, compliant with all her medications, follows up regularly with her PMD. Reports no changes in meds since admission in 2020 for COVID-19. (2020 23:05)      Problem/Recommendations 1: ams resolved.   likely underlying dementia related sundowning   can reorient often   avoid day time somnolence   depakote 125 if needed.     ct no acute path   will continue to monitor 	      right hand pain, good radial pulses    swelling improved.   tenderness to palpation     hyponatremia trend     Thank you for allowing me to participate in the care of this patient. Please do not hesitate to call me if you have any  questions.        ________________  Alice Kerr MD  [facility] ([facility]  [phone]      33 minutes spent on total encounter; more than 50 % of the visit was  spent counseling about plan of care, compliance to diet/exercise and medication regimen and or  coordinating care by the attending physician.      It is advised that stroke patients follow up with SATHISH Silveira @ [phone] in 1- 2 weeks.   Others please follow up with Dr. Michael Nissenbaum [phone]

## 2020-07-02 NOTE — PROGRESS NOTE ADULT - ASSESSMENT
Assessment  DMT2: 74y Female with DM T2 with hyperglycemia, on basal bolus insulin, she ia awake and alert, no hypoglycemic episode, eating meals, compliant with low carb diet.  CHF: on medications, no chest pain, stable, monitored.  Hypothyroidism/subclinical: Apparently no history of thyroid disease, asymptomatic, repeat TFTs euthyroid..  HTN:  Controlled,  on antihypertensive medications.    Eddie Smith MD  Cell: [phone]  Office: [phone]

## 2020-07-02 NOTE — PROGRESS NOTE ADULT - ASSESSMENT
73 y/o F PMH HTN, HLD, DM2, Depression, COVID-19 (4/20) p/w B/L LE swelling X1-2 weeks. Reports no other symptoms, no change in functional status, no change in diet, compliant with all her medications, follows up regularly with her PMD. Reports no changes in meds since admission in April 2020 for COVID-19.     Problem/Recommendation - 1:  Problem: NSTEMI (non-ST elevated myocardial infarction). Recommendation: .ASA, Plavix , BB ,Statin and Heparin.   Cardiology following . Cardiac cath with KIERSTEN Stent RCA  and   Prox cirx .       Problem/Recommendation - 2:  ·  Problem: Ventricular tachycardia, non-sustained.  Recommendation: EP help appreciated.      Problem/Recommendation - 3:  ·  Problem:  Acute on chronic congestive heart failure, unspecified heart failure type.  Recommendation: IV Lasix.      Problem/Recommendation - 4:  ·  Problem: Diabetes mellitus type 2, noninsulin dependent.  Recommendation: Lantus and SSI for now. Endo helping.      Problem/Recommendation - 5:  ·  Problem: Hypertension.  Recommendation: BP meds with hold parameters .      Problem/Recommendation - 6:  Problem: Hypercholesterolemia. Recommendation: Statin.     Problem/Recommendation - 7:  Problem: SUNIL . Recommendation: Creatinine better .  Renal helping.      Problem/Recommendation - 8:  Problem: Acute DVT . Recommendation: On AC and changed to Eliquis  . Hematology helping.      Problem/Recommendation - 9:  Problem: Right side Headache . Recommendation: CT head ordered.

## 2020-07-02 NOTE — DISCHARGE NOTE NURSING/CASE MANAGEMENT/SOCIAL WORK - PATIENT PORTAL LINK FT
You can access the FollowMyHealth Patient Portal offered by [facility] by registering at the following website: [email or web address]. By joining [employer]’s FollowMyHealth portal, you will also be able to view your health information using other applications (apps) compatible with our system.

## 2020-07-02 NOTE — PROGRESS NOTE ADULT - SUBJECTIVE AND OBJECTIVE BOX
Chief complaint  Patient is a 74y old  Female who presents with a chief complaint of LE swelling (01 Jul 2020 16:06)   Review of systems  Patient in bed, looks comfortable, no fever, no hypoglycemia.    Labs and Fingersticks  CAPILLARY BLOOD GLUCOSE      POCT Blood Glucose.: 164 mg/dL (02 Jul 2020 08:42)  POCT Blood Glucose.: 240 mg/dL (01 Jul 2020 22:14)  POCT Blood Glucose.: 139 mg/dL (01 Jul 2020 17:27)  POCT Blood Glucose.: 105 mg/dL (01 Jul 2020 12:11)      Anion Gap, Serum: 14 (07-02 @ 05:48)  Anion Gap, Serum: 18 <H> (07-01 @ 05:05)  Anion Gap, Serum: 17 <H> (06-30 @ 10:37)      Calcium, Total Serum: 9.4 (07-02 @ 05:48)  Calcium, Total Serum: 9.1 (07-01 @ 05:05)  Calcium, Total Serum: 9.1 (06-30 @ 10:37)          07-02    132<L>  |  95<L>  |  44<H>  ----------------------------<  174<H>  3.8   |  23  |  1.48<H>    Ca    9.4      02 Jul 2020 05:48  Mg     2.2     07-02                          9.8    7.32  )-----------( 367      ( 02 Jul 2020 05:48 )             29.8     Medications  MEDICATIONS  (STANDING):  aspirin enteric coated 81 milliGRAM(s) Oral daily  atorvastatin 40 milliGRAM(s) Oral at bedtime  carvedilol 3.125 milliGRAM(s) Oral every 12 hours  chlorhexidine 4% Liquid 1 Application(s) Topical daily  clopidogrel Tablet 75 milliGRAM(s) Oral daily  dextrose 5%. 1000 milliLiter(s) (50 mL/Hr) IV Continuous <Continuous>  dextrose 50% Injectable 12.5 Gram(s) IV Push once  dextrose 50% Injectable 25 Gram(s) IV Push once  dextrose 50% Injectable 25 Gram(s) IV Push once  furosemide   Injectable 40 milliGRAM(s) IV Push daily  heparin  Infusion. 1000 Unit(s)/Hr (10 mL/Hr) IV Continuous <Continuous>  hydrALAZINE 50 milliGRAM(s) Oral three times a day  insulin glargine Injectable (LANTUS) 32 Unit(s) SubCutaneous at bedtime  insulin lispro (HumaLOG) corrective regimen sliding scale   SubCutaneous three times a day before meals  insulin lispro (HumaLOG) corrective regimen sliding scale   SubCutaneous at bedtime  insulin lispro Injectable (HumaLOG) 6 Unit(s) SubCutaneous three times a day before meals  risperiDONE   Tablet 1 milliGRAM(s) Oral daily  sodium chloride 0.9%. 500 milliLiter(s) (75 mL/Hr) IV Continuous <Continuous>  sterile water 1000 milliLiter(s) (75 mL/Hr) IV Continuous <Continuous>      Physical Exam  General: Patient comfortable in bed  Vital Signs Last 12 Hrs  T(F): 98.9 (07-02-20 @ 06:31), Max: 99 (07-01-20 @ 21:24)  HR: 79 (07-02-20 @ 06:31) (79 - 91)  BP: 139/65 (07-02-20 @ 06:31) (139/65 - 153/77)  BP(mean): --  RR: 19 (07-02-20 @ 06:31) (19 - 19)  SpO2: 98% (07-02-20 @ 06:31) (98% - 98%)  Neck: No palpable thyroid nodules.  CVS: S1S2, No murmurs  Respiratory: No wheezing, no crepitations  GI: Abdomen soft, bowel sounds positive  Musculoskeletal:  edema lower extremities.   Skin: No skin rashes, no ecchymosis    Diagnostics

## 2020-07-02 NOTE — PROGRESS NOTE ADULT - SUBJECTIVE AND OBJECTIVE BOX
EP     tele: NSR, no further NSVT    she denies palpitations, syncope, nor angina, ROS otherwise -      MEDICATIONS  (STANDING):  aspirin enteric coated 81 milliGRAM(s) Oral daily  atorvastatin 40 milliGRAM(s) Oral at bedtime  carvedilol 3.125 milliGRAM(s) Oral every 12 hours  chlorhexidine 4% Liquid 1 Application(s) Topical daily  clopidogrel Tablet 75 milliGRAM(s) Oral daily  dextrose 5%. 1000 milliLiter(s) (50 mL/Hr) IV Continuous <Continuous>  dextrose 50% Injectable 12.5 Gram(s) IV Push once  dextrose 50% Injectable 25 Gram(s) IV Push once  dextrose 50% Injectable 25 Gram(s) IV Push once  furosemide   Injectable 40 milliGRAM(s) IV Push daily  heparin  Infusion. 1000 Unit(s)/Hr (10 mL/Hr) IV Continuous <Continuous>  hydrALAZINE 50 milliGRAM(s) Oral three times a day  insulin glargine Injectable (LANTUS) 32 Unit(s) SubCutaneous at bedtime  insulin lispro (HumaLOG) corrective regimen sliding scale   SubCutaneous three times a day before meals  insulin lispro (HumaLOG) corrective regimen sliding scale   SubCutaneous at bedtime  insulin lispro Injectable (HumaLOG) 6 Unit(s) SubCutaneous three times a day before meals  risperiDONE   Tablet 1 milliGRAM(s) Oral daily  sodium chloride 0.9%. 500 milliLiter(s) (75 mL/Hr) IV Continuous <Continuous>  sterile water 1000 milliLiter(s) (75 mL/Hr) IV Continuous <Continuous>    MEDICATIONS  (PRN):  acetaminophen   Tablet .. 650 milliGRAM(s) Oral every 6 hours PRN Mild Pain (1 - 3), Moderate Pain (4 - 6)  dextrose 40% Gel 15 Gram(s) Oral once PRN Blood Glucose LESS THAN 70 milliGRAM(s)/deciLiter  glucagon  Injectable 1 milliGRAM(s) IntraMuscular once PRN Glucose <70 milliGRAM(s)/deciLiter  heparin   Injectable 5500 Unit(s) IV Push every 6 hours PRN For aPTT less than 40  heparin   Injectable 2500 Unit(s) IV Push every 6 hours PRN For aPTT between 40 - 57      LABS:                            9.8    7.32  )-----------( 367      ( 02 Jul 2020 05:48 )             29.8     132<L>  |  95<L>  |  44<H>  ----------------------------<  174<H>  3.8   |  23  |  1.48<H>    Ca    9.4      02 Jul 2020 05:48  Mg     2.2     07-02      Creatinine Trend: 1.48<--, 1.43<--, 1.56<--, 1.78<--, 1.88<--, 1.87<--   PTT - ( 02 Jul 2020 05:34 )  PTT:143.8 SEC    PHYSICAL EXAM  Vital Signs Last 24 Hrs  T(C): 37.2 (02 Jul 2020 06:31), Max: 37.2 (01 Jul 2020 21:24)  T(F): 98.9 (02 Jul 2020 06:31), Max: 99 (01 Jul 2020 21:24)  HR: 79 (02 Jul 2020 06:31) (79 - 93)  BP: 139/65 (02 Jul 2020 06:31) (139/65 - 178/72)  RR: 19 (02 Jul 2020 06:31) (18 - 19)  SpO2: 98% (02 Jul 2020 06:31) (98% - 100%)      A&O x 3  neurologically intact  no JVD  RRR, no murmurs  CTAB  soft nt/nd  no c/c/e    echo: moderate MR, normal LVEF    A/P) 74 year old female PMH HTN, hyperlipidemia, DM admitted with LE edema. EP called for NSVT in setting of normal LVEF.    -continue beta blockers for NSVT  -s/p LHC and PCI to RCA and LCx  -no further inpatient EP workup expected  -given normal LVEF there is no indication for an ICD nor AAD  -She was found with acute LLE non occlusive PT DVT this admit, per d/w Heme onc Dr Delgado, treat with Eliquis 5 BID given advanced age and need for DAPT for cardiac stent   -R wrist hematoma improved  -DC planning today  -OP Cardio f/u next week

## 2020-07-03 VITALS
SYSTOLIC BLOOD PRESSURE: 132 MMHG | HEART RATE: 94 BPM | OXYGEN SATURATION: 98 % | TEMPERATURE: 99 F | RESPIRATION RATE: 18 BRPM | DIASTOLIC BLOOD PRESSURE: 64 MMHG

## 2020-07-03 LAB
ANION GAP SERPL CALC-SCNC: 15 MMO/L — HIGH (ref 7–14)
BUN SERPL-MCNC: 42 MG/DL — HIGH (ref 7–23)
CALCIUM SERPL-MCNC: 9.8 MG/DL — SIGNIFICANT CHANGE UP (ref 8.4–10.5)
CHLORIDE SERPL-SCNC: 96 MMOL/L — LOW (ref 98–107)
CO2 SERPL-SCNC: 22 MMOL/L — SIGNIFICANT CHANGE UP (ref 22–31)
CREAT SERPL-MCNC: 1.54 MG/DL — HIGH (ref 0.5–1.3)
GLUCOSE BLDC GLUCOMTR-MCNC: 129 MG/DL — HIGH (ref 70–99)
GLUCOSE BLDC GLUCOMTR-MCNC: 153 MG/DL — HIGH (ref 70–99)
GLUCOSE SERPL-MCNC: 132 MG/DL — HIGH (ref 70–99)
HCT VFR BLD CALC: 28.4 % — LOW (ref 34.5–45)
HGB BLD-MCNC: 9.2 G/DL — LOW (ref 11.5–15.5)
MAGNESIUM SERPL-MCNC: 2.3 MG/DL — SIGNIFICANT CHANGE UP (ref 1.6–2.6)
MCHC RBC-ENTMCNC: 25.7 PG — LOW (ref 27–34)
MCHC RBC-ENTMCNC: 32.4 % — SIGNIFICANT CHANGE UP (ref 32–36)
MCV RBC AUTO: 79.3 FL — LOW (ref 80–100)
NRBC # FLD: 0 K/UL — SIGNIFICANT CHANGE UP (ref 0–0)
PLATELET # BLD AUTO: 352 K/UL — SIGNIFICANT CHANGE UP (ref 150–400)
PMV BLD: 10.5 FL — SIGNIFICANT CHANGE UP (ref 7–13)
POTASSIUM SERPL-MCNC: 4 MMOL/L — SIGNIFICANT CHANGE UP (ref 3.5–5.3)
POTASSIUM SERPL-SCNC: 4 MMOL/L — SIGNIFICANT CHANGE UP (ref 3.5–5.3)
RBC # BLD: 3.58 M/UL — LOW (ref 3.8–5.2)
RBC # FLD: 15.3 % — HIGH (ref 10.3–14.5)
SODIUM SERPL-SCNC: 133 MMOL/L — LOW (ref 135–145)
WBC # BLD: 6.49 K/UL — SIGNIFICANT CHANGE UP (ref 3.8–10.5)
WBC # FLD AUTO: 6.49 K/UL — SIGNIFICANT CHANGE UP (ref 3.8–10.5)

## 2020-07-03 RX ORDER — REPAGLINIDE 1 MG/1
1 TABLET ORAL
Qty: 90 | Refills: 0
Start: 2020-07-03 | End: 2020-08-01

## 2020-07-03 RX ADMIN — Medication 81 MILLIGRAM(S): at 12:53

## 2020-07-03 RX ADMIN — APIXABAN 5 MILLIGRAM(S): 2.5 TABLET, FILM COATED ORAL at 05:39

## 2020-07-03 RX ADMIN — CARVEDILOL PHOSPHATE 3.12 MILLIGRAM(S): 80 CAPSULE, EXTENDED RELEASE ORAL at 05:40

## 2020-07-03 RX ADMIN — Medication 2: at 12:43

## 2020-07-03 RX ADMIN — CLOPIDOGREL BISULFATE 75 MILLIGRAM(S): 75 TABLET, FILM COATED ORAL at 12:53

## 2020-07-03 RX ADMIN — Medication 50 MILLIGRAM(S): at 13:54

## 2020-07-03 RX ADMIN — Medication 6 UNIT(S): at 12:44

## 2020-07-03 RX ADMIN — RISPERIDONE 1 MILLIGRAM(S): 4 TABLET ORAL at 12:53

## 2020-07-03 RX ADMIN — Medication 40 MILLIGRAM(S): at 05:42

## 2020-07-03 RX ADMIN — Medication 50 MILLIGRAM(S): at 05:40

## 2020-07-03 NOTE — PROGRESS NOTE ADULT - PROBLEM SELECTOR PROBLEM 1
Diabetes mellitus type 2, noninsulin dependent

## 2020-07-03 NOTE — PROGRESS NOTE ADULT - ASSESSMENT
Assessment  DMT2: 74y Female with DM T2 with hyperglycemia, on basal bolus insulin, no hypoglycemic episode, eating meals, compliant with low carb diet.  CHF: on medications, no chest pain, stable, monitored.  Hypothyroidism/subclinical: Apparently no history of thyroid disease, asymptomatic, repeat TFTs euthyroid..  HTN:  Controlled,  on antihypertensive medications.    Eddie Smith MD  Cell: [phone]  Office: [phone]

## 2020-07-03 NOTE — PROGRESS NOTE ADULT - SUBJECTIVE AND OBJECTIVE BOX
INTERVAL HPI/OVERNIGHT EVENTS: No new concerns. I am going home today .   Vital Signs Last 24 Hrs  T(C): 37.2 (03 Jul 2020 05:38), Max: 37.4 (02 Jul 2020 21:23)  T(F): 98.9 (03 Jul 2020 05:38), Max: 99.4 (02 Jul 2020 21:23)  HR: 78 (03 Jul 2020 05:38) (78 - 82)  BP: 141/66 (03 Jul 2020 05:38) (141/66 - 148/65)  BP(mean): --  RR: 19 (03 Jul 2020 05:38) (18 - 19)  SpO2: 98% (03 Jul 2020 05:38) (98% - 100%)  I&O's Summary    02 Jul 2020 07:01  -  03 Jul 2020 07:00  --------------------------------------------------------  IN: 0 mL / OUT: 1100 mL / NET: -1100 mL      MEDICATIONS  (STANDING):  apixaban 5 milliGRAM(s) Oral every 12 hours  aspirin enteric coated 81 milliGRAM(s) Oral daily  atorvastatin 40 milliGRAM(s) Oral at bedtime  carvedilol 3.125 milliGRAM(s) Oral every 12 hours  chlorhexidine 4% Liquid 1 Application(s) Topical daily  clopidogrel Tablet 75 milliGRAM(s) Oral daily  dextrose 5%. 1000 milliLiter(s) (50 mL/Hr) IV Continuous <Continuous>  dextrose 50% Injectable 12.5 Gram(s) IV Push once  dextrose 50% Injectable 25 Gram(s) IV Push once  dextrose 50% Injectable 25 Gram(s) IV Push once  furosemide    Tablet 40 milliGRAM(s) Oral daily  hydrALAZINE 50 milliGRAM(s) Oral three times a day  insulin glargine Injectable (LANTUS) 32 Unit(s) SubCutaneous at bedtime  insulin lispro (HumaLOG) corrective regimen sliding scale   SubCutaneous three times a day before meals  insulin lispro (HumaLOG) corrective regimen sliding scale   SubCutaneous at bedtime  insulin lispro Injectable (HumaLOG) 6 Unit(s) SubCutaneous three times a day before meals  risperiDONE   Tablet 1 milliGRAM(s) Oral daily  sodium chloride 0.9%. 500 milliLiter(s) (75 mL/Hr) IV Continuous <Continuous>  sterile water 1000 milliLiter(s) (75 mL/Hr) IV Continuous <Continuous>    MEDICATIONS  (PRN):  acetaminophen   Tablet .. 650 milliGRAM(s) Oral every 6 hours PRN Mild Pain (1 - 3), Moderate Pain (4 - 6)  dextrose 40% Gel 15 Gram(s) Oral once PRN Blood Glucose LESS THAN 70 milliGRAM(s)/deciLiter  glucagon  Injectable 1 milliGRAM(s) IntraMuscular once PRN Glucose <70 milliGRAM(s)/deciLiter    LABS:                        9.2    6.49  )-----------( 352      ( 03 Jul 2020 06:30 )             28.4     07-03    133<L>  |  96<L>  |  42<H>  ----------------------------<  132<H>  4.0   |  22  |  1.54<H>    Ca    9.8      03 Jul 2020 06:30  Mg     2.3     07-03      PTT - ( 02 Jul 2020 05:34 )  PTT:143.8 SEC    CAPILLARY BLOOD GLUCOSE      POCT Blood Glucose.: 153 mg/dL (03 Jul 2020 12:03)  POCT Blood Glucose.: 129 mg/dL (03 Jul 2020 08:55)  POCT Blood Glucose.: 163 mg/dL (02 Jul 2020 21:38)  POCT Blood Glucose.: 98 mg/dL (02 Jul 2020 17:24)          REVIEW OF SYSTEMS:  CONSTITUTIONAL: No fever, weight loss, or fatigue  EYES: No eye pain, visual disturbances, or discharge  ENMT:  No difficulty hearing, tinnitus, vertigo; No sinus or throat pain  NECK: No pain or stiffness  BREASTS: No pain, masses, or nipple discharge  RESPIRATORY: No cough, wheezing, chills or hemoptysis; No shortness of breath  CARDIOVASCULAR: No chest pain, palpitations, dizziness, or leg swelling  GASTROINTESTINAL: No abdominal or epigastric pain. No nausea, vomiting, or hematemesis; No diarrhea or constipation. No melena or hematochezia.  GENITOURINARY: No dysuria, frequency, hematuria, or incontinence  NEUROLOGICAL: No headaches, memory loss, loss of strength, numbness, or tremors  SKIN: No itching, burning, rashes, or lesions   LYMPH NODES: No enlarged glands  ENDOCRINE: No heat or cold intolerance; No hair loss  MUSCULOSKELETAL: No joint pain or swelling; No muscle, back, or extremity pain  PSYCHIATRIC: No depression, anxiety, mood swings, or difficulty sleeping  HEME/LYMPH: No easy bruising, or bleeding gums  ALLERY AND IMMUNOLOGIC: No hives or eczema    RADIOLOGY & ADDITIONAL TESTS:    Consultant(s) Notes Reviewed:  [x ] YES  [ ] NO    PHYSICAL EXAM:  GENERAL: NAD, well-groomed, well-developed,not in any distress ,  HEAD:  Atraumatic, Normocephalic  EYES: EOMI, PERRLA, conjunctiva and sclera clear  ENMT: No tonsillar erythema, exudates, or enlargement; Moist mucous membranes, Good dentition, No lesions  NECK: Supple, No JVD, Normal thyroid  NERVOUS SYSTEM:  Alert & Oriented X3, No focal deficit   CHEST/LUNG: Good air entry bilateral with no  rales, rhonchi, wheezing, or rubs  HEART: Regular rate and rhythm; No murmurs, rubs, or gallops  ABDOMEN: Soft, Nontender, Nondistended; Bowel sounds present  EXTREMITIES:  2+ Peripheral Pulses, No clubbing, cyanosis, or edema  SKIN: No rashes or lesions    Care Discussed with Consultants/Other Providers [ x] YES  [ ] NO

## 2020-07-03 NOTE — PROGRESS NOTE ADULT - SUBJECTIVE AND OBJECTIVE BOX
EP     tele: NSR, blocked APCs, no further NSVT    she denies palpitations, syncope, nor angina, ROS otherwise -      MEDICATIONS  (STANDING):  apixaban 5 milliGRAM(s) Oral every 12 hours  aspirin enteric coated 81 milliGRAM(s) Oral daily  atorvastatin 40 milliGRAM(s) Oral at bedtime  carvedilol 3.125 milliGRAM(s) Oral every 12 hours  chlorhexidine 4% Liquid 1 Application(s) Topical daily  clopidogrel Tablet 75 milliGRAM(s) Oral daily  dextrose 5%. 1000 milliLiter(s) (50 mL/Hr) IV Continuous <Continuous>  dextrose 50% Injectable 12.5 Gram(s) IV Push once  dextrose 50% Injectable 25 Gram(s) IV Push once  dextrose 50% Injectable 25 Gram(s) IV Push once  furosemide    Tablet 40 milliGRAM(s) Oral daily  hydrALAZINE 50 milliGRAM(s) Oral three times a day  insulin glargine Injectable (LANTUS) 32 Unit(s) SubCutaneous at bedtime  insulin lispro (HumaLOG) corrective regimen sliding scale   SubCutaneous three times a day before meals  insulin lispro (HumaLOG) corrective regimen sliding scale   SubCutaneous at bedtime  insulin lispro Injectable (HumaLOG) 6 Unit(s) SubCutaneous three times a day before meals  risperiDONE   Tablet 1 milliGRAM(s) Oral daily  sodium chloride 0.9%. 500 milliLiter(s) (75 mL/Hr) IV Continuous <Continuous>  sterile water 1000 milliLiter(s) (75 mL/Hr) IV Continuous <Continuous>    MEDICATIONS  (PRN):  acetaminophen   Tablet .. 650 milliGRAM(s) Oral every 6 hours PRN Mild Pain (1 - 3), Moderate Pain (4 - 6)  dextrose 40% Gel 15 Gram(s) Oral once PRN Blood Glucose LESS THAN 70 milliGRAM(s)/deciLiter  glucagon  Injectable 1 milliGRAM(s) IntraMuscular once PRN Glucose <70 milliGRAM(s)/deciLiter      LABS:                            9.2    6.49  )-----------( 352      ( 03 Jul 2020 06:30 )             28.4     Hemoglobin: 9.2 g/dL (07-03 @ 06:30)  Hemoglobin: 9.8 g/dL (07-02 @ 05:48)  Hemoglobin: 9.8 g/dL (07-02 @ 05:48)  Hemoglobin: 8.9 g/dL (07-01 @ 22:36)  Hemoglobin: 9.6 g/dL (07-01 @ 05:05)    07-03    133<L>  |  96<L>  |  42<H>  ----------------------------<  132<H>  4.0   |  22  |  1.54<H>    Ca    9.8      03 Jul 2020 06:30  Mg     2.3     07-03      Creatinine Trend: 1.54<--, 1.48<--, 1.43<--, 1.56<--, 1.78<--, 1.88<--             07-02-20 @ 07:01  -  07-03-20 @ 07:00  --------------------------------------------------------  IN: 0 mL / OUT: 1100 mL / NET: -1100 mL        PHYSICAL EXAM  Vital Signs Last 24 Hrs  T(C): 37.2 (03 Jul 2020 05:38), Max: 37.4 (02 Jul 2020 21:23)  T(F): 98.9 (03 Jul 2020 05:38), Max: 99.4 (02 Jul 2020 21:23)  HR: 78 (03 Jul 2020 05:38) (78 - 82)  BP: 141/66 (03 Jul 2020 05:38) (141/66 - 148/65)  BP(mean): --  RR: 19 (03 Jul 2020 05:38) (18 - 19)  SpO2: 98% (03 Jul 2020 05:38) (98% - 100%)      A&O x 3  neurologically intact  no JVD  RRR, no murmurs  CTAB  soft nt/nd  no c/c/e    echo: moderate MR, normal LVEF    A/P) 74 year old female PMH HTN, hyperlipidemia, DM admitted with LE edema. EP called for NSVT in setting of normal LVEF.    -continue beta blockers for NSVT  -s/p LHC and PCI to RCA and LCx  -given normal LVEF there is no indication for an ICD nor AAD  -She was found with acute LLE non occlusive PT DVT this admit, per d/w Heme onc Dr Delgado, treat with Eliquis 5 BID given advanced age and need for DAPT for cardiac stent   -no further inpatient EP workup needed  -DC planning today  -OP Cardio f/u next week    MARY LOU DunhamC  Pager: [phone]

## 2020-07-03 NOTE — PROGRESS NOTE ADULT - PROBLEM SELECTOR PLAN 2
Repeat TFTs in 4 to 6 weeks as out patient. FU

## 2020-07-03 NOTE — PROGRESS NOTE ADULT - SUBJECTIVE AND OBJECTIVE BOX
Chief complaint  Patient is a 74y old  Female who presents with a chief complaint of LE swelling (02 Jul 2020 16:44)   Review of systems  Patient in bed, looks comfortable, no fever, no hypoglycemia.    Labs and Fingersticks  CAPILLARY BLOOD GLUCOSE      POCT Blood Glucose.: 129 mg/dL (03 Jul 2020 08:55)  POCT Blood Glucose.: 163 mg/dL (02 Jul 2020 21:38)  POCT Blood Glucose.: 98 mg/dL (02 Jul 2020 17:24)  POCT Blood Glucose.: 195 mg/dL (02 Jul 2020 12:15)      Anion Gap, Serum: 15 <H> (07-03 @ 06:30)  Anion Gap, Serum: 14 (07-02 @ 05:48)      Calcium, Total Serum: 9.8 (07-03 @ 06:30)  Calcium, Total Serum: 9.4 (07-02 @ 05:48)          07-03    133<L>  |  96<L>  |  42<H>  ----------------------------<  132<H>  4.0   |  22  |  1.54<H>    Ca    9.8      03 Jul 2020 06:30  Mg     2.3     07-03                          9.2    6.49  )-----------( 352      ( 03 Jul 2020 06:30 )             28.4     Medications  MEDICATIONS  (STANDING):  apixaban 5 milliGRAM(s) Oral every 12 hours  aspirin enteric coated 81 milliGRAM(s) Oral daily  atorvastatin 40 milliGRAM(s) Oral at bedtime  carvedilol 3.125 milliGRAM(s) Oral every 12 hours  chlorhexidine 4% Liquid 1 Application(s) Topical daily  clopidogrel Tablet 75 milliGRAM(s) Oral daily  dextrose 5%. 1000 milliLiter(s) (50 mL/Hr) IV Continuous <Continuous>  dextrose 50% Injectable 12.5 Gram(s) IV Push once  dextrose 50% Injectable 25 Gram(s) IV Push once  dextrose 50% Injectable 25 Gram(s) IV Push once  furosemide    Tablet 40 milliGRAM(s) Oral daily  hydrALAZINE 50 milliGRAM(s) Oral three times a day  insulin glargine Injectable (LANTUS) 32 Unit(s) SubCutaneous at bedtime  insulin lispro (HumaLOG) corrective regimen sliding scale   SubCutaneous three times a day before meals  insulin lispro (HumaLOG) corrective regimen sliding scale   SubCutaneous at bedtime  insulin lispro Injectable (HumaLOG) 6 Unit(s) SubCutaneous three times a day before meals  risperiDONE   Tablet 1 milliGRAM(s) Oral daily  sodium chloride 0.9%. 500 milliLiter(s) (75 mL/Hr) IV Continuous <Continuous>  sterile water 1000 milliLiter(s) (75 mL/Hr) IV Continuous <Continuous>      Physical Exam  General: Patient comfortable in bed  Vital Signs Last 12 Hrs  T(F): 98.9 (07-03-20 @ 05:38), Max: 98.9 (07-03-20 @ 05:38)  HR: 78 (07-03-20 @ 05:38) (78 - 78)  BP: 141/66 (07-03-20 @ 05:38) (141/66 - 141/66)  BP(mean): --  RR: 19 (07-03-20 @ 05:38) (19 - 19)  SpO2: 98% (07-03-20 @ 05:38) (98% - 98%)  Neck: No palpable thyroid nodules.  CVS: S1S2, No murmurs  Respiratory: No wheezing, no crepitations  GI: Abdomen soft, bowel sounds positive  Musculoskeletal:  edema lower extremities.   Skin: No skin rashes, no ecchymosis    Diagnostics

## 2020-07-03 NOTE — PROGRESS NOTE ADULT - ASSESSMENT
74 year old female with PMH of HTN, HLD, DM, recent COVID infection in April 2020 who presented with LE swelling for weeks, found to have HF exacerbation and treated with diuretics; patient was found to have Wenchebach on telemetry and had 23 beats of NSVT, as well as elevated cardiac enzymes, warranting transfer to CCU for continued monitoring and ischemic workup for likely NSTEMI.    SUNIL on CKD III  Baseline Scr 1 with GFR 45 12/2019   scr on admission 1.13, renal function worsen and peaked 2.28  SUNIL possible sec to JUNIOR (CTA 6/20) and/or ATN   renal function elevated but stable today. patient cleared for d/c, advise to follow up with Dr. Schulz in 1-2 weeks  Okay to continue lasix 40mg PO daily if needed. Pt advised for follow up   s/p PCI 6/29 and stage PCI 6/30  monitor bmp    Anemia  acceptable  Monitor at present     NSTEMI  s/p cath  monitor urine output, weight and bmp  f/u cardio    HTN  controlled  monitor    Proteinuria  UA with 100 protein  urine p/c ratio 2.5g/day  likely sec to DM/HTN  vasculitis work up in progress   hep b neg, c3 c4 wnl, hep c neg, rpr neg, K/L ratio ok, SPeP neg, Anca neg  shonda 1:80  pending hiv, serum immunofixation    Hyponatremia  ? etiology  CHF on lasix, received NS post cath 6/30  urine work up suggested siadh  free water restriction <1L/day  monitor

## 2020-07-03 NOTE — PROGRESS NOTE ADULT - SUBJECTIVE AND OBJECTIVE BOX
S: no CP, SOB; Review of systems otherwise (-)      acetaminophen   Tablet .. 650 milliGRAM(s) Oral every 6 hours PRN  apixaban 5 milliGRAM(s) Oral every 12 hours  aspirin enteric coated 81 milliGRAM(s) Oral daily  atorvastatin 40 milliGRAM(s) Oral at bedtime  carvedilol 3.125 milliGRAM(s) Oral every 12 hours  chlorhexidine 4% Liquid 1 Application(s) Topical daily  clopidogrel Tablet 75 milliGRAM(s) Oral daily  dextrose 40% Gel 15 Gram(s) Oral once PRN  dextrose 5%. 1000 milliLiter(s) IV Continuous <Continuous>  dextrose 50% Injectable 12.5 Gram(s) IV Push once  dextrose 50% Injectable 25 Gram(s) IV Push once  dextrose 50% Injectable 25 Gram(s) IV Push once  furosemide    Tablet 40 milliGRAM(s) Oral daily  glucagon  Injectable 1 milliGRAM(s) IntraMuscular once PRN  hydrALAZINE 50 milliGRAM(s) Oral three times a day  insulin glargine Injectable (LANTUS) 32 Unit(s) SubCutaneous at bedtime  insulin lispro (HumaLOG) corrective regimen sliding scale   SubCutaneous three times a day before meals  insulin lispro (HumaLOG) corrective regimen sliding scale   SubCutaneous at bedtime  insulin lispro Injectable (HumaLOG) 6 Unit(s) SubCutaneous three times a day before meals  risperiDONE   Tablet 1 milliGRAM(s) Oral daily  sodium chloride 0.9%. 500 milliLiter(s) IV Continuous <Continuous>  sterile water 1000 milliLiter(s) IV Continuous <Continuous>                            9.2    6.49  )-----------( 352      ( 03 Jul 2020 06:30 )             28.4       07-03    133<L>  |  96<L>  |  42<H>  ----------------------------<  132<H>  4.0   |  22  |  1.54<H>    Ca    9.8      03 Jul 2020 06:30  Mg     2.3     07-03              T(C): 37.2 (07-03-20 @ 05:38), Max: 37.4 (07-02-20 @ 21:23)  HR: 78 (07-03-20 @ 05:38) (78 - 82)  BP: 141/66 (07-03-20 @ 05:38) (112/75 - 148/65)  RR: 19 (07-03-20 @ 05:38) (18 - 19)  SpO2: 98% (07-03-20 @ 05:38) (98% - 100%)  Wt(kg): --    I&O's Summary    02 Jul 2020 07:01  -  03 Jul 2020 07:00  --------------------------------------------------------  IN: 0 mL / OUT: 1100 mL / NET: -1100 mL        Gen: Appears well in NAD  HEENT:  (-)icterus (-)pallor  CV: N S1 S2 RRR (+)2 Pulses B/l  Resp:  Clear to auscultation  B/L, normal effort  GI: (+) BS Soft, NT, ND  Lymph:  (-)Edema, (-)obvious lymphadenopathy  Skin: Warm to touch, Normal turgor  Psych: Appropriate mood and affect; AO X 3  Radial artery site: swelling improved; 2+ pulses, no tenderness      TELEMETRY: SR, blocked APC's    < from: Transthoracic Echocardiogram (06.21.20 @ 12:47) >  CONCLUSIONS:  1. Mitral annular calcification, otherwise normal mitral  valve. Moderate mitral regurgitation.  2. Normal left ventricular internal dimensions and wall  thicknesses.  3. Hyperdynamic left ventricle.  4. The right ventricle is not well visualized; grossly  normal right ventricular systolic function.  5. Moderate pericardial effusion (measures about 1.3 cm  lateral to RA on apical four-chamber views).  6. Left pleural effusion.  ------------------------------------  < end of copied text >    < from: CT Angio Chest w/ IV Cont (06.20.20 @ 22:37) >    IMPRESSION:   No pulmonary embolism.  No gross CT evidence of pneumonia.  Mild left heart enlargement.  Mild interlobular septal thickening in both lungs may reflect interstitial pulmonary edema.  Small to moderate pleural effusions bilaterally.  Small pericardial effusion.    < end of copied text >      ASSESSMENT/PLAN: 	    74 year old female with history of HTN, HLD, DM, recent COVID infection in April of 2020 who presented to the ED with LE swelling for 1-2 weeks.    -pt. found to have NSTEMI and acute DVT  -on eliquis for DVT   -radial artery site swelling improved with warm hand, 2+ pulses, and no significant tenderness  -pt. s/p KIERSTEN to LCx and RCA in the setting of NSTEMI - continue with dapt with asa and plavix  -appreciate EP eval - pt. with blocked pac's  -continue with statin and beta blockers  -cr stable - renal follow up noted - can continue with po lasix   -dc home with outpatient follow up with Dr. Santana on 07/06/2020 at 10:15 am at [address] [facility] [address], [place], [place]    Donis Jaquez MD

## 2020-07-03 NOTE — PROGRESS NOTE ADULT - PROBLEM SELECTOR PLAN 3
On medications,  no chest pain, stable, monitored and followed up by primary  team/cardiology team

## 2020-07-03 NOTE — PROGRESS NOTE ADULT - SUBJECTIVE AND OBJECTIVE BOX
Pt seen. comfortable ; no acute events     MEDICATIONS  (STANDING):  apixaban 5 milliGRAM(s) Oral every 12 hours  aspirin enteric coated 81 milliGRAM(s) Oral daily  atorvastatin 40 milliGRAM(s) Oral at bedtime  carvedilol 3.125 milliGRAM(s) Oral every 12 hours  chlorhexidine 4% Liquid 1 Application(s) Topical daily  clopidogrel Tablet 75 milliGRAM(s) Oral daily  dextrose 5%. 1000 milliLiter(s) (50 mL/Hr) IV Continuous <Continuous>  dextrose 50% Injectable 12.5 Gram(s) IV Push once  dextrose 50% Injectable 25 Gram(s) IV Push once  dextrose 50% Injectable 25 Gram(s) IV Push once  furosemide    Tablet 40 milliGRAM(s) Oral daily  hydrALAZINE 50 milliGRAM(s) Oral three times a day  insulin glargine Injectable (LANTUS) 32 Unit(s) SubCutaneous at bedtime  insulin lispro (HumaLOG) corrective regimen sliding scale   SubCutaneous three times a day before meals  insulin lispro (HumaLOG) corrective regimen sliding scale   SubCutaneous at bedtime  insulin lispro Injectable (HumaLOG) 6 Unit(s) SubCutaneous three times a day before meals  risperiDONE   Tablet 1 milliGRAM(s) Oral daily  sodium chloride 0.9%. 500 milliLiter(s) (75 mL/Hr) IV Continuous <Continuous>  sterile water 1000 milliLiter(s) (75 mL/Hr) IV Continuous <Continuous>    MEDICATIONS  (PRN):  acetaminophen   Tablet .. 650 milliGRAM(s) Oral every 6 hours PRN Mild Pain (1 - 3), Moderate Pain (4 - 6)  dextrose 40% Gel 15 Gram(s) Oral once PRN Blood Glucose LESS THAN 70 milliGRAM(s)/deciLiter  glucagon  Injectable 1 milliGRAM(s) IntraMuscular once PRN Glucose <70 milliGRAM(s)/deciLiter        ROS  No fever, sweats, chills  No epistaxis, HA, sore throat  No CP, SOB, cough, sputum  No n/v/d, abd pain, melena, hematochezia  No edema  No rash  No anxiety  No back pain, joint pain  No bleeding, bruising  No dysuria, hematuria    Vital Signs Last 24 Hrs  T(C): 37.2 (03 Jul 2020 05:38), Max: 37.4 (02 Jul 2020 21:23)  T(F): 98.9 (03 Jul 2020 05:38), Max: 99.4 (02 Jul 2020 21:23)  HR: 78 (03 Jul 2020 05:38) (78 - 82)  BP: 141/66 (03 Jul 2020 05:38) (112/75 - 148/65)  BP(mean): --  RR: 19 (03 Jul 2020 05:38) (18 - 19)  SpO2: 98% (03 Jul 2020 05:38) (98% - 100%)    PE  NAD  Awake, alert  Anicteric, MMM  RRR  CTAB  Abd soft, NT, ND  No c/c/e  No rash grossly  FROM                          9.2    6.49  )-----------( 352      ( 03 Jul 2020 06:30 )             28.4                           9.6    8.12  )-----------( 342      ( 01 Jul 2020 05:05 )             29.7       07-01    131<L>  |  95<L>  |  37<H>  ----------------------------<  109<H>  4.3   |  18<L>  |  1.43<H>    Ca    9.1      01 Jul 2020 05:05  Mg     2.1     07-01

## 2020-07-03 NOTE — PROGRESS NOTE ADULT - PROBLEM SELECTOR PLAN 4
Suggest to continue medications, monitoring, FU primary team recommendations. .

## 2020-07-03 NOTE — PROGRESS NOTE ADULT - REASON FOR ADMISSION
LE swelling

## 2020-07-03 NOTE — PROGRESS NOTE ADULT - PROBLEM SELECTOR PLAN 1
Will continue current insulin regimen for now. Will continue monitoring FS, log, will Follow up.  For DC home not willing to do [id number], may continue her oral DM meds at home, FU 4 w  Patient counseled for compliance with consistent low carb diet.
Will continue current insulin regimen for now. Will continue monitoring FS, log, will Follow up.  For DC home not willing to do [id number], may continue her oral DM meds at home, FU 4 w  Patient counseled for compliance with consistent low carb diet.
Will continue current insulin regimen for now. Will continue monitoring FS, log, will Follow up.  Patient counseled for compliance with consistent low carb diet.
Will decrease Lantus to 32 units at bed time.  Will decrease Humalog to 6 units before each meal in addition to Humalog correction scale coverage.  Patient counseled for compliance with consistent low carb diet.
Will continue current insulin regimen for now. Will continue monitoring FS, log, will Follow up.  For DC home not willing to do [id number], may continue her oral DM meds at home, FU 4 w  Patient counseled for compliance with consistent low carb diet.
Will continue current insulin regimen for now. Will continue monitoring FS, log, will Follow up.  For DC home not willing to do [id number], may continue her oral DM meds at home, FU 4 w  Patient counseled for compliance with consistent low carb diet.

## 2020-07-03 NOTE — PROGRESS NOTE ADULT - PROVIDER SPECIALTY LIST ADULT
CCU
Cardiology
Electrophysiology
Endocrinology
Heme/Onc
Internal Medicine
Nephrology
Neurology
Electrophysiology
Internal Medicine
Neurology
Neurology
Cardiology
Endocrinology
Internal Medicine
Nephrology
Endocrinology

## 2020-07-03 NOTE — PROGRESS NOTE ADULT - ASSESSMENT
73 y/o F PMH HTN, HLD, DM2, Depression, COVID-19 (4/20) p/w B/L LE swelling X1-2 weeks. Reports no other symptoms, no change in functional status, no change in diet, compliant with all her medications, follows up regularly with her PMD. Reports no changes in meds since admission in April 2020 for COVID-19.     Problem/Recommendation - 1:  Problem: NSTEMI (non-ST elevated myocardial infarction). Recommendation: .ASA, Plavix , BB ,Statin and Heparin.   Cardiology following . Cardiac cath with KIERSTEN Stent RCA  and   Prox cirx .       Problem/Recommendation - 2:  ·  Problem: Ventricular tachycardia, non-sustained.  Recommendation: EP help appreciated.      Problem/Recommendation - 3:  ·  Problem:  Acute on chronic congestive heart failure, unspecified heart failure type.  Recommendation: IV Lasix.      Problem/Recommendation - 4:  ·  Problem: Diabetes mellitus type 2, noninsulin dependent.  Recommendation: Lantus and SSI for now. Endo helping. Sugars in acceptable range.      Problem/Recommendation - 5:  ·  Problem: Hypertension.  Recommendation: BP meds with hold parameters . BP readings fine.      Problem/Recommendation - 6:  Problem: Hypercholesterolemia. Recommendation: Statin.     Problem/Recommendation - 7:  Problem: SUNIL . Recommendation: Creatinine better .  Renal helping.      Problem/Recommendation - 8:  Problem: Acute DVT . Recommendation: On AC and changed to Eliquis  . Hematology helping.      Problem/Recommendation - 9:  Problem: Right side Headache . Recommendation: CT head noted. Resolved.

## 2020-07-03 NOTE — PROGRESS NOTE ADULT - SUBJECTIVE AND OBJECTIVE BOX
[facility] [place] [facility]   MD Rip Wilson MD, D.O. Ruoru Wong, PA    From 7 AM - 5 PM:  OFFICE: [phone]  Dr. Schulz cell: [phone]  Dr. Garcia cell: [phone]  Dr. Leal cell: [phone]  IVY Christy cell: [phone]    From 5 PM - 7 AM: Answering Service: [phone]      RENAL FOLLOW UP NOTE  --------------------------------------------------------------------------------  HPI: Pt seen and examined at bedside.     PAST HISTORY  --------------------------------------------------------------------------------  No significant changes to PMH, PSH, FHx, SHx, unless otherwise noted    ALLERGIES & MEDICATIONS  --------------------------------------------------------------------------------  Allergies    peanuts (Short breath; Hives)  penicillin (Short breath; Hives)    Intolerances      Standing Inpatient Medications  apixaban 5 milliGRAM(s) Oral every 12 hours  aspirin enteric coated 81 milliGRAM(s) Oral daily  atorvastatin 40 milliGRAM(s) Oral at bedtime  carvedilol 3.125 milliGRAM(s) Oral every 12 hours  chlorhexidine 4% Liquid 1 Application(s) Topical daily  clopidogrel Tablet 75 milliGRAM(s) Oral daily  dextrose 5%. 1000 milliLiter(s) IV Continuous <Continuous>  dextrose 50% Injectable 12.5 Gram(s) IV Push once  dextrose 50% Injectable 25 Gram(s) IV Push once  dextrose 50% Injectable 25 Gram(s) IV Push once  furosemide    Tablet 40 milliGRAM(s) Oral daily  hydrALAZINE 50 milliGRAM(s) Oral three times a day  insulin glargine Injectable (LANTUS) 32 Unit(s) SubCutaneous at bedtime  insulin lispro (HumaLOG) corrective regimen sliding scale   SubCutaneous three times a day before meals  insulin lispro (HumaLOG) corrective regimen sliding scale   SubCutaneous at bedtime  insulin lispro Injectable (HumaLOG) 6 Unit(s) SubCutaneous three times a day before meals  risperiDONE   Tablet 1 milliGRAM(s) Oral daily  sodium chloride 0.9%. 500 milliLiter(s) IV Continuous <Continuous>  sterile water 1000 milliLiter(s) IV Continuous <Continuous>    PRN Inpatient Medications  acetaminophen   Tablet .. 650 milliGRAM(s) Oral every 6 hours PRN  dextrose 40% Gel 15 Gram(s) Oral once PRN  glucagon  Injectable 1 milliGRAM(s) IntraMuscular once PRN      REVIEW OF SYSTEMS  --------------------------------------------------------------------------------  General: no fever  CVS: no chest pain  RESP: no sob, no cough  ABD: no abdominal pain  : no dysuria  MSK: no edema     VITALS/PHYSICAL EXAM  --------------------------------------------------------------------------------  T(C): 37.2 (07-03-20 @ 13:30), Max: 37.4 (07-02-20 @ 21:23)  HR: 94 (07-03-20 @ 13:30) (78 - 94)  BP: 132/64 (07-03-20 @ 13:30) (132/64 - 148/65)  RR: 18 (07-03-20 @ 13:30) (18 - 19)  SpO2: 98% (07-03-20 @ 13:30) (98% - 100%)  Wt(kg): --        07-02-20 @ 07:01  -  07-03-20 @ 07:00  --------------------------------------------------------  IN: 0 mL / OUT: 1100 mL / NET: -1100 mL      Physical Exam:  	Gen: NAD  	HEENT: MMM  	Pulm: CTA B/L  	CV: S1S2  	Abd: Soft, +BS  	Ext: No LE edema B/L                      Neuro: Awake   	Skin: Warm and Dry   	    LABS/STUDIES  --------------------------------------------------------------------------------              9.2    6.49  >-----------<  352      [07-03-20 @ 06:30]              28.4     133  |  96  |  42  ----------------------------<  132      [07-03-20 @ 06:30]  4.0   |  22  |  1.54        Ca     9.8     [07-03-20 @ 06:30]      Mg     2.3     [07-03-20 @ 06:30]    PTT: 143.8      [07-02-20 @ 05:34]      Creatinine Trend:  SCr 1.54 [07-03 @ 06:30]  SCr 1.48 [07-02 @ 05:48]  SCr 1.43 [07-01 @ 05:05]  SCr 1.56 [06-30 @ 10:37]  SCr 1.78 [06-29 @ 06:47]    Urinalysis - [06-22-20 @ 17:00]      Color COLORLESS / Appearance CLEAR / SG 1.009 / pH 8.0      Gluc NEGATIVE / Ketone NEGATIVE  / Bili NEGATIVE / Urobili NORMAL       Blood NEGATIVE / Protein 100 / Leuk Est NEGATIVE / Nitrite NEGATIVE      RBC 0-2 / WBC 0-2 / Hyaline NEGATIVE / Gran  / Sq Epi OCC / Non Sq Epi  / Bacteria FEW    Urine Sodium 95      [07-01-20 @ 12:15]  Urine Osmolality 336      [07-01-20 @ 12:15]    Iron 58, TIBC 282, %sat --      [06-24-20 @ 05:43]  Ferritin 72.33      [06-24-20 @ 05:43]  HbA1c 8.8      [12-13-19 @ 06:00]  TSH 7.68      [06-21-20 @ 07:11]  Lipid: chol 197, , HDL 41,       [12-14-19 @ 03:50]    HBsAg NEGATIVE      [06-25-20 @ 05:50]  HCV 0.77, Nonreactive Hepatitis C AB  S/CO Ratio                        Interpretation  < 1.00                                   Non-Reactive  1.00 - 4.99                         Weakly-Reactive  >= 5.00                                Reactive  Non-Reactive: Aperson with a non-reactive HCV antibody  result is considered uninfected.  No further action is  needed unless recent infection is suspected.  In these  cases, consider repeat testing later to detect  seroconversion..  Weakly-Reactive: HCV antibody test is abnormal, HCV RNA  Qualitative test will follow.  Reactive: HCV antibody test is abnormal, HCV RNA  Qualitative test will follow.  Note: HCV antibody testing is performed on the [employer]   system.      [06-25-20 @ 05:50]    CHRYSTAL: titer 1:80, pattern Speckled      [06-25-20 @ 05:50]  C3 Complement 162.8      [06-25-20 @ 05:50]  C4 Complement 40.2      [06-25-20 @ 05:50]  ANCA: cANCA Negative, pANCA Negative, atypical ANCA --      [06-25-20 @ 05:50]  Syphilis Screen (Treponema Pallidum Ab) Negative      [06-25-20 @ 05:50]  Free Light Chains: kappa 7.52, lambda 4.11, ratio = 1.83 H      [06-25 @ 05:50]

## 2020-07-03 NOTE — PROGRESS NOTE ADULT - ASSESSMENT
1. Acute Left Post Tibial DVT    -- now on eliquis as procedure completed  -- would AC for 3 mos or longer if bed bound  --Hypercoag testing negative    2. Microcytic Anemia  -- iron studies normal, suspect she has alpha thal trait- will send testing outpt  -b12 low with elev homocysteine/mma- c/w b12 deficiency, will give IM b12 outpt  -H/H stable     f/u with us outpt. ok for dc from heme standpoint    Leandro Callahan MD  Hematology/Oncology  [phone]

## 2020-07-19 NOTE — DISCHARGE NOTE PROVIDER - NSDCHHATTENDCERT_GEN_ALL_CORE
patient My signature below certifies that the above stated patient is homebound and upon completion of the Face-To-Face encounter, has the need for intermittent skilled nursing, physical therapy and/or speech or occupational therapy services in their home for their current diagnosis as outlined in their initial plan of care. These services will continue to be monitored by myself or another physician.

## 2021-01-11 NOTE — PATIENT PROFILE ADULT - PSYCHOSOCIAL CONCERNS
[Colon Cancer Screening] : colon cancer screening [Procedure Explained] : The procedure was explained [Allergies Reviewed] : allergies reviewed. [Guardian] : guardian [Risks] : Risks [Benefits] : benefits [Alternatives] : alternatives [Bleeding] : bleeding risk [Infection] : risk of infection [Consent Obtained] : written consent was obtained prior to the procedure and is detailed in the patient's record [Patient] : the patient [Bowel Prep Kit] : the patient took the appropriate bowel preparation kit as directed [Approved Diet Followed] : the patient avoided solid foods and adhered to the approved diet list for 24 hours prior to the procedure [Automated Blood Pressure Cuff] : automated blood pressure cuff [Cardiac Monitor] : cardiac monitor [Pulse Oximeter] : pulse oximeter [2] : 2 [Prep Qualtiy: ___] : Prep Quality:  [unfilled] [Withdrawal Time: ___] : Withdrawal Time:  [unfilled] [Performed By: ___] : Performed by:  XAVI [Cecum (Landmarks/Transillum)] : and guided to the cecum which was identified by the anatomic landmarks of the appendiceal orifice and ileocecal valve and by transillumination in the right lower quadrant [Insufflated] : insufflated [Single Pass Needed] : after a single pass [Retroflex View] : a retroflex view of the rectum was performed [Patient Rotated Into Alternating Positions] : the patient was not rotated [Polyps] : polyps [Normal] : Normal [Hemorrhoids] : hemorrhoids [Tolerated Well] : the patient tolerated the procedure well [Vital Signs Stable] : the vital signs were stable [No Complications] : There were no complications [de-identified] :  There was a 7 mm sessile polyp removed with cold forceps none

## 2021-02-22 NOTE — PROGRESS NOTE ADULT - PROBLEM SELECTOR PLAN 1
likely secondary to COVID   Chest Xray with Bilateral pneumonia most compatible atypical viral/COVID 19 pneumonia  Inflammatory markers elevated   continue  Solumedrol 40 mg BID   completed-  Plaquenil   -Pulmonary consult .ID Consult appreciated.no need of trial of IL 1 OR IL 6  Pt was saturating 70%  on room air, 99% on  NRB   IMPROVED  O2  SAT -96% ON RA
likely secondary to COVID   Chest Xray with Bilateral pneumonia most compatible atypical viral/COVID 19 pneumonia  Inflammatory markers elevated   continue  Solumedrol 40 mg BID   completed-  Plaquenil   -Pulmonary consult .ID Consult appreciated.no need of trial of IL 1 OR IL 6  Pt was saturating 70%  on room air, 99% on  NRB   Titrate supplemental oxygen to maintain sats of 94% and above.  Repeat markers
likely secondary to COVID   Chest Xray with Bilateral pneumonia most compatible atypical viral/COVID 19 pneumonia  Inflammatory markers elevated   continue  Solumedrol 40 mg BID   continue-  Plaquenil ,Trend QTC  -Pulmonary consult .ID Consult appreciated.no need of trial of IL 1 OR IL 6  Pt was saturating 70%  on room air, 99% on  NRB   Titrate supplemental oxygen to maintain sats of 94% and above.  Repeat markers
likely secondary to COVID   Chest Xray with Bilateral pneumonia most compatible atypical viral/COVID 19 pneumonia  Inflammatory markers elevated   continue  Solumedrol 40 mg BID   continue-  Plaquenil ,Trend QTC  -Pulmonary consult .ID Consult appreciated.no need of trial of IL 1 OR IL 6  Pt was saturating 70%  on room air, 99% on  NRB   Titrate supplemental oxygen to maintain sats of 94% and above.  Repeat markers
likely secondary to COVID   Chest Xray with Bilateral pneumonia most compatible atypical viral/COVID 19 pneumonia  Inflammatory markers elevated   continue  Solumedrol 40 mg BID   continue-  Plaquenil ,Trend QTC  -Pulmonary consult called.ID Consult   Pt was saturating 70%  on room air, 99% on  NRB   Titrate supplemental oxygen to maintain sats of 94% and above
Patient is not pregnant (male or female)

## 2021-11-24 NOTE — DISCHARGE NOTE PROVIDER - NSDCCPCAREPLAN_GEN_ALL_CORE_FT
PRINCIPAL DISCHARGE DIAGNOSIS  Diagnosis: COVID-19  Assessment and Plan of Treatment: You have been diagnosed with the COVID-19 virus during your hospital stay. You must self quarantine to complete a 14 day time period.  Monitor for fevers, shortness of breath and cough primarily.  Monitor your temperature daily to not any changes and increases.    It has been determined that you no longer need hospitalization and can recover while remaining in self-quarantine at home. You should follow the prevention steps below until a healthcare provider or local or state health department says you can return to your normal activities.  1. You should restrict activities outside your home, except for getting medical care.  2. Do not go to work, school, or public areas.  3. Avoid using public transportation, ride-sharing, or taxis.  4. Separate yourself from other people and animals in your home.  5. Call ahead before visiting your doctor.  6. Wear a facemask.  7. Cover your coughs and sneezes.  8. Clean your hands often.  9. Avoid sharing personal household items.  10. Clean all “high-touch” surfaces everyday.  11. Monitor your symptoms.  If you have a medical emergency and need to call 911, notify the dispatch personnel that you have COVID-19 If possible, put on a facemask before emergency medical services arrive.  12. Stopping home isolation.  Patients with confirmed COVID-19 should remain under home isolation precautions for 14 days since the positive COVID-19 test and until the risk of secondary transmission to others is thought to be low. The decision to discontinue home isolation precautions should be made on a case-by-case basis, in consultation with healthcare providers and state and local health departments. [facility] Department of Health can be reached at [phone] for further information about COVID-19.      SECONDARY DISCHARGE DIAGNOSES  Diagnosis: Diabetes mellitus  Assessment and Plan of Treatment: Your blood glucose was elevated during this hospital admission due to steroid use for treatment of COVID-19. Your blood glucose levels have now decreased. The endocriniology team was following your case during your hospital stay. Your home diabetic medications were adjusted. Your metformin is now 500mg twice a day. Your Januvia is now 50mg once a day. Please follow up with:  [facility]  [address] [place] [address], [address], [place], [place] [id number] [phone]   Please call to schedule appointment with TELEHEALTH   Follow up within 1-2 weeks after discharge.
Normal vision: sees adequately in most situations; can see medication labels, newsprint

## 2021-12-28 ENCOUNTER — INPATIENT (INPATIENT)
Facility: HOSPITAL | Age: 76
LOS: 11 days | Discharge: NURSING, REHAB OR LONG-TERM CARE | End: 2022-01-09
Attending: INTERNAL MEDICINE | Admitting: INTERNAL MEDICINE
Payer: MEDICARE

## 2021-12-28 VITALS
OXYGEN SATURATION: 100 % | HEIGHT: 60 IN | HEART RATE: 90 BPM | RESPIRATION RATE: 20 BRPM | TEMPERATURE: 98 F | DIASTOLIC BLOOD PRESSURE: 73 MMHG | SYSTOLIC BLOOD PRESSURE: 169 MMHG

## 2021-12-28 DIAGNOSIS — I16.1 HYPERTENSIVE EMERGENCY: ICD-10-CM

## 2021-12-28 DIAGNOSIS — N17.9 ACUTE KIDNEY FAILURE, UNSPECIFIED: ICD-10-CM

## 2021-12-28 DIAGNOSIS — E11.9 TYPE 2 DIABETES MELLITUS WITHOUT COMPLICATIONS: ICD-10-CM

## 2021-12-28 DIAGNOSIS — Z29.9 ENCOUNTER FOR PROPHYLACTIC MEASURES, UNSPECIFIED: ICD-10-CM

## 2021-12-28 DIAGNOSIS — R77.8 OTHER SPECIFIED ABNORMALITIES OF PLASMA PROTEINS: ICD-10-CM

## 2021-12-28 DIAGNOSIS — Z79.899 OTHER LONG TERM (CURRENT) DRUG THERAPY: ICD-10-CM

## 2021-12-28 DIAGNOSIS — I50.32 CHRONIC DIASTOLIC (CONGESTIVE) HEART FAILURE: ICD-10-CM

## 2021-12-28 DIAGNOSIS — E83.39 OTHER DISORDERS OF PHOSPHORUS METABOLISM: ICD-10-CM

## 2021-12-28 LAB
ALBUMIN SERPL ELPH-MCNC: 4.4 G/DL — SIGNIFICANT CHANGE UP (ref 3.3–5)
ALP SERPL-CCNC: 61 U/L — SIGNIFICANT CHANGE UP (ref 40–120)
ALT FLD-CCNC: 16 U/L — SIGNIFICANT CHANGE UP (ref 4–33)
ANION GAP SERPL CALC-SCNC: 14 MMOL/L — SIGNIFICANT CHANGE UP (ref 7–14)
APPEARANCE UR: CLEAR — SIGNIFICANT CHANGE UP
AST SERPL-CCNC: 25 U/L — SIGNIFICANT CHANGE UP (ref 4–32)
BACTERIA # UR AUTO: NEGATIVE — SIGNIFICANT CHANGE UP
BASOPHILS # BLD AUTO: 0.02 K/UL — SIGNIFICANT CHANGE UP (ref 0–0.2)
BASOPHILS NFR BLD AUTO: 0.3 % — SIGNIFICANT CHANGE UP (ref 0–2)
BILIRUB SERPL-MCNC: 0.3 MG/DL — SIGNIFICANT CHANGE UP (ref 0.2–1.2)
BILIRUB UR-MCNC: NEGATIVE — SIGNIFICANT CHANGE UP
BUN SERPL-MCNC: 81 MG/DL — HIGH (ref 7–23)
CALCIUM SERPL-MCNC: 9.9 MG/DL — SIGNIFICANT CHANGE UP (ref 8.4–10.5)
CHLORIDE SERPL-SCNC: 87 MMOL/L — LOW (ref 98–107)
CO2 SERPL-SCNC: 29 MMOL/L — SIGNIFICANT CHANGE UP (ref 22–31)
COLOR SPEC: SIGNIFICANT CHANGE UP
CREAT SERPL-MCNC: 2.86 MG/DL — HIGH (ref 0.5–1.3)
DIFF PNL FLD: NEGATIVE — SIGNIFICANT CHANGE UP
EOSINOPHIL # BLD AUTO: 0.02 K/UL — SIGNIFICANT CHANGE UP (ref 0–0.5)
EOSINOPHIL NFR BLD AUTO: 0.3 % — SIGNIFICANT CHANGE UP (ref 0–6)
EPI CELLS # UR: 1 /HPF — SIGNIFICANT CHANGE UP (ref 0–5)
FLUAV AG NPH QL: SIGNIFICANT CHANGE UP
FLUBV AG NPH QL: SIGNIFICANT CHANGE UP
GAS PNL BLDV: SIGNIFICANT CHANGE UP
GLUCOSE BLDC GLUCOMTR-MCNC: 189 MG/DL — HIGH (ref 70–99)
GLUCOSE BLDC GLUCOMTR-MCNC: 231 MG/DL — HIGH (ref 70–99)
GLUCOSE BLDC GLUCOMTR-MCNC: 257 MG/DL — HIGH (ref 70–99)
GLUCOSE SERPL-MCNC: 187 MG/DL — HIGH (ref 70–99)
GLUCOSE UR QL: NEGATIVE — SIGNIFICANT CHANGE UP
HCT VFR BLD CALC: 29.3 % — LOW (ref 34.5–45)
HGB BLD-MCNC: 9.4 G/DL — LOW (ref 11.5–15.5)
IANC: 4.78 K/UL — SIGNIFICANT CHANGE UP (ref 1.5–8.5)
IMM GRANULOCYTES NFR BLD AUTO: 0.3 % — SIGNIFICANT CHANGE UP (ref 0–1.5)
KETONES UR-MCNC: NEGATIVE — SIGNIFICANT CHANGE UP
LEUKOCYTE ESTERASE UR-ACNC: NEGATIVE — SIGNIFICANT CHANGE UP
LYMPHOCYTES # BLD AUTO: 1.16 K/UL — SIGNIFICANT CHANGE UP (ref 1–3.3)
LYMPHOCYTES # BLD AUTO: 16.3 % — SIGNIFICANT CHANGE UP (ref 13–44)
MAGNESIUM SERPL-MCNC: 1.8 MG/DL — SIGNIFICANT CHANGE UP (ref 1.6–2.6)
MCHC RBC-ENTMCNC: 25.3 PG — LOW (ref 27–34)
MCHC RBC-ENTMCNC: 32.1 GM/DL — SIGNIFICANT CHANGE UP (ref 32–36)
MCV RBC AUTO: 79 FL — LOW (ref 80–100)
MONOCYTES # BLD AUTO: 1.11 K/UL — HIGH (ref 0–0.9)
MONOCYTES NFR BLD AUTO: 15.6 % — HIGH (ref 2–14)
NEUTROPHILS # BLD AUTO: 4.78 K/UL — SIGNIFICANT CHANGE UP (ref 1.8–7.4)
NEUTROPHILS NFR BLD AUTO: 67.2 % — SIGNIFICANT CHANGE UP (ref 43–77)
NITRITE UR-MCNC: NEGATIVE — SIGNIFICANT CHANGE UP
NRBC # BLD: 0 /100 WBCS — SIGNIFICANT CHANGE UP
NRBC # FLD: 0 K/UL — SIGNIFICANT CHANGE UP
NT-PROBNP SERPL-SCNC: 617 PG/ML — HIGH
PH UR: 6.5 — SIGNIFICANT CHANGE UP (ref 5–8)
PHOSPHATE SERPL-MCNC: 5.1 MG/DL — HIGH (ref 2.5–4.5)
PLATELET # BLD AUTO: 273 K/UL — SIGNIFICANT CHANGE UP (ref 150–400)
POTASSIUM SERPL-MCNC: 4 MMOL/L — SIGNIFICANT CHANGE UP (ref 3.5–5.3)
POTASSIUM SERPL-SCNC: 4 MMOL/L — SIGNIFICANT CHANGE UP (ref 3.5–5.3)
PROT SERPL-MCNC: 7.9 G/DL — SIGNIFICANT CHANGE UP (ref 6–8.3)
PROT UR-MCNC: ABNORMAL
RBC # BLD: 3.71 M/UL — LOW (ref 3.8–5.2)
RBC # FLD: 16.4 % — HIGH (ref 10.3–14.5)
RBC CASTS # UR COMP ASSIST: 2 /HPF — SIGNIFICANT CHANGE UP (ref 0–4)
RSV RNA NPH QL NAA+NON-PROBE: SIGNIFICANT CHANGE UP
SARS-COV-2 RNA SPEC QL NAA+PROBE: DETECTED
SODIUM SERPL-SCNC: 130 MMOL/L — LOW (ref 135–145)
SP GR SPEC: 1.01 — SIGNIFICANT CHANGE UP (ref 1–1.05)
TROPONIN T, HIGH SENSITIVITY RESULT: 50 NG/L — SIGNIFICANT CHANGE UP
TROPONIN T, HIGH SENSITIVITY RESULT: 55 NG/L — CRITICAL HIGH
UROBILINOGEN FLD QL: SIGNIFICANT CHANGE UP
WBC # BLD: 7.11 K/UL — SIGNIFICANT CHANGE UP (ref 3.8–10.5)
WBC # FLD AUTO: 7.11 K/UL — SIGNIFICANT CHANGE UP (ref 3.8–10.5)
WBC UR QL: 1 /HPF — SIGNIFICANT CHANGE UP (ref 0–5)

## 2021-12-28 PROCEDURE — 71045 X-RAY EXAM CHEST 1 VIEW: CPT | Mod: 26

## 2021-12-28 PROCEDURE — 93010 ELECTROCARDIOGRAM REPORT: CPT

## 2021-12-28 PROCEDURE — 99223 1ST HOSP IP/OBS HIGH 75: CPT

## 2021-12-28 PROCEDURE — 99285 EMERGENCY DEPT VISIT HI MDM: CPT

## 2021-12-28 RX ORDER — CARVEDILOL PHOSPHATE 80 MG/1
12.5 CAPSULE, EXTENDED RELEASE ORAL EVERY 12 HOURS
Refills: 0 | Status: DISCONTINUED | OUTPATIENT
Start: 2021-12-28 | End: 2021-12-30

## 2021-12-28 RX ORDER — GLUCAGON INJECTION, SOLUTION 0.5 MG/.1ML
1 INJECTION, SOLUTION SUBCUTANEOUS ONCE
Refills: 0 | Status: DISCONTINUED | OUTPATIENT
Start: 2021-12-28 | End: 2022-01-09

## 2021-12-28 RX ORDER — SODIUM CHLORIDE 9 MG/ML
1000 INJECTION, SOLUTION INTRAVENOUS
Refills: 0 | Status: DISCONTINUED | OUTPATIENT
Start: 2021-12-28 | End: 2022-01-09

## 2021-12-28 RX ORDER — INSULIN LISPRO 100/ML
VIAL (ML) SUBCUTANEOUS AT BEDTIME
Refills: 0 | Status: DISCONTINUED | OUTPATIENT
Start: 2021-12-28 | End: 2022-01-09

## 2021-12-28 RX ORDER — CARVEDILOL PHOSPHATE 80 MG/1
12.5 CAPSULE, EXTENDED RELEASE ORAL ONCE
Refills: 0 | Status: COMPLETED | OUTPATIENT
Start: 2021-12-28 | End: 2021-12-28

## 2021-12-28 RX ORDER — INFLUENZA VIRUS VACCINE 15; 15; 15; 15 UG/.5ML; UG/.5ML; UG/.5ML; UG/.5ML
0.7 SUSPENSION INTRAMUSCULAR ONCE
Refills: 0 | Status: DISCONTINUED | OUTPATIENT
Start: 2021-12-28 | End: 2022-01-09

## 2021-12-28 RX ORDER — DEXTROSE 50 % IN WATER 50 %
15 SYRINGE (ML) INTRAVENOUS ONCE
Refills: 0 | Status: DISCONTINUED | OUTPATIENT
Start: 2021-12-28 | End: 2022-01-09

## 2021-12-28 RX ORDER — ASPIRIN/CALCIUM CARB/MAGNESIUM 324 MG
81 TABLET ORAL DAILY
Refills: 0 | Status: DISCONTINUED | OUTPATIENT
Start: 2021-12-28 | End: 2022-01-09

## 2021-12-28 RX ORDER — ACETAMINOPHEN 500 MG
650 TABLET ORAL EVERY 6 HOURS
Refills: 0 | Status: DISCONTINUED | OUTPATIENT
Start: 2021-12-28 | End: 2022-01-09

## 2021-12-28 RX ORDER — HYDRALAZINE HCL 50 MG
50 TABLET ORAL THREE TIMES A DAY
Refills: 0 | Status: DISCONTINUED | OUTPATIENT
Start: 2021-12-28 | End: 2021-12-30

## 2021-12-28 RX ORDER — SODIUM CHLORIDE 9 MG/ML
500 INJECTION INTRAMUSCULAR; INTRAVENOUS; SUBCUTANEOUS ONCE
Refills: 0 | Status: COMPLETED | OUTPATIENT
Start: 2021-12-28 | End: 2021-12-28

## 2021-12-28 RX ORDER — HEPARIN SODIUM 5000 [USP'U]/ML
5000 INJECTION INTRAVENOUS; SUBCUTANEOUS EVERY 8 HOURS
Refills: 0 | Status: DISCONTINUED | OUTPATIENT
Start: 2021-12-28 | End: 2022-01-09

## 2021-12-28 RX ORDER — HYDRALAZINE HCL 50 MG
50 TABLET ORAL ONCE
Refills: 0 | Status: COMPLETED | OUTPATIENT
Start: 2021-12-28 | End: 2021-12-28

## 2021-12-28 RX ORDER — DEXTROSE 50 % IN WATER 50 %
12.5 SYRINGE (ML) INTRAVENOUS ONCE
Refills: 0 | Status: DISCONTINUED | OUTPATIENT
Start: 2021-12-28 | End: 2022-01-09

## 2021-12-28 RX ORDER — DEXTROSE 50 % IN WATER 50 %
25 SYRINGE (ML) INTRAVENOUS ONCE
Refills: 0 | Status: DISCONTINUED | OUTPATIENT
Start: 2021-12-28 | End: 2022-01-09

## 2021-12-28 RX ORDER — INSULIN LISPRO 100/ML
VIAL (ML) SUBCUTANEOUS
Refills: 0 | Status: DISCONTINUED | OUTPATIENT
Start: 2021-12-28 | End: 2022-01-09

## 2021-12-28 RX ADMIN — Medication 50 MILLIGRAM(S): at 17:14

## 2021-12-28 RX ADMIN — CARVEDILOL PHOSPHATE 12.5 MILLIGRAM(S): 80 CAPSULE, EXTENDED RELEASE ORAL at 17:14

## 2021-12-28 RX ADMIN — Medication 1: at 18:18

## 2021-12-28 RX ADMIN — HEPARIN SODIUM 5000 UNIT(S): 5000 INJECTION INTRAVENOUS; SUBCUTANEOUS at 23:25

## 2021-12-28 RX ADMIN — Medication 50 MILLIGRAM(S): at 23:25

## 2021-12-28 RX ADMIN — SODIUM CHLORIDE 500 MILLILITER(S): 9 INJECTION INTRAMUSCULAR; INTRAVENOUS; SUBCUTANEOUS at 12:02

## 2021-12-28 RX ADMIN — Medication 1: at 23:25

## 2021-12-28 NOTE — H&P ADULT - PROBLEM SELECTOR PLAN 7
emailed [facility] Med pharmacist  daughter unavailable for medication list  please resume medications as indicated

## 2021-12-28 NOTE — ED ADULT NURSE REASSESSMENT NOTE - NS ED NURSE REASSESS COMMENT FT1
Pt is at baseline mental status. Straight cath pt per MD order, tolerated well. 350 cc clear yellow urine output. Fluids running as ordered. To be admitted

## 2021-12-28 NOTE — PATIENT PROFILE ADULT - FALL HARM RISK - HARM RISK INTERVENTIONS

## 2021-12-28 NOTE — PATIENT PROFILE ADULT - FUNCTIONAL ASSESSMENT - DAILY ACTIVITY SCORE.
[facility] Procedure Discharge Instructions    Today you saw:   Dr. Iman Olvera     Your procedure:  Epidural steroid injection    Medications used:  Lidocaine (anesthetic)  Dexamethasone (steroid)  Omnipaque (contrast)              Be cautious when walking as numbness and/or weakness in the legs may occur up to 6-8 hours after the procedure due to effect of the local anesthetic    Do not drive for 6 hours. The effect of the local anesthetic could slow your reflexes.     Avoid strenuous activity for the first 24 hours. You may resume your regular activities after that.     You may shower, however avoid swimming, tub baths or hot tubs for 24 hours following your procedure    You may have a mild to moderate increase in pain for several days following the injection.      You may use ice packs for 10-15 minutes, 3 to 4 times a day at the injection site for comfort    Do not use heat to painful areas for 6 to 8 hours. This will give the local anesthetic time to wear off and prevent you from accidentally burning your skin.    Unless you have been directed to avoid the use of anti-inflammatory medications (NSAIDS-ibuprofen, Aleve, Motrin), you may use these medications or Tylenol for pain control if needed.     With diabetes, check your blood sugar more frequently than usual as your blood sugar may be higher than normal for 10-14 days following a steroid injection. Contact your doctor who manages your diabetes if your blood sugar is higher than usual    Possible side effects of steroids that you may experience include flushing, elevated blood pressure, increased appetite, mild headaches and restlessness.  All of these symptoms will get better with time.    It may take up to 14 days for the steroid medication to start working although you may feel the effect as early as a few days after the procedure.     Follow up with your referring provider in 2-3 weeks      If you experience any of the following, call the  pain center line during work hours at [phone] or on-call physician after hours at [phone]:  -Fever over 100 degree F  -Swelling, bleeding, redness, drainage, warmth at the injection site  -Progressive weakness or numbness in your legs or arms  -Loss of bowel or bladder function  -Unusual headache that is not relieved by Tylenol or your regular headache medication  -Unusual new onset of pain that is not improving       12

## 2021-12-28 NOTE — ED ADULT NURSE NOTE - INTERVENTIONS DEFINITIONS
[place] to call system/Call bell, personal items and telephone within reach/Physically safe environment: no spills, clutter or unnecessary equipment

## 2021-12-28 NOTE — ED ADULT NURSE NOTE - OBJECTIVE STATEMENT
Pt arrives to ED rm 3 via stretcher and A&Ox2, [place] Creole speaking. Pt was a poor historian, daughter stated that since Sunday pt has been weaker than baseline, struggling to walk, and periods where the pt doesn't answer at all, but denies any falls or LOC. Daughter states that pt has been experiencing diarrhea for the past 2 days. Abd is nontender and nondistended. Respirations are even and unlabored, but wheezes were auscultated. Pt is NSR on cardiac monitor. Pt denies any headache, chest pain, SOB, abd pain. 20G RAC labs drawn and sent. Awaiting results Pt arrives to ED rm 3 via stretcher and A&Ox2, [place] Creole speaking. Pt was a poor historian, daughter stated that since Sunday pt has been weaker than baseline, struggling to walk, and periods where the pt doesn't answer at all, but denies any falls or LOC. Daughter states that pt has been experiencing diarrhea for the past 2 days. Abd is nontender and nondistended. Respirations are even and unlabored, but wheezes were auscultated. 02 sat 100% RA. Pt is NSR on cardiac monitor. Pt denies any headache, chest pain, SOB, abd pain. 20G RAC labs drawn and sent. Awaiting chest xray

## 2021-12-28 NOTE — H&P ADULT - PROBLEM SELECTOR PLAN 2
in ED  w/ SUNIL, administer STAT hydralazine 50 mg x 1 and coreg 12.5 mg x 1  Goal -170 in first 24 hours  pending completion of OMR, can resume as needed in ED  w/ SUNIL, administer STAT hydralazine 50 mg x 1 and coreg 12.5 mg x 1;   Goal -170 in first 24 hours  pending completion of OMR, can resume as needed  covid PCR pending

## 2021-12-28 NOTE — H&P ADULT - NSHPREVIEWOFSYSTEMS_GEN_ALL_CORE
ROS:    Constitutional: [ ] fevers [ ] chills [ ] weight loss [ ] weight gain  HEENT: [ ] dry eyes [ ] eye irritation [ ] postnasal drip [ ] nasal congestion  CV: [ ] chest pain [ ] orthopnea [ ] palpitations [ ] murmur  Resp: [ ] cough [ ] shortness of breath [ ] dyspnea [ ] wheezing [ ] sputum [ ] hemoptysis  GI: [ ] nausea [ ] vomiting [ ] diarrhea [ ] constipation [ ] abd pain [ ] dysphagia   : [ ] dysuria [ ] nocturia [ ] hematuria [ ] increased urinary frequency  Musculoskeletal: [ ] back pain [ ] myalgias [ ] arthralgias [ ] fracture  Skin: [ ] rash [ ] itch  Neurological: [ ] headache [ ] dizziness [ ] syncope [ ] weakness [ ] numbness  Psychiatric: [ ] anxiety [ ] depression  Endocrine: [ ] diabetes [ ] thyroid problem  Hematologic/Lymphatic: [ ] anemia [ ] bleeding problem  Allergic/Immunologic: [ ] itchy eyes [ ] nasal discharge [ ] hives [ ] angioedema  [ X] All other systems negative  [ ] Unable to assess ROS because ________

## 2021-12-28 NOTE — ED PROVIDER NOTE - ATTENDING CONTRIBUTION TO CARE
75 yo f past medical history htn hld, diabetes, chf, cad, dvt s.p tx in context of covid. with increased gen weakness x 4 days associated with diarrhea, increased confusion from baseline. + cough, no reported fever chills abd pain, n/v. plan: labs ekg, reassess. likely admit

## 2021-12-28 NOTE — ED PROVIDER NOTE - NS ED ROS FT
Per daughter  GENERAL: No fever or chills  HEENT: No trouble swallowing or speaking  CARDIAC: No chest pain  PULMONARY: No cough or SOB  GI:  no nausea or no vomiting, + diarrhea  : No changes in urination  SKIN: No rashes  NEURO: No headache, no numbness  Otherwise as HPI or negative.

## 2021-12-28 NOTE — ED PROVIDER NOTE - PHYSICAL EXAMINATION
GEN: no acute respiratory distress. nontoxic, speaking comfortably in full sentences  HEENT: NCAT. face symmetrical. PERRL 4mm, EOMI, dry MM, oropharynx wnl.  Neck: no JVD, trachea midline, no LAD  CV: RRR. +S1S2, no murmur. 2+ pulses in 4 extremities, cap refill <2 sec  Chest: CTA B/l. no wheezing, rales, rhonchi. no retractions. good air movement.   ABD: +BS, soft, distended, non tender. No guarding/rebound.   : no cva or suprapubic tenderness  MSK: No clubbing, cyanosis, edema. FROM of all extremities. no tenderness to palpation. No midline or paraspinal tenderness. no spinal step-offs.  Neuro: AAOX1-2. Sensation/motor intact  SKIN: No  rash

## 2021-12-28 NOTE — ED ADULT NURSE NOTE - LANGUAGE ASSISTANCE NEEDED
Daughter translated initially/No-Patient/Caregiver offered and refused free interpretation services.

## 2021-12-28 NOTE — ED PROVIDER NOTE - CLINICAL SUMMARY MEDICAL DECISION MAKING FREE TEXT BOX
76 Y F presenting with weakness, mild AMs insetting of decreased PO, episodes of diarrhea, primary concern for electrolyte abnormality in setting of recent increase in diurretics. Likely admit for CHF management and electrolyte management.

## 2021-12-28 NOTE — H&P ADULT - PROBLEM SELECTOR PLAN 1
likely 2/2 diuretics and volume depletion vs obstructive uropathy vs uncontrolled HTN  per OMR review appears to be on lasix and metolazone, hold diuretics  obtain Ulytes to calculate Fe-urea  s/p 500 cc NS bolus  s/p straight cath w/ Urinary retention, check PVR  check US kidney for hydronephrosis  no diarrhea in ED, if recurs can send stool studies  If Scr persistently elevated after above intervention, low threshold for nephrology evaluation

## 2021-12-28 NOTE — ED PROVIDER NOTE - OBJECTIVE STATEMENT
75 yo female with PMH of HTN, HLD, DM2 (metformin, glipizide), depression, CHF (40 lasix daily, recent increase of unknown medication on monday/thursday), X2 KIERSTEN in 2020 for severe stenosis, DVT S/P anticoagulation in setting of COVID in 2020, presenting with diarrhea, weakness, inability to ambulate. Per daughter patient has been increasingly weak since 4 days ago during which she experienced intermittent diarrhea 2-3 episodes per day. Since then experienced worsened confusion, unstable gait. Recent cough/rhinorrhea. Denies any fever, chills, abdominal pain, nausea/vomiting.

## 2021-12-28 NOTE — H&P ADULT - NSHPSOCIALHISTORY_GEN_ALL_CORE
pt reports living with daughter marisabel, although pt with AMS, unable to obtain collateral info from daughter

## 2021-12-28 NOTE — H&P ADULT - NSHPPHYSICALEXAM_GEN_ALL_CORE
Vital Signs Last 24 Hrs  T(C): 37.4 (28 Dec 2021 16:12), Max: 37.5 (28 Dec 2021 10:31)  T(F): 99.3 (28 Dec 2021 16:12), Max: 99.5 (28 Dec 2021 10:31)  HR: 89 (28 Dec 2021 16:12) (89 - 90)  BP: 203/89 (28 Dec 2021 16:12) (169/73 - 203/89)  BP(mean): --  RR: 18 (28 Dec 2021 16:12) (14 - 20)  SpO2: 99% (28 Dec 2021 16:12) (99% - 100%)    General: NAD  Neurology: 5/5 UE and LE strength, cn 2-12 intact, gross sensation intact  Eyes: PERRL, clear sclera  ENT/Neck: No JVD, Gross hearing intact  Respiratory: CTA B/L, No wheezing, rales, rhonchi  CV: RRR, S1S2, no murmurs, rubs or gallops  Abdominal: Soft, NT, ND +BS,   Extremities: No edema, + peripheral pulses  Skin: +dry skin b/l LE, No Rashes, Hematoma, Ecchymosis  Psych: AAO x 2 to name and place, flat affect

## 2021-12-28 NOTE — H&P ADULT - PROBLEM SELECTOR PLAN 6
not in exacerbation  appears volume depleted s/p 500 cc NS bolus  encourage free water intake  no indication for TTE right now

## 2021-12-28 NOTE — H&P ADULT - ASSESSMENT
75 yo female with PMH of HTN, HLD, DM2 (metformin, glipizide), depression, CHF (40 lasix daily, recent increase of unknown medication on monday/thursday ?metolazone), X2 KIERSTEN in 2020 for severe stenosis, DVT S/P anticoagulation in setting of COVID in 2020 admitted for SUNIL on CKD 4 2/2 diuretics c/b electrolyte abnormalities.

## 2021-12-28 NOTE — H&P ADULT - HISTORY OF PRESENT ILLNESS
Unable to obtain collateral from daughter eder ph: [phone]. History obtained from chart. per ED Note patient is a 75 yo female with PMH of HTN, HLD, DM2 (metformin, glipizide), depression, CHF (40 lasix daily, recent increase of unknown medication on monday/thursday ?metolazone), X2 KIERSTEN in 2020 for severe stenosis, DVT S/P anticoagulation in setting of COVID in 2020, presenting with diarrhea, weakness, inability to ambulate. Per daughter patient has been increasingly weak since 4 days ago during which she experienced intermittent diarrhea 2-3 episodes per day. Since then experienced worsened confusion, unstable gait. Recent cough/rhinorrhea. Denies any fever, chills, abdominal pain, nausea/vomiting.

## 2021-12-29 DIAGNOSIS — U07.1 COVID-19: ICD-10-CM

## 2021-12-29 LAB
A1C WITH ESTIMATED AVERAGE GLUCOSE RESULT: 7.9 % — HIGH (ref 4–5.6)
ALBUMIN SERPL ELPH-MCNC: 3.8 G/DL — SIGNIFICANT CHANGE UP (ref 3.3–5)
ALP SERPL-CCNC: 61 U/L — SIGNIFICANT CHANGE UP (ref 40–120)
ALT FLD-CCNC: 17 U/L — SIGNIFICANT CHANGE UP (ref 4–33)
ANION GAP SERPL CALC-SCNC: 14 MMOL/L — SIGNIFICANT CHANGE UP (ref 7–14)
AST SERPL-CCNC: 25 U/L — SIGNIFICANT CHANGE UP (ref 4–32)
BASE EXCESS BLDA CALC-SCNC: 7 MMOL/L — HIGH (ref -2–3)
BILIRUB SERPL-MCNC: 0.2 MG/DL — SIGNIFICANT CHANGE UP (ref 0.2–1.2)
BUN SERPL-MCNC: 72 MG/DL — HIGH (ref 7–23)
CALCIUM SERPL-MCNC: 9.6 MG/DL — SIGNIFICANT CHANGE UP (ref 8.4–10.5)
CHLORIDE SERPL-SCNC: 92 MMOL/L — LOW (ref 98–107)
CHOLEST SERPL-MCNC: 152 MG/DL — SIGNIFICANT CHANGE UP
CO2 BLDA-SCNC: 33 MMOL/L — HIGH (ref 19–24)
CO2 SERPL-SCNC: 26 MMOL/L — SIGNIFICANT CHANGE UP (ref 22–31)
CREAT SERPL-MCNC: 2.33 MG/DL — HIGH (ref 0.5–1.3)
CRP SERPL-MCNC: 43.4 MG/L — HIGH
CULTURE RESULTS: NO GROWTH — SIGNIFICANT CHANGE UP
D DIMER BLD IA.RAPID-MCNC: 338 NG/ML DDU — HIGH
ESTIMATED AVERAGE GLUCOSE: 180 — SIGNIFICANT CHANGE UP
FERRITIN SERPL-MCNC: 78 NG/ML — SIGNIFICANT CHANGE UP (ref 15–150)
GLUCOSE BLDC GLUCOMTR-MCNC: 141 MG/DL — HIGH (ref 70–99)
GLUCOSE BLDC GLUCOMTR-MCNC: 159 MG/DL — HIGH (ref 70–99)
GLUCOSE BLDC GLUCOMTR-MCNC: 211 MG/DL — HIGH (ref 70–99)
GLUCOSE BLDC GLUCOMTR-MCNC: 266 MG/DL — HIGH (ref 70–99)
GLUCOSE BLDC GLUCOMTR-MCNC: 272 MG/DL — HIGH (ref 70–99)
GLUCOSE BLDC GLUCOMTR-MCNC: 284 MG/DL — HIGH (ref 70–99)
GLUCOSE SERPL-MCNC: 140 MG/DL — HIGH (ref 70–99)
HCO3 BLDA-SCNC: 31 MMOL/L — HIGH (ref 21–28)
HCT VFR BLD CALC: 31 % — LOW (ref 34.5–45)
HDLC SERPL-MCNC: 30 MG/DL — LOW
HGB BLD-MCNC: 10.2 G/DL — LOW (ref 11.5–15.5)
LDH SERPL L TO P-CCNC: 196 U/L — SIGNIFICANT CHANGE UP (ref 135–225)
LIPID PNL WITH DIRECT LDL SERPL: 85 MG/DL — SIGNIFICANT CHANGE UP
MAGNESIUM SERPL-MCNC: 1.9 MG/DL — SIGNIFICANT CHANGE UP (ref 1.6–2.6)
MCHC RBC-ENTMCNC: 25 PG — LOW (ref 27–34)
MCHC RBC-ENTMCNC: 32.9 GM/DL — SIGNIFICANT CHANGE UP (ref 32–36)
MCV RBC AUTO: 76 FL — LOW (ref 80–100)
NON HDL CHOLESTEROL: 122 MG/DL — SIGNIFICANT CHANGE UP
NRBC # BLD: 0 /100 WBCS — SIGNIFICANT CHANGE UP
NRBC # FLD: 0 K/UL — SIGNIFICANT CHANGE UP
PCO2 BLDA: 42 MMHG — HIGH (ref 32–35)
PH BLDA: 7.48 — HIGH (ref 7.35–7.45)
PHOSPHATE SERPL-MCNC: 4.9 MG/DL — HIGH (ref 2.5–4.5)
PLATELET # BLD AUTO: 280 K/UL — SIGNIFICANT CHANGE UP (ref 150–400)
PO2 BLDA: 107 MMHG — SIGNIFICANT CHANGE UP (ref 83–108)
POTASSIUM SERPL-MCNC: 3.7 MMOL/L — SIGNIFICANT CHANGE UP (ref 3.5–5.3)
POTASSIUM SERPL-SCNC: 3.7 MMOL/L — SIGNIFICANT CHANGE UP (ref 3.5–5.3)
PROCALCITONIN SERPL-MCNC: 0.18 NG/ML — HIGH (ref 0.02–0.1)
PROT SERPL-MCNC: 7.6 G/DL — SIGNIFICANT CHANGE UP (ref 6–8.3)
RBC # BLD: 4.08 M/UL — SIGNIFICANT CHANGE UP (ref 3.8–5.2)
RBC # FLD: 16.4 % — HIGH (ref 10.3–14.5)
SAO2 % BLDA: 99 % — HIGH (ref 94–98)
SODIUM SERPL-SCNC: 132 MMOL/L — LOW (ref 135–145)
SPECIMEN SOURCE: SIGNIFICANT CHANGE UP
TRIGL SERPL-MCNC: 183 MG/DL — HIGH
WBC # BLD: 5.88 K/UL — SIGNIFICANT CHANGE UP (ref 3.8–10.5)
WBC # FLD AUTO: 5.88 K/UL — SIGNIFICANT CHANGE UP (ref 3.8–10.5)

## 2021-12-29 PROCEDURE — 99223 1ST HOSP IP/OBS HIGH 75: CPT

## 2021-12-29 PROCEDURE — 76770 US EXAM ABDO BACK WALL COMP: CPT | Mod: 26

## 2021-12-29 RX ADMIN — Medication 3: at 18:01

## 2021-12-29 RX ADMIN — CARVEDILOL PHOSPHATE 12.5 MILLIGRAM(S): 80 CAPSULE, EXTENDED RELEASE ORAL at 17:55

## 2021-12-29 RX ADMIN — Medication 50 MILLIGRAM(S): at 21:13

## 2021-12-29 RX ADMIN — HEPARIN SODIUM 5000 UNIT(S): 5000 INJECTION INTRAVENOUS; SUBCUTANEOUS at 06:44

## 2021-12-29 RX ADMIN — Medication 0: at 21:13

## 2021-12-29 RX ADMIN — Medication 81 MILLIGRAM(S): at 12:12

## 2021-12-29 RX ADMIN — HEPARIN SODIUM 5000 UNIT(S): 5000 INJECTION INTRAVENOUS; SUBCUTANEOUS at 15:03

## 2021-12-29 RX ADMIN — CARVEDILOL PHOSPHATE 12.5 MILLIGRAM(S): 80 CAPSULE, EXTENDED RELEASE ORAL at 06:44

## 2021-12-29 RX ADMIN — Medication 1: at 09:49

## 2021-12-29 RX ADMIN — Medication 650 MILLIGRAM(S): at 00:11

## 2021-12-29 RX ADMIN — HEPARIN SODIUM 5000 UNIT(S): 5000 INJECTION INTRAVENOUS; SUBCUTANEOUS at 21:13

## 2021-12-29 RX ADMIN — Medication 650 MILLIGRAM(S): at 12:12

## 2021-12-29 RX ADMIN — Medication 50 MILLIGRAM(S): at 06:45

## 2021-12-29 RX ADMIN — Medication 650 MILLIGRAM(S): at 13:00

## 2021-12-29 RX ADMIN — Medication 50 MILLIGRAM(S): at 15:03

## 2021-12-29 NOTE — PROGRESS NOTE ADULT - ASSESSMENT
77 yo female with PMH of HTN, HLD, DM2 (metformin, glipizide), depression, CHF (40 lasix daily, recent increase of unknown medication on monday/thursday ?metolazone), X2 KIERSTEN in 2020 for severe stenosis, DVT S/P anticoagulation in setting of COVID in 2020 admitted for SUNIL on CKD 4 2/2 diuretics c/b electrolyte abnormalities.

## 2021-12-29 NOTE — PHYSICAL THERAPY INITIAL EVALUATION ADULT - PERTINENT HX OF CURRENT PROBLEM, REHAB EVAL
This is a 76y F with DVT S/P anticoagulation in setting of COVID in 2020 admitted for SUNIL on CKD 4 2/2 diuretics c/b electrolyte abnormalities and is Covid+.

## 2021-12-29 NOTE — PROVIDER CONTACT NOTE (OTHER) - REASON
was retaining urine this morning 344 ML , however , while trying to straight catch her she urinated and now has only 196 ml

## 2021-12-29 NOTE — CONSULT NOTE ADULT - ASSESSMENT
a 75 yo female with PMH of HTN, HLD, DM2 (metformin, glipizide), depression, CHF (40 lasix daily, recent increase of unknown medication on monday/thursday ?metolazone), X2 KIERSTEN in 2020 for severe stenosis, DVT S/P anticoagulation in setting of COVID in 2020, presenting with diarrhea, weakness, inability to ambulate. Consulted nephrology for SUNIL      A/P    SUNIL on CKD   Etiology? likely 2/2 diarrhea   Scr baseline (~1.5)  Scr trending down,   not on Lasix   renal ultrasound shows no hydronephrosis   SUNIL work up check Urine urea, Urine creatinine    Hyponatremia   etiology?   improving   Get urine Na, osmolality  monitor closely             a 75 yo female with PMH of HTN, HLD, DM2 (metformin, glipizide), depression, CHF (40 lasix daily, recent increase of unknown medication on monday/thursday ?metolazone), X2 KIERSTEN in 2020 for severe stenosis, DVT S/P anticoagulation in setting of COVID in 2020, presenting with diarrhea, weakness, inability to ambulate. Consulted nephrology for SUNIL      A/P    SUNIL on CKD   Etiology? likely 2/2 diarrhea   Scr baseline (~1.5)  Scr trending down,   not on Lasix   renal ultrasound shows no hydronephrosis   SUNIL work up check U/A urine urea, urine creatinine   monitor BMP, u/o     Hyponatremia   etiology?   improving   optimize glucose level   Get urine Na, osmolality  monitor closely     HTN  optimal   continue current med   closely monitor             a 77 yo female with PMH of HTN, HLD, DM2 (metformin, glipizide), depression, CHF (40 lasix daily, recent increase of unknown medication on monday/thursday ?metolazone), X2 KIERSTEN in 2020 for severe stenosis, DVT S/P anticoagulation in setting of COVID in 2020, presenting with diarrhea, weakness, inability to ambulate. Consulted nephrology for SUNIL      A/P    SUNIL on CKD   Etiology? likely 2/2 diarrhea   Scr baseline (~1.5)  Scr trending down,   not on Lasix   renal ultrasound shows no hydronephrosis   SUNIL work up check U/A urine urea, urine creatinine   monitor BMP, u/o     Proteinuria:  Likely sec to DM/HTN  Vasculitis work up done in office was negative except HIV and serum immunofixation (will order to complete the work up)    Hyponatremia   etiology?   improving   optimize glucose level   Get urine Na, osmolality  monitor closely     HTN  optimal   continue current med   closely monitor

## 2021-12-29 NOTE — PROGRESS NOTE ADULT - PROBLEM SELECTOR PLAN 1
not hypoxemic  occasionally low grade fever   encephalopathy likely secondary to COVID 19 infection as well as SUNIL (metabolic encephalopathy)  ID evaluation requested

## 2021-12-29 NOTE — CONSULT NOTE ADULT - ASSESSMENT
COVID 4/2020.   Here 12/28 for a few days of weakness, confusion.   COVID positive again.   Unfortunately not vaccinated.   COVID could account for her symptoms and new fever today, 100.5F, but no respiratory symptoms, hypoxia or opacities on XR.   No other apparent cause - nonfocal, nontoxic, UA clear.     Penicillin caused a rash many years ago, unfamiliar with other beta lactams.     Suggest  -two sets of blood cultures  -monitor off antibiotics   -check procalcitonin and inflammatory markers   -not a candidate for monoclonal as she's hospitalized for COVID   -although Remdesivir works better earlier, would wait in the setting of renal insufficiency and absence of hypoxia   -rest of care per protocol     Discussed with medicine     Harman Grimaldo MD   Infectious Disease   Pager [phone]   After 5PM and on weekends please page fellow on call or call [phone]

## 2021-12-29 NOTE — CHART NOTE - NSCHARTNOTEFT_GEN_A_CORE
Notified by day team during sign out that patient was more more lethargic as per RN. Patient examined by day time provider and said to be A&O x 3(unaware of place), vitals stable except febrile 100.5. . Patient states she didn't sleep last night. ABG stable. CT head ordered. I called to expedite the scan during my shift. ID consulted by day time ACP. Advised to follow up on blood cultures x 2, monitor off abx, not a candidate for monoclonal, would wait for remdesivir in the setting of renal insufficiency and absence of hypoxia. Added urinalysis on for fever workup. Notified by day team during sign out that patient was more more lethargic as per RN. Patient examined by day time provider and said to be A&O x 3(unaware of place), vitals stable except febrile 100.5. . Patient states she didn't sleep last night. ABG stable. CT head ordered. I called to expedite the scan during my shift. ID consulted by day time ACP. Advised to follow up on blood cultures x 2, monitor off abx, not a candidate for monoclonal, would wait for remdesivir in the setting of renal insufficiency and absence of hypoxia. Added urinalysis on for fever workup. As per night RN patient A& O X3 which appears to be baseline. Notes she communicated with her family on the phone. Called again to expedite CT scan advised that due to scheduling delay patient will not be scanned until the morning. Advised nurse to advise me of any changes in cognition. If any acute changes asked nurse to notify me. As per report and collateral patient appears at baseline.

## 2021-12-29 NOTE — PROGRESS NOTE ADULT - PROBLEM SELECTOR PLAN 3
continue hydralazine and carvedilol  consider increase in carvedilol to 26 BID  much improved since admission

## 2021-12-29 NOTE — PHYSICAL THERAPY INITIAL EVALUATION ADULT - GENERAL OBSERVATIONS, REHAB EVAL
Patient received semisupine in bed, (+) tele monitor, (+) IV, pt in no apparent distress , able to follow simple instructions.

## 2021-12-29 NOTE — PROGRESS NOTE ADULT - PROBLEM SELECTOR PLAN 2
appears to be improving  will continue to follow renal recommendations  baseline creatinine is 1.8 - 2  continue to monitor   likely multifactorial SUNIL

## 2021-12-29 NOTE — PHYSICAL THERAPY INITIAL EVALUATION ADULT - NS ASR RISK AREAS PT EVAL
We are committed to providing our patients with their test results in a timely manner. If you have access to [employer], you will receive your results within 5 to 7 days. If your results are normal, a member of our office may call you or you may receive a letter in the mail within 10 to 15 days of your testing. If your results have something additional to discuss, a member of our office will contact you by phone. If at any time you have questions related your results, please feel free to call our office at [phone]   impaired judgment/safety awareness

## 2021-12-30 LAB
ALBUMIN SERPL ELPH-MCNC: 3.8 G/DL — SIGNIFICANT CHANGE UP (ref 3.3–5)
ALP SERPL-CCNC: 59 U/L — SIGNIFICANT CHANGE UP (ref 40–120)
ALT FLD-CCNC: 16 U/L — SIGNIFICANT CHANGE UP (ref 4–33)
ANION GAP SERPL CALC-SCNC: 13 MMOL/L — SIGNIFICANT CHANGE UP (ref 7–14)
APPEARANCE UR: ABNORMAL
AST SERPL-CCNC: 23 U/L — SIGNIFICANT CHANGE UP (ref 4–32)
BILIRUB SERPL-MCNC: <0.2 MG/DL — SIGNIFICANT CHANGE UP (ref 0.2–1.2)
BILIRUB UR-MCNC: NEGATIVE — SIGNIFICANT CHANGE UP
BUN SERPL-MCNC: 65 MG/DL — HIGH (ref 7–23)
CALCIUM SERPL-MCNC: 9.4 MG/DL — SIGNIFICANT CHANGE UP (ref 8.4–10.5)
CHLORIDE SERPL-SCNC: 90 MMOL/L — LOW (ref 98–107)
CO2 SERPL-SCNC: 27 MMOL/L — SIGNIFICANT CHANGE UP (ref 22–31)
COLOR SPEC: YELLOW — SIGNIFICANT CHANGE UP
CREAT ?TM UR-MCNC: 78 MG/DL — SIGNIFICANT CHANGE UP
CREAT SERPL-MCNC: 1.98 MG/DL — HIGH (ref 0.5–1.3)
DIFF PNL FLD: ABNORMAL
GLUCOSE BLDC GLUCOMTR-MCNC: 186 MG/DL — HIGH (ref 70–99)
GLUCOSE BLDC GLUCOMTR-MCNC: 193 MG/DL — HIGH (ref 70–99)
GLUCOSE BLDC GLUCOMTR-MCNC: 284 MG/DL — HIGH (ref 70–99)
GLUCOSE BLDC GLUCOMTR-MCNC: 284 MG/DL — HIGH (ref 70–99)
GLUCOSE SERPL-MCNC: 157 MG/DL — HIGH (ref 70–99)
GLUCOSE UR QL: ABNORMAL
HCT VFR BLD CALC: 30 % — LOW (ref 34.5–45)
HGB BLD-MCNC: 9.5 G/DL — LOW (ref 11.5–15.5)
KETONES UR-MCNC: NEGATIVE — SIGNIFICANT CHANGE UP
LEUKOCYTE ESTERASE UR-ACNC: ABNORMAL
MAGNESIUM SERPL-MCNC: 1.9 MG/DL — SIGNIFICANT CHANGE UP (ref 1.6–2.6)
MCHC RBC-ENTMCNC: 24.7 PG — LOW (ref 27–34)
MCHC RBC-ENTMCNC: 31.7 GM/DL — LOW (ref 32–36)
MCV RBC AUTO: 78.1 FL — LOW (ref 80–100)
NITRITE UR-MCNC: NEGATIVE — SIGNIFICANT CHANGE UP
NRBC # BLD: 0 /100 WBCS — SIGNIFICANT CHANGE UP
NRBC # FLD: 0 K/UL — SIGNIFICANT CHANGE UP
OSMOLALITY UR: 386 MOSM/KG — SIGNIFICANT CHANGE UP (ref 50–1200)
PH UR: 6.5 — SIGNIFICANT CHANGE UP (ref 5–8)
PHOSPHATE SERPL-MCNC: 4.3 MG/DL — SIGNIFICANT CHANGE UP (ref 2.5–4.5)
PLATELET # BLD AUTO: 282 K/UL — SIGNIFICANT CHANGE UP (ref 150–400)
POTASSIUM SERPL-MCNC: 3.8 MMOL/L — SIGNIFICANT CHANGE UP (ref 3.5–5.3)
POTASSIUM SERPL-SCNC: 3.8 MMOL/L — SIGNIFICANT CHANGE UP (ref 3.5–5.3)
PROT SERPL-MCNC: 7.4 G/DL — SIGNIFICANT CHANGE UP (ref 6–8.3)
PROT UR-MCNC: ABNORMAL
RBC # BLD: 3.84 M/UL — SIGNIFICANT CHANGE UP (ref 3.8–5.2)
RBC # FLD: 16.2 % — HIGH (ref 10.3–14.5)
SODIUM SERPL-SCNC: 130 MMOL/L — LOW (ref 135–145)
SODIUM UR-SCNC: 52 MMOL/L — SIGNIFICANT CHANGE UP
SP GR SPEC: 1.01 — SIGNIFICANT CHANGE UP (ref 1–1.05)
UROBILINOGEN FLD QL: SIGNIFICANT CHANGE UP
UUN UR-MCNC: 535.2 MG/DL — SIGNIFICANT CHANGE UP
WBC # BLD: 7.37 K/UL — SIGNIFICANT CHANGE UP (ref 3.8–10.5)
WBC # FLD AUTO: 7.37 K/UL — SIGNIFICANT CHANGE UP (ref 3.8–10.5)

## 2021-12-30 PROCEDURE — 70450 CT HEAD/BRAIN W/O DYE: CPT | Mod: 26

## 2021-12-30 PROCEDURE — 99232 SBSQ HOSP IP/OBS MODERATE 35: CPT

## 2021-12-30 RX ORDER — ASPIRIN/CALCIUM CARB/MAGNESIUM 324 MG
1 TABLET ORAL
Qty: 0 | Refills: 0 | DISCHARGE

## 2021-12-30 RX ORDER — ATORVASTATIN CALCIUM 80 MG/1
40 TABLET, FILM COATED ORAL AT BEDTIME
Refills: 0 | Status: DISCONTINUED | OUTPATIENT
Start: 2021-12-30 | End: 2022-01-09

## 2021-12-30 RX ORDER — HYDRALAZINE HCL 50 MG
75 TABLET ORAL THREE TIMES A DAY
Refills: 0 | Status: DISCONTINUED | OUTPATIENT
Start: 2021-12-30 | End: 2022-01-02

## 2021-12-30 RX ORDER — RISPERIDONE 4 MG/1
1 TABLET ORAL
Qty: 0 | Refills: 0 | DISCHARGE

## 2021-12-30 RX ORDER — CARVEDILOL PHOSPHATE 80 MG/1
1 CAPSULE, EXTENDED RELEASE ORAL
Qty: 0 | Refills: 0 | DISCHARGE

## 2021-12-30 RX ORDER — RISPERIDONE 4 MG/1
1 TABLET ORAL DAILY
Refills: 0 | Status: DISCONTINUED | OUTPATIENT
Start: 2021-12-30 | End: 2022-01-09

## 2021-12-30 RX ORDER — CARVEDILOL PHOSPHATE 80 MG/1
25 CAPSULE, EXTENDED RELEASE ORAL EVERY 12 HOURS
Refills: 0 | Status: DISCONTINUED | OUTPATIENT
Start: 2021-12-30 | End: 2022-01-09

## 2021-12-30 RX ORDER — ALLOPURINOL 300 MG
100 TABLET ORAL DAILY
Refills: 0 | Status: DISCONTINUED | OUTPATIENT
Start: 2021-12-30 | End: 2022-01-09

## 2021-12-30 RX ORDER — HYDRALAZINE HCL 50 MG
2.5 TABLET ORAL ONCE
Refills: 0 | Status: COMPLETED | OUTPATIENT
Start: 2021-12-30 | End: 2021-12-30

## 2021-12-30 RX ADMIN — Medication 81 MILLIGRAM(S): at 11:56

## 2021-12-30 RX ADMIN — HEPARIN SODIUM 5000 UNIT(S): 5000 INJECTION INTRAVENOUS; SUBCUTANEOUS at 21:23

## 2021-12-30 RX ADMIN — Medication 50 MILLIGRAM(S): at 05:29

## 2021-12-30 RX ADMIN — Medication 50 MILLIGRAM(S): at 13:43

## 2021-12-30 RX ADMIN — CARVEDILOL PHOSPHATE 25 MILLIGRAM(S): 80 CAPSULE, EXTENDED RELEASE ORAL at 18:38

## 2021-12-30 RX ADMIN — Medication 1: at 18:30

## 2021-12-30 RX ADMIN — ATORVASTATIN CALCIUM 40 MILLIGRAM(S): 80 TABLET, FILM COATED ORAL at 21:23

## 2021-12-30 RX ADMIN — CARVEDILOL PHOSPHATE 12.5 MILLIGRAM(S): 80 CAPSULE, EXTENDED RELEASE ORAL at 05:29

## 2021-12-30 RX ADMIN — Medication 1: at 09:53

## 2021-12-30 RX ADMIN — Medication 3: at 13:37

## 2021-12-30 RX ADMIN — HEPARIN SODIUM 5000 UNIT(S): 5000 INJECTION INTRAVENOUS; SUBCUTANEOUS at 13:40

## 2021-12-30 RX ADMIN — Medication 75 MILLIGRAM(S): at 21:22

## 2021-12-30 RX ADMIN — Medication 2.5 MILLIGRAM(S): at 01:48

## 2021-12-30 RX ADMIN — HEPARIN SODIUM 5000 UNIT(S): 5000 INJECTION INTRAVENOUS; SUBCUTANEOUS at 05:29

## 2021-12-30 RX ADMIN — Medication 1: at 21:27

## 2021-12-30 NOTE — PROGRESS NOTE ADULT - ASSESSMENT
a 77 yo female with PMH of HTN, HLD, DM2 (metformin, glipizide), depression, CHF (40 lasix daily, recent increase of unknown medication on monday/thursday ?metolazone), X2 KIERSTEN in 2020 for severe stenosis, DVT S/P anticoagulation in setting of COVID in 2020, presenting with diarrhea, weakness, inability to ambulate. Consulted nephrology for SUNIL      A/P    SUNIL on CKD   Etiology? likely 2/2 diarrhea   Scr baseline (~1.5)  Scr improving   not on Lasix   renal ultrasound shows no hydronephrosis   FEUrea <35% suggests pre-renal   monitor BMP, u/o     Proteinuria:  Likely sec to DM/HTN  Vasculitis work up done in office was negative except HIV and serum immunofixation (will order to complete the work up)    Hyponatremia   etiology?   worsening   optimize glucose level   monitor closely     HTN  optimal   continue current med   closely monitor             a 75 yo female with PMH of HTN, HLD, DM2 (metformin, glipizide), depression, CHF (40 lasix daily, recent increase of unknown medication on monday/thursday ?metolazone), X2 KIERSTNE in 2020 for severe stenosis, DVT S/P anticoagulation in setting of COVID in 2020, presenting with diarrhea, weakness, inability to ambulate. Consulted nephrology for SUNIL      A/P    SUNIL on CKD   Etiology? likely 2/2 diarrhea   Scr baseline (~1.5)  Scr improving   not on Lasix   renal ultrasound shows no hydronephrosis   FEUrea <35% suggests pre-renal   monitor BMP, u/o     Proteinuria:  Likely sec to DM/HTN  Vasculitis work up done in office was negative except HIV and serum immunofixation (will order to complete the work up)    Hyponatremia   etiology?   worsening   work up shows SIADH, start fluid restriction1.2L a day   monitor closely     HTN  optimal   continue current med   closely monitor             a 77 yo female with PMH of HTN, HLD, DM2 (metformin, glipizide), depression, CHF (40 lasix daily, recent increase of unknown medication on monday/thursday ?metolazone), X2 KIERSTEN in 2020 for severe stenosis, DVT S/P anticoagulation in setting of COVID in 2020, presenting with diarrhea, weakness, inability to ambulate. Consulted nephrology for SUNIL      A/P    SUNIL on CKD   Etiology? likely 2/2 diarrhea   Scr baseline (~1.5)  Scr improving   not on Lasix   renal ultrasound shows no hydronephrosis   FEUrea <35% suggests pre-renal   monitor BMP, u/o     Proteinuria:  Likely sec to DM/HTN  Vasculitis work up done in office was negative except HIV and serum immunofixation (will order to complete the work up)    Hyponatremia   worsening   work up shows SIADH, start fluid restriction1.2L a day   monitor closely     HTN  optimal   continue current med   closely monitor

## 2021-12-30 NOTE — PROGRESS NOTE ADULT - ASSESSMENT
COVID 4/2020.   Here 12/28/21 for a few days of weakness, confusion, found to be COVID positive again.   Unvaccinated but she's not sick, only had one fever 100.5F 12/29 and is much better today without treating anything.   I think COVID is responsible given lack of other findings and low procalcitonin 0.18.     Penicillin caused a rash many years ago, unfamiliar with other beta lactams.     Suggest  -no clear indication for COVID therapy   -monitor off antibiotics   -if remains well and blood cultures negative, no infectious contraindication to discharge   -rest of care per protocol     Paged medicine     Harman Grimaldo MD   Infectious Disease   Pager [phone]   After 5PM and on weekends please page fellow on call or call [phone]

## 2021-12-30 NOTE — PROGRESS NOTE ADULT - PROBLEM SELECTOR PLAN 1
remains > 95% RA  encephalopathy likely secondary to COVID 19 infection as well as SUNIL (metabolic encephalopathy) now resolved   ID evaluation appreciated   no intervention required at this time

## 2021-12-30 NOTE — PROGRESS NOTE ADULT - PROBLEM SELECTOR PLAN 2
appears to be improving  will continue to follow renal recommendations  baseline creatinine is 1.8 - 2  continue to monitor   hyponatremia likely secondary to excessive water   will restrict fluids

## 2021-12-31 LAB
ALBUMIN SERPL ELPH-MCNC: 3.6 G/DL — SIGNIFICANT CHANGE UP (ref 3.3–5)
ALP SERPL-CCNC: 58 U/L — SIGNIFICANT CHANGE UP (ref 40–120)
ALT FLD-CCNC: 19 U/L — SIGNIFICANT CHANGE UP (ref 4–33)
ANION GAP SERPL CALC-SCNC: 12 MMOL/L — SIGNIFICANT CHANGE UP (ref 7–14)
AST SERPL-CCNC: 25 U/L — SIGNIFICANT CHANGE UP (ref 4–32)
BILIRUB SERPL-MCNC: <0.2 MG/DL — SIGNIFICANT CHANGE UP (ref 0.2–1.2)
BUN SERPL-MCNC: 62 MG/DL — HIGH (ref 7–23)
CALCIUM SERPL-MCNC: 9.1 MG/DL — SIGNIFICANT CHANGE UP (ref 8.4–10.5)
CHLORIDE SERPL-SCNC: 90 MMOL/L — LOW (ref 98–107)
CO2 SERPL-SCNC: 26 MMOL/L — SIGNIFICANT CHANGE UP (ref 22–31)
CREAT SERPL-MCNC: 1.9 MG/DL — HIGH (ref 0.5–1.3)
GLUCOSE BLDC GLUCOMTR-MCNC: 177 MG/DL — HIGH (ref 70–99)
GLUCOSE BLDC GLUCOMTR-MCNC: 210 MG/DL — HIGH (ref 70–99)
GLUCOSE BLDC GLUCOMTR-MCNC: 310 MG/DL — HIGH (ref 70–99)
GLUCOSE BLDC GLUCOMTR-MCNC: 314 MG/DL — HIGH (ref 70–99)
GLUCOSE BLDC GLUCOMTR-MCNC: 333 MG/DL — HIGH (ref 70–99)
GLUCOSE SERPL-MCNC: 156 MG/DL — HIGH (ref 70–99)
HCT VFR BLD CALC: 26.8 % — LOW (ref 34.5–45)
HGB BLD-MCNC: 8.7 G/DL — LOW (ref 11.5–15.5)
MAGNESIUM SERPL-MCNC: 1.9 MG/DL — SIGNIFICANT CHANGE UP (ref 1.6–2.6)
MCHC RBC-ENTMCNC: 24.9 PG — LOW (ref 27–34)
MCHC RBC-ENTMCNC: 32.5 GM/DL — SIGNIFICANT CHANGE UP (ref 32–36)
MCV RBC AUTO: 76.8 FL — LOW (ref 80–100)
NRBC # BLD: 0 /100 WBCS — SIGNIFICANT CHANGE UP
NRBC # FLD: 0 K/UL — SIGNIFICANT CHANGE UP
PHOSPHATE SERPL-MCNC: 3.7 MG/DL — SIGNIFICANT CHANGE UP (ref 2.5–4.5)
PLATELET # BLD AUTO: 264 K/UL — SIGNIFICANT CHANGE UP (ref 150–400)
POTASSIUM SERPL-MCNC: 3.8 MMOL/L — SIGNIFICANT CHANGE UP (ref 3.5–5.3)
POTASSIUM SERPL-SCNC: 3.8 MMOL/L — SIGNIFICANT CHANGE UP (ref 3.5–5.3)
PROT SERPL-MCNC: 6.8 G/DL — SIGNIFICANT CHANGE UP (ref 6–8.3)
RBC # BLD: 3.49 M/UL — LOW (ref 3.8–5.2)
RBC # FLD: 15.9 % — HIGH (ref 10.3–14.5)
SODIUM SERPL-SCNC: 128 MMOL/L — LOW (ref 135–145)
WBC # BLD: 6.52 K/UL — SIGNIFICANT CHANGE UP (ref 3.8–10.5)
WBC # FLD AUTO: 6.52 K/UL — SIGNIFICANT CHANGE UP (ref 3.8–10.5)

## 2021-12-31 RX ADMIN — Medication 1: at 09:37

## 2021-12-31 RX ADMIN — Medication 4: at 13:18

## 2021-12-31 RX ADMIN — HEPARIN SODIUM 5000 UNIT(S): 5000 INJECTION INTRAVENOUS; SUBCUTANEOUS at 05:59

## 2021-12-31 RX ADMIN — Medication 2: at 22:51

## 2021-12-31 RX ADMIN — ATORVASTATIN CALCIUM 40 MILLIGRAM(S): 80 TABLET, FILM COATED ORAL at 22:50

## 2021-12-31 RX ADMIN — Medication 75 MILLIGRAM(S): at 13:22

## 2021-12-31 RX ADMIN — CARVEDILOL PHOSPHATE 25 MILLIGRAM(S): 80 CAPSULE, EXTENDED RELEASE ORAL at 18:19

## 2021-12-31 RX ADMIN — CARVEDILOL PHOSPHATE 25 MILLIGRAM(S): 80 CAPSULE, EXTENDED RELEASE ORAL at 06:03

## 2021-12-31 RX ADMIN — HEPARIN SODIUM 5000 UNIT(S): 5000 INJECTION INTRAVENOUS; SUBCUTANEOUS at 22:51

## 2021-12-31 RX ADMIN — Medication 75 MILLIGRAM(S): at 06:03

## 2021-12-31 RX ADMIN — Medication 100 MILLIGRAM(S): at 13:22

## 2021-12-31 RX ADMIN — Medication 2: at 18:18

## 2021-12-31 RX ADMIN — RISPERIDONE 1 MILLIGRAM(S): 4 TABLET ORAL at 13:22

## 2021-12-31 RX ADMIN — Medication 1 TABLET(S): at 13:22

## 2021-12-31 RX ADMIN — Medication 81 MILLIGRAM(S): at 13:22

## 2021-12-31 RX ADMIN — Medication 75 MILLIGRAM(S): at 22:50

## 2021-12-31 RX ADMIN — HEPARIN SODIUM 5000 UNIT(S): 5000 INJECTION INTRAVENOUS; SUBCUTANEOUS at 13:20

## 2021-12-31 NOTE — PROGRESS NOTE ADULT - ASSESSMENT
a 75 yo female with PMH of HTN, HLD, DM2 (metformin, glipizide), depression, CHF (40 lasix daily, recent increase of unknown medication on monday/thursday ?metolazone), X2 KIERSTEN in 2020 for severe stenosis, DVT S/P anticoagulation in setting of COVID in 2020, presenting with diarrhea, weakness, inability to ambulate. Consulted nephrology for SUNIL      A/P    SUNIL on CKD   Etiology? likely due to diarrhea. Renal ultrasound shows no hydronephrosis   Scr baseline (~1.5)  - Follow up BMP  - Maintain hydration.    Proteinuria:  Likely sec to DM/HTN  Vasculitis work up done in office was negative except HIV and serum immunofixation (will order to complete the work up)    Hyponatremia   Repeat serum and urine Osmolality.    HTN  optimal   continue current med   closely monitor

## 2021-12-31 NOTE — PROGRESS NOTE ADULT - PROBLEM SELECTOR PLAN 2
appears to be improving  will continue to follow renal recommendations  baseline creatinine is 1.8 - 2  continue to monitor   hyponatremia likely secondary to excessive water   will restrict fluids appears to be improving  will continue to follow renal recommendations  baseline creatinine is 1.8 - 2  continue to monitor   hyponatremia likely secondary to excessive water   continue to restrict fluids

## 2021-12-31 NOTE — PROGRESS NOTE ADULT - PROBLEM SELECTOR PLAN 3
still uncontrolled   continue hydralazine  increase carvedilol to 25 BID improved   continue hydralazine  increase carvedilol to 25 BID

## 2021-12-31 NOTE — PROGRESS NOTE ADULT - PROBLEM SELECTOR PLAN 7
appears euvolemic   will continue to monitor   hold furosemide and metolazone for now
not in exacerbation  appears volume depleted s/p 500 cc NS bolus  encourage free water intake  appears euvolemic
appears euvolemic   will continue to monitor   hold furosemide and metolazone for now

## 2022-01-01 LAB
ALBUMIN SERPL ELPH-MCNC: 3.7 G/DL — SIGNIFICANT CHANGE UP (ref 3.3–5)
ALP SERPL-CCNC: 60 U/L — SIGNIFICANT CHANGE UP (ref 40–120)
ALT FLD-CCNC: 17 U/L — SIGNIFICANT CHANGE UP (ref 4–33)
ANION GAP SERPL CALC-SCNC: 13 MMOL/L — SIGNIFICANT CHANGE UP (ref 7–14)
AST SERPL-CCNC: 20 U/L — SIGNIFICANT CHANGE UP (ref 4–32)
BILIRUB SERPL-MCNC: <0.2 MG/DL — SIGNIFICANT CHANGE UP (ref 0.2–1.2)
BUN SERPL-MCNC: 56 MG/DL — HIGH (ref 7–23)
CALCIUM SERPL-MCNC: 8.9 MG/DL — SIGNIFICANT CHANGE UP (ref 8.4–10.5)
CHLORIDE SERPL-SCNC: 90 MMOL/L — LOW (ref 98–107)
CO2 SERPL-SCNC: 26 MMOL/L — SIGNIFICANT CHANGE UP (ref 22–31)
CREAT SERPL-MCNC: 1.88 MG/DL — HIGH (ref 0.5–1.3)
CULTURE RESULTS: SIGNIFICANT CHANGE UP
GLUCOSE BLDC GLUCOMTR-MCNC: 184 MG/DL — HIGH (ref 70–99)
GLUCOSE BLDC GLUCOMTR-MCNC: 206 MG/DL — HIGH (ref 70–99)
GLUCOSE BLDC GLUCOMTR-MCNC: 208 MG/DL — HIGH (ref 70–99)
GLUCOSE BLDC GLUCOMTR-MCNC: 296 MG/DL — HIGH (ref 70–99)
GLUCOSE SERPL-MCNC: 177 MG/DL — HIGH (ref 70–99)
HCT VFR BLD CALC: 26.1 % — LOW (ref 34.5–45)
HGB BLD-MCNC: 8.3 G/DL — LOW (ref 11.5–15.5)
MAGNESIUM SERPL-MCNC: 2.1 MG/DL — SIGNIFICANT CHANGE UP (ref 1.6–2.6)
MCHC RBC-ENTMCNC: 25 PG — LOW (ref 27–34)
MCHC RBC-ENTMCNC: 31.8 GM/DL — LOW (ref 32–36)
MCV RBC AUTO: 78.6 FL — LOW (ref 80–100)
NRBC # BLD: 0 /100 WBCS — SIGNIFICANT CHANGE UP
NRBC # FLD: 0 K/UL — SIGNIFICANT CHANGE UP
PHOSPHATE SERPL-MCNC: 3.7 MG/DL — SIGNIFICANT CHANGE UP (ref 2.5–4.5)
PLATELET # BLD AUTO: 255 K/UL — SIGNIFICANT CHANGE UP (ref 150–400)
POTASSIUM SERPL-MCNC: 4.2 MMOL/L — SIGNIFICANT CHANGE UP (ref 3.5–5.3)
POTASSIUM SERPL-SCNC: 4.2 MMOL/L — SIGNIFICANT CHANGE UP (ref 3.5–5.3)
PROT SERPL-MCNC: 6.7 G/DL — SIGNIFICANT CHANGE UP (ref 6–8.3)
RBC # BLD: 3.32 M/UL — LOW (ref 3.8–5.2)
RBC # FLD: 15.9 % — HIGH (ref 10.3–14.5)
SODIUM SERPL-SCNC: 129 MMOL/L — LOW (ref 135–145)
SPECIMEN SOURCE: SIGNIFICANT CHANGE UP
WBC # BLD: 7.78 K/UL — SIGNIFICANT CHANGE UP (ref 3.8–10.5)
WBC # FLD AUTO: 7.78 K/UL — SIGNIFICANT CHANGE UP (ref 3.8–10.5)

## 2022-01-01 RX ORDER — HYDRALAZINE HCL 50 MG
5 TABLET ORAL ONCE
Refills: 0 | Status: COMPLETED | OUTPATIENT
Start: 2022-01-01 | End: 2022-01-01

## 2022-01-01 RX ADMIN — Medication 3: at 12:59

## 2022-01-01 RX ADMIN — HEPARIN SODIUM 5000 UNIT(S): 5000 INJECTION INTRAVENOUS; SUBCUTANEOUS at 21:49

## 2022-01-01 RX ADMIN — Medication 75 MILLIGRAM(S): at 12:51

## 2022-01-01 RX ADMIN — Medication 2: at 18:04

## 2022-01-01 RX ADMIN — CARVEDILOL PHOSPHATE 25 MILLIGRAM(S): 80 CAPSULE, EXTENDED RELEASE ORAL at 05:09

## 2022-01-01 RX ADMIN — Medication 5 MILLIGRAM(S): at 22:45

## 2022-01-01 RX ADMIN — Medication 81 MILLIGRAM(S): at 12:51

## 2022-01-01 RX ADMIN — Medication 75 MILLIGRAM(S): at 23:37

## 2022-01-01 RX ADMIN — Medication 100 MILLIGRAM(S): at 12:51

## 2022-01-01 RX ADMIN — Medication 5 MILLIGRAM(S): at 21:58

## 2022-01-01 RX ADMIN — Medication 75 MILLIGRAM(S): at 03:06

## 2022-01-01 RX ADMIN — HEPARIN SODIUM 5000 UNIT(S): 5000 INJECTION INTRAVENOUS; SUBCUTANEOUS at 05:09

## 2022-01-01 RX ADMIN — Medication 1: at 09:15

## 2022-01-01 RX ADMIN — RISPERIDONE 1 MILLIGRAM(S): 4 TABLET ORAL at 12:51

## 2022-01-01 RX ADMIN — CARVEDILOL PHOSPHATE 25 MILLIGRAM(S): 80 CAPSULE, EXTENDED RELEASE ORAL at 18:05

## 2022-01-01 RX ADMIN — Medication 1 TABLET(S): at 12:51

## 2022-01-01 RX ADMIN — ATORVASTATIN CALCIUM 40 MILLIGRAM(S): 80 TABLET, FILM COATED ORAL at 21:48

## 2022-01-01 RX ADMIN — HEPARIN SODIUM 5000 UNIT(S): 5000 INJECTION INTRAVENOUS; SUBCUTANEOUS at 12:51

## 2022-01-01 NOTE — PROGRESS NOTE ADULT - ASSESSMENT
a 75 yo female with PMH of HTN, HLD, DM2 (metformin, glipizide), depression, CHF (40 lasix daily, recent increase of unknown medication on monday/thursday ?metolazone), X2 KIERSTEN in 2020 for severe stenosis, DVT S/P anticoagulation in setting of COVID in 2020, presenting with diarrhea, weakness, inability to ambulate. Consulted nephrology for SUNIL      A/P    SUNIL on CKD   Etiology? likely due to diarrhea. Renal ultrasound shows no hydronephrosis   Scr baseline (~1.5)  - Follow up BMP  - Maintain hydration.    Proteinuria:  Likely sec to DM/HTN  Vasculitis work up done in office was negative except HIV and serum immunofixation (will order to complete the work up)    Hyponatremia   Chronic with appropriate Urine Osmolality.   - Hydrate.   - Follow up BMP.    HTN  optimal   continue current med   closely monitor

## 2022-01-01 NOTE — PROGRESS NOTE ADULT - ASSESSMENT
77 yo female with PMH of HTN, HLD, DM2 (metformin, glipizide), depression, CHF (40 lasix daily, recent increase of unknown medication on monday/thursday ?metolazone), X2 KIERSTEN in 2020 for severe stenosis, DVT S/P anticoagulation in setting of COVID in 2020 admitted for SUNIL on CKD 4 2/2 diuretics c/b electrolyte abnormalities.     Problem/Plan - 1:  ·  Problem: 2019 novel coronavirus disease (COVID-19).   ·  Plan: remains > 95% RA  encephalopathy likely secondary to COVID 19 infection as well as SUNIL (metabolic encephalopathy) now resolved   ID evaluation appreciated   no intervention required at this time.     Problem/Plan - 2:  ·  Problem: Acute on chronic kidney failure.   ·  Plan: appears to be improving  will continue to follow renal recommendations  baseline creatinine is 1.8 - 2  continue to monitor   hyponatremia likely secondary to excessive water   continue to restrict fluids.     Problem/Plan - 3:  ·  Problem: Hypertensive emergency.   ·  Plan: improved   continue hydralazine  increase carvedilol to 25 BID.     Problem/Plan - 4:  ·  Problem: Hyperphosphatemia.   ·  Plan: will continue to monitor   monitor closely after hydration.     Problem/Plan - 5:  ·  Problem: Elevated troponin.   ·  Plan: cardiology follow up  unlikely cardiac event  continue to follow cardiology recommendations.     Problem/Plan - 6:  ·  Problem: Type 2 diabetes mellitus.   ·  Plan: HbA1C 7.8  continue finger sticks with short acting insulin sliding scale  no oral meds.     Problem/Plan - 7:  ·  Problem: Chronic diastolic congestive heart failure.   ·  Plan: appears euvolemic   will continue to monitor   hold furosemide and metolazone for now.     Problem/Plan - 8:  ·  Problem: Hyponatremia .   ·  Plan: Renal helping.      Problem/Plan - 9:  ·  Problem: Preventive measure.   ·  Plan: continue heparin SQ.

## 2022-01-01 NOTE — CHART NOTE - NSCHARTNOTEFT_GEN_A_CORE
UCx results discussed w/ ID (Dr. Wheatley), ID recs as follows:  - Would not treat if asymptomatic, only treat if she becomes symptomatic  - Coag neg staph is not a usual UTI bug, possibly a contaminate or colonized, likely not a true UTI       Pamela Peña NP-BC  Department of Medicine  In House Pager #[phone]

## 2022-01-02 LAB
ALBUMIN SERPL ELPH-MCNC: 3.5 G/DL — SIGNIFICANT CHANGE UP (ref 3.3–5)
ALP SERPL-CCNC: 61 U/L — SIGNIFICANT CHANGE UP (ref 40–120)
ALT FLD-CCNC: 18 U/L — SIGNIFICANT CHANGE UP (ref 4–33)
ANION GAP SERPL CALC-SCNC: 13 MMOL/L — SIGNIFICANT CHANGE UP (ref 7–14)
AST SERPL-CCNC: 22 U/L — SIGNIFICANT CHANGE UP (ref 4–32)
BILIRUB SERPL-MCNC: <0.2 MG/DL — SIGNIFICANT CHANGE UP (ref 0.2–1.2)
BUN SERPL-MCNC: 47 MG/DL — HIGH (ref 7–23)
CALCIUM SERPL-MCNC: 9.2 MG/DL — SIGNIFICANT CHANGE UP (ref 8.4–10.5)
CHLORIDE SERPL-SCNC: 90 MMOL/L — LOW (ref 98–107)
CO2 SERPL-SCNC: 24 MMOL/L — SIGNIFICANT CHANGE UP (ref 22–31)
CREAT SERPL-MCNC: 1.68 MG/DL — HIGH (ref 0.5–1.3)
CRP SERPL-MCNC: 19.9 MG/L — HIGH
FERRITIN SERPL-MCNC: 98 NG/ML — SIGNIFICANT CHANGE UP (ref 15–150)
GLUCOSE BLDC GLUCOMTR-MCNC: 192 MG/DL — HIGH (ref 70–99)
GLUCOSE BLDC GLUCOMTR-MCNC: 226 MG/DL — HIGH (ref 70–99)
GLUCOSE BLDC GLUCOMTR-MCNC: 269 MG/DL — HIGH (ref 70–99)
GLUCOSE BLDC GLUCOMTR-MCNC: 331 MG/DL — HIGH (ref 70–99)
GLUCOSE SERPL-MCNC: 173 MG/DL — HIGH (ref 70–99)
HCT VFR BLD CALC: 25.7 % — LOW (ref 34.5–45)
HGB BLD-MCNC: 8.3 G/DL — LOW (ref 11.5–15.5)
LDH SERPL L TO P-CCNC: 199 U/L — SIGNIFICANT CHANGE UP (ref 135–225)
MAGNESIUM SERPL-MCNC: 2.1 MG/DL — SIGNIFICANT CHANGE UP (ref 1.6–2.6)
MCHC RBC-ENTMCNC: 24.9 PG — LOW (ref 27–34)
MCHC RBC-ENTMCNC: 32.3 GM/DL — SIGNIFICANT CHANGE UP (ref 32–36)
MCV RBC AUTO: 76.9 FL — LOW (ref 80–100)
NRBC # BLD: 0 /100 WBCS — SIGNIFICANT CHANGE UP
NRBC # FLD: 0 K/UL — SIGNIFICANT CHANGE UP
PHOSPHATE SERPL-MCNC: 3.7 MG/DL — SIGNIFICANT CHANGE UP (ref 2.5–4.5)
PLATELET # BLD AUTO: 272 K/UL — SIGNIFICANT CHANGE UP (ref 150–400)
POTASSIUM SERPL-MCNC: 4.2 MMOL/L — SIGNIFICANT CHANGE UP (ref 3.5–5.3)
POTASSIUM SERPL-SCNC: 4.2 MMOL/L — SIGNIFICANT CHANGE UP (ref 3.5–5.3)
PROCALCITONIN SERPL-MCNC: 0.09 NG/ML — SIGNIFICANT CHANGE UP (ref 0.02–0.1)
PROT SERPL-MCNC: 6.9 G/DL — SIGNIFICANT CHANGE UP (ref 6–8.3)
RBC # BLD: 3.34 M/UL — LOW (ref 3.8–5.2)
RBC # FLD: 15.9 % — HIGH (ref 10.3–14.5)
SODIUM SERPL-SCNC: 127 MMOL/L — LOW (ref 135–145)
WBC # BLD: 8.5 K/UL — SIGNIFICANT CHANGE UP (ref 3.8–10.5)
WBC # FLD AUTO: 8.5 K/UL — SIGNIFICANT CHANGE UP (ref 3.8–10.5)

## 2022-01-02 RX ORDER — HYDRALAZINE HCL 50 MG
100 TABLET ORAL THREE TIMES A DAY
Refills: 0 | Status: DISCONTINUED | OUTPATIENT
Start: 2022-01-02 | End: 2022-01-09

## 2022-01-02 RX ORDER — HYDRALAZINE HCL 50 MG
5 TABLET ORAL ONCE
Refills: 0 | Status: COMPLETED | OUTPATIENT
Start: 2022-01-02 | End: 2022-01-02

## 2022-01-02 RX ADMIN — CARVEDILOL PHOSPHATE 25 MILLIGRAM(S): 80 CAPSULE, EXTENDED RELEASE ORAL at 05:12

## 2022-01-02 RX ADMIN — Medication 100 MILLIGRAM(S): at 21:45

## 2022-01-02 RX ADMIN — HEPARIN SODIUM 5000 UNIT(S): 5000 INJECTION INTRAVENOUS; SUBCUTANEOUS at 13:04

## 2022-01-02 RX ADMIN — HEPARIN SODIUM 5000 UNIT(S): 5000 INJECTION INTRAVENOUS; SUBCUTANEOUS at 21:45

## 2022-01-02 RX ADMIN — RISPERIDONE 1 MILLIGRAM(S): 4 TABLET ORAL at 11:44

## 2022-01-02 RX ADMIN — Medication 100 MILLIGRAM(S): at 13:10

## 2022-01-02 RX ADMIN — Medication 75 MILLIGRAM(S): at 05:12

## 2022-01-02 RX ADMIN — HEPARIN SODIUM 5000 UNIT(S): 5000 INJECTION INTRAVENOUS; SUBCUTANEOUS at 05:13

## 2022-01-02 RX ADMIN — Medication 1 TABLET(S): at 11:43

## 2022-01-02 RX ADMIN — Medication 1: at 09:18

## 2022-01-02 RX ADMIN — Medication 1: at 21:45

## 2022-01-02 RX ADMIN — Medication 2: at 17:54

## 2022-01-02 RX ADMIN — Medication 100 MILLIGRAM(S): at 11:43

## 2022-01-02 RX ADMIN — ATORVASTATIN CALCIUM 40 MILLIGRAM(S): 80 TABLET, FILM COATED ORAL at 21:45

## 2022-01-02 RX ADMIN — CARVEDILOL PHOSPHATE 25 MILLIGRAM(S): 80 CAPSULE, EXTENDED RELEASE ORAL at 17:54

## 2022-01-02 RX ADMIN — Medication 4: at 13:06

## 2022-01-02 RX ADMIN — Medication 81 MILLIGRAM(S): at 11:43

## 2022-01-02 RX ADMIN — Medication 5 MILLIGRAM(S): at 18:14

## 2022-01-02 NOTE — PROGRESS NOTE ADULT - ASSESSMENT
77 yo female with PMH of HTN, HLD, DM2 (metformin, glipizide), depression, CHF (40 lasix daily, recent increase of unknown medication on monday/thursday ?metolazone), X2 KIERSTEN in 2020 for severe stenosis, DVT S/P anticoagulation in setting of COVID in 2020 admitted for SUNIL on CKD 4 2/2 diuretics c/b electrolyte abnormalities.     Problem/Plan - 1:  ·  Problem: 2019 novel coronavirus disease (COVID-19).   ·  Plan: remains > 95% RA  encephalopathy likely secondary to COVID 19 infection as well as SUNIL (metabolic encephalopathy) now resolved   ID evaluation appreciated   no intervention required at this time.     Problem/Plan - 2:  ·  Problem: Acute on chronic kidney failure.   ·  Plan: appears to be improving  will continue to follow renal recommendations  baseline creatinine is 1.8 - 2  continue to monitor   hyponatremia likely secondary to excessive water   continue to restrict fluids.     Problem/Plan - 3:  ·  Problem: Hypertensive emergency.   ·  Plan: improved   continue hydralazine  increase carvedilol to 25 BID.     Problem/Plan - 4:  ·  Problem: Hyperphosphatemia.   ·  Plan: will continue to monitor   monitor closely after hydration.     Problem/Plan - 5:  ·  Problem: Elevated troponin.   ·  Plan: cardiology follow up  unlikely cardiac event  continue to follow cardiology recommendations.     Problem/Plan - 6:  ·  Problem: Type 2 diabetes mellitus.   ·  Plan: HbA1C 7.8  continue finger sticks with short acting insulin sliding scale  no oral meds.     Problem/Plan - 7:  ·  Problem: Chronic diastolic congestive heart failure.   ·  Plan: appears euvolemic   will continue to monitor   hold furosemide and metolazone for now.     Problem/Plan - 8:  ·  Problem: Hyponatremia .   ·  Plan: Renal helping.      Problem/Plan - 9:  ·  Problem: Preventive measure.   ·  Plan: continue heparin SQ.    Disposition ; DC planning .

## 2022-01-02 NOTE — PROGRESS NOTE ADULT - ASSESSMENT
Attending addendum:   Agree with above  Continue with sapt for history of CAD with PCI > 1 year ago  Treatment of COVID per primary team     Donis Jaquez MD

## 2022-01-02 NOTE — PROGRESS NOTE ADULT - ASSESSMENT
a 77 yo female with PMH of HTN, HLD, DM2 (metformin, glipizide), depression, CHF (40 lasix daily, recent increase of unknown medication on monday/thursday ?metolazone), X2 KIERSTEN in 2020 for severe stenosis, DVT S/P anticoagulation in setting of COVID in 2020, presenting with diarrhea, weakness, inability to ambulate. Consulted nephrology for SUNIL      A/P    SUNIL on CKD   Etiology? likely due to diarrhea. Renal ultrasound shows no hydronephrosis   Scr baseline (~1.5)  - Follow up BMP  - Maintain hydration.    Proteinuria:  Likely due to DM/HTN  Vasculitis work up done in office was negative except HIV and serum immunofixation (will order to complete the work up)    Hyponatremia   Chronic with appropriate Urine Osmolality.   - Hydrate.   - Follow up BMP.    HTN  optimal   continue current med   closely monitor

## 2022-01-03 LAB
ALBUMIN SERPL ELPH-MCNC: 3.5 G/DL — SIGNIFICANT CHANGE UP (ref 3.3–5)
ALP SERPL-CCNC: 65 U/L — SIGNIFICANT CHANGE UP (ref 40–120)
ALT FLD-CCNC: 23 U/L — SIGNIFICANT CHANGE UP (ref 4–33)
ANION GAP SERPL CALC-SCNC: 12 MMOL/L — SIGNIFICANT CHANGE UP (ref 7–14)
AST SERPL-CCNC: 21 U/L — SIGNIFICANT CHANGE UP (ref 4–32)
BILIRUB SERPL-MCNC: <0.2 MG/DL — SIGNIFICANT CHANGE UP (ref 0.2–1.2)
BUN SERPL-MCNC: 47 MG/DL — HIGH (ref 7–23)
CALCIUM SERPL-MCNC: 9.4 MG/DL — SIGNIFICANT CHANGE UP (ref 8.4–10.5)
CHLORIDE SERPL-SCNC: 93 MMOL/L — LOW (ref 98–107)
CO2 SERPL-SCNC: 25 MMOL/L — SIGNIFICANT CHANGE UP (ref 22–31)
CREAT SERPL-MCNC: 1.68 MG/DL — HIGH (ref 0.5–1.3)
CULTURE RESULTS: SIGNIFICANT CHANGE UP
CULTURE RESULTS: SIGNIFICANT CHANGE UP
D DIMER BLD IA.RAPID-MCNC: 271 NG/ML DDU — HIGH
GLUCOSE BLDC GLUCOMTR-MCNC: 211 MG/DL — HIGH (ref 70–99)
GLUCOSE BLDC GLUCOMTR-MCNC: 213 MG/DL — HIGH (ref 70–99)
GLUCOSE BLDC GLUCOMTR-MCNC: 248 MG/DL — HIGH (ref 70–99)
GLUCOSE BLDC GLUCOMTR-MCNC: 330 MG/DL — HIGH (ref 70–99)
GLUCOSE SERPL-MCNC: 189 MG/DL — HIGH (ref 70–99)
HCT VFR BLD CALC: 25.9 % — LOW (ref 34.5–45)
HGB BLD-MCNC: 8.2 G/DL — LOW (ref 11.5–15.5)
MAGNESIUM SERPL-MCNC: 2.2 MG/DL — SIGNIFICANT CHANGE UP (ref 1.6–2.6)
MCHC RBC-ENTMCNC: 25.2 PG — LOW (ref 27–34)
MCHC RBC-ENTMCNC: 31.7 GM/DL — LOW (ref 32–36)
MCV RBC AUTO: 79.4 FL — LOW (ref 80–100)
NRBC # BLD: 0 /100 WBCS — SIGNIFICANT CHANGE UP
NRBC # FLD: 0 K/UL — SIGNIFICANT CHANGE UP
PHOSPHATE SERPL-MCNC: 4 MG/DL — SIGNIFICANT CHANGE UP (ref 2.5–4.5)
PLATELET # BLD AUTO: 292 K/UL — SIGNIFICANT CHANGE UP (ref 150–400)
POTASSIUM SERPL-MCNC: 4.4 MMOL/L — SIGNIFICANT CHANGE UP (ref 3.5–5.3)
POTASSIUM SERPL-SCNC: 4.4 MMOL/L — SIGNIFICANT CHANGE UP (ref 3.5–5.3)
PROT SERPL-MCNC: 6.8 G/DL — SIGNIFICANT CHANGE UP (ref 6–8.3)
RBC # BLD: 3.26 M/UL — LOW (ref 3.8–5.2)
RBC # FLD: 15.9 % — HIGH (ref 10.3–14.5)
SARS-COV-2 RNA SPEC QL NAA+PROBE: DETECTED
SODIUM SERPL-SCNC: 130 MMOL/L — LOW (ref 135–145)
SPECIMEN SOURCE: SIGNIFICANT CHANGE UP
SPECIMEN SOURCE: SIGNIFICANT CHANGE UP
WBC # BLD: 8.52 K/UL — SIGNIFICANT CHANGE UP (ref 3.8–10.5)
WBC # FLD AUTO: 8.52 K/UL — SIGNIFICANT CHANGE UP (ref 3.8–10.5)

## 2022-01-03 RX ORDER — HYDRALAZINE HCL 50 MG
5 TABLET ORAL ONCE
Refills: 0 | Status: COMPLETED | OUTPATIENT
Start: 2022-01-03 | End: 2022-01-03

## 2022-01-03 RX ORDER — AMLODIPINE BESYLATE 2.5 MG/1
5 TABLET ORAL ONCE
Refills: 0 | Status: COMPLETED | OUTPATIENT
Start: 2022-01-03 | End: 2022-01-03

## 2022-01-03 RX ORDER — AMLODIPINE BESYLATE 2.5 MG/1
5 TABLET ORAL DAILY
Refills: 0 | Status: DISCONTINUED | OUTPATIENT
Start: 2022-01-03 | End: 2022-01-06

## 2022-01-03 RX ADMIN — CARVEDILOL PHOSPHATE 25 MILLIGRAM(S): 80 CAPSULE, EXTENDED RELEASE ORAL at 05:28

## 2022-01-03 RX ADMIN — ATORVASTATIN CALCIUM 40 MILLIGRAM(S): 80 TABLET, FILM COATED ORAL at 22:28

## 2022-01-03 RX ADMIN — Medication 4: at 12:53

## 2022-01-03 RX ADMIN — Medication 5 MILLIGRAM(S): at 02:49

## 2022-01-03 RX ADMIN — HEPARIN SODIUM 5000 UNIT(S): 5000 INJECTION INTRAVENOUS; SUBCUTANEOUS at 22:28

## 2022-01-03 RX ADMIN — HEPARIN SODIUM 5000 UNIT(S): 5000 INJECTION INTRAVENOUS; SUBCUTANEOUS at 05:28

## 2022-01-03 RX ADMIN — Medication 1 TABLET(S): at 11:19

## 2022-01-03 RX ADMIN — CARVEDILOL PHOSPHATE 25 MILLIGRAM(S): 80 CAPSULE, EXTENDED RELEASE ORAL at 17:27

## 2022-01-03 RX ADMIN — Medication 2: at 18:04

## 2022-01-03 RX ADMIN — RISPERIDONE 1 MILLIGRAM(S): 4 TABLET ORAL at 11:19

## 2022-01-03 RX ADMIN — Medication 100 MILLIGRAM(S): at 11:19

## 2022-01-03 RX ADMIN — Medication 81 MILLIGRAM(S): at 11:18

## 2022-01-03 RX ADMIN — Medication 100 MILLIGRAM(S): at 13:16

## 2022-01-03 RX ADMIN — AMLODIPINE BESYLATE 5 MILLIGRAM(S): 2.5 TABLET ORAL at 12:03

## 2022-01-03 RX ADMIN — HEPARIN SODIUM 5000 UNIT(S): 5000 INJECTION INTRAVENOUS; SUBCUTANEOUS at 13:15

## 2022-01-03 RX ADMIN — Medication 2: at 10:03

## 2022-01-03 RX ADMIN — Medication 100 MILLIGRAM(S): at 05:29

## 2022-01-03 RX ADMIN — Medication 100 MILLIGRAM(S): at 22:28

## 2022-01-03 NOTE — PROGRESS NOTE ADULT - ASSESSMENT
75 yo female with PMH of HTN, HLD, DM2 (metformin, glipizide), depression, CHF (40 lasix daily, recent increase of unknown medication on monday/thursday ?metolazone), X2 KIERSTEN in 2020 for severe stenosis, DVT S/P anticoagulation in setting of COVID in 2020 admitted for SUNIL on CKD 4 2/2 diuretics c/b electrolyte abnormalities.     Problem/Plan - 1:  ·  Problem: 2019 novel coronavirus disease (COVID-19).   ·  Plan: remains > 95% RA  encephalopathy likely secondary to COVID 19 infection as well as SUNIL (metabolic encephalopathy) now resolved   ID evaluation appreciated   no intervention required at this time.     Problem/Plan - 2:  ·  Problem: Acute on chronic kidney failure.   ·  Plan: appears to be improving  will continue to follow renal recommendations  baseline creatinine is 1.8 - 2  continue to monitor   hyponatremia likely secondary to excessive water   continue to restrict fluids.     Problem/Plan - 3:  ·  Problem: Hypertensive emergency.   ·  Plan: improved   continue hydralazine  increase carvedilol to 25 BID.     Problem/Plan - 4:  ·  Problem: Hyperphosphatemia.   ·  Plan: will continue to monitor   monitor closely after hydration.     Problem/Plan - 5:  ·  Problem: Elevated troponin.   ·  Plan: cardiology follow up  unlikely cardiac event  continue to follow cardiology recommendations.     Problem/Plan - 6:  ·  Problem: Type 2 diabetes mellitus.   ·  Plan: HbA1C 7.8  continue finger sticks with short acting insulin sliding scale  no oral meds.     Problem/Plan - 7:  ·  Problem: Chronic diastolic congestive heart failure.   ·  Plan: appears euvolemic   will continue to monitor   hold furosemide and metolazone for now.     Problem/Plan - 8:  ·  Problem: Hyponatremia .   ·  Plan: Renal helping.      Problem/Plan - 9:  ·  Problem: Preventive measure.   ·  Plan: continue heparin SQ.    Disposition ; DC planning as Medically stable. .

## 2022-01-03 NOTE — PROGRESS NOTE ADULT - ASSESSMENT
a 77 yo female with PMH of HTN, HLD, DM2 (metformin, glipizide), depression, CHF (40 lasix daily, recent increase of unknown medication on monday/thursday ?metolazone), X2 KIERSTEN in 2020 for severe stenosis, DVT S/P anticoagulation in setting of COVID in 2020, presenting with diarrhea, weakness, inability to ambulate. Consulted nephrology for SUNIL      A/P    SUNIL on CKD   Etiology? likely due to diarrhea. Renal ultrasound shows no hydronephrosis   Scr baseline (~1.5)  - Scr remains stable   - Follow up BMP  - Maintain hydration.    Proteinuria:  Likely due to DM/HTN  Vasculitis work up done in office was negative   ordered HIV and serum immunofixation     Hyponatremia   UA suggestive of SIADH   - Continue fluid restriction   - Follow up BMP.    HTN  elevated   add amlodipine 5mg daily  (01/03/22)  closely monitor             a 77 yo female with PMH of HTN, HLD, DM2 (metformin, glipizide), depression, CHF (40 lasix daily, recent increase of unknown medication on monday/thursday ?metolazone), X2 KIERSTEN in 2020 for severe stenosis, DVT S/P anticoagulation in setting of COVID in 2020, presenting with diarrhea, weakness, inability to ambulate. Consulted nephrology for SUNIL      A/P    SUNIL on CKD   Etiology? likely due to diarrhea. Renal ultrasound shows no hydronephrosis   Scr baseline (~1.5)  - Scr remains stable   - Follow up BMP  - Maintain hydration.    Proteinuria:  Likely due to DM/HTN  Vasculitis work up done in office was negative   ordered HIV and serum immunofixation     Hyponatremia   UA suggestive of SIADH   - Continue fluid restriction   - Follow up BMP daily    HTN  elevated   add amlodipine 5mg daily  (01/03/22)  closely monitor

## 2022-01-03 NOTE — CHART NOTE - NSCHARTNOTEFT_GEN_A_CORE
Reviewed Blood pressure trend with Dr. Garcia. Patient required IV BP yesterday due to hypertension, last reading is 176/75, Informed by  to add amlodipine 5 mg.

## 2022-01-04 LAB
ANION GAP SERPL CALC-SCNC: 10 MMOL/L — SIGNIFICANT CHANGE UP (ref 7–14)
BUN SERPL-MCNC: 46 MG/DL — HIGH (ref 7–23)
CALCIUM SERPL-MCNC: 9.6 MG/DL — SIGNIFICANT CHANGE UP (ref 8.4–10.5)
CHLORIDE SERPL-SCNC: 91 MMOL/L — LOW (ref 98–107)
CO2 SERPL-SCNC: 26 MMOL/L — SIGNIFICANT CHANGE UP (ref 22–31)
CREAT SERPL-MCNC: 1.69 MG/DL — HIGH (ref 0.5–1.3)
GLUCOSE BLDC GLUCOMTR-MCNC: 191 MG/DL — HIGH (ref 70–99)
GLUCOSE BLDC GLUCOMTR-MCNC: 261 MG/DL — HIGH (ref 70–99)
GLUCOSE BLDC GLUCOMTR-MCNC: 320 MG/DL — HIGH (ref 70–99)
GLUCOSE BLDC GLUCOMTR-MCNC: 357 MG/DL — HIGH (ref 70–99)
GLUCOSE SERPL-MCNC: 177 MG/DL — HIGH (ref 70–99)
HCT VFR BLD CALC: 26.4 % — LOW (ref 34.5–45)
HGB BLD-MCNC: 8.6 G/DL — LOW (ref 11.5–15.5)
HIV 1+2 AB+HIV1 P24 AG SERPL QL IA: SIGNIFICANT CHANGE UP
MCHC RBC-ENTMCNC: 25.1 PG — LOW (ref 27–34)
MCHC RBC-ENTMCNC: 32.6 GM/DL — SIGNIFICANT CHANGE UP (ref 32–36)
MCV RBC AUTO: 77.2 FL — LOW (ref 80–100)
NRBC # BLD: 0 /100 WBCS — SIGNIFICANT CHANGE UP
NRBC # FLD: 0 K/UL — SIGNIFICANT CHANGE UP
PLATELET # BLD AUTO: 312 K/UL — SIGNIFICANT CHANGE UP (ref 150–400)
POTASSIUM SERPL-MCNC: 4.4 MMOL/L — SIGNIFICANT CHANGE UP (ref 3.5–5.3)
POTASSIUM SERPL-SCNC: 4.4 MMOL/L — SIGNIFICANT CHANGE UP (ref 3.5–5.3)
RBC # BLD: 3.42 M/UL — LOW (ref 3.8–5.2)
RBC # FLD: 15.9 % — HIGH (ref 10.3–14.5)
SODIUM SERPL-SCNC: 127 MMOL/L — LOW (ref 135–145)
WBC # BLD: 8.48 K/UL — SIGNIFICANT CHANGE UP (ref 3.8–10.5)
WBC # FLD AUTO: 8.48 K/UL — SIGNIFICANT CHANGE UP (ref 3.8–10.5)

## 2022-01-04 PROCEDURE — 99231 SBSQ HOSP IP/OBS SF/LOW 25: CPT

## 2022-01-04 RX ORDER — SODIUM CHLORIDE 9 MG/ML
1 INJECTION INTRAMUSCULAR; INTRAVENOUS; SUBCUTANEOUS THREE TIMES A DAY
Refills: 0 | Status: COMPLETED | OUTPATIENT
Start: 2022-01-04 | End: 2022-01-06

## 2022-01-04 RX ADMIN — AMLODIPINE BESYLATE 5 MILLIGRAM(S): 2.5 TABLET ORAL at 05:36

## 2022-01-04 RX ADMIN — SODIUM CHLORIDE 1 GRAM(S): 9 INJECTION INTRAMUSCULAR; INTRAVENOUS; SUBCUTANEOUS at 13:03

## 2022-01-04 RX ADMIN — SODIUM CHLORIDE 1 GRAM(S): 9 INJECTION INTRAMUSCULAR; INTRAVENOUS; SUBCUTANEOUS at 21:35

## 2022-01-04 RX ADMIN — Medication 100 MILLIGRAM(S): at 13:03

## 2022-01-04 RX ADMIN — Medication 1 TABLET(S): at 13:04

## 2022-01-04 RX ADMIN — CARVEDILOL PHOSPHATE 25 MILLIGRAM(S): 80 CAPSULE, EXTENDED RELEASE ORAL at 17:29

## 2022-01-04 RX ADMIN — Medication 3: at 21:56

## 2022-01-04 RX ADMIN — HEPARIN SODIUM 5000 UNIT(S): 5000 INJECTION INTRAVENOUS; SUBCUTANEOUS at 05:37

## 2022-01-04 RX ADMIN — Medication 1: at 09:45

## 2022-01-04 RX ADMIN — CARVEDILOL PHOSPHATE 25 MILLIGRAM(S): 80 CAPSULE, EXTENDED RELEASE ORAL at 05:37

## 2022-01-04 RX ADMIN — Medication 81 MILLIGRAM(S): at 13:04

## 2022-01-04 RX ADMIN — Medication 4: at 12:59

## 2022-01-04 RX ADMIN — Medication 100 MILLIGRAM(S): at 21:36

## 2022-01-04 RX ADMIN — ATORVASTATIN CALCIUM 40 MILLIGRAM(S): 80 TABLET, FILM COATED ORAL at 21:36

## 2022-01-04 RX ADMIN — HEPARIN SODIUM 5000 UNIT(S): 5000 INJECTION INTRAVENOUS; SUBCUTANEOUS at 21:35

## 2022-01-04 RX ADMIN — HEPARIN SODIUM 5000 UNIT(S): 5000 INJECTION INTRAVENOUS; SUBCUTANEOUS at 13:04

## 2022-01-04 RX ADMIN — Medication 3: at 17:28

## 2022-01-04 RX ADMIN — Medication 100 MILLIGRAM(S): at 13:04

## 2022-01-04 RX ADMIN — RISPERIDONE 1 MILLIGRAM(S): 4 TABLET ORAL at 13:04

## 2022-01-04 RX ADMIN — Medication 100 MILLIGRAM(S): at 05:36

## 2022-01-04 NOTE — DIETITIAN INITIAL EVALUATION ADULT. - PROBLEM SELECTOR PLAN 2
in ED  w/ SUNIL, administer STAT hydralazine 50 mg x 1 and coreg 12.5 mg x 1;   Goal -170 in first 24 hours  pending completion of OMR, can resume as needed  covid PCR pending

## 2022-01-04 NOTE — DIETITIAN INITIAL EVALUATION ADULT. - OTHER INFO
Nutrition assessment for length of stay.    Patient confused, disoriented, therefore,  services would not be helpful or appropriate at this time.  Patient admitted for SUNIL on CKD stage IV.  Patient also with Covid-19.  Currently on soft and bite sized diet consistency with 1000ml fluid restriction, no therapeutic diet restrictions ordered, however, in view of elevated POCT glucose and hypertensive urgency, h/o HTN, would suggest DASH/TLC (cholesterol and sodium restricted) and CCHO diet with evening snack to diet order.  Education not appropriate due to confusion.  Allergy to peanuts noted per chart.  No noted GI distress i.e. nausea, vomiting, diarrhea.  No admission weight noted. Last weight per HIE section of the EMR 6/21/20 - 68.4kg.

## 2022-01-04 NOTE — DIETITIAN INITIAL EVALUATION ADULT. - ADD RECOMMEND
1) Monitor weights, PO intake/diet tolerance, skin integrity, pertinent labs. 2) Please consistently document % PO intake in nursing flowsheets to assess adequacy of nutritional intake/monitor need for further nutritional intervention(s).

## 2022-01-04 NOTE — PROGRESS NOTE ADULT - ASSESSMENT
COVID 4/2020.   Here with COVID again 12/28/21.   Resolved weakness, confusion and fever without treating anything.   Asymptomatic CoNS bacteriuria does not represent an infection and does not need to be treated unless she's pregnant or planned for invasive urologic procedure.   Penicillin caused a rash many years ago, unfamiliar with other beta lactams.   Discharge planning.     Suggest  -no clear indication for COVID therapy   -monitor off antibiotics     Signing off     Harman Grimaldo MD   Infectious Disease   Pager [phone]   After 5PM and on weekends please page fellow on call or call [phone]

## 2022-01-04 NOTE — DIETITIAN INITIAL EVALUATION ADULT. - PROBLEM SELECTOR PLAN 7
emailed [facility] Med pharmacist  daughter unavailable for medication list  please resume medications as indicated

## 2022-01-04 NOTE — DIETITIAN INITIAL EVALUATION ADULT. - DIET TYPE
DASH/TLC (sodium and cholesterol restricted diet)/soft and bite-sized/1000ml/consistent carbohydrate (evening snack)

## 2022-01-04 NOTE — DIETITIAN INITIAL EVALUATION ADULT. - PERTINENT MEDS FT
MEDICATIONS  (STANDING):  allopurinol 100 milliGRAM(s) Oral daily  amLODIPine   Tablet 5 milliGRAM(s) Oral daily  aspirin enteric coated 81 milliGRAM(s) Oral daily  atorvastatin 40 milliGRAM(s) Oral at bedtime  carvedilol 25 milliGRAM(s) Oral every 12 hours  dextrose 40% Gel 15 Gram(s) Oral once  dextrose 5%. 1000 milliLiter(s) (100 mL/Hr) IV Continuous <Continuous>  dextrose 5%. 1000 milliLiter(s) (50 mL/Hr) IV Continuous <Continuous>  dextrose 50% Injectable 25 Gram(s) IV Push once  dextrose 50% Injectable 12.5 Gram(s) IV Push once  dextrose 50% Injectable 25 Gram(s) IV Push once  glucagon  Injectable 1 milliGRAM(s) IntraMuscular once  heparin   Injectable 5000 Unit(s) SubCutaneous every 8 hours  hydrALAZINE 100 milliGRAM(s) Oral three times a day  influenza  Vaccine (HIGH DOSE) 0.7 milliLiter(s) IntraMuscular once  insulin lispro (ADMELOG) corrective regimen sliding scale   SubCutaneous at bedtime  insulin lispro (ADMELOG) corrective regimen sliding scale   SubCutaneous three times a day before meals  multivitamin 1 Tablet(s) Oral daily  risperiDONE   Tablet 1 milliGRAM(s) Oral daily  sodium chloride 1 Gram(s) Oral three times a day    MEDICATIONS  (PRN):  acetaminophen     Tablet .. 650 milliGRAM(s) Oral every 6 hours PRN Temp greater or equal to 38C (100.4F), Mild Pain (1 - 3)

## 2022-01-04 NOTE — DIETITIAN INITIAL EVALUATION ADULT. - ORAL NUTRITION SUPPLEMENTS
If patient exhibits diminished appetite / po intake, may consider Glucerna Therapeutic Nutrition 240mls 2x daily (440kcals, 20g protein) to optimize nutrition.

## 2022-01-04 NOTE — DIETITIAN INITIAL EVALUATION ADULT. - PERTINENT LABORATORY DATA
01-04 Na127 mmol/L<L> Glu 177 mg/dL<H> K+ 4.4 mmol/L Cr  1.69 mg/dL<H> BUN 46 mg/dL<H> 01-03 Phos 4.0 mg/dL 01-03 Alb 3.5 g/dL 12-29 Chol 152 mg/dL LDL --    HDL 30 mg/dL<L> Trig 183 mg/dL<H>    CAPILLARY BLOOD GLUCOSE  POCT Blood Glucose.: 320 mg/dL (04 Jan 2022 12:26)  POCT Blood Glucose.: 191 mg/dL (04 Jan 2022 09:10)  POCT Blood Glucose.: 248 mg/dL (03 Jan 2022 21:00)  POCT Blood Glucose.: 213 mg/dL (03 Jan 2022 18:00)    A1C with Estimated Average Glucose (12.29.21 @ 08:22)    A1C with Estimated Average Glucose Result: 7.9: High Risk (prediabetic)    5.7 - 6.4 %  Diabetic, diagnostic           > 6.5 %  ADA diabetic treatment goal    < 7.0 %  HbA1C values may not accurately reflect mean blood glucose in patients  with Hb variants.  Suggest clinical correlation. %    Estimated Average Glucose: 180

## 2022-01-05 LAB
ANION GAP SERPL CALC-SCNC: 17 MMOL/L — HIGH (ref 7–14)
BUN SERPL-MCNC: 46 MG/DL — HIGH (ref 7–23)
CALCIUM SERPL-MCNC: 9.5 MG/DL — SIGNIFICANT CHANGE UP (ref 8.4–10.5)
CHLORIDE SERPL-SCNC: 93 MMOL/L — LOW (ref 98–107)
CO2 SERPL-SCNC: 19 MMOL/L — LOW (ref 22–31)
CREAT SERPL-MCNC: 1.56 MG/DL — HIGH (ref 0.5–1.3)
CRP SERPL-MCNC: 12 MG/L — HIGH
FERRITIN SERPL-MCNC: 394 NG/ML — HIGH (ref 15–150)
GLUCOSE BLDC GLUCOMTR-MCNC: 207 MG/DL — HIGH (ref 70–99)
GLUCOSE BLDC GLUCOMTR-MCNC: 302 MG/DL — HIGH (ref 70–99)
GLUCOSE BLDC GLUCOMTR-MCNC: 339 MG/DL — HIGH (ref 70–99)
GLUCOSE BLDC GLUCOMTR-MCNC: 360 MG/DL — HIGH (ref 70–99)
GLUCOSE SERPL-MCNC: 215 MG/DL — HIGH (ref 70–99)
HCT VFR BLD CALC: 31.2 % — LOW (ref 34.5–45)
HGB BLD-MCNC: 9 G/DL — LOW (ref 11.5–15.5)
LDH SERPL L TO P-CCNC: 321 U/L — HIGH (ref 135–225)
MAGNESIUM SERPL-MCNC: 2.2 MG/DL — SIGNIFICANT CHANGE UP (ref 1.6–2.6)
MCHC RBC-ENTMCNC: 25.3 PG — LOW (ref 27–34)
MCHC RBC-ENTMCNC: 28.8 GM/DL — LOW (ref 32–36)
MCV RBC AUTO: 87.6 FL — SIGNIFICANT CHANGE UP (ref 80–100)
NRBC # BLD: 0 /100 WBCS — SIGNIFICANT CHANGE UP
NRBC # FLD: 0 K/UL — SIGNIFICANT CHANGE UP
PHOSPHATE SERPL-MCNC: 3.7 MG/DL — SIGNIFICANT CHANGE UP (ref 2.5–4.5)
PLATELET # BLD AUTO: 280 K/UL — SIGNIFICANT CHANGE UP (ref 150–400)
POTASSIUM SERPL-MCNC: 5.2 MMOL/L — SIGNIFICANT CHANGE UP (ref 3.5–5.3)
POTASSIUM SERPL-SCNC: 5.2 MMOL/L — SIGNIFICANT CHANGE UP (ref 3.5–5.3)
RBC # BLD: 3.56 M/UL — LOW (ref 3.8–5.2)
RBC # FLD: 16 % — HIGH (ref 10.3–14.5)
SODIUM SERPL-SCNC: 129 MMOL/L — LOW (ref 135–145)
WBC # BLD: 7.38 K/UL — SIGNIFICANT CHANGE UP (ref 3.8–10.5)
WBC # FLD AUTO: 7.38 K/UL — SIGNIFICANT CHANGE UP (ref 3.8–10.5)

## 2022-01-05 RX ADMIN — Medication 100 MILLIGRAM(S): at 12:12

## 2022-01-05 RX ADMIN — SODIUM CHLORIDE 1 GRAM(S): 9 INJECTION INTRAMUSCULAR; INTRAVENOUS; SUBCUTANEOUS at 05:58

## 2022-01-05 RX ADMIN — Medication 4: at 18:21

## 2022-01-05 RX ADMIN — CARVEDILOL PHOSPHATE 25 MILLIGRAM(S): 80 CAPSULE, EXTENDED RELEASE ORAL at 05:59

## 2022-01-05 RX ADMIN — HEPARIN SODIUM 5000 UNIT(S): 5000 INJECTION INTRAVENOUS; SUBCUTANEOUS at 21:31

## 2022-01-05 RX ADMIN — CARVEDILOL PHOSPHATE 25 MILLIGRAM(S): 80 CAPSULE, EXTENDED RELEASE ORAL at 18:31

## 2022-01-05 RX ADMIN — HEPARIN SODIUM 5000 UNIT(S): 5000 INJECTION INTRAVENOUS; SUBCUTANEOUS at 12:13

## 2022-01-05 RX ADMIN — Medication 2: at 09:35

## 2022-01-05 RX ADMIN — HEPARIN SODIUM 5000 UNIT(S): 5000 INJECTION INTRAVENOUS; SUBCUTANEOUS at 05:59

## 2022-01-05 RX ADMIN — Medication 81 MILLIGRAM(S): at 12:13

## 2022-01-05 RX ADMIN — Medication 3: at 21:32

## 2022-01-05 RX ADMIN — Medication 100 MILLIGRAM(S): at 05:58

## 2022-01-05 RX ADMIN — SODIUM CHLORIDE 1 GRAM(S): 9 INJECTION INTRAMUSCULAR; INTRAVENOUS; SUBCUTANEOUS at 12:11

## 2022-01-05 RX ADMIN — Medication 4: at 13:29

## 2022-01-05 RX ADMIN — SODIUM CHLORIDE 1 GRAM(S): 9 INJECTION INTRAMUSCULAR; INTRAVENOUS; SUBCUTANEOUS at 21:31

## 2022-01-05 RX ADMIN — Medication 1 TABLET(S): at 12:11

## 2022-01-05 RX ADMIN — AMLODIPINE BESYLATE 5 MILLIGRAM(S): 2.5 TABLET ORAL at 05:59

## 2022-01-05 RX ADMIN — Medication 100 MILLIGRAM(S): at 21:31

## 2022-01-05 RX ADMIN — ATORVASTATIN CALCIUM 40 MILLIGRAM(S): 80 TABLET, FILM COATED ORAL at 21:31

## 2022-01-05 RX ADMIN — RISPERIDONE 1 MILLIGRAM(S): 4 TABLET ORAL at 12:11

## 2022-01-05 NOTE — PROVIDER CONTACT NOTE (OTHER) - ACTION/TREATMENT ORDERED:
Awaiting response, will continue to monitor.
Provider made aware. Gave 0600 hydralazine at 0300.
Provider notified. No further orders at this time.
Provider notified. No further orders at this time.
Provider made aware. Hold PO Hydralazine. Give IVP. Recheck in 30 minutes
recheck manually. no further orders at this time. BP medication scheduled for morning.
one time hydralazine dose ordered. give morning medication as ordered. repeat BP
Hydralazine dose increased to 100mg TID. Continue to monitor.
Provider notified. Coreg given as per order.
Pt was given 2200 BP medication. Provider made aware.
no additional recommendations at this time,  will endorse to oncoming RN and follow up
Continue to monitor.
give coreg as ordered. IVP 5mg hydralazine ordered. recheck vital signs after IVP hydralazine.
Provider made aware.
Provider made aware. Give IVP hydralazine. Recheck BP in 30 minutes

## 2022-01-05 NOTE — PROGRESS NOTE ADULT - ASSESSMENT
a 75 yo female with PMH of HTN, HLD, DM2 (metformin, glipizide), depression, CHF (40 lasix daily, recent increase of unknown medication on monday/thursday ?metolazone), X2 KIERSTEN in 2020 for severe stenosis, DVT S/P anticoagulation in setting of COVID in 2020, presenting with diarrhea, weakness, inability to ambulate. Consulted nephrology for SUNIL      A/P    SUNIL on CKD   Etiology? likely due to diarrhea. Renal ultrasound shows no hydronephrosis   Scr baseline (~1.5)  - Scr remains improving   - Follow up BMP, U/O  avoid further nephrotoxic medication     Proteinuria:  Likely due to DM/HTN  Vasculitis work up except for HIV, Serum immunofixation done in office was negative  HIV negative  serum immunofixation pending      Hyponatremia   improving today    UA suggestive of SIADH   - Continue fluid restriction   - s/p salt tablet 1g [id number] (01/04/22)  - glucose is high, please optimize glucose level   - Follow up BMP daily    HTN  elevated   amlodipine 5mg daily was added  (01/03/22)  monitor bp on current medications  may need to adjust bp meds             a 75 yo female with PMH of HTN, HLD, DM2 (metformin, glipizide), depression, CHF (40 lasix daily, recent increase of unknown medication on monday/thursday ?metolazone), X2 KIERSTEN in 2020 for severe stenosis, DVT S/P anticoagulation in setting of COVID in 2020, presenting with diarrhea, weakness, inability to ambulate. Consulted nephrology for SUNIL      A/P    SUNIL on CKD   Etiology? likely due to diarrhea. Renal ultrasound shows no hydronephrosis   Scr baseline (~1.5)  - Scr remains improving   - Follow up BMP, U/O  avoid further nephrotoxic medication     Proteinuria:  Likely due to DM/HTN  Vasculitis work up except for HIV, Serum immunofixation done in office was negative  HIV negative  serum immunofixation pending      Hyponatremia   improving today    UA suggestive of SIADH   - Continue fluid restriction   - started salt tablet 1g tidx 2days (01/04/22)  - glucose is high, please optimize glucose level   - Follow up BMP daily    HTN  elevated   amlodipine 5mg daily was added  (01/03/22)  monitor bp on current medications  may need to adjust bp meds

## 2022-01-05 NOTE — CHART NOTE - NSCHARTNOTEFT_GEN_A_CORE
case d/w Dr. Garcia patient is medically Stable for discharge to rehab . D/w Dr Schulz - cleared for discharge to rehab from renal perspective. Can discharge with salt tabs to complete 2 day course . Social work team following

## 2022-01-05 NOTE — PROVIDER CONTACT NOTE (OTHER) - ASSESSMENT
/79. Patient asymptomatic. no acute distress noted.
No s/s of distress.
Pt shows no s/s of distress
Pt shows no s/s of distress
patient asymptomatic
patient asymptomatic, denies headache, blurred vision
patient asymptomatic, denies headache, chest pressure
Patient stable
Pt shows no s/s of distress
elevated bp. asymptomatic
patient asymptomatic
BP: 189/75, asymptomatic in bed.
Pt shows no s/s of distress
patient asymptomatic
/77 Patient asymptomatic. no acute distress noted.
patient blood pressure 180/65. manual blood pressure of 170/70. HR 83. no signs or symptoms of distress noted.
/74. Patient asymptomatic. no acute distress noted.

## 2022-01-05 NOTE — PROVIDER CONTACT NOTE (OTHER) - NAME OF MD/NP/PA/DO NOTIFIED:
IVY Felix
garrett dorsey
Adenike HAYNES
IVY Felix
Krysta Vogt
Ace Myrick
IVY Felix
IVY Miller
Krysta Vogt
Krysta Vogt
Phillip, IVY Jeong
ACP #6 ON CALL
IVY Morel
IVY Miller Starr

## 2022-01-05 NOTE — PROVIDER CONTACT NOTE (OTHER) - BACKGROUND
75 y/o female with PMH of DM type 2 HTN, HLD, CHF, covid, presented with SUNIL on CKD.
Pt admitted for acute renal failure
Pt admitted for acute renal failure.
admitted +HTN urgency, +covid, +DM
patient admit with acute renal failure
Pt admitted for acute renal failure
Admitted + renal failure, +HTN emergency, +covid.
Pt admitted for acute renal failure.
patient admitted for acute on chronic kidney and covid
75 y/o female with PMH of DM type 2 HTN, HLD, CHF, covid, presented with SUNIL on CKD.
76F admitted for acute renal failure. History of HTN.
Pt admitted for acute renal failure
patient admitted for acute renal failure
75 y/o female with PMH of DM type 2 HTN, HLD, CHF, covid, presented with SUNIL on CKD.
patient admit with acute renal failure
patient admit with acute renal failure

## 2022-01-05 NOTE — PROVIDER CONTACT NOTE (OTHER) - SITUATION
/61
/74
Pt bp 200/77, asymptomatic. No s/s of distress
elevated BP. asymptomatic
Patient BP at /68
/50
/77
patient blood pressure 180/65. manual blood pressure of 170/70. HR 83. no signs or symptoms of distress noted.
Pt bp 179/68, asymptomatic. No s/s of distress
was retaining urine this morning 344 ML , however , while trying to straight catch her she urinated and now has only 196 ml
/60
/79
Pt BP: 189/75.
Pt bp 208/78, asymptomatic. No s/s of distress
Rn notified provider regarding bp 184/72 at this time
pt / 57   HR 74  O2 100 RA
pt /57, HR 71, O2 100 room air. No s/s of distress.

## 2022-01-05 NOTE — PROGRESS NOTE ADULT - ASSESSMENT
75 yo female with PMH of HTN, HLD, DM2 (metformin, glipizide), depression, CHF (40 lasix daily, recent increase of unknown medication on monday/thursday ?metolazone), X2 KIERSTEN in 2020 for severe stenosis, DVT S/P anticoagulation in setting of COVID in 2020 admitted for SUNIL on CKD 4 2/2 diuretics c/b electrolyte abnormalities.     Problem/Plan - 1:  ·  Problem: 2019 novel coronavirus disease (COVID-19).   ·  Plan: remains > 95% RA  encephalopathy likely secondary to COVID 19 infection as well as SUNIL (metabolic encephalopathy) now resolved   ID evaluation appreciated   no intervention required at this time.     Problem/Plan - 2:  ·  Problem: Acute on chronic kidney failure.   ·  Plan: appears to be stable.   will continue to follow renal recommendations  baseline creatinine is 1.8 - 2  continue to monitor   hyponatremia likely secondary to excessive water   continue to restrict fluids.     Problem/Plan - 3:  ·  Problem: Hypertensive emergency.   ·  Plan: improved   continue hydralazine  increase carvedilol to 25 BID.     Problem/Plan - 4:  ·  Problem: Hyperphosphatemia.   ·  Plan: will continue to monitor   monitor closely after hydration.     Problem/Plan - 5:  ·  Problem: Elevated troponin.   ·  Plan: cardiology follow up  unlikely cardiac event  continue to follow cardiology recommendations.     Problem/Plan - 6:  ·  Problem: Type 2 diabetes mellitus.   ·  Plan: HbA1C 7.8  continue finger sticks with short acting insulin sliding scale  no oral meds.     Problem/Plan - 7:  ·  Problem: Chronic diastolic congestive heart failure.   ·  Plan: appears euvolemic   will continue to monitor   hold furosemide and metolazone for now.     Problem/Plan - 8:  ·  Problem: Hyponatremia .   ·  Plan: Renal helping.      Problem/Plan - 9:  ·  Problem: Preventive measure.   ·  Plan: continue heparin SQ.    Disposition ; DC planning pending placement .

## 2022-01-05 NOTE — PROVIDER CONTACT NOTE (OTHER) - DATE AND TIME:
31-Dec-2021 09:20
01-Jan-2022 21:50
02-Jan-2022 14:00
02-Jan-2022 13:30
05-Jan-2022 18:21
31-Dec-2021 18:20
31-Dec-2021 22:50
29-Dec-2021 09:22
01-Jan-2022 02:56
02-Jan-2022 14:00
30-Dec-2021 01:15
30-Dec-2021 21:10
01-Jan-2022 00:40
01-Jan-2022 22:25
03-Jan-2022 01:46
31-Dec-2021 13:16

## 2022-01-05 NOTE — PROVIDER CONTACT NOTE (OTHER) - RECOMMENDATIONS
Provider made aware
Provider made aware.
Contact ACP
Provider made aware.
Provider made aware.
bp control
Continue to monitor.
PA notified
Provider notified
hydralazine 100 mg given as ordered. Will continue to monitor.
Provider made aware. Give 0600 hydralazine at 0300.
Provider notified
Provider notified
coreg admin as per order
provider made aware.
Give PO hydralazine as ordered. Continue to monitor.
PA notified

## 2022-01-06 DIAGNOSIS — E78.5 HYPERLIPIDEMIA, UNSPECIFIED: ICD-10-CM

## 2022-01-06 LAB
ANION GAP SERPL CALC-SCNC: 11 MMOL/L — SIGNIFICANT CHANGE UP (ref 7–14)
ANION GAP SERPL CALC-SCNC: 9 MMOL/L — SIGNIFICANT CHANGE UP (ref 7–14)
BLD GP AB SCN SERPL QL: NEGATIVE — SIGNIFICANT CHANGE UP
BUN SERPL-MCNC: 54 MG/DL — HIGH (ref 7–23)
BUN SERPL-MCNC: 56 MG/DL — HIGH (ref 7–23)
CALCIUM SERPL-MCNC: 9.1 MG/DL — SIGNIFICANT CHANGE UP (ref 8.4–10.5)
CALCIUM SERPL-MCNC: 9.2 MG/DL — SIGNIFICANT CHANGE UP (ref 8.4–10.5)
CHLORIDE SERPL-SCNC: 96 MMOL/L — LOW (ref 98–107)
CHLORIDE SERPL-SCNC: 98 MMOL/L — SIGNIFICANT CHANGE UP (ref 98–107)
CO2 SERPL-SCNC: 24 MMOL/L — SIGNIFICANT CHANGE UP (ref 22–31)
CO2 SERPL-SCNC: 26 MMOL/L — SIGNIFICANT CHANGE UP (ref 22–31)
CREAT SERPL-MCNC: 1.87 MG/DL — HIGH (ref 0.5–1.3)
CREAT SERPL-MCNC: 1.87 MG/DL — HIGH (ref 0.5–1.3)
GLUCOSE BLDC GLUCOMTR-MCNC: 231 MG/DL — HIGH (ref 70–99)
GLUCOSE BLDC GLUCOMTR-MCNC: 246 MG/DL — HIGH (ref 70–99)
GLUCOSE BLDC GLUCOMTR-MCNC: 249 MG/DL — HIGH (ref 70–99)
GLUCOSE BLDC GLUCOMTR-MCNC: 329 MG/DL — HIGH (ref 70–99)
GLUCOSE SERPL-MCNC: 214 MG/DL — HIGH (ref 70–99)
GLUCOSE SERPL-MCNC: 267 MG/DL — HIGH (ref 70–99)
HCT VFR BLD CALC: 24.1 % — LOW (ref 34.5–45)
HCT VFR BLD CALC: 24.1 % — LOW (ref 34.5–45)
HGB BLD-MCNC: 7.7 G/DL — LOW (ref 11.5–15.5)
HGB BLD-MCNC: 7.9 G/DL — LOW (ref 11.5–15.5)
MAGNESIUM SERPL-MCNC: 2.1 MG/DL — SIGNIFICANT CHANGE UP (ref 1.6–2.6)
MCHC RBC-ENTMCNC: 25.5 PG — LOW (ref 27–34)
MCHC RBC-ENTMCNC: 25.8 PG — LOW (ref 27–34)
MCHC RBC-ENTMCNC: 32 GM/DL — SIGNIFICANT CHANGE UP (ref 32–36)
MCHC RBC-ENTMCNC: 32.8 GM/DL — SIGNIFICANT CHANGE UP (ref 32–36)
MCV RBC AUTO: 77.7 FL — LOW (ref 80–100)
MCV RBC AUTO: 80.6 FL — SIGNIFICANT CHANGE UP (ref 80–100)
NRBC # BLD: 0 /100 WBCS — SIGNIFICANT CHANGE UP
NRBC # BLD: 0 /100 WBCS — SIGNIFICANT CHANGE UP
NRBC # FLD: 0 K/UL — SIGNIFICANT CHANGE UP
NRBC # FLD: 0 K/UL — SIGNIFICANT CHANGE UP
PHOSPHATE SERPL-MCNC: 4 MG/DL — SIGNIFICANT CHANGE UP (ref 2.5–4.5)
PLATELET # BLD AUTO: 299 K/UL — SIGNIFICANT CHANGE UP (ref 150–400)
PLATELET # BLD AUTO: 306 K/UL — SIGNIFICANT CHANGE UP (ref 150–400)
POTASSIUM SERPL-MCNC: 4.5 MMOL/L — SIGNIFICANT CHANGE UP (ref 3.5–5.3)
POTASSIUM SERPL-MCNC: 4.8 MMOL/L — SIGNIFICANT CHANGE UP (ref 3.5–5.3)
POTASSIUM SERPL-SCNC: 4.5 MMOL/L — SIGNIFICANT CHANGE UP (ref 3.5–5.3)
POTASSIUM SERPL-SCNC: 4.8 MMOL/L — SIGNIFICANT CHANGE UP (ref 3.5–5.3)
RBC # BLD: 2.99 M/UL — LOW (ref 3.8–5.2)
RBC # BLD: 3.1 M/UL — LOW (ref 3.8–5.2)
RBC # FLD: 16.3 % — HIGH (ref 10.3–14.5)
RBC # FLD: 16.3 % — HIGH (ref 10.3–14.5)
RH IG SCN BLD-IMP: POSITIVE — SIGNIFICANT CHANGE UP
SODIUM SERPL-SCNC: 131 MMOL/L — LOW (ref 135–145)
SODIUM SERPL-SCNC: 133 MMOL/L — LOW (ref 135–145)
WBC # BLD: 7.69 K/UL — SIGNIFICANT CHANGE UP (ref 3.8–10.5)
WBC # BLD: 9.16 K/UL — SIGNIFICANT CHANGE UP (ref 3.8–10.5)
WBC # FLD AUTO: 7.69 K/UL — SIGNIFICANT CHANGE UP (ref 3.8–10.5)
WBC # FLD AUTO: 9.16 K/UL — SIGNIFICANT CHANGE UP (ref 3.8–10.5)

## 2022-01-06 PROCEDURE — 86334 IMMUNOFIX E-PHORESIS SERUM: CPT | Mod: 26

## 2022-01-06 RX ORDER — INSULIN LISPRO 100/ML
5 VIAL (ML) SUBCUTANEOUS
Refills: 0 | Status: DISCONTINUED | OUTPATIENT
Start: 2022-01-06 | End: 2022-01-07

## 2022-01-06 RX ORDER — AMLODIPINE BESYLATE 2.5 MG/1
10 TABLET ORAL DAILY
Refills: 0 | Status: DISCONTINUED | OUTPATIENT
Start: 2022-01-07 | End: 2022-01-09

## 2022-01-06 RX ORDER — INSULIN GLARGINE 100 [IU]/ML
10 INJECTION, SOLUTION SUBCUTANEOUS AT BEDTIME
Refills: 0 | Status: DISCONTINUED | OUTPATIENT
Start: 2022-01-06 | End: 2022-01-07

## 2022-01-06 RX ORDER — AMLODIPINE BESYLATE 2.5 MG/1
5 TABLET ORAL ONCE
Refills: 0 | Status: COMPLETED | OUTPATIENT
Start: 2022-01-06 | End: 2022-01-06

## 2022-01-06 RX ORDER — AMLODIPINE BESYLATE 2.5 MG/1
10 TABLET ORAL DAILY
Refills: 0 | Status: DISCONTINUED | OUTPATIENT
Start: 2022-01-06 | End: 2022-01-06

## 2022-01-06 RX ADMIN — Medication 2: at 09:16

## 2022-01-06 RX ADMIN — RISPERIDONE 1 MILLIGRAM(S): 4 TABLET ORAL at 12:36

## 2022-01-06 RX ADMIN — Medication 100 MILLIGRAM(S): at 12:36

## 2022-01-06 RX ADMIN — CARVEDILOL PHOSPHATE 25 MILLIGRAM(S): 80 CAPSULE, EXTENDED RELEASE ORAL at 18:27

## 2022-01-06 RX ADMIN — INSULIN GLARGINE 10 UNIT(S): 100 INJECTION, SOLUTION SUBCUTANEOUS at 21:46

## 2022-01-06 RX ADMIN — AMLODIPINE BESYLATE 5 MILLIGRAM(S): 2.5 TABLET ORAL at 18:27

## 2022-01-06 RX ADMIN — Medication 81 MILLIGRAM(S): at 12:36

## 2022-01-06 RX ADMIN — Medication 2: at 18:05

## 2022-01-06 RX ADMIN — SODIUM CHLORIDE 1 GRAM(S): 9 INJECTION INTRAMUSCULAR; INTRAVENOUS; SUBCUTANEOUS at 05:29

## 2022-01-06 RX ADMIN — Medication 100 MILLIGRAM(S): at 05:29

## 2022-01-06 RX ADMIN — Medication 4: at 12:38

## 2022-01-06 RX ADMIN — HEPARIN SODIUM 5000 UNIT(S): 5000 INJECTION INTRAVENOUS; SUBCUTANEOUS at 05:29

## 2022-01-06 RX ADMIN — ATORVASTATIN CALCIUM 40 MILLIGRAM(S): 80 TABLET, FILM COATED ORAL at 21:46

## 2022-01-06 RX ADMIN — Medication 5 UNIT(S): at 18:06

## 2022-01-06 RX ADMIN — AMLODIPINE BESYLATE 5 MILLIGRAM(S): 2.5 TABLET ORAL at 05:29

## 2022-01-06 RX ADMIN — Medication 100 MILLIGRAM(S): at 12:56

## 2022-01-06 RX ADMIN — Medication 5 UNIT(S): at 12:37

## 2022-01-06 RX ADMIN — Medication 100 MILLIGRAM(S): at 21:45

## 2022-01-06 RX ADMIN — HEPARIN SODIUM 5000 UNIT(S): 5000 INJECTION INTRAVENOUS; SUBCUTANEOUS at 12:58

## 2022-01-06 RX ADMIN — CARVEDILOL PHOSPHATE 25 MILLIGRAM(S): 80 CAPSULE, EXTENDED RELEASE ORAL at 05:32

## 2022-01-06 RX ADMIN — Medication 1 TABLET(S): at 12:36

## 2022-01-06 RX ADMIN — HEPARIN SODIUM 5000 UNIT(S): 5000 INJECTION INTRAVENOUS; SUBCUTANEOUS at 21:45

## 2022-01-06 NOTE — CONSULT NOTE ADULT - ASSESSMENT
Assessment  DMT2: 76y Female with DM T2 with hyperglycemia, A1C 7.9%, was on oral meds at home, now on insulin coverage, blood sugars running high and not at target, did not receive any steroids, no hypoglycemic episodes, eating meals, NAD.  Covid-19: on medications, stable, monitored.  HTN: Controlled,  on antihypertensive medications.  HLD: On statin.  CKD: Monitor labs/BMP.                 Assessment  DMT2: 76y Female with DM T2 with hyperglycemia, A1C 7.9%, was on oral meds at home, now on insulin coverage, blood sugars running high and not at target, did not receive any steroids, no hypoglycemic episodes, eating meals, NAD.  Covid-19: on medications, stable, monitored.  HTN: Controlled,  on antihypertensive medications.  HLD: On statin.  CKD: Monitor labs/BMP.      Thank u for consult  Alejo silva MD  cell:[phone]

## 2022-01-06 NOTE — CONSULT NOTE ADULT - PROBLEM SELECTOR RECOMMENDATION 9
Will start Lantus 10u at bedtime.  Will start Admelog 5u before each meal and continue Admelog correction scale coverage. Will continue monitoring FS and FU.  Will continue monitoring and FU DC recommendations.  Patient counseled for compliance with consistent low carb diet.

## 2022-01-06 NOTE — PROGRESS NOTE ADULT - ASSESSMENT
a 77 yo female with PMH of HTN, HLD, DM2 (metformin, glipizide), depression, CHF (40 lasix daily, recent increase of unknown medication on monday/thursday ?metolazone), X2 KIERSTEN in 2020 for severe stenosis, DVT S/P anticoagulation in setting of COVID in 2020, presenting with diarrhea, weakness, inability to ambulate. Consulted nephrology for SUNIL      A/P    SUNIL on CKD   Etiology? likely due to diarrhea. Renal ultrasound shows no hydronephrosis   Scr baseline (~1.5)  - Scr worsening likely 2/2 uncontrolled glucose level  - optimize glucose level    - Follow up BMP, U/O  avoid further nephrotoxic medication     Proteinuria:  Likely due to DM/HTN  Vasculitis work up except for HIV, Serum immunofixation done in office was negative  HIV negative  serum immunofixation pending      Hyponatremia   improving today    UA suggestive of SIADH   s/p salt tablet 1g tidx 2days (01/04/22)  - Continue fluid restriction   - glucose is high, please optimize glucose level   - Follow up BMP daily    HTN  suboptimal   amlodipine 5mg daily was added  (01/03/22)  monitor bp on current medications  may need to adjust bp meds             a 75 yo female with PMH of HTN, HLD, DM2 (metformin, glipizide), depression, CHF (40 lasix daily, recent increase of unknown medication on monday/thursday ?metolazone), X2 KIERSTEN in 2020 for severe stenosis, DVT S/P anticoagulation in setting of COVID in 2020, presenting with diarrhea, weakness, inability to ambulate. Consulted nephrology for SUNIL      A/P    SUNIL on CKD   Etiology? likely due to diarrhea. Renal ultrasound shows no hydronephrosis   Scr baseline (~1.5)  - Scr worsening likely 2/2 uncontrolled glucose level  - optimize glucose level    - Follow up BMP, U/O  avoid further nephrotoxic medication     Proteinuria:  Likely due to DM/HTN  Vasculitis work up except for HIV, Serum immunofixation done in office was negative  HIV negative  serum immunofixation pending      Hyponatremia   improving today    UA suggestive of SIADH   s/p salt tablet 1g tidx 2days (01/04/22)  - Continue fluid restriction   - glucose is high, please optimize glucose level   - Follow up BMP daily    HTN  suboptimal   amlodipine 5mg daily was added  (01/03/22)  If remains elevated increase amlodipine to 10mg QD   Low salt diet   Monitor closely

## 2022-01-06 NOTE — CONSULT NOTE ADULT - SUBJECTIVE AND OBJECTIVE BOX
[facility] [place] [facility]   MD HA STOUT MD, PA INJUNG KO NP    TEL:  OFFICE: [phone]  DR FLORES CELL: [phone]  DR. HODGES CELL: [phone]  WESTLEY DAVE CELL: [phone]  FIDEL STAPLES CELL :[phone]  From 5pm-7am answering service [phone]    --- INITIAL RENAL CONSULT NOTE ---date of service 21 @ 12:25    HPI:   a 75 yo female with PMH of HTN, HLD, DM2 (metformin, glipizide), depression, CHF (40 lasix daily, recent increase of unknown medication on monday/thursday ?metolazone), X2 KIERSTEN in  for severe stenosis, DVT S/P anticoagulation in setting of COVID in 2020, presenting with diarrhea, weakness, inability to ambulate. Consulted nephrology for SUNIL       Allergies:  peanuts (Short breath; Hives)  penicillin (Short breath; Hives)      PAST MEDICAL & SURGICAL HISTORY:  Diabetes mellitus    Hypertension    Hypercholesterolemia    Anxiety and depression    No significant past surgical history        Home Medications Reviewed    Hospital Medications:   MEDICATIONS  (STANDING):  aspirin enteric coated 81 milliGRAM(s) Oral daily  carvedilol 12.5 milliGRAM(s) Oral every 12 hours  dextrose 40% Gel 15 Gram(s) Oral once  dextrose 5%. 1000 milliLiter(s) (50 mL/Hr) IV Continuous <Continuous>  dextrose 5%. 1000 milliLiter(s) (100 mL/Hr) IV Continuous <Continuous>  dextrose 50% Injectable 25 Gram(s) IV Push once  dextrose 50% Injectable 12.5 Gram(s) IV Push once  dextrose 50% Injectable 25 Gram(s) IV Push once  glucagon  Injectable 1 milliGRAM(s) IntraMuscular once  heparin   Injectable 5000 Unit(s) SubCutaneous every 8 hours  hydrALAZINE 50 milliGRAM(s) Oral three times a day  influenza  Vaccine (HIGH DOSE) 0.7 milliLiter(s) IntraMuscular once  insulin lispro (ADMELOG) corrective regimen sliding scale   SubCutaneous three times a day before meals  insulin lispro (ADMELOG) corrective regimen sliding scale   SubCutaneous at bedtime    SOCIAL HISTORY:  Denies ETOh, Smoking,     FAMILY HISTORY:  No pertinent family history in first degree relatives      REVIEW OF SYSTEMS:  Unable to obtain ROS, not following the commends     VITALS:  T(F): 100.5 (21 @ 11:45), Max: 100.5 (21 @ 11:45)  HR: 87 (21 @ 11:45)  BP: 155/54 (21 @ 11:45)  RR: 18 (21 @ 11:45)  SpO2: 100% (21 @ 11:45)  Wt(kg): --      PHYSICAL EXAM:  Constitutional: NAD  HEENT: anicteric sclera, oropharynx clear, MMM  Neck: No JVD  Respiratory: CTAB, no wheezes, rales or rhonchi  Cardiovascular: S1, S2, RRR  Gastrointestinal: BS+, soft, NT/ND  Extremities: No cyanosis or clubbing. No peripheral edema  Neurological: not following the commands   Psychiatric: Normal mood, normal affect  : No CVA tenderness. No michaels.   Skin: No rashes    LABS:      132<L>  |  92<L>  |  72<H>  ----------------------------<  140<H>  3.7   |  26  |  2.33<H>    Ca    9.6      29 Dec 2021 08:22  Phos  4.9       Mg     1.90         TPro  7.6  /  Alb  3.8  /  TBili  0.2  /  DBili      /  AST  25  /  ALT  17  /  AlkPhos  61      Creatinine Trend: 2.33 <--, 2.86 <--                        10.2   5.88  )-----------( 280      ( 29 Dec 2021 08:22 )             31.0     Urine Studies:  Urinalysis Basic - ( 28 Dec 2021 13:07 )    Color: Light Yellow / Appearance: Clear / S.013 / pH:   Gluc:  / Ketone: Negative  / Bili: Negative / Urobili: <2 mg/dL   Blood:  / Protein: 300 mg/dL / Nitrite: Negative   Leuk Esterase: Negative / RBC: 2 /HPF / WBC 1 /HPF   Sq Epi:  / Non Sq Epi: 1 /HPF / Bacteria: Negative          RADIOLOGY & ADDITIONAL STUDIES:                
HPI:   76F with DM, HTN, CAD.   Admitted  for weakness.   COVID positive.   Spoke with daughter over the phone Nelsy.   Onset 4 days prior followed by confusion.   Had diarrhea starting  but only lasted a few hours. She was active and alert as usual but the next morning she was so weak she couldn't get out of bed. They thought she was dehydrated.   She never had fever or chills at home. No cough or trouble breathing.   Had COVID in 2020. Was not vaccinated.   Daughter says she normally doesn't talk much in the hospital or with strangers because she's shy and language barrier. She barely said anything to us even if nursing .   Penicillin caused a rash many years ago, daughter unfamiliar with other beta lactams.       PAST MEDICAL & SURGICAL HISTORY:  Diabetes mellitus    Hypertension    Hypercholesterolemia    Anxiety and depression    No significant past surgical history        Allergies    peanuts (Short breath; Hives)  penicillin (Short breath; Hives)    Intolerances        ANTIMICROBIALS:      OTHER MEDS:  acetaminophen     Tablet .. 650 milliGRAM(s) Oral every 6 hours PRN  aspirin enteric coated 81 milliGRAM(s) Oral daily  carvedilol 12.5 milliGRAM(s) Oral every 12 hours  dextrose 40% Gel 15 Gram(s) Oral once  dextrose 5%. 1000 milliLiter(s) IV Continuous <Continuous>  dextrose 5%. 1000 milliLiter(s) IV Continuous <Continuous>  dextrose 50% Injectable 25 Gram(s) IV Push once  dextrose 50% Injectable 12.5 Gram(s) IV Push once  dextrose 50% Injectable 25 Gram(s) IV Push once  glucagon  Injectable 1 milliGRAM(s) IntraMuscular once  heparin   Injectable 5000 Unit(s) SubCutaneous every 8 hours  hydrALAZINE 50 milliGRAM(s) Oral three times a day  influenza  Vaccine (HIGH DOSE) 0.7 milliLiter(s) IntraMuscular once  insulin lispro (ADMELOG) corrective regimen sliding scale   SubCutaneous three times a day before meals  insulin lispro (ADMELOG) corrective regimen sliding scale   SubCutaneous at bedtime      SOCIAL HISTORY: Unable to obtain due to clinical condition     FAMILY HISTORY: Unable to obtain due to clinical condition     ROS:   Unable to obtain due to clinical condition   Constitutional: no pain   Respiratory: no SOB, no cough  GI:  no diarrhea       Physical Exam:   General: awake, non toxic, slow to respond, barely interactive   Head: atraumatic, normocephalic  Eyes: normal sclera and conjunctiva  ENT: no LAD, neck supple  Cardio: regular rate   Respiratory: nonlabored on room air  abd: soft, not tender  : no suprapubic tenderness, no michaels  Musculoskeletal: no joint swelling, no edema  Skin: no rash  vascular: no phlebitis  Neurologic: unable to assess   psych: flat affect       Drug Dosing Weight  Height (cm): 152.4 (28 Dec 2021 08:31)  Weight (kg): 68.4 (2020 18:42)  BMI (kg/m2): 29.5 (28 Dec 2021 08:31)  BSA (m2): 1.66 (28 Dec 2021 08:31)    Vital Signs Last 24 Hrs  T(F): 100.5 (21 @ 11:45), Max: 100.5 (21 @ 11:45)    Vital Signs Last 24 Hrs  HR: 87 (21 @ 11:45) (83 - 93)  BP: 155/54 (21 @ 11:45) (155/54 - 203/89)  RR: 18 (21 @ 11:45)  SpO2: 100% (21 @ 11:45) (99% - 100%)  Wt(kg): --                          10.2   5.88  )-----------( 280      ( 29 Dec 2021 08:22 )             31.0           132<L>  |  92<L>  |  72<H>  ----------------------------<  140<H>  3.7   |  26  |  2.33<H>    Ca    9.6      29 Dec 2021 08:22  Phos  4.9       Mg     1.90         TPro  7.6  /  Alb  3.8  /  TBili  0.2  /  DBili  x   /  AST  25  /  ALT  17  /  AlkPhos  61        Urinalysis Basic - ( 28 Dec 2021 13:07 )    Color: Light Yellow / Appearance: Clear / S.013 / pH: x  Gluc: x / Ketone: Negative  / Bili: Negative / Urobili: <2 mg/dL   Blood: x / Protein: 300 mg/dL / Nitrite: Negative   Leuk Esterase: Negative / RBC: 2 /HPF / WBC 1 /HPF   Sq Epi: x / Non Sq Epi: 1 /HPF / Bacteria: Negative        MICROBIOLOGY:      RADIOLOGY:  Images below reviewed personally  Xray Chest 1 View- PORTABLE-Urgent (21 @ 10:38)   Clear lungs.    US Kidney and Bladder (21 @ 11:04)   No hydronephrosis.  
Date of service: 12/29/21    Requesting Physician : Dr. Dumont, Dr. Bolton     Reason for Consultation: CAD    HISTORY OF PRESENT ILLNESS: 76 year old female with history of CAD s/p KIERSTEN to the RCA and LCx in 2020, HTN, HLD, DM, last EF normal from echocardiogram in March of 2021, prior history of DVT in the setting of COVID who is being seen for management of CAD.  The patient presented to the ED with weakness and diarrhea and was diagnosed with COVID.  The patient has had worsening confusion and lethargy during this time but no reported anginal symptoms.  In June of 2020 the patient presented to the ER with NSTEMI at which time she underwent cardiac cath and successful KIERSTEN to the LCx and RCA.  Her NSTEMI and PCI were > 1 year ago and she has been maintained on sapt.     PAST MEDICAL & SURGICAL HISTORY:  Diabetes mellitus    Hypertension    Hypercholesterolemia    Anxiety and depression    No significant past surgical history            MEDICATIONS:  MEDICATIONS  (STANDING):  aspirin enteric coated 81 milliGRAM(s) Oral daily  carvedilol 12.5 milliGRAM(s) Oral every 12 hours  dextrose 40% Gel 15 Gram(s) Oral once  dextrose 5%. 1000 milliLiter(s) (50 mL/Hr) IV Continuous <Continuous>  dextrose 5%. 1000 milliLiter(s) (100 mL/Hr) IV Continuous <Continuous>  dextrose 50% Injectable 25 Gram(s) IV Push once  dextrose 50% Injectable 12.5 Gram(s) IV Push once  dextrose 50% Injectable 25 Gram(s) IV Push once  glucagon  Injectable 1 milliGRAM(s) IntraMuscular once  heparin   Injectable 5000 Unit(s) SubCutaneous every 8 hours  hydrALAZINE 50 milliGRAM(s) Oral three times a day  influenza  Vaccine (HIGH DOSE) 0.7 milliLiter(s) IntraMuscular once  insulin lispro (ADMELOG) corrective regimen sliding scale   SubCutaneous three times a day before meals  insulin lispro (ADMELOG) corrective regimen sliding scale   SubCutaneous at bedtime      Allergies    peanuts (Short breath; Hives)  penicillin (Short breath; Hives)    Intolerances        FAMILY HISTORY:  No pertinent family history in first degree relatives      Non-contributary for premature coronary disease or sudden cardiac death    SOCIAL HISTORY:    [x ] Non-smoker  [ ] Smoker  [ ] Alcohol      REVIEW OF SYSTEMS:  [ ]chest pain  [  ]shortness of breath  [  ]palpitations  [  ]syncope  [ ]near syncope [ ]upper extremity weakness   [ ] lower extremity weakness  [  ]diplopia  [ x ]altered mental status   [  ]fevers  [ ]chills [ ]nausea  [ ]vomitting  [  ]dysphagia    [ ]abdominal pain  [ ]melena  [ ]BRBPR    [  ]epistaxis  [  ]rash    [ ]lower extremity edema        [x ] All others negative	  [ ] Unable to obtain    PHYSICAL EXAM:  T(C): 38.1 (12-29-21 @ 11:45), Max: 38.1 (12-29-21 @ 11:45)  HR: 87 (12-29-21 @ 11:45) (83 - 93)  BP: 155/54 (12-29-21 @ 11:45) (155/54 - 203/89)  RR: 18 (12-29-21 @ 11:45) (18 - 19)  SpO2: 100% (12-29-21 @ 11:45) (99% - 100%)  Wt(kg): --  I&O's Summary        HEENT:   Normal oral mucosa, PERRL, EOMI	  Lymphatic: No lymphadenopathy , no edema  Cardiovascular: Normal S1 S2, No JVD, No murmurs , Peripheral pulses palpable 2+ bilaterally  Respiratory: Lungs clear to auscultation, normal effort 	  Gastrointestinal:  Soft, Non-tender, + BS	  Skin: No rashes, No ecchymoses, No cyanosis, warm to touch  Musculoskeletal: Normal range of motion, normal strength  Psychiatry:  unable to assess       TELEMETRY: not on tele  	    ECG: SR, 1st degree AVB  	  RADIOLOGY:  OTHER:     DIAGNOSTIC TESTING:  [ ] Echocardiogram:  [ ]  Catheterization: < from: Cardiac Cath Lab - Adult (06.30.20 @ 14:11) >  CORONARY VESSELS: Dominance was not assessed.  LM:   --  LM: This vessel was not injected, but was visualized during a  prior cardiac catheterization.  LAD:   --  LAD: This vessel was not injected, but was visualized during a  prior cardiac catheterization.  CX:   --  Proximal circumflex: There was a tubular 95 % stenosis.  RCA:   --  RCA: This vessel was not injected, but was visualized during a  prior cardiac catheterization.  COMPLICATIONS: There were no complications.  INTERVENTIONAL RECOMMENDATIONS: S/p successful KIERSTEN to the circumflex  artery. The patient should continue with dual antiplatelet therapy.  Prepared and signed by  Donis Jaquez M.D.    < end of copied text >    [ ] Stress Test:    	  	  LABS:	 	    CARDIAC MARKERS:                              10.2   5.88  )-----------( 280      ( 29 Dec 2021 08:22 )             31.0     12-29    132<L>  |  92<L>  |  72<H>  ----------------------------<  140<H>  3.7   |  26  |  2.33<H>    Ca    9.6      29 Dec 2021 08:22  Phos  4.9     12-29  Mg     1.90     12-29    TPro  7.6  /  Alb  3.8  /  TBili  0.2  /  DBili  x   /  AST  25  /  ALT  17  /  AlkPhos  61  12-29    proBNP:   Lipid Profile:   HgA1c:   TSH:     ASSESSMENT/PLAN: 76 year old female with history of CAD s/p KIERSTEN to the RCA and LCx in 2020, HTN, HLD, DM, last EF normal from echocardiogram in March of 2021, prior history of DVT in the setting of COVID who is being seen for management of CAD.     -pt. with no chest pain and anginal symptoms   -recommend medical management of known CAD   -given NSTEMI and PCI > 1 year ago, can continue with sapt (asa 81mg PO daily)  -pt. with ams - follow up CTH - workup per primary team   -treatment of COVID per primary team     Donis Jaquez MD     
      HPI:  Unable to obtain collateral from daughter eder ph: [phone]. History obtained from chart. per ED Note patient is a 75 yo female with PMH of HTN, HLD, DM2 (metformin, glipizide), depression, CHF (40 lasix daily, recent increase of unknown medication on monday/thursday ?metolazone), X2 KIERSTEN in 2020 for severe stenosis, DVT S/P anticoagulation in setting of COVID in 2020, presenting with diarrhea, weakness, inability to ambulate. Per daughter patient has been increasingly weak since 4 days ago during which she experienced intermittent diarrhea 2-3 episodes per day. Since then experienced worsened confusion, unstable gait. Recent cough/rhinorrhea. Denies any fever, chills, abdominal pain, nausea/vomiting.   (28 Dec 2021 16:48)  Patient has history of diabetes, A1C 7.9%, on oral meds at home (Patient unsure of her medications, per chart-review: Metformin 500mg BID, Januvia 50mg daily, Prandin 1mg TID), follows up with PCP for diabetes management.  Endo was consulted for glycemic control.    PAST MEDICAL & SURGICAL HISTORY:  Diabetes mellitus    Hypertension    Hypercholesterolemia    Anxiety and depression    No significant past surgical history        FAMILY HISTORY:  No pertinent family history in first degree relatives        Social History:    Outpatient Medications:    MEDICATIONS  (STANDING):  allopurinol 100 milliGRAM(s) Oral daily  amLODIPine   Tablet 5 milliGRAM(s) Oral daily  aspirin enteric coated 81 milliGRAM(s) Oral daily  atorvastatin 40 milliGRAM(s) Oral at bedtime  carvedilol 25 milliGRAM(s) Oral every 12 hours  dextrose 40% Gel 15 Gram(s) Oral once  dextrose 5%. 1000 milliLiter(s) (50 mL/Hr) IV Continuous <Continuous>  dextrose 5%. 1000 milliLiter(s) (100 mL/Hr) IV Continuous <Continuous>  dextrose 50% Injectable 25 Gram(s) IV Push once  dextrose 50% Injectable 12.5 Gram(s) IV Push once  dextrose 50% Injectable 25 Gram(s) IV Push once  glucagon  Injectable 1 milliGRAM(s) IntraMuscular once  heparin   Injectable 5000 Unit(s) SubCutaneous every 8 hours  hydrALAZINE 100 milliGRAM(s) Oral three times a day  influenza  Vaccine (HIGH DOSE) 0.7 milliLiter(s) IntraMuscular once  insulin glargine Injectable (LANTUS) 10 Unit(s) SubCutaneous at bedtime  insulin lispro (ADMELOG) corrective regimen sliding scale   SubCutaneous three times a day before meals  insulin lispro (ADMELOG) corrective regimen sliding scale   SubCutaneous at bedtime  insulin lispro Injectable (ADMELOG) 5 Unit(s) SubCutaneous three times a day before meals  multivitamin 1 Tablet(s) Oral daily  risperiDONE   Tablet 1 milliGRAM(s) Oral daily    MEDICATIONS  (PRN):  acetaminophen     Tablet .. 650 milliGRAM(s) Oral every 6 hours PRN Temp greater or equal to 38C (100.4F), Mild Pain (1 - 3)      Allergies    peanuts (Short breath; Hives)  penicillin (Short breath; Hives)    Intolerances      Review of Systems:  Constitutional: No fever, no chills  Eyes: No blurry vision  Neuro: No tremors  HEENT: No pain, no neck swelling  Cardiovascular: No chest pain, no palpitations  Respiratory: Has SOB, no cough  GI: No nausea, vomiting, abdominal pain  : No dysuria  Skin: no rash  MSK: Has leg swelling.  Psych: no depression  Endocrine: no polyuria, polydipsia    ALL OTHER SYSTEMS REVIEWED AND NEGATIVE    UNABLE TO OBTAIN    PHYSICAL EXAM:  VITALS: T(C): 36.6 (01-06-22 @ 05:27)  T(F): 97.9 (01-06-22 @ 05:27), Max: 99 (01-05-22 @ 12:00)  HR: 61 (01-06-22 @ 05:27) (61 - 79)  BP: 160/61 (01-06-22 @ 05:27) (150/56 - 184/72)  RR:  (18 - 18)  SpO2:  (100% - 100%)  Wt(kg): --  GENERAL: NAD, well-groomed, well-developed  EYES: No proptosis, no lid lag  HEENT:  Atraumatic, Normocephalic  THYROID: Normal size, no palpable nodules  RESPIRATORY: Clear to auscultation bilaterally; No rales, rhonchi, wheezing  CARDIOVASCULAR: Si S2, No murmurs;  GI: Soft, non distended, normal bowel sounds  SKIN: Dry, intact, No rashes or lesions  MUSCULOSKELETAL: Has BL lower extremity edema.  NEURO:  no tremor, sensation decreased in feet BL,    POCT Blood Glucose.: 231 mg/dL (01-06-22 @ 09:12)  POCT Blood Glucose.: 360 mg/dL (01-05-22 @ 21:31)  POCT Blood Glucose.: 339 mg/dL (01-05-22 @ 17:32)  POCT Blood Glucose.: 302 mg/dL (01-05-22 @ 12:54)  POCT Blood Glucose.: 207 mg/dL (01-05-22 @ 09:23)  POCT Blood Glucose.: 357 mg/dL (01-04-22 @ 21:49)  POCT Blood Glucose.: 261 mg/dL (01-04-22 @ 16:52)  POCT Blood Glucose.: 320 mg/dL (01-04-22 @ 12:26)  POCT Blood Glucose.: 191 mg/dL (01-04-22 @ 09:10)  POCT Blood Glucose.: 248 mg/dL (01-03-22 @ 21:00)  POCT Blood Glucose.: 213 mg/dL (01-03-22 @ 18:00)  POCT Blood Glucose.: 330 mg/dL (01-03-22 @ 12:32)                            7.7    7.69  )-----------( 299      ( 06 Jan 2022 07:19 )             24.1       01-06    133<L>  |  98  |  56<H>  ----------------------------<  214<H>  4.8   |  24  |  1.87<H>    EGFR if : 30<L>  EGFR if non : 25<L>    Ca    9.2      01-06  Mg     2.10     01-06  Phos  4.0     01-06        Thyroid Function Tests:          12-29 Chol 152 Direct LDL -- LDL calculated 85 HDL 30<L> Trig 183<H>    Radiology:

## 2022-01-06 NOTE — PROGRESS NOTE ADULT - ASSESSMENT
77 yo female with PMH of HTN, HLD, DM2 (metformin, glipizide), depression, CHF (40 lasix daily, recent increase of unknown medication on monday/thursday ?metolazone), X2 KIERSTEN in 2020 for severe stenosis, DVT S/P anticoagulation in setting of COVID in 2020 admitted for SUNIL on CKD 4 2/2 diuretics c/b electrolyte abnormalities.     Problem/Plan - 1:  ·  Problem: 2019 novel coronavirus disease (COVID-19).   ·  Plan: remains > 95% RA  encephalopathy likely secondary to COVID 19 infection as well as SUNIL (metabolic encephalopathy) now resolved   ID evaluation appreciated   no intervention required at this time.     Problem/Plan - 2:  ·  Problem: Acute on chronic kidney failure.   ·  Plan: appears to be stable.   will continue to follow renal recommendations  baseline creatinine is 1.8 - 2  continue to monitor   hyponatremia likely secondary to excessive water   continue to restrict fluids.     Problem/Plan - 3:  ·  Problem: Hypertensive emergency.   ·  Plan: improved   continue hydralazine  increase carvedilol to 25 BID.     Problem/Plan - 4:  ·  Problem: Hyperphosphatemia.   ·  Plan: will continue to monitor   monitor closely after hydration.     Problem/Plan - 5:  ·  Problem: Elevated troponin.   ·  Plan: cardiology follow up  unlikely cardiac event  continue to follow cardiology recommendations.     Problem/Plan - 6:  ·  Problem: Type 2 diabetes mellitus.   ·  Plan: Sugars high so endo consulted.   HbA1C 7.8  continue finger sticks with short acting insulin sliding scale  no oral meds.     Problem/Plan - 7:  ·  Problem: Chronic diastolic congestive heart failure.   ·  Plan: appears euvolemic   will continue to monitor   hold furosemide and metolazone for now.     Problem/Plan - 8:  ·  Problem: Hyponatremia .   ·  Plan: Renal helping.      Problem/Plan - 9:  ·  Problem: Anemia .   ·  Plan: Chronic but drop in hgb so work up in progress.     Disposition ; DC planning pending placement .

## 2022-01-07 DIAGNOSIS — E03.9 HYPOTHYROIDISM, UNSPECIFIED: ICD-10-CM

## 2022-01-07 LAB
ANION GAP SERPL CALC-SCNC: 10 MMOL/L — SIGNIFICANT CHANGE UP (ref 7–14)
BUN SERPL-MCNC: 52 MG/DL — HIGH (ref 7–23)
CALCIUM SERPL-MCNC: 9.7 MG/DL — SIGNIFICANT CHANGE UP (ref 8.4–10.5)
CHLORIDE SERPL-SCNC: 99 MMOL/L — SIGNIFICANT CHANGE UP (ref 98–107)
CO2 SERPL-SCNC: 24 MMOL/L — SIGNIFICANT CHANGE UP (ref 22–31)
CREAT SERPL-MCNC: 1.72 MG/DL — HIGH (ref 0.5–1.3)
FERRITIN SERPL-MCNC: 92 NG/ML — SIGNIFICANT CHANGE UP (ref 15–150)
FOLATE SERPL-MCNC: 20 NG/ML — HIGH (ref 3.1–17.5)
GLUCOSE BLDC GLUCOMTR-MCNC: 195 MG/DL — HIGH (ref 70–99)
GLUCOSE BLDC GLUCOMTR-MCNC: 224 MG/DL — HIGH (ref 70–99)
GLUCOSE BLDC GLUCOMTR-MCNC: 236 MG/DL — HIGH (ref 70–99)
GLUCOSE BLDC GLUCOMTR-MCNC: 265 MG/DL — HIGH (ref 70–99)
GLUCOSE SERPL-MCNC: 182 MG/DL — HIGH (ref 70–99)
HAPTOGLOB SERPL-MCNC: 176 MG/DL — SIGNIFICANT CHANGE UP (ref 34–200)
HCT VFR BLD CALC: 25.9 % — LOW (ref 34.5–45)
HGB BLD-MCNC: 8.2 G/DL — LOW (ref 11.5–15.5)
INTERPRETATION SERPL IFE-IMP: SIGNIFICANT CHANGE UP
IRON SATN MFR SERPL: 24 % — SIGNIFICANT CHANGE UP (ref 14–50)
IRON SATN MFR SERPL: 59 UG/DL — SIGNIFICANT CHANGE UP (ref 30–160)
MAGNESIUM SERPL-MCNC: 2.2 MG/DL — SIGNIFICANT CHANGE UP (ref 1.6–2.6)
MCHC RBC-ENTMCNC: 25.5 PG — LOW (ref 27–34)
MCHC RBC-ENTMCNC: 31.7 GM/DL — LOW (ref 32–36)
MCV RBC AUTO: 80.4 FL — SIGNIFICANT CHANGE UP (ref 80–100)
NRBC # BLD: 0 /100 WBCS — SIGNIFICANT CHANGE UP
NRBC # FLD: 0 K/UL — SIGNIFICANT CHANGE UP
PHOSPHATE SERPL-MCNC: 4 MG/DL — SIGNIFICANT CHANGE UP (ref 2.5–4.5)
PLATELET # BLD AUTO: 338 K/UL — SIGNIFICANT CHANGE UP (ref 150–400)
POTASSIUM SERPL-MCNC: 4.4 MMOL/L — SIGNIFICANT CHANGE UP (ref 3.5–5.3)
POTASSIUM SERPL-SCNC: 4.4 MMOL/L — SIGNIFICANT CHANGE UP (ref 3.5–5.3)
RBC # BLD: 3.22 M/UL — LOW (ref 3.8–5.2)
RBC # FLD: 16.1 % — HIGH (ref 10.3–14.5)
SODIUM SERPL-SCNC: 133 MMOL/L — LOW (ref 135–145)
T4 FREE SERPL-MCNC: 1.1 NG/DL — SIGNIFICANT CHANGE UP (ref 0.9–1.8)
TIBC SERPL-MCNC: 242 UG/DL — SIGNIFICANT CHANGE UP (ref 220–430)
TSH SERPL-MCNC: 4.53 UIU/ML — HIGH (ref 0.27–4.2)
TSH SERPL-MCNC: 4.62 UIU/ML — HIGH (ref 0.27–4.2)
UIBC SERPL-MCNC: 183 UG/DL — SIGNIFICANT CHANGE UP (ref 110–370)
VIT B12 SERPL-MCNC: 2000 PG/ML — HIGH (ref 200–900)
WBC # BLD: 9.95 K/UL — SIGNIFICANT CHANGE UP (ref 3.8–10.5)
WBC # FLD AUTO: 9.95 K/UL — SIGNIFICANT CHANGE UP (ref 3.8–10.5)

## 2022-01-07 RX ORDER — INSULIN GLARGINE 100 [IU]/ML
12 INJECTION, SOLUTION SUBCUTANEOUS AT BEDTIME
Refills: 0 | Status: DISCONTINUED | OUTPATIENT
Start: 2022-01-07 | End: 2022-01-07

## 2022-01-07 RX ORDER — INSULIN GLARGINE 100 [IU]/ML
14 INJECTION, SOLUTION SUBCUTANEOUS AT BEDTIME
Refills: 0 | Status: DISCONTINUED | OUTPATIENT
Start: 2022-01-07 | End: 2022-01-09

## 2022-01-07 RX ORDER — INSULIN LISPRO 100/ML
8 VIAL (ML) SUBCUTANEOUS
Refills: 0 | Status: COMPLETED | OUTPATIENT
Start: 2022-01-07 | End: 2022-01-08

## 2022-01-07 RX ORDER — INSULIN LISPRO 100/ML
7 VIAL (ML) SUBCUTANEOUS
Refills: 0 | Status: DISCONTINUED | OUTPATIENT
Start: 2022-01-07 | End: 2022-01-07

## 2022-01-07 RX ADMIN — INSULIN GLARGINE 14 UNIT(S): 100 INJECTION, SOLUTION SUBCUTANEOUS at 21:15

## 2022-01-07 RX ADMIN — Medication 100 MILLIGRAM(S): at 06:02

## 2022-01-07 RX ADMIN — Medication 2: at 18:14

## 2022-01-07 RX ADMIN — Medication 100 MILLIGRAM(S): at 21:16

## 2022-01-07 RX ADMIN — ATORVASTATIN CALCIUM 40 MILLIGRAM(S): 80 TABLET, FILM COATED ORAL at 21:16

## 2022-01-07 RX ADMIN — Medication 1 TABLET(S): at 13:17

## 2022-01-07 RX ADMIN — Medication 5 UNIT(S): at 09:28

## 2022-01-07 RX ADMIN — Medication 100 MILLIGRAM(S): at 13:17

## 2022-01-07 RX ADMIN — HEPARIN SODIUM 5000 UNIT(S): 5000 INJECTION INTRAVENOUS; SUBCUTANEOUS at 21:16

## 2022-01-07 RX ADMIN — Medication 3: at 13:16

## 2022-01-07 RX ADMIN — CARVEDILOL PHOSPHATE 25 MILLIGRAM(S): 80 CAPSULE, EXTENDED RELEASE ORAL at 18:14

## 2022-01-07 RX ADMIN — Medication 0: at 21:16

## 2022-01-07 RX ADMIN — Medication 8 UNIT(S): at 18:13

## 2022-01-07 RX ADMIN — Medication 100 MILLIGRAM(S): at 13:18

## 2022-01-07 RX ADMIN — Medication 1: at 09:28

## 2022-01-07 RX ADMIN — HEPARIN SODIUM 5000 UNIT(S): 5000 INJECTION INTRAVENOUS; SUBCUTANEOUS at 13:18

## 2022-01-07 RX ADMIN — AMLODIPINE BESYLATE 10 MILLIGRAM(S): 2.5 TABLET ORAL at 06:07

## 2022-01-07 RX ADMIN — CARVEDILOL PHOSPHATE 25 MILLIGRAM(S): 80 CAPSULE, EXTENDED RELEASE ORAL at 06:07

## 2022-01-07 RX ADMIN — HEPARIN SODIUM 5000 UNIT(S): 5000 INJECTION INTRAVENOUS; SUBCUTANEOUS at 06:08

## 2022-01-07 RX ADMIN — Medication 81 MILLIGRAM(S): at 13:17

## 2022-01-07 RX ADMIN — Medication 7 UNIT(S): at 13:15

## 2022-01-07 RX ADMIN — RISPERIDONE 1 MILLIGRAM(S): 4 TABLET ORAL at 13:17

## 2022-01-07 NOTE — PROGRESS NOTE ADULT - PROBLEM SELECTOR PLAN 1
Will increase Lantus to 12u at bedtime.  Will increase Admelog to 7u before each meal and continue Admelog correction scale coverage. Will continue monitoring FS and FU.  Suggest DC on the following DM regimen:  -Continue prior Januvia and Prandin  -Discontinue Metformin 2/2 CKD  -FU endo 4 weeks  Patient counseled for compliance with consistent low carb diet. Will increase Lantus to 14u at bedtime.  Will increase Admelog to 8u before each meal and continue Admelog correction scale coverage. Will continue monitoring FS and FU.  Suggest DC on the following DM regimen:  -Continue prior Januvia and Prandin  -Discontinue Metformin 2/2 CKD  -FU endo 4 weeks  Patient counseled for compliance with consistent low carb diet.

## 2022-01-07 NOTE — PROGRESS NOTE ADULT - ASSESSMENT
a 77 yo female with PMH of HTN, HLD, DM2 (metformin, glipizide), depression, CHF (40 lasix daily, recent increase of unknown medication on monday/thursday ?metolazone), X2 KIERSTEN in 2020 for severe stenosis, DVT S/P anticoagulation in setting of COVID in 2020, presenting with diarrhea, weakness, inability to ambulate. Consulted nephrology for SUNIL      A/P    SUNIL on CKD   Etiology? likely due to diarrhea.   Renal ultrasound shows no hydronephrosis   Scr baseline (~1.5)  - Scr  stable   - optimize glucose level    - Follow up BMP, U/O  avoid further nephrotoxic medication     Proteinuria:  Likely due to DM/HTN  Vasculitis work up negative in office except for  Serum immunofixation   HIV negative  Pending serum immunofixation       Hyponatremia   improving today    UA suggestive of SIADH   s/p salt tablet 1g tidx 2days (01/04/22)  - Continue fluid restriction   - glucose is high, please optimize glucose level   - Follow up BMP daily    HTN  suboptimal   Amlodipine increased today  Low salt diet   Monitor closely

## 2022-01-07 NOTE — PROGRESS NOTE ADULT - ASSESSMENT
Assessment  DMT2: 76y Female with DM T2 with hyperglycemia, A1C 7.9%, was on oral meds at home, started on basal bolus insulin, blood sugars improving though still not at target, did not receive any steroids, no hypoglycemic episodes, eating meals, NAD.  Hypothyroidism: Patient has no history thyroid disease, was not on any thyroid supplements, subclinical, FT4 1.1.  Covid-19: on medications, stable, monitored.  HTN: Controlled,  on antihypertensive medications.  HLD: On statin.  CKD: Monitor labs/BMP.      Thank u for consult  Alejo silva MD  cell:[phone]           Assessment  DMT2: 76y Female with DM T2 with hyperglycemia, A1C 7.9%, was on oral meds at home, started on basal bolus insulin, blood sugars improving though still not at target, did not receive any steroids, no hypoglycemic episodes, eating meals, NAD.  Hypothyroidism: Patient has no history thyroid disease, was not on any thyroid supplements, subclinical, FT4 1.1.  Covid-19: on medications, stable, monitored.  HTN: Controlled,  on antihypertensive medications.  HLD: On statin.  CKD: Monitor labs/BMP.        Alejo silva MD  cell:[phone]

## 2022-01-07 NOTE — PROGRESS NOTE ADULT - ASSESSMENT
75 yo female with PMH of HTN, HLD, DM2 (metformin, glipizide), depression, CHF (40 lasix daily, recent increase of unknown medication on monday/thursday ?metolazone), X2 KIERSTEN in 2020 for severe stenosis, DVT S/P anticoagulation in setting of COVID in 2020 admitted for SUNIL on CKD 4 2/2 diuretics c/b electrolyte abnormalities.     Problem/Plan - 1:  ·  Problem: 2019 novel coronavirus disease (COVID-19).   ·  Plan: remains > 95% RA  encephalopathy likely secondary to COVID 19 infection as well as SUNIL (metabolic encephalopathy) now resolved   ID evaluation appreciated   no intervention required at this time.     Problem/Plan - 2:  ·  Problem: Acute on chronic kidney failure.   ·  Plan: appears to be stable.   will continue to follow renal recommendations  baseline creatinine is 1.8 - 2  continue to monitor   hyponatremia likely secondary to excessive water   continue to restrict fluids.     Problem/Plan - 3:  ·  Problem: Hypertensive emergency.   ·  Plan: improved   continue hydralazine  increase carvedilol to 25 BID.     Problem/Plan - 4:  ·  Problem: Hyperphosphatemia.   ·  Plan: will continue to monitor   monitor closely after hydration.     Problem/Plan - 5:  ·  Problem: Elevated troponin.   ·  Plan: cardiology follow up  unlikely cardiac event  continue to follow cardiology recommendations.     Problem/Plan - 6:  ·  Problem: Type 2 diabetes mellitus.   ·  Plan: Sugars high so endo consulted.   HbA1C 7.8  continue finger sticks with short acting insulin sliding scale  no oral meds.     Problem/Plan - 7:  ·  Problem: Chronic diastolic congestive heart failure.   ·  Plan: appears euvolemic   will continue to monitor   hold furosemide and metolazone for now.     Problem/Plan - 8:  ·  Problem: Hyponatremia .   ·  Plan: Renal helping.      Problem/Plan - 9:  ·  Problem: Anemia .   ·  Plan: Chronic . HH stable . Work up noted.     Disposition ; DC planning pending placement .

## 2022-01-08 ENCOUNTER — TRANSCRIPTION ENCOUNTER (OUTPATIENT)
Age: 77
End: 2022-01-08

## 2022-01-08 LAB
GLUCOSE BLDC GLUCOMTR-MCNC: 140 MG/DL — HIGH (ref 70–99)
GLUCOSE BLDC GLUCOMTR-MCNC: 199 MG/DL — HIGH (ref 70–99)
GLUCOSE BLDC GLUCOMTR-MCNC: 264 MG/DL — HIGH (ref 70–99)
GLUCOSE BLDC GLUCOMTR-MCNC: 268 MG/DL — HIGH (ref 70–99)

## 2022-01-08 RX ORDER — ALLOPURINOL 300 MG
1 TABLET ORAL
Qty: 0 | Refills: 0 | DISCHARGE

## 2022-01-08 RX ORDER — HYDRALAZINE HCL 50 MG
1 TABLET ORAL
Qty: 0 | Refills: 0 | DISCHARGE
Start: 2022-01-08

## 2022-01-08 RX ORDER — ALLOPURINOL 300 MG
1 TABLET ORAL
Qty: 0 | Refills: 0 | DISCHARGE
Start: 2022-01-08

## 2022-01-08 RX ORDER — AMLODIPINE BESYLATE 2.5 MG/1
1 TABLET ORAL
Qty: 0 | Refills: 0 | DISCHARGE
Start: 2022-01-08

## 2022-01-08 RX ORDER — HYDRALAZINE HCL 50 MG
1.5 TABLET ORAL
Qty: 0 | Refills: 0 | DISCHARGE

## 2022-01-08 RX ADMIN — HEPARIN SODIUM 5000 UNIT(S): 5000 INJECTION INTRAVENOUS; SUBCUTANEOUS at 21:56

## 2022-01-08 RX ADMIN — INSULIN GLARGINE 14 UNIT(S): 100 INJECTION, SOLUTION SUBCUTANEOUS at 21:56

## 2022-01-08 RX ADMIN — Medication 8 UNIT(S): at 10:04

## 2022-01-08 RX ADMIN — Medication 1: at 18:19

## 2022-01-08 RX ADMIN — Medication 100 MILLIGRAM(S): at 14:02

## 2022-01-08 RX ADMIN — CARVEDILOL PHOSPHATE 25 MILLIGRAM(S): 80 CAPSULE, EXTENDED RELEASE ORAL at 05:13

## 2022-01-08 RX ADMIN — Medication 3: at 13:59

## 2022-01-08 RX ADMIN — Medication 100 MILLIGRAM(S): at 05:12

## 2022-01-08 RX ADMIN — Medication 8 UNIT(S): at 13:59

## 2022-01-08 RX ADMIN — Medication 1 TABLET(S): at 14:02

## 2022-01-08 RX ADMIN — Medication 8 UNIT(S): at 18:20

## 2022-01-08 RX ADMIN — AMLODIPINE BESYLATE 10 MILLIGRAM(S): 2.5 TABLET ORAL at 05:12

## 2022-01-08 RX ADMIN — Medication 81 MILLIGRAM(S): at 14:01

## 2022-01-08 RX ADMIN — CARVEDILOL PHOSPHATE 25 MILLIGRAM(S): 80 CAPSULE, EXTENDED RELEASE ORAL at 18:27

## 2022-01-08 RX ADMIN — Medication 100 MILLIGRAM(S): at 21:56

## 2022-01-08 RX ADMIN — HEPARIN SODIUM 5000 UNIT(S): 5000 INJECTION INTRAVENOUS; SUBCUTANEOUS at 05:16

## 2022-01-08 RX ADMIN — Medication 100 MILLIGRAM(S): at 14:00

## 2022-01-08 RX ADMIN — ATORVASTATIN CALCIUM 40 MILLIGRAM(S): 80 TABLET, FILM COATED ORAL at 21:56

## 2022-01-08 RX ADMIN — RISPERIDONE 1 MILLIGRAM(S): 4 TABLET ORAL at 14:01

## 2022-01-08 RX ADMIN — HEPARIN SODIUM 5000 UNIT(S): 5000 INJECTION INTRAVENOUS; SUBCUTANEOUS at 14:01

## 2022-01-08 RX ADMIN — Medication 1: at 21:56

## 2022-01-08 NOTE — PROGRESS NOTE ADULT - PROBLEM SELECTOR PROBLEM 4
Acute on chronic kidney failure
Hyperphosphatemia
Acute on chronic kidney failure

## 2022-01-08 NOTE — PROGRESS NOTE ADULT - PROBLEM SELECTOR PLAN 4
Monitor labs, Renal FU.
will continue to monitor   monitor closely after hydration
will continue to monitor   monitor closely after hydration
Monitor labs, Renal FU.
will continue to monitor   monitor closely after hydration

## 2022-01-08 NOTE — PROGRESS NOTE ADULT - PROBLEM SELECTOR PLAN 6
Will continue statin, primary team FU.
Will continue statin, primary team FU.
HbA1C 7.8  continue finger sticks with short acting insulin sliding scale  no oral meds

## 2022-01-08 NOTE — PROGRESS NOTE ADULT - PROBLEM SELECTOR PROBLEM 1
Type 2 diabetes mellitus
Type 2 diabetes mellitus
2019 novel coronavirus disease (COVID-19)

## 2022-01-08 NOTE — PROGRESS NOTE ADULT - ASSESSMENT
CARDIOLOGY ATTENDING     Agree with above. Continue medical therapy for known CAD with normal LVEF. No further inpatient cardiac workup expected.

## 2022-01-08 NOTE — PROGRESS NOTE ADULT - PROBLEM SELECTOR PROBLEM 6
Type 2 diabetes mellitus
HLD (hyperlipidemia)
HLD (hyperlipidemia)
Type 2 diabetes mellitus
Type 2 diabetes mellitus

## 2022-01-08 NOTE — PROGRESS NOTE ADULT - PROBLEM SELECTOR PLAN 5
Suggest to continue medications, monitoring, FU primary team recommendations.
Suggest to continue medications, monitoring, FU primary team recommendations.
cardiology follow up  unlikely cardiac event  continue to follow cardiology recommendations
cardiology follow up  unlikely cardiac event  will follow cardiology recommendations
cardiology follow up  unlikely cardiac event  continue to follow cardiology recommendations

## 2022-01-08 NOTE — DISCHARGE NOTE PROVIDER - CARE PROVIDER_API CALL
Your primary care physician,   Phone: (   )    -  Fax: (   )    -  Follow Up Time:     Silvino Rivera)  EndocrinologyMetabDiabetes; Internal Medicine  [address] [facility] [address], [address]  [place] [address]  Phone: [phone]  Fax: [phone]  Follow Up Time: 1 month

## 2022-01-08 NOTE — PROGRESS NOTE ADULT - PROBLEM SELECTOR PROBLEM 2
Acute on chronic kidney failure
Hypothyroid
Hypothyroid
Acute on chronic kidney failure
Acute on chronic kidney failure

## 2022-01-08 NOTE — PROGRESS NOTE ADULT - ASSESSMENT
a 77 yo female with PMH of HTN, HLD, DM2 (metformin, glipizide), depression, CHF (40 lasix daily, recent increase of unknown medication on monday/thursday ?metolazone), X2 KIERSTEN in 2020 for severe stenosis, DVT S/P anticoagulation in setting of COVID in 2020, presenting with diarrhea, weakness, inability to ambulate. Consulted nephrology for SUNIL      A/P    SUNIL on CKD   Etiology? likely due to diarrhea.   Renal ultrasound shows no hydronephrosis   Scr baseline (~1.5)  - no labs today   - optimize glucose level    - Follow up BMP, U/O  avoid further nephrotoxic medication     Proteinuria:  Likely due to DM/HTN  Vasculitis work up negative in office  HIV negative  serum immunofixation negative     Hyponatremia   no labs today   UA suggestive of SIADH   s/p salt tablet 1g tidx 2days (01/04/22)  - Continue fluid restriction   - glucose is high, please optimize glucose level   - Follow up BMP daily    HTN  optimal   continue current meds   Low salt diet   Monitor closely

## 2022-01-08 NOTE — PROGRESS NOTE ADULT - PROBLEM SELECTOR PROBLEM 3
Hypertensive emergency
2019 novel coronavirus disease (COVID-19)
Hypertensive emergency
Hypertensive emergency
2019 novel coronavirus disease (COVID-19)

## 2022-01-08 NOTE — PROGRESS NOTE ADULT - PROBLEM SELECTOR PLAN 1
Will Continue  Lantus to 14u at bedtime.  Will increase Admelog to 10u before each meal and continue Admelog correction scale coverage. Will continue monitoring FS and FU.  Suggest DC on the following DM regimen:  -Continue prior Januvia and Prandin  -Discontinue Metformin 2/2 CKD  -FU endo 4 weeks  Patient counseled for compliance with consistent low carb diet.

## 2022-01-08 NOTE — DISCHARGE NOTE NURSING/CASE MANAGEMENT/SOCIAL WORK - PATIENT PORTAL LINK FT
You can access the FollowMyHealth Patient Portal offered by [facility] by registering at the following website: [email or web address]. By joining [facility]’s FollowMyHealth portal, you will also be able to view your health information using other applications (apps) compatible with our system.

## 2022-01-08 NOTE — DISCHARGE NOTE PROVIDER - PROVIDER TOKENS
FREE:[LAST:[Your primary care physician],PHONE:[(   )    -],FAX:[(   )    -]],PROVIDER:[TOKEN:[2016:MIIS:2016],FOLLOWUP:[1 month]]

## 2022-01-08 NOTE — DISCHARGE NOTE PROVIDER - NSDCCPCAREPLAN_GEN_ALL_CORE_FT
PRINCIPAL DISCHARGE DIAGNOSIS  Diagnosis: 2019 novel coronavirus disease (COVID-19)  Assessment and Plan of Treatment: You have been diagnosed with the COVID-19 virus during your hospital stay. You must self quarantine to complete a 10 day time period.  Monitor for fevers, shortness of breath and cough primarily.  Monitor your temperature daily to not any changes and increases.    It has been determined that you no longer need hospitalization and can recover while remaining in self-quarantine at home. You should follow the prevention steps below until a healthcare provider or local or state health department says you can return to your normal activities.  1. You should restrict activities outside your home, except for getting medical care.  2. Do not go to work, school, or public areas.  3. Avoid using public transportation, ride-sharing, or taxis.  4. Separate yourself from other people and animals in your home.  5. Call ahead before visiting your doctor.  6. Wear a facemask.  7. Cover your coughs and sneezes.  8. Clean your hands often.  9. Avoid sharing personal household items.  10. Clean all “high-touch” surfaces everyday.  11. Monitor your symptoms.  If you have a medical emergency and need to call 911, notify the dispatch personnel that you have COVID-19 If possible, put on a facemask before emergency medical services arrive.  12. Stopping home isolation.  Patients with confirmed COVID-19 should remain under home isolation precautions for 14 days since the positive COVID-19 test and until the risk of secondary transmission to others is thought to be low. The decision to discontinue home isolation precautions should be made on a case-by-case basis, in consultation with healthcare providers and state and local health departments. Your [facility] Department of Health can be reached at [phone] for further information about COVID-19.      SECONDARY DISCHARGE DIAGNOSES  Diagnosis: Hypertensive emergency  Assessment and Plan of Treatment: Low sodium and fat diet, continue anti-hypertensive medications, and follow up with primary care physician.    Diagnosis: Elevated troponin  Assessment and Plan of Treatment: You were seen by cardiology. No further workup is needed at this time.      Diagnosis: Type 2 diabetes mellitus  Assessment and Plan of Treatment: Stop taking metformin. Continue januvia and prandin. Follow up with endocrinology in 4 weeks.    Diagnosis: Acute on chronic kidney failure  Assessment and Plan of Treatment: Continue blood pressure, cholesterol and diabetic medications. Goal of hemoglobin A1C (HgbA1C) < 7%.  Avoid nephrotoxic drugs such as nonsteroidal anti-inflammatory agents (NSAIDs).   Please follow up with your nephrologist or primary care physician to monitor your kidney function, continue with low protein and potassium diet.     PRINCIPAL DISCHARGE DIAGNOSIS  Diagnosis: 2019 novel coronavirus disease (COVID-19)  Assessment and Plan of Treatment: You have been diagnosed with the COVID-19 virus during your hospital stay. You must self quarantine to complete a 10 day time period.  Monitor for fevers, shortness of breath and cough primarily.  Monitor your temperature daily to not any changes and increases.    It has been determined that you no longer need hospitalization and can recover while remaining in self-quarantine at home. You should follow the prevention steps below until a healthcare provider or local or state health department says you can return to your normal activities.  1. You should restrict activities outside your home, except for getting medical care.  2. Do not go to work, school, or public areas.  3. Avoid using public transportation, ride-sharing, or taxis.  4. Separate yourself from other people and animals in your home.  5. Call ahead before visiting your doctor.  6. Wear a facemask.  7. Cover your coughs and sneezes.  8. Clean your hands often.  9. Avoid sharing personal household items.  10. Clean all “high-touch” surfaces everyday.  11. Monitor your symptoms.  If you have a medical emergency and need to call 911, notify the dispatch personnel that you have COVID-19 If possible, put on a facemask before emergency medical services arrive.  12. Stopping home isolation.  Patients with confirmed COVID-19 should remain under home isolation precautions for 14 days since the positive COVID-19 test and until the risk of secondary transmission to others is thought to be low. The decision to discontinue home isolation precautions should be made on a case-by-case basis, in consultation with healthcare providers and state and local health departments. Your [facility] Department of Health can be reached at [phone] for further information about COVID-19.      SECONDARY DISCHARGE DIAGNOSES  Diagnosis: Hypertensive emergency  Assessment and Plan of Treatment: Low sodium and fat diet, continue anti-hypertensive medications, and follow up with primary care physician.    Diagnosis: Elevated troponin  Assessment and Plan of Treatment: You were seen by cardiology. No further workup is needed at this time.      Diagnosis: Type 2 diabetes mellitus  Assessment and Plan of Treatment: Stop taking metformin. Continue januvia and prandin. Follow up with endocrinology in 4 weeks.    Diagnosis: Acute on chronic kidney failure  Assessment and Plan of Treatment: Stop taking furosemide and metolazone. Follow up with your primary care physician to possibly restart these medications.

## 2022-01-08 NOTE — DISCHARGE NOTE PROVIDER - HOSPITAL COURSE
75 yo female with PMH of HTN, HLD, DM2 (metformin, glipizide), depression, CHF (40 lasix daily, recent increase of unknown medication on monday/thursday ?metolazone), X2 KIERSTEN in 2020 for severe stenosis, DVT S/P anticoagulation in setting of COVID in 2020 admitted for SUNIL on CKD 4 2/2 diuretics c/b electrolyte abnormalities.    2019 novel coronavirus disease (COVID-19).   remains > 95% RA  encephalopathy likely secondary to COVID 19 infection as well as SUNIL (metabolic encephalopathy) now resolved   ID evaluation appreciated   no intervention required at this time.    Acute on chronic kidney failure.   appears to be stable.   will continue to follow renal recommendations  baseline creatinine is 1.8 - 2  continue to monitor   hyponatremia likely secondary to excessive water   continue to restrict fluids.    Hypertensive emergency.   improved   continue hydralazine  increase carvedilol to 25 BID.    Hyperphosphatemia.   will continue to monitor   monitor closely after hydration.    Elevated troponin.   cardiology follow up  unlikely cardiac event  continue to follow cardiology recommendations.    Type 2 diabetes mellitus.   Sugars high so endo consulted.   HbA1C 7.8  continue finger sticks with short acting insulin sliding scale  no oral meds.    Chronic diastolic congestive heart failure.   appears euvolemic   will continue to monitor   hold furosemide and metolazone for now.    Hyponatremia .   Renal helping.     Anemia .   Chronic. HH stable .     Patient is medically stable for discharge on 1/8/2022 per attending Dr. Garcia.

## 2022-01-08 NOTE — DISCHARGE NOTE PROVIDER - NSDCMRMEDTOKEN_GEN_ALL_CORE_FT
allopurinol 100 mg oral tablet: 1 tab(s) orally 2 times a day  aspirin 81 mg oral delayed release tablet: 1 tab(s) orally once a day  atorvastatin 40 mg oral tablet: 1 tab(s) orally once a day (at bedtime)  carvedilol 25 mg oral tablet: 1 tab(s) orally 2 times a day  furosemide 40 mg oral tablet: 1 tab(s) orally once a day  hydrALAZINE 50 mg oral tablet: 1.5 tab(s) orally 3 times a day  Januvia 50 mg oral tablet: 1 tab(s) orally once a day   metFORMIN 500 mg oral tablet: 1 tab(s) orally 2 times a day   metOLazone 5 mg oral tablet: 1 tab(s) orally 2 times a week  Prandin 1 mg oral tablet: 1 tab(s) orally 3 times a day (before meals)   Torrie-Wilian oral tablet: 1 tab(s) orally once a day  risperiDONE 1 mg oral tablet: 1 milligram(s) orally once a day   allopurinol 100 mg oral tablet: 1 tab(s) orally once a day  amLODIPine 10 mg oral tablet: 1 tab(s) orally once a day  aspirin 81 mg oral delayed release tablet: 1 tab(s) orally once a day  atorvastatin 40 mg oral tablet: 1 tab(s) orally once a day (at bedtime)  carvedilol 25 mg oral tablet: 1 tab(s) orally 2 times a day  hydrALAZINE 100 mg oral tablet: 1 tab(s) orally 3 times a day  Januvia 50 mg oral tablet: 1 tab(s) orally once a day   Prandin 1 mg oral tablet: 1 tab(s) orally 3 times a day (before meals)   Torrie-Wilian oral tablet: 1 tab(s) orally once a day  risperiDONE 1 mg oral tablet: 1 milligram(s) orally once a day

## 2022-01-08 NOTE — PROGRESS NOTE ADULT - ASSESSMENT
Assessment  DMT2: 76y Female with DM T2 with hyperglycemia, A1C 7.9%, was on oral meds at home, started on basal bolus insulin, blood sugars improving though still not at target, did not receive any steroids, no hypoglycemic episodes, eating meals, NAD.  Hypothyroidism: Patient has no history thyroid disease, was not on any thyroid supplements, subclinical, FT4 1.1.  Covid-19: on medications, stable, monitored.  HTN: Controlled,  on antihypertensive medications.  HLD: On statin.  CKD: Monitor labs/BMP.        Alejo silva MD  cell:[phone]

## 2022-01-08 NOTE — DISCHARGE NOTE NURSING/CASE MANAGEMENT/SOCIAL WORK - NSDCPEFALRISK_GEN_ALL_CORE
For information on Fall & Injury Prevention, visit: [email or web address] OR  [email or web address] OR  https://www.cdc.gov/steadi/patient.html

## 2022-01-08 NOTE — DISCHARGE NOTE PROVIDER - NSRESEARCHGRANT_HIDDEN_GEN_A_CORE
Physical Therapy Daily Progress Note  Visit: 7    Minnie Ross reports: I am feeling better with less back pain and no pain in the (R) hip.  I still have pain in the (L) hip but it is better.      Subjective     Objective   See Exercise, Manual, and Modality Logs for complete treatment.       Assessment & Plan     Assessment  Assessment details: The pt continues to improve with her back pain and radicular symptoms.  We continue to work on lumbosacral strength and stability.     Plan  Plan details: Progress ROM / strengthening / stabilization / functional activity as tolerated          Manual Therapy:          mins  93040;  Therapeutic Exercise:     40/55     mins  43707;     Neuromuscular Barbie:         mins  13119;    Therapeutic Activity:           mins  37712;     Gait Training:            mins  31509;     Ultrasound:           mins  43089;    Electrical Stimulation:          mins  02611 ( );  Dry Needling           mins self-pay  Traction           mins 12239  Canalith Repositioning         mins 28151      Timed Treatment:   40   mins   Total Treatment:     55   mins    Omkar Krueger PT  [place] License #: [id number]    Physical Therapist     Yes

## 2022-01-09 VITALS
RESPIRATION RATE: 18 BRPM | SYSTOLIC BLOOD PRESSURE: 151 MMHG | TEMPERATURE: 98 F | DIASTOLIC BLOOD PRESSURE: 58 MMHG | HEART RATE: 82 BPM | OXYGEN SATURATION: 100 %

## 2022-01-09 LAB — GLUCOSE BLDC GLUCOMTR-MCNC: 169 MG/DL — HIGH (ref 70–99)

## 2022-01-09 RX ADMIN — Medication 100 MILLIGRAM(S): at 05:07

## 2022-01-09 RX ADMIN — CARVEDILOL PHOSPHATE 25 MILLIGRAM(S): 80 CAPSULE, EXTENDED RELEASE ORAL at 05:11

## 2022-01-09 RX ADMIN — AMLODIPINE BESYLATE 10 MILLIGRAM(S): 2.5 TABLET ORAL at 05:08

## 2022-01-09 RX ADMIN — HEPARIN SODIUM 5000 UNIT(S): 5000 INJECTION INTRAVENOUS; SUBCUTANEOUS at 05:07

## 2022-01-09 RX ADMIN — Medication 1: at 09:26

## 2022-01-09 NOTE — PROGRESS NOTE ADULT - SUBJECTIVE AND OBJECTIVE BOX
Date of Service  : 01-08-22     INTERVAL HPI/OVERNIGHT EVENTS: No new concerns.   Vital Signs Last 24 Hrs  T(C): 36.9 (08 Jan 2022 13:57), Max: 36.9 (08 Jan 2022 05:00)  T(F): 98.5 (08 Jan 2022 13:57), Max: 98.5 (08 Jan 2022 13:57)  HR: 84 (08 Jan 2022 18:19) (70 - 84)  BP: 138/68 (08 Jan 2022 18:19) (136/67 - 148/65)  BP(mean): --  RR: 18 (08 Jan 2022 18:19) (17 - 18)  SpO2: 98% (08 Jan 2022 18:19) (98% - 100%)  I&O's Summary    MEDICATIONS  (STANDING):  allopurinol 100 milliGRAM(s) Oral daily  amLODIPine   Tablet 10 milliGRAM(s) Oral daily  aspirin enteric coated 81 milliGRAM(s) Oral daily  atorvastatin 40 milliGRAM(s) Oral at bedtime  carvedilol 25 milliGRAM(s) Oral every 12 hours  dextrose 40% Gel 15 Gram(s) Oral once  dextrose 5%. 1000 milliLiter(s) (50 mL/Hr) IV Continuous <Continuous>  dextrose 5%. 1000 milliLiter(s) (100 mL/Hr) IV Continuous <Continuous>  dextrose 50% Injectable 25 Gram(s) IV Push once  dextrose 50% Injectable 12.5 Gram(s) IV Push once  dextrose 50% Injectable 25 Gram(s) IV Push once  glucagon  Injectable 1 milliGRAM(s) IntraMuscular once  heparin   Injectable 5000 Unit(s) SubCutaneous every 8 hours  hydrALAZINE 100 milliGRAM(s) Oral three times a day  influenza  Vaccine (HIGH DOSE) 0.7 milliLiter(s) IntraMuscular once  insulin glargine Injectable (LANTUS) 14 Unit(s) SubCutaneous at bedtime  insulin lispro (ADMELOG) corrective regimen sliding scale   SubCutaneous three times a day before meals  insulin lispro (ADMELOG) corrective regimen sliding scale   SubCutaneous at bedtime  multivitamin 1 Tablet(s) Oral daily  risperiDONE   Tablet 1 milliGRAM(s) Oral daily    MEDICATIONS  (PRN):  acetaminophen     Tablet .. 650 milliGRAM(s) Oral every 6 hours PRN Temp greater or equal to 38C (100.4F), Mild Pain (1 - 3)    LABS:                        8.2    9.95  )-----------( 338      ( 07 Jan 2022 07:04 )             25.9     01-07    133<L>  |  99  |  52<H>  ----------------------------<  182<H>  4.4   |  24  |  1.72<H>    Ca    9.7      07 Jan 2022 07:04  Phos  4.0     01-07  Mg     2.20     01-07          CAPILLARY BLOOD GLUCOSE      POCT Blood Glucose.: 199 mg/dL (08 Jan 2022 18:06)  POCT Blood Glucose.: 264 mg/dL (08 Jan 2022 13:06)  POCT Blood Glucose.: 140 mg/dL (08 Jan 2022 09:53)          REVIEW OF SYSTEMS:  CONSTITUTIONAL: No fever, weight loss, or fatigue  EYES: No eye pain, visual disturbances, or discharge  ENMT:  No difficulty hearing, tinnitus, vertigo; No sinus or throat pain  NECK: No pain or stiffness  RESPIRATORY: No cough, wheezing, chills or hemoptysis; No shortness of breath  CARDIOVASCULAR: No chest pain, palpitations, dizziness, or leg swelling  GASTROINTESTINAL: No abdominal or epigastric pain. No nausea, vomiting, or hematemesis; No diarrhea or constipation. No melena or hematochezia.  GENITOURINARY: No dysuria, frequency, hematuria, or incontinence  NEUROLOGICAL: No headaches, memory loss, loss of strength, numbness, or tremors      Consultant(s) Notes Reviewed:  [x ] YES  [ ] NO    PHYSICAL EXAM:  GENERAL: NAD, well-groomed, well-developed, not in any distress ,  HEAD:  Atraumatic, Normocephalic  NECK: Supple, No JVD, Normal thyroid  NERVOUS SYSTEM:  Alert & Oriented X3, No focal deficit   CHEST/LUNG: Good air entry bilateral with no  rales, rhonchi, wheezing, or rubs  HEART: Regular rate and rhythm; No murmurs, rubs, or gallops  ABDOMEN: Soft, Nontender, Nondistended; Bowel sounds present  EXTREMITIES:  2+ Peripheral Pulses, No clubbing, cyanosis, or edema      Care Discussed with Consultants/Other Providers [ x] YES  [ ] NO
Date of service  1/1/22    chief complaint: weakness    extended hpi: 76 year old female with history of CAD s/p KIERSTEN to the RCA and LCx in 2020, HTN, HLD, DM, last EF normal from echocardiogram in March of 2021, prior history of DVT in the setting of COVID who is being seen for management of CAD.     no chest pain or SOB    Review of Systems:   Constitutional: [ ] fevers, [ ] chills.   Skin: [ ] dry skin. [ ] rashes.  Psychiatric: [ ] depression, [ ] anxiety.   Gastrointestinal: [ ] BRBPR, [ ] melena.   Neurological: [ ] confusion. [ ] seizures. [ ] shuffling gait.   Ears,Nose,Mouth and Throat: [ ] ear pain [ ] sore throat.   Eyes: [ ] diplopia.   Respiratory: [ ] hemoptysis. [ ] shortness of breath  Cardiovascular: See HPI above  Hematologic/Lymphatic: [ ] anemia. [ ] painful nodes. [ ] prolonged bleeding.   Genitourinary: [ ] hematuria. [ ] flank pain.   Endocrine: [ ] significant change in weight. [ ] intolerance to heat and cold.     Review of systems [x ] otherwise negative, [ ] otherwise unable to obtain    FH: no family history of sudden cardiac death in first degree relatives    SH: [ ] tobacco, [ ] alcohol, [ ] drugs    acetaminophen     Tablet .. 650 milliGRAM(s) Oral every 6 hours PRN  allopurinol 100 milliGRAM(s) Oral daily  aspirin enteric coated 81 milliGRAM(s) Oral daily  atorvastatin 40 milliGRAM(s) Oral at bedtime  carvedilol 25 milliGRAM(s) Oral every 12 hours  dextrose 40% Gel 15 Gram(s) Oral once  dextrose 5%. 1000 milliLiter(s) IV Continuous <Continuous>  dextrose 5%. 1000 milliLiter(s) IV Continuous <Continuous>  dextrose 50% Injectable 25 Gram(s) IV Push once  dextrose 50% Injectable 12.5 Gram(s) IV Push once  dextrose 50% Injectable 25 Gram(s) IV Push once  glucagon  Injectable 1 milliGRAM(s) IntraMuscular once  heparin   Injectable 5000 Unit(s) SubCutaneous every 8 hours  hydrALAZINE 75 milliGRAM(s) Oral three times a day  influenza  Vaccine (HIGH DOSE) 0.7 milliLiter(s) IntraMuscular once  insulin lispro (ADMELOG) corrective regimen sliding scale   SubCutaneous three times a day before meals  insulin lispro (ADMELOG) corrective regimen sliding scale   SubCutaneous at bedtime  multivitamin 1 Tablet(s) Oral daily  risperiDONE   Tablet 1 milliGRAM(s) Oral daily                        8.3    7.78  )-----------( 255      ( 01 Jan 2022 07:37 )             26.1     129<L>  |  90<L>  |  56<H>  ----------------------------<  177<H>  4.2   |  26  |  1.88<H>    Ca    8.9      01 Jan 2022 07:37  Phos  3.7     01-01  Mg     2.10     01-01    TPro  6.7  /  Alb  3.7  /  TBili  <0.2  /  DBili  x   /  AST  20  /  ALT  17  /  AlkPhos  60  01-01    T(C): 36.7 (01-01-22 @ 12:50), Max: 37.2 (12-31-21 @ 17:00)  HR: 75 (01-01-22 @ 12:50) (71 - 87)  BP: 150/69 (01-01-22 @ 13:20) (150/57 - 178/65)  RR: 18 (01-01-22 @ 12:50) (18 - 18)  SpO2: 100% (01-01-22 @ 12:50) (100% - 100%)    HEENT:   Normal oral mucosa, PERRL, EOMI	  Lymphatic: No lymphadenopathy , no edema  Cardiovascular: Normal S1 S2, No JVD, No murmurs , Peripheral pulses palpable 2+ bilaterally  Respiratory: Lungs clear to auscultation, normal effort 	  Gastrointestinal:  Soft, Non-tender, + BS	  Skin: No rashes, No ecchymoses, No cyanosis, warm to touch  Musculoskeletal: Normal range of motion, normal strength    tele- no tele    < from: Cardiac Cath Lab - Adult (06.30.20 @ 14:11) >  CORONARY VESSELS: Dominance was not assessed.  LM:   --  LM: This vessel was not injected, but was visualized during a  prior cardiac catheterization.  LAD:   --  LAD: This vessel was not injected, but was visualized during a  prior cardiac catheterization.  CX:   --  Proximal circumflex: There was a tubular 95 % stenosis.  RCA:   --  RCA: This vessel was not injected, but was visualized during a  prior cardiac catheterization.  COMPLICATIONS: There were no complications.  INTERVENTIONAL RECOMMENDATIONS: S/p successful KIERSTEN to the circumflex  artery. The patient should continue with dual antiplatelet therapy.  Prepared and signed by  Donis Jaquez M.D.    < from: CT Head No Cont (12.30.21 @ 12:40) >  IMPRESSION: No acute hemorrhage mass or mass effect.    < end of copied text >        ASSESSMENT/PLAN: 76 year old female with history of CAD s/p KIERSTEN to the RCA and LCx in 2020, HTN, HLD, DM, last EF normal from echocardiogram in March of 2021, prior history of DVT in the setting of COVID who is being seen for management of CAD.     -pt. with no chest pain and anginal symptoms   -recommend medical management of known CAD   -given NSTEMI and PCI > 1 year ago, can continue with sapt (asa 81mg PO daily)  -CTH negative, AMS resolving  -treatment of COVID per primary team       
Date of service  1/2/22    chief complaint: weakness    extended hpi: 76 year old female with history of CAD s/p KIERSTEN to the RCA and LCx in 2020, HTN, HLD, DM, last EF normal from echocardiogram in March of 2021, prior history of DVT in the setting of COVID who is being seen for management of CAD.     No events overnight, no chest pain     Review of Systems:   Constitutional: [ ] fevers, [ ] chills.   Skin: [ ] dry skin. [ ] rashes.  Psychiatric: [ ] depression, [ ] anxiety.   Gastrointestinal: [ ] BRBPR, [ ] melena.   Neurological: [ ] confusion. [ ] seizures. [ ] shuffling gait.   Ears,Nose,Mouth and Throat: [ ] ear pain [ ] sore throat.   Eyes: [ ] diplopia.   Respiratory: [ ] hemoptysis. [ ] shortness of breath  Cardiovascular: See HPI above  Hematologic/Lymphatic: [ ] anemia. [ ] painful nodes. [ ] prolonged bleeding.        acetaminophen     Tablet .. 650 milliGRAM(s) Oral every 6 hours PRN  allopurinol 100 milliGRAM(s) Oral daily  aspirin enteric coated 81 milliGRAM(s) Oral daily  atorvastatin 40 milliGRAM(s) Oral at bedtime  carvedilol 25 milliGRAM(s) Oral every 12 hours  dextrose 40% Gel 15 Gram(s) Oral once  dextrose 5%. 1000 milliLiter(s) IV Continuous <Continuous>  dextrose 5%. 1000 milliLiter(s) IV Continuous <Continuous>  dextrose 50% Injectable 25 Gram(s) IV Push once  dextrose 50% Injectable 12.5 Gram(s) IV Push once  dextrose 50% Injectable 25 Gram(s) IV Push once  glucagon  Injectable 1 milliGRAM(s) IntraMuscular once  heparin   Injectable 5000 Unit(s) SubCutaneous every 8 hours  hydrALAZINE 75 milliGRAM(s) Oral three times a day  influenza  Vaccine (HIGH DOSE) 0.7 milliLiter(s) IntraMuscular once  insulin lispro (ADMELOG) corrective regimen sliding scale   SubCutaneous three times a day before meals  insulin lispro (ADMELOG) corrective regimen sliding scale   SubCutaneous at bedtime  multivitamin 1 Tablet(s) Oral daily  risperiDONE   Tablet 1 milliGRAM(s) Oral daily                            8.3    8.50  )-----------( 272      ( 02 Jan 2022 07:01 )             25.7       Hemoglobin: 8.3 g/dL (01-02 @ 07:01)  Hemoglobin: 8.3 g/dL (01-01 @ 07:37)  Hemoglobin: 8.7 g/dL (12-31 @ 07:09)  Hemoglobin: 9.5 g/dL (12-30 @ 08:33)  Hemoglobin: 10.2 g/dL (12-29 @ 08:22)      01-01    129<L>  |  90<L>  |  56<H>  ----------------------------<  177<H>  4.2   |  26  |  1.88<H>    Ca    8.9      01 Jan 2022 07:37  Phos  3.7     01-01  Mg     2.10     01-01    TPro  6.7  /  Alb  3.7  /  TBili  <0.2  /  DBili  x   /  AST  20  /  ALT  17  /  AlkPhos  60  01-01    Creatinine Trend: 1.88<--, 1.90<--, 1.98<--, 2.33<--, 2.86<--    COAGS:           T(C): 37.2 (01-02-22 @ 05:11), Max: 37.2 (01-02-22 @ 05:11)  HR: 77 (01-02-22 @ 05:11) (71 - 80)  BP: 170/72 (01-02-22 @ 05:11) (150/57 - 208/78)  RR: 18 (01-02-22 @ 05:11) (18 - 18)  SpO2: 100% (01-02-22 @ 05:11) (100% - 100%)  Wt(kg): --    I&O's Summary  Genitourinary: [ ] hematuria. [ ] flank pain.   Endocrine: [ ] significant change in weight. [ ] intolerance to heat and cold.     Review of systems [x ] otherwise negative, [ ] otherwise unable to obtain    FH: no family history of sudden cardiac death in first degree relatives    SH: [ ] tobacco, [ ] alcohol, [ ] drugs      HEENT:   Normal oral mucosa, PERRL, EOMI	  Lymphatic: No lymphadenopathy , no edema  Cardiovascular: Normal S1 S2, No JVD, No murmurs , Peripheral pulses palpable 2+ bilaterally  Respiratory: Lungs clear to auscultation, normal effort 	  Gastrointestinal:  Soft, Non-tender, + BS	  Skin: No rashes, No ecchymoses, No cyanosis, warm to touch  Musculoskeletal: Normal range of motion, normal strength    tele- no tele    < from: Cardiac Cath Lab - Adult (06.30.20 @ 14:11) >  CORONARY VESSELS: Dominance was not assessed.  LM:   --  LM: This vessel was not injected, but was visualized during a  prior cardiac catheterization.  LAD:   --  LAD: This vessel was not injected, but was visualized during a  prior cardiac catheterization.  CX:   --  Proximal circumflex: There was a tubular 95 % stenosis.  RCA:   --  RCA: This vessel was not injected, but was visualized during a  prior cardiac catheterization.  COMPLICATIONS: There were no complications.  INTERVENTIONAL RECOMMENDATIONS: S/p successful KIERSTEN to the circumflex  artery. The patient should continue with dual antiplatelet therapy.  Prepared and signed by  Donis Jaquez M.D.    < from: CT Head No Cont (12.30.21 @ 12:40) >  IMPRESSION: No acute hemorrhage mass or mass effect.    < end of copied text >        ASSESSMENT/PLAN: 76 year old female with history of CAD s/p KIERSTEN to the RCA and LCx in 2020, HTN, HLD, DM, last EF normal from echocardiogram in March of 2021, prior history of DVT in the setting of COVID who is being seen for management of CAD.     -pt. with no chest pain and anginal symptoms   -recommend medical management of known CAD   -given NSTEMI and PCI > 1 year ago, can continue with sapt (asa 81mg PO daily)  -CTH negative, AMS resolving  -treatment of COVID per primary team       
Date of service  1/4/22    chief complaint: weakness    extended hpi: 76 year old female with history of CAD s/p KIERSTEN to the RCA and LCx in 2020, HTN, HLD, DM, last EF normal from echocardiogram in March of 2021, prior history of DVT in the setting of COVID who is being seen for management of CAD.     S: no chest pain or sob; ros otherwise negative.     Review of Systems:   Constitutional: [ ] fevers, [ ] chills.   Skin: [ ] dry skin. [ ] rashes.  Psychiatric: [ ] depression, [ ] anxiety.   Gastrointestinal: [ ] BRBPR, [ ] melena.   Neurological: [ ] confusion. [ ] seizures. [ ] shuffling gait.   Ears,Nose,Mouth and Throat: [ ] ear pain [ ] sore throat.   Eyes: [ ] diplopia.   Respiratory: [ ] hemoptysis. [ ] shortness of breath  Cardiovascular: See HPI above  Hematologic/Lymphatic: [ ] anemia. [ ] painful nodes. [ ] prolonged bleeding.   Genitourinary: [ ] hematuria. [ ] flank pain.   Endocrine: [ ] significant change in weight. [ ] intolerance to heat and cold.     Review of systems [x ] otherwise negative, [ ] otherwise unable to obtain    FH: no family history of sudden cardiac death in first degree relatives    SH: [ ] tobacco, [ ] alcohol, [ ] drugs      acetaminophen     Tablet .. 650 milliGRAM(s) Oral every 6 hours PRN  allopurinol 100 milliGRAM(s) Oral daily  amLODIPine   Tablet 5 milliGRAM(s) Oral daily  aspirin enteric coated 81 milliGRAM(s) Oral daily  atorvastatin 40 milliGRAM(s) Oral at bedtime  carvedilol 25 milliGRAM(s) Oral every 12 hours  dextrose 40% Gel 15 Gram(s) Oral once  dextrose 5%. 1000 milliLiter(s) IV Continuous <Continuous>  dextrose 5%. 1000 milliLiter(s) IV Continuous <Continuous>  dextrose 50% Injectable 25 Gram(s) IV Push once  dextrose 50% Injectable 12.5 Gram(s) IV Push once  dextrose 50% Injectable 25 Gram(s) IV Push once  glucagon  Injectable 1 milliGRAM(s) IntraMuscular once  heparin   Injectable 5000 Unit(s) SubCutaneous every 8 hours  hydrALAZINE 100 milliGRAM(s) Oral three times a day  influenza  Vaccine (HIGH DOSE) 0.7 milliLiter(s) IntraMuscular once  insulin lispro (ADMELOG) corrective regimen sliding scale   SubCutaneous at bedtime  insulin lispro (ADMELOG) corrective regimen sliding scale   SubCutaneous three times a day before meals  multivitamin 1 Tablet(s) Oral daily  risperiDONE   Tablet 1 milliGRAM(s) Oral daily  sodium chloride 1 Gram(s) Oral three times a day                            8.6    8.48  )-----------( 312      ( 04 Jan 2022 07:18 )             26.4       01-04    127<L>  |  91<L>  |  46<H>  ----------------------------<  177<H>  4.4   |  26  |  1.69<H>    Ca    9.6      04 Jan 2022 07:18  Phos  4.0     01-03  Mg     2.20     01-03    TPro  6.8  /  Alb  3.5  /  TBili  <0.2  /  DBili  x   /  AST  21  /  ALT  23  /  AlkPhos  65  01-03            T(C): 36.7 (01-04-22 @ 13:00), Max: 37.1 (01-04-22 @ 05:03)  HR: 72 (01-04-22 @ 13:00) (72 - 89)  BP: 124/76 (01-04-22 @ 13:00) (124/76 - 175/70)  RR: 18 (01-04-22 @ 13:00) (18 - 19)  SpO2: 100% (01-04-22 @ 13:00) (98% - 100%)  Wt(kg): --    I&O's Summary      HEENT:   Normal oral mucosa, PERRL, EOMI	  Lymphatic: No lymphadenopathy , no edema  Cardiovascular: Normal S1 S2, No JVD, No murmurs , Peripheral pulses palpable 2+ bilaterally  Respiratory: Lungs clear to auscultation, normal effort 	  Gastrointestinal:  Soft, Non-tender, + BS	  Skin: No rashes, No ecchymoses, No cyanosis, warm to touch  Musculoskeletal: Normal range of motion, normal strength    tele- no tele    < from: Cardiac Cath Lab - Adult (06.30.20 @ 14:11) >  CORONARY VESSELS: Dominance was not assessed.  LM:   --  LM: This vessel was not injected, but was visualized during a  prior cardiac catheterization.  LAD:   --  LAD: This vessel was not injected, but was visualized during a  prior cardiac catheterization.  CX:   --  Proximal circumflex: There was a tubular 95 % stenosis.  RCA:   --  RCA: This vessel was not injected, but was visualized during a  prior cardiac catheterization.  COMPLICATIONS: There were no complications.  INTERVENTIONAL RECOMMENDATIONS: S/p successful KIERSTEN to the circumflex  artery. The patient should continue with dual antiplatelet therapy.  Prepared and signed by  Donis Jaquez M.D.    < from: CT Head No Cont (12.30.21 @ 12:40) >  IMPRESSION: No acute hemorrhage mass or mass effect.    < end of copied text >        ASSESSMENT/PLAN: 76 year old female with history of CAD s/p KIERSTEN to the RCA and LCx in 2020, HTN, HLD, DM, last EF normal from echocardiogram in March of 2021, prior history of DVT in the setting of COVID who is being seen for management of CAD.     -pt. with no chest pain and anginal symptoms   -recommend medical management of known CAD   -given NSTEMI and PCI > 1 year ago, can continue with sapt (asa 81mg PO daily)  -CTH negative, AMS workup per primary team   -treatment of COVID per primary team  -no further inpatient cardiac workup anticipated at this time  -hydralazine increased for better BP control - monitor BP and adjust anti-hypertensive medications as needed / tolerated    Donis Jaquez MD        
Date of service  1/6/22    chief complaint: weakness    extended hpi: 76 year old female with history of CAD s/p KIERSTEN to the RCA and LCx in 2020, HTN, HLD, DM, last EF normal from echocardiogram in March of 2021, prior history of DVT in the setting of COVID who is being seen for management of CAD.     S: no chest pain or sob; ros otherwise negative.     Review of Systems:   Constitutional: [ ] fevers, [ ] chills.   Skin: [ ] dry skin. [ ] rashes.  Psychiatric: [ ] depression, [ ] anxiety.   Gastrointestinal: [ ] BRBPR, [ ] melena.   Neurological: [ ] confusion. [ ] seizures. [ ] shuffling gait.   Ears,Nose,Mouth and Throat: [ ] ear pain [ ] sore throat.   Eyes: [ ] diplopia.   Respiratory: [ ] hemoptysis. [ ] shortness of breath  Cardiovascular: See HPI above  Hematologic/Lymphatic: [ ] anemia. [ ] painful nodes. [ ] prolonged bleeding.   Genitourinary: [ ] hematuria. [ ] flank pain.   Endocrine: [ ] significant change in weight. [ ] intolerance to heat and cold.     Review of systems [x ] otherwise negative, [ ] otherwise unable to obtain    FH: no family history of sudden cardiac death in first degree relatives    SH: [ ] tobacco, [ ] alcohol, [ ] drugs    acetaminophen     Tablet .. 650 milliGRAM(s) Oral every 6 hours PRN  allopurinol 100 milliGRAM(s) Oral daily  amLODIPine   Tablet 5 milliGRAM(s) Oral daily  aspirin enteric coated 81 milliGRAM(s) Oral daily  atorvastatin 40 milliGRAM(s) Oral at bedtime  carvedilol 25 milliGRAM(s) Oral every 12 hours  dextrose 40% Gel 15 Gram(s) Oral once  dextrose 5%. 1000 milliLiter(s) IV Continuous <Continuous>  dextrose 5%. 1000 milliLiter(s) IV Continuous <Continuous>  dextrose 50% Injectable 25 Gram(s) IV Push once  dextrose 50% Injectable 12.5 Gram(s) IV Push once  dextrose 50% Injectable 25 Gram(s) IV Push once  glucagon  Injectable 1 milliGRAM(s) IntraMuscular once  heparin   Injectable 5000 Unit(s) SubCutaneous every 8 hours  hydrALAZINE 100 milliGRAM(s) Oral three times a day  influenza  Vaccine (HIGH DOSE) 0.7 milliLiter(s) IntraMuscular once  insulin glargine Injectable (LANTUS) 10 Unit(s) SubCutaneous at bedtime  insulin lispro (ADMELOG) corrective regimen sliding scale   SubCutaneous three times a day before meals  insulin lispro (ADMELOG) corrective regimen sliding scale   SubCutaneous at bedtime  insulin lispro Injectable (ADMELOG) 5 Unit(s) SubCutaneous three times a day before meals  multivitamin 1 Tablet(s) Oral daily  risperiDONE   Tablet 1 milliGRAM(s) Oral daily                      7.7    7.69  )-----------( 299      ( 06 Jan 2022 07:19 )             24.1     133<L>  |  98  |  56<H>  ----------------------------<  214<H>  4.8   |  24  |  1.87<H>    Ca    9.2      06 Jan 2022 07:19  Phos  4.0     01-06  Mg     2.10     01-06    T(C): 36.6 (01-06-22 @ 12:30), Max: 37.1 (01-05-22 @ 15:15)  HR: 81 (01-06-22 @ 12:30) (61 - 81)  BP: 160/66 (01-06-22 @ 12:30) (150/56 - 184/72)  RR: 18 (01-06-22 @ 12:30) (18 - 18)  SpO2: 100% (01-06-22 @ 12:30) (100% - 100%)    General: Well nourished in no acute distress. Alert and Oriented * 3.   Head: Normocephalic and atraumatic.   Neck: No JVD. No bruits. Supple. Does not appear to be enlarged.   Cardiovascular: + S1,S2 ; RRR Soft systolic murmur at the left lower sternal border. No rubs noted.    Lungs: CTA b/l. No rhonchi, rales or wheezes.   Abdomen: + BS, soft. Non tender. Non distended. No rebound. No guarding.   Extremities: No clubbing/cyanosis/edema.   Neurologic: Moves all four extremities. Full range of motion.   Skin: Warm and moist. The patient's skin has normal elasticity and good skin turgor.   Psychiatric: Appropriate mood and affect.  Musculoskeletal: Normal range of motion, normal strength    tele- no tele    < from: Cardiac Cath Lab - Adult (06.30.20 @ 14:11) >  CORONARY VESSELS: Dominance was not assessed.  LM:   --  LM: This vessel was not injected, but was visualized during a  prior cardiac catheterization.  LAD:   --  LAD: This vessel was not injected, but was visualized during a  prior cardiac catheterization.  CX:   --  Proximal circumflex: There was a tubular 95 % stenosis.  RCA:   --  RCA: This vessel was not injected, but was visualized during a  prior cardiac catheterization.  COMPLICATIONS: There were no complications.  INTERVENTIONAL RECOMMENDATIONS: S/p successful KIERSTEN to the circumflex  artery. The patient should continue with dual antiplatelet therapy.  Prepared and signed by  Donis Jaquez M.D.    < from: CT Head No Cont (12.30.21 @ 12:40) >  IMPRESSION: No acute hemorrhage mass or mass effect.  < end of copied text >        ASSESSMENT/PLAN: 76 year old female with history of CAD s/p KIERSTEN to the RCA and LCx in 2020, HTN, HLD, DM, last EF normal from echocardiogram in March of 2021, prior history of DVT in the setting of COVID who is being seen for management of CAD.     -pt. with no chest pain and anginal symptoms   -recommend medical management of known CAD   -given NSTEMI and PCI > 1 year ago, can continue with sapt (asa 81mg PO daily)  -CTH negative, AMS workup per primary team   -treatment of COVID per primary team  -no further inpatient cardiac workup anticipated at this time  -hydralazine increased, can consider changing Norvasc to Procardia XL 30 and uptitrate as tolerated for BP management          
Date of service  1/7/22    chief complaint: weakness    extended hpi: 76 year old female with history of CAD s/p KIERSTEN to the RCA and LCx in 2020, HTN, HLD, DM, last EF normal from echocardiogram in March of 2021, prior history of DVT in the setting of COVID who is being seen for management of CAD.     S: no chest pain or sob; ros otherwise negative.     Review of Systems:   Constitutional: [ ] fevers, [ ] chills.   Skin: [ ] dry skin. [ ] rashes.  Psychiatric: [ ] depression, [ ] anxiety.   Gastrointestinal: [ ] BRBPR, [ ] melena.   Neurological: [ ] confusion. [ ] seizures. [ ] shuffling gait.   Ears,Nose,Mouth and Throat: [ ] ear pain [ ] sore throat.   Eyes: [ ] diplopia.   Respiratory: [ ] hemoptysis. [ ] shortness of breath  Cardiovascular: See HPI above  Hematologic/Lymphatic: [ ] anemia. [ ] painful nodes. [ ] prolonged bleeding.   Genitourinary: [ ] hematuria. [ ] flank pain.   Endocrine: [ ] significant change in weight. [ ] intolerance to heat and cold.     Review of systems [x ] otherwise negative, [ ] otherwise unable to obtain    FH: no family history of sudden cardiac death in first degree relatives    SH: [ ] tobacco, [ ] alcohol, [ ] drugs               acetaminophen     Tablet .. 650 milliGRAM(s) Oral every 6 hours PRN  allopurinol 100 milliGRAM(s) Oral daily  amLODIPine   Tablet 10 milliGRAM(s) Oral daily  aspirin enteric coated 81 milliGRAM(s) Oral daily  atorvastatin 40 milliGRAM(s) Oral at bedtime  carvedilol 25 milliGRAM(s) Oral every 12 hours  dextrose 40% Gel 15 Gram(s) Oral once  dextrose 5%. 1000 milliLiter(s) IV Continuous <Continuous>  dextrose 5%. 1000 milliLiter(s) IV Continuous <Continuous>  dextrose 50% Injectable 25 Gram(s) IV Push once  dextrose 50% Injectable 12.5 Gram(s) IV Push once  dextrose 50% Injectable 25 Gram(s) IV Push once  glucagon  Injectable 1 milliGRAM(s) IntraMuscular once  heparin   Injectable 5000 Unit(s) SubCutaneous every 8 hours  hydrALAZINE 100 milliGRAM(s) Oral three times a day  influenza  Vaccine (HIGH DOSE) 0.7 milliLiter(s) IntraMuscular once  insulin glargine Injectable (LANTUS) 12 Unit(s) SubCutaneous at bedtime  insulin lispro (ADMELOG) corrective regimen sliding scale   SubCutaneous three times a day before meals  insulin lispro (ADMELOG) corrective regimen sliding scale   SubCutaneous at bedtime  insulin lispro Injectable (ADMELOG) 7 Unit(s) SubCutaneous three times a day before meals  multivitamin 1 Tablet(s) Oral daily  risperiDONE   Tablet 1 milliGRAM(s) Oral daily                            8.2    9.95  )-----------( 338      ( 07 Jan 2022 07:04 )             25.9       01-07    133<L>  |  99  |  52<H>  ----------------------------<  182<H>  4.4   |  24  |  1.72<H>    Ca    9.7      07 Jan 2022 07:04  Phos  4.0     01-07  Mg     2.20     01-07              T(C): 36.8 (01-07-22 @ 05:47), Max: 37.1 (01-06-22 @ 21:45)  HR: 78 (01-07-22 @ 05:47) (72 - 82)  BP: 161/64 (01-07-22 @ 05:47) (151/68 - 161/64)  RR: 17 (01-07-22 @ 05:47) (17 - 18)  SpO2: 100% (01-07-22 @ 05:47) (100% - 100%)  Wt(kg): --    I&O's Summary      General: Well nourished in no acute distress.   Head: Normocephalic and atraumatic.   Neck: No JVD. No bruits. Supple. Does not appear to be enlarged.   Cardiovascular: + S1,S2 ; RRR Soft systolic murmur at the left lower sternal border. No rubs noted.    Lungs: CTA b/l. No rhonchi, rales or wheezes.   Abdomen: + BS, soft. Non tender. Non distended. No rebound. No guarding.   Extremities: No clubbing/cyanosis/edema.   Neurologic: Moves all four extremities. Full range of motion.   Skin: Warm and moist. The patient's skin has normal elasticity and good skin turgor.       tele- no tele    < from: Cardiac Cath Lab - Adult (06.30.20 @ 14:11) >  CORONARY VESSELS: Dominance was not assessed.  LM:   --  LM: This vessel was not injected, but was visualized during a  prior cardiac catheterization.  LAD:   --  LAD: This vessel was not injected, but was visualized during a  prior cardiac catheterization.  CX:   --  Proximal circumflex: There was a tubular 95 % stenosis.  RCA:   --  RCA: This vessel was not injected, but was visualized during a  prior cardiac catheterization.  COMPLICATIONS: There were no complications.  INTERVENTIONAL RECOMMENDATIONS: S/p successful KIERSTEN to the circumflex  artery. The patient should continue with dual antiplatelet therapy.  Prepared and signed by  Donis Jaquez M.D.    < from: CT Head No Cont (12.30.21 @ 12:40) >  IMPRESSION: No acute hemorrhage mass or mass effect.  < end of copied text >        ASSESSMENT/PLAN: 76 year old female with history of CAD s/p KIERSTEN to the RCA and LCx in 2020, HTN, HLD, DM, last EF normal from echocardiogram in March of 2021, prior history of DVT in the setting of COVID who is being seen for management of CAD.     -pt. with no chest pain and anginal symptoms   -recommend medical management of known CAD   -given NSTEMI and PCI > 1 year ago, can continue with sapt (asa 81mg PO daily) if no contraindications   -workup of anemia per primary team   -CTH negative, AMS workup per primary team   -treatment of COVID per primary team  -no further inpatient cardiac workup anticipated at this time    Donis Jaquez MD           
Date of service  12/30/21    chief complaint: weakness    extended hpi: 76 year old female with history of CAD s/p KIERSTEN to the RCA and LCx in 2020, HTN, HLD, DM, last EF normal from echocardiogram in March of 2021, prior history of DVT in the setting of COVID who is being seen for management of CAD.     no chest pain or SOB    Review of Systems:   Constitutional: [ ] fevers, [ ] chills.   Skin: [ ] dry skin. [ ] rashes.  Psychiatric: [ ] depression, [ ] anxiety.   Gastrointestinal: [ ] BRBPR, [ ] melena.   Neurological: [ ] confusion. [ ] seizures. [ ] shuffling gait.   Ears,Nose,Mouth and Throat: [ ] ear pain [ ] sore throat.   Eyes: [ ] diplopia.   Respiratory: [ ] hemoptysis. [ ] shortness of breath  Cardiovascular: See HPI above  Hematologic/Lymphatic: [ ] anemia. [ ] painful nodes. [ ] prolonged bleeding.   Genitourinary: [ ] hematuria. [ ] flank pain.   Endocrine: [ ] significant change in weight. [ ] intolerance to heat and cold.     Review of systems [x ] otherwise negative, [ ] otherwise unable to obtain    FH: no family history of sudden cardiac death in first degree relatives    SH: [ ] tobacco, [ ] alcohol, [ ] drugs    acetaminophen     Tablet .. 650 milliGRAM(s) Oral every 6 hours PRN  aspirin enteric coated 81 milliGRAM(s) Oral daily  carvedilol 12.5 milliGRAM(s) Oral every 12 hours  dextrose 40% Gel 15 Gram(s) Oral once  dextrose 5%. 1000 milliLiter(s) IV Continuous <Continuous>  dextrose 5%. 1000 milliLiter(s) IV Continuous <Continuous>  dextrose 50% Injectable 25 Gram(s) IV Push once  dextrose 50% Injectable 12.5 Gram(s) IV Push once  dextrose 50% Injectable 25 Gram(s) IV Push once  glucagon  Injectable 1 milliGRAM(s) IntraMuscular once  heparin   Injectable 5000 Unit(s) SubCutaneous every 8 hours  hydrALAZINE 50 milliGRAM(s) Oral three times a day  influenza  Vaccine (HIGH DOSE) 0.7 milliLiter(s) IntraMuscular once  insulin lispro (ADMELOG) corrective regimen sliding scale   SubCutaneous three times a day before meals  insulin lispro (ADMELOG) corrective regimen sliding scale   SubCutaneous at bedtime                       9.5    7.37  )-----------( 282      ( 30 Dec 2021 08:33 )             30.0     130<L>  |  90<L>  |  65<H>  ----------------------------<  157<H>  3.8   |  27  |  1.98<H>    Ca    9.4      30 Dec 2021 08:33  Phos  4.3     12-30  Mg     1.90     12-30    TPro  7.4  /  Alb  3.8  /  TBili  <0.2  /  DBili  x   /  AST  23  /  ALT  16  /  AlkPhos  59  12-30      T(C): 36.9 (12-30-21 @ 09:00), Max: 37.5 (12-29-21 @ 21:00)  HR: 87 (12-30-21 @ 09:00) (75 - 87)  BP: 146/72 (12-30-21 @ 09:00) (131/50 - 180/65)  RR: 18 (12-30-21 @ 09:00) (16 - 18)  SpO2: 100% (12-30-21 @ 09:00) (100% - 100%)    HEENT:   Normal oral mucosa, PERRL, EOMI	  Lymphatic: No lymphadenopathy , no edema  Cardiovascular: Normal S1 S2, No JVD, No murmurs , Peripheral pulses palpable 2+ bilaterally  Respiratory: Lungs clear to auscultation, normal effort 	  Gastrointestinal:  Soft, Non-tender, + BS	  Skin: No rashes, No ecchymoses, No cyanosis, warm to touch  Musculoskeletal: Normal range of motion, normal strength  Psychiatry:  unable to assess     tele- no tele    < from: Cardiac Cath Lab - Adult (06.30.20 @ 14:11) >  CORONARY VESSELS: Dominance was not assessed.  LM:   --  LM: This vessel was not injected, but was visualized during a  prior cardiac catheterization.  LAD:   --  LAD: This vessel was not injected, but was visualized during a  prior cardiac catheterization.  CX:   --  Proximal circumflex: There was a tubular 95 % stenosis.  RCA:   --  RCA: This vessel was not injected, but was visualized during a  prior cardiac catheterization.  COMPLICATIONS: There were no complications.  INTERVENTIONAL RECOMMENDATIONS: S/p successful KIERSTEN to the circumflex  artery. The patient should continue with dual antiplatelet therapy.  Prepared and signed by  Donis Jaquez M.D.        ASSESSMENT/PLAN: 76 year old female with history of CAD s/p KIERSTEN to the RCA and LCx in 2020, HTN, HLD, DM, last EF normal from echocardiogram in March of 2021, prior history of DVT in the setting of COVID who is being seen for management of CAD.     -pt. with no chest pain and anginal symptoms   -recommend medical management of known CAD   -given NSTEMI and PCI > 1 year ago, can continue with sapt (asa 81mg PO daily)  -pt. with ams - CTH pending - workup per primary team   -treatment of COVID per primary team       
[facility] [place] [facility]   MD HA STOUT MD, PA INJUNG KO NP    TEL:  OFFICE: [phone]  DR FLORES CELL: [phone]  DR. HODGES CELL: [phone]  WESTLEY DAVE CELL: [phone]  FIDEL STAPLES CELL :[phone]    From 5pm-7am Answering Service [phone]    -- RENAL FOLLOW UP NOTE ---Date of Service 12-30-21 @ 11:38    Patient is a 76y old  Female who presents with a chief complaint of weakness (29 Dec 2021 14:29)      Patient seen and examined at bedside. No chest pain/sob    VITALS:  T(F): 98.5 (12-30-21 @ 09:00), Max: 100.5 (12-29-21 @ 11:45)  HR: 87 (12-30-21 @ 09:00)  BP: 146/72 (12-30-21 @ 09:00)  RR: 18 (12-30-21 @ 09:00)  SpO2: 100% (12-30-21 @ 09:00)  Wt(kg): --      PHYSICAL EXAM:  Constitutional: NAD  Neck: No JVD  Respiratory: CTAB, no wheezes, rales or rhonchi  Cardiovascular: S1, S2, RRR  Gastrointestinal: BS+, soft, NT/ND  Extremities: +1 edema on lower extremity     Hospital Medications:   MEDICATIONS  (STANDING):  aspirin enteric coated 81 milliGRAM(s) Oral daily  carvedilol 12.5 milliGRAM(s) Oral every 12 hours  dextrose 40% Gel 15 Gram(s) Oral once  dextrose 5%. 1000 milliLiter(s) (50 mL/Hr) IV Continuous <Continuous>  dextrose 5%. 1000 milliLiter(s) (100 mL/Hr) IV Continuous <Continuous>  dextrose 50% Injectable 25 Gram(s) IV Push once  dextrose 50% Injectable 12.5 Gram(s) IV Push once  dextrose 50% Injectable 25 Gram(s) IV Push once  glucagon  Injectable 1 milliGRAM(s) IntraMuscular once  heparin   Injectable 5000 Unit(s) SubCutaneous every 8 hours  hydrALAZINE 50 milliGRAM(s) Oral three times a day  influenza  Vaccine (HIGH DOSE) 0.7 milliLiter(s) IntraMuscular once  insulin lispro (ADMELOG) corrective regimen sliding scale   SubCutaneous three times a day before meals  insulin lispro (ADMELOG) corrective regimen sliding scale   SubCutaneous at bedtime      LABS:  12-30    130<L>  |  90<L>  |  65<H>  ----------------------------<  157<H>  3.8   |  27  |  1.98<H>    Ca    9.4      30 Dec 2021 08:33  Phos  4.3     12-30  Mg     1.90     12-30    TPro  7.4  /  Alb  3.8  /  TBili  <0.2  /  DBili      /  AST  23  /  ALT  16  /  AlkPhos  59  12-30    Creatinine Trend: 1.98 <--, 2.33 <--, 2.86 <--    Albumin, Serum: 3.8 g/dL (12-30 @ 08:33)  Phosphorus Level, Serum: 4.3 mg/dL (12-30 @ 08:33)  Ferritin, Serum: 78 ng/mL (12-29 @ 15:14)                              9.5    7.37  )-----------( 282      ( 30 Dec 2021 08:33 )             30.0     Urine Studies:  Urinalysis - [12-30-21 @ 02:56]      Color Yellow / Appearance Slightly Turbid / SG 1.015 / pH 6.5      Gluc Trace / Ketone Negative  / Bili Negative / Urobili <2 mg/dL       Blood Small / Protein 100 mg/dL / Leuk Est Large / Nitrite Negative      RBC 5-10 / WBC >10 / Hyaline 0 / Gran  / Sq Epi  / Non Sq Epi 2 / Bacteria Many    Urine Creatinine 78      [12-30-21 @ 02:56]  Urine Sodium 52      [12-30-21 @ 02:56]  Urine Urea Nitrogen 535.2      [12-30-21 @ 02:56]  Urine Osmolality 386      [12-30-21 @ 02:56]    Ferritin 78      [12-29-21 @ 15:14]  HbA1c 8.8      [12-13-19 @ 06:00]  Lipid: chol 152, , HDL 30, LDL --      [12-29-21 @ 08:22]        RADIOLOGY & ADDITIONAL STUDIES:  
JOSE SAGASTUME  76y  Patient is a 76y old  Female who presents with a chief complaint of weakness (01 Jan 2022 15:08)    HPI:  Admitted for weakness, found to have COVID.  She is followed for SUNIL on CKD.  No new complaints.    HEALTH ISSUES - PROBLEM Dx:  Acute on chronic kidney failure    Hyperphosphatemia    Elevated troponin    Type 2 diabetes mellitus    Chronic diastolic congestive heart failure    Hypertensive emergency    Preventive measure    Medication management    2019 novel coronavirus disease (COVID-19)          MEDICATIONS  (STANDING):  allopurinol 100 milliGRAM(s) Oral daily  aspirin enteric coated 81 milliGRAM(s) Oral daily  atorvastatin 40 milliGRAM(s) Oral at bedtime  carvedilol 25 milliGRAM(s) Oral every 12 hours  dextrose 40% Gel 15 Gram(s) Oral once  dextrose 5%. 1000 milliLiter(s) (50 mL/Hr) IV Continuous <Continuous>  dextrose 5%. 1000 milliLiter(s) (100 mL/Hr) IV Continuous <Continuous>  dextrose 50% Injectable 25 Gram(s) IV Push once  dextrose 50% Injectable 12.5 Gram(s) IV Push once  dextrose 50% Injectable 25 Gram(s) IV Push once  glucagon  Injectable 1 milliGRAM(s) IntraMuscular once  heparin   Injectable 5000 Unit(s) SubCutaneous every 8 hours  hydrALAZINE 75 milliGRAM(s) Oral three times a day  influenza  Vaccine (HIGH DOSE) 0.7 milliLiter(s) IntraMuscular once  insulin lispro (ADMELOG) corrective regimen sliding scale   SubCutaneous three times a day before meals  insulin lispro (ADMELOG) corrective regimen sliding scale   SubCutaneous at bedtime  multivitamin 1 Tablet(s) Oral daily  risperiDONE   Tablet 1 milliGRAM(s) Oral daily    MEDICATIONS  (PRN):  acetaminophen     Tablet .. 650 milliGRAM(s) Oral every 6 hours PRN Temp greater or equal to 38C (100.4F), Mild Pain (1 - 3)    Vital Signs Last 24 Hrs  T(C): 36.7 (01 Jan 2022 12:50), Max: 37.2 (31 Dec 2021 17:00)  T(F): 98 (01 Jan 2022 12:50), Max: 99 (31 Dec 2021 18:18)  HR: 75 (01 Jan 2022 12:50) (71 - 87)  BP: 150/69 (01 Jan 2022 13:20) (150/57 - 178/65)  BP(mean): --  RR: 18 (01 Jan 2022 12:50) (18 - 18)  SpO2: 100% (01 Jan 2022 12:50) (100% - 100%)  Daily     Daily     PHYSICAL EXAM:  Constitutional:  She appears comfortable and not distressed. Not diaphoretic.    Neck:  The thyroid is normal. Trachea is midline.     Respiratory: The lungs are clear to auscultation. No dullness and expansion is normal.    Cardiovascular: S1 and S2 are normal. No mummurs, rubs or gallops are present.    Gastrointestinal: The abdomen is soft. No tenderness is present. No masses are present. Bowel sounds are normal.    Genitourinary: The bladder is not distended. No CVA tenderness is present.    Extremities: No edema is noted. No deformities are present.    Neurological: Cognition is normal. Tone, power and sensation are normal. Gait is steady.    Skin: No lesions are seen  or palpated.    Lymph Nodes: No lymphadenopathy is present.                            8.3    7.78  )-----------( 255      ( 01 Jan 2022 07:37 )             26.1     01-01    129<L>  |  90<L>  |  56<H>  ----------------------------<  177<H>  4.2   |  26  |  1.88<H>    Ca    8.9      01 Jan 2022 07:37  Phos  3.7     01-01  Mg     2.10     01-01    TPro  6.7  /  Alb  3.7  /  TBili  <0.2  /  DBili  x   /  AST  20  /  ALT  17  /  AlkPhos  60  01-01    Osmolality, Random Urine: 386 mosm/kg (12.30.21 @ 02:56)   Osmolality, Random Urine: 336 mosmo/kg (07.01.20 @ 12:15)         < from: US Kidney and Bladder (12.29.21 @ 11:04) >      Right kidney: 8.8 cm. No renal mass, hydronephrosis or calculi.    Left kidney: 8.5 cm. No renal mass, hydronephrosis or calculi.      < end of copied text >  
[facility] [place] [facility]   MD HA STOUT MD, PA INJUNG KO NP    TEL:  OFFICE: [phone]  DR FLORES CELL: [phone]  DR. HODGES CELL: [phone]  WESTLEY DAVE CELL: [phone]  FIDEL STAPLES CELL :[phone]    From 5pm-7am Answering Service [phone]    -- RENAL FOLLOW UP NOTE ---Date of Service 01-06-22 @ 13:19    Patient is a 76y old  Female who presents with a chief complaint of weakness (06 Jan 2022 11:03)    Patient seen and examined at bedside. No chest pain/sob    VITALS:  T(F): 97.9 (01-06-22 @ 12:30), Max: 98.7 (01-05-22 @ 15:15)  HR: 81 (01-06-22 @ 12:30)  BP: 160/66 (01-06-22 @ 12:30)  RR: 18 (01-06-22 @ 12:30)  SpO2: 100% (01-06-22 @ 12:30)  Wt(kg): --    01-05 @ 07:01  -  01-06 @ 07:00  --------------------------------------------------------  IN: 0 mL / OUT: 600 mL / NET: -600 mL        PHYSICAL EXAM:  Constitutional: NAD  Neck: No JVD  Respiratory: CTAB, no wheezes, rales or rhonchi  Cardiovascular: S1, S2, RRR  Gastrointestinal: BS+, soft, NT/ND  Extremities: No peripheral edema    Hospital Medications:   MEDICATIONS  (STANDING):  allopurinol 100 milliGRAM(s) Oral daily  amLODIPine   Tablet 5 milliGRAM(s) Oral daily  aspirin enteric coated 81 milliGRAM(s) Oral daily  atorvastatin 40 milliGRAM(s) Oral at bedtime  carvedilol 25 milliGRAM(s) Oral every 12 hours  dextrose 40% Gel 15 Gram(s) Oral once  dextrose 5%. 1000 milliLiter(s) (50 mL/Hr) IV Continuous <Continuous>  dextrose 5%. 1000 milliLiter(s) (100 mL/Hr) IV Continuous <Continuous>  dextrose 50% Injectable 25 Gram(s) IV Push once  dextrose 50% Injectable 12.5 Gram(s) IV Push once  dextrose 50% Injectable 25 Gram(s) IV Push once  glucagon  Injectable 1 milliGRAM(s) IntraMuscular once  heparin   Injectable 5000 Unit(s) SubCutaneous every 8 hours  hydrALAZINE 100 milliGRAM(s) Oral three times a day  influenza  Vaccine (HIGH DOSE) 0.7 milliLiter(s) IntraMuscular once  insulin glargine Injectable (LANTUS) 10 Unit(s) SubCutaneous at bedtime  insulin lispro (ADMELOG) corrective regimen sliding scale   SubCutaneous three times a day before meals  insulin lispro (ADMELOG) corrective regimen sliding scale   SubCutaneous at bedtime  insulin lispro Injectable (ADMELOG) 5 Unit(s) SubCutaneous three times a day before meals  multivitamin 1 Tablet(s) Oral daily  risperiDONE   Tablet 1 milliGRAM(s) Oral daily      LABS:  01-06    133<L>  |  98  |  56<H>  ----------------------------<  214<H>  4.8   |  24  |  1.87<H>    Ca    9.2      06 Jan 2022 07:19  Phos  4.0     01-06  Mg     2.10     01-06      Creatinine Trend: 1.87 <--, 1.56 <--, 1.69 <--, 1.68 <--, 1.68 <--, 1.88 <--, 1.90 <--    Phosphorus Level, Serum: 4.0 mg/dL (01-06 @ 07:19)                              7.7    7.69  )-----------( 299      ( 06 Jan 2022 07:19 )             24.1     Urine Studies:  Urinalysis - [12-30-21 @ 02:56]      Color Yellow / Appearance Slightly Turbid / SG 1.015 / pH 6.5      Gluc Trace / Ketone Negative  / Bili Negative / Urobili <2 mg/dL       Blood Small / Protein 100 mg/dL / Leuk Est Large / Nitrite Negative      RBC 5-10 / WBC >10 / Hyaline 0 / Gran  / Sq Epi  / Non Sq Epi 2 / Bacteria Many      Ferritin 394      [01-05-22 @ 07:19]  HbA1c 8.8      [12-13-19 @ 06:00]  Lipid: chol 152, , HDL 30, LDL --      [12-29-21 @ 08:22]    HIV Nonreact      [01-04-22 @ 07:18]      RADIOLOGY & ADDITIONAL STUDIES:  
Date of Service  : 01-01-22 @ 15:08    INTERVAL HPI/OVERNIGHT EVENTS: no new concerns.   Vital Signs Last 24 Hrs  T(C): 36.7 (01 Jan 2022 12:50), Max: 37.2 (31 Dec 2021 17:00)  T(F): 98 (01 Jan 2022 12:50), Max: 99 (31 Dec 2021 18:18)  HR: 75 (01 Jan 2022 12:50) (71 - 87)  BP: 150/69 (01 Jan 2022 13:20) (150/57 - 178/65)  BP(mean): --  RR: 18 (01 Jan 2022 12:50) (18 - 18)  SpO2: 100% (01 Jan 2022 12:50) (100% - 100%)  I&O's Summary    31 Dec 2021 07:01  -  01 Jan 2022 07:00  --------------------------------------------------------  IN: 700 mL / OUT: 650 mL / NET: 50 mL      MEDICATIONS  (STANDING):  allopurinol 100 milliGRAM(s) Oral daily  aspirin enteric coated 81 milliGRAM(s) Oral daily  atorvastatin 40 milliGRAM(s) Oral at bedtime  carvedilol 25 milliGRAM(s) Oral every 12 hours  dextrose 40% Gel 15 Gram(s) Oral once  dextrose 5%. 1000 milliLiter(s) (50 mL/Hr) IV Continuous <Continuous>  dextrose 5%. 1000 milliLiter(s) (100 mL/Hr) IV Continuous <Continuous>  dextrose 50% Injectable 25 Gram(s) IV Push once  dextrose 50% Injectable 12.5 Gram(s) IV Push once  dextrose 50% Injectable 25 Gram(s) IV Push once  glucagon  Injectable 1 milliGRAM(s) IntraMuscular once  heparin   Injectable 5000 Unit(s) SubCutaneous every 8 hours  hydrALAZINE 75 milliGRAM(s) Oral three times a day  influenza  Vaccine (HIGH DOSE) 0.7 milliLiter(s) IntraMuscular once  insulin lispro (ADMELOG) corrective regimen sliding scale   SubCutaneous three times a day before meals  insulin lispro (ADMELOG) corrective regimen sliding scale   SubCutaneous at bedtime  multivitamin 1 Tablet(s) Oral daily  risperiDONE   Tablet 1 milliGRAM(s) Oral daily    MEDICATIONS  (PRN):  acetaminophen     Tablet .. 650 milliGRAM(s) Oral every 6 hours PRN Temp greater or equal to 38C (100.4F), Mild Pain (1 - 3)    LABS:                        8.3    7.78  )-----------( 255      ( 01 Jan 2022 07:37 )             26.1     01-01    129<L>  |  90<L>  |  56<H>  ----------------------------<  177<H>  4.2   |  26  |  1.88<H>    Ca    8.9      01 Jan 2022 07:37  Phos  3.7     01-01  Mg     2.10     01-01    TPro  6.7  /  Alb  3.7  /  TBili  <0.2  /  DBili  x   /  AST  20  /  ALT  17  /  AlkPhos  60  01-01        CAPILLARY BLOOD GLUCOSE      POCT Blood Glucose.: 296 mg/dL (01 Jan 2022 12:56)  POCT Blood Glucose.: 184 mg/dL (01 Jan 2022 08:42)  POCT Blood Glucose.: 314 mg/dL (31 Dec 2021 22:48)  POCT Blood Glucose.: 210 mg/dL (31 Dec 2021 18:04)          REVIEW OF SYSTEMS:  CONSTITUTIONAL: No fever, weight loss, or fatigue  EYES: No eye pain, visual disturbances, or discharge  ENMT:  No difficulty hearing, tinnitus, vertigo; No sinus or throat pain  NECK: No pain or stiffness  RESPIRATORY: No cough, wheezing, chills or hemoptysis; No shortness of breath  CARDIOVASCULAR: No chest pain, palpitations, dizziness, or leg swelling  GASTROINTESTINAL: No abdominal or epigastric pain. No nausea, vomiting, or hematemesis; No diarrhea or constipation. No melena or hematochezia.  GENITOURINARY: No dysuria, frequency, hematuria, or incontinence  NEUROLOGICAL: No headaches, memory loss, loss of strength, numbness, or tremors      Consultant(s) Notes Reviewed:  [x ] YES  [ ] NO    PHYSICAL EXAM:  GENERAL: NAD, well-groomed, well-developed,not in any distress ,  HEAD:  Atraumatic, Normocephalic  EYES: EOMI, PERRLA, conjunctiva and sclera clear  ENMT: No tonsillar erythema, exudates, or enlargement; Moist mucous membranes, Good dentition, No lesions  NECK: Supple, No JVD, Normal thyroid  NERVOUS SYSTEM:  Alert &  No focal deficit   CHEST/LUNG: Good air entry bilateral with no  rales, rhonchi, wheezing, or rubs  HEART: Regular rate and rhythm; No murmurs, rubs, or gallops  ABDOMEN: Soft, Nontender, Nondistended; Bowel sounds present  EXTREMITIES:  2+ Peripheral Pulses, No clubbing, cyanosis, or edema  SKIN: No rashes or lesions    Care Discussed with Consultants/Other Providers [ x] YES  [ ] NO
Date of Service  : 01-02-22 @ 12:48    INTERVAL HPI/OVERNIGHT EVENTS: Lost my dentures . I am fine.   Vital Signs Last 24 Hrs  T(C): 36.7 (02 Jan 2022 09:00), Max: 37.2 (02 Jan 2022 05:11)  T(F): 98 (02 Jan 2022 09:00), Max: 99 (02 Jan 2022 05:11)  HR: 76 (02 Jan 2022 09:00) (71 - 80)  BP: 150/66 (02 Jan 2022 09:00) (150/66 - 208/78)  BP(mean): --  RR: 17 (02 Jan 2022 09:00) (17 - 18)  SpO2: 100% (02 Jan 2022 09:00) (100% - 100%)  I&O's Summary    MEDICATIONS  (STANDING):  allopurinol 100 milliGRAM(s) Oral daily  aspirin enteric coated 81 milliGRAM(s) Oral daily  atorvastatin 40 milliGRAM(s) Oral at bedtime  carvedilol 25 milliGRAM(s) Oral every 12 hours  dextrose 40% Gel 15 Gram(s) Oral once  dextrose 5%. 1000 milliLiter(s) (50 mL/Hr) IV Continuous <Continuous>  dextrose 5%. 1000 milliLiter(s) (100 mL/Hr) IV Continuous <Continuous>  dextrose 50% Injectable 25 Gram(s) IV Push once  dextrose 50% Injectable 12.5 Gram(s) IV Push once  dextrose 50% Injectable 25 Gram(s) IV Push once  glucagon  Injectable 1 milliGRAM(s) IntraMuscular once  heparin   Injectable 5000 Unit(s) SubCutaneous every 8 hours  hydrALAZINE 100 milliGRAM(s) Oral three times a day  influenza  Vaccine (HIGH DOSE) 0.7 milliLiter(s) IntraMuscular once  insulin lispro (ADMELOG) corrective regimen sliding scale   SubCutaneous three times a day before meals  insulin lispro (ADMELOG) corrective regimen sliding scale   SubCutaneous at bedtime  multivitamin 1 Tablet(s) Oral daily  risperiDONE   Tablet 1 milliGRAM(s) Oral daily    MEDICATIONS  (PRN):  acetaminophen     Tablet .. 650 milliGRAM(s) Oral every 6 hours PRN Temp greater or equal to 38C (100.4F), Mild Pain (1 - 3)    LABS:                        8.3    8.50  )-----------( 272      ( 02 Jan 2022 07:01 )             25.7     01-02    127<L>  |  90<L>  |  47<H>  ----------------------------<  173<H>  4.2   |  24  |  1.68<H>    Ca    9.2      02 Jan 2022 07:01  Phos  3.7     01-02  Mg     2.10     01-02    TPro  6.9  /  Alb  3.5  /  TBili  <0.2  /  DBili  x   /  AST  22  /  ALT  18  /  AlkPhos  61  01-02        CAPILLARY BLOOD GLUCOSE      POCT Blood Glucose.: 331 mg/dL (02 Jan 2022 12:21)  POCT Blood Glucose.: 192 mg/dL (02 Jan 2022 09:07)  POCT Blood Glucose.: 206 mg/dL (01 Jan 2022 21:17)  POCT Blood Glucose.: 208 mg/dL (01 Jan 2022 17:59)  POCT Blood Glucose.: 296 mg/dL (01 Jan 2022 12:56)          REVIEW OF SYSTEMS:  CONSTITUTIONAL: No fever, weight loss, or fatigue  EYES: No eye pain, visual disturbances, or discharge  ENMT:  No difficulty hearing, tinnitus, vertigo; No sinus or throat pain  NECK: No pain or stiffness  RESPIRATORY: No cough, wheezing, chills or hemoptysis; No shortness of breath  CARDIOVASCULAR: No chest pain, palpitations, dizziness, or leg swelling  GASTROINTESTINAL: No abdominal or epigastric pain. No nausea, vomiting, or hematemesis; No diarrhea or constipation. No melena or hematochezia.  GENITOURINARY: No dysuria, frequency, hematuria, or incontinence  NEUROLOGICAL: No headaches, memory loss, loss of strength, numbness, or tremors      Consultant(s) Notes Reviewed:  [x ] YES  [ ] NO    PHYSICAL EXAM:  GENERAL: NAD, well-groomed, well-developed,not in any distress ,  HEAD:  Atraumatic, Normocephalic  EYES: EOMI, PERRLA, conjunctiva and sclera clear  ENMT: No tonsillar erythema, exudates, or enlargement; Moist mucous membranes, Good dentition, No lesions  NECK: Supple, No JVD, Normal thyroid  NERVOUS SYSTEM:  Alert & Oriented X3, No focal deficit   CHEST/LUNG: Good air entry bilateral with no  rales, rhonchi, wheezing, or rubs  HEART: Regular rate and rhythm; No murmurs, rubs, or gallops  ABDOMEN: Soft, Nontender, Nondistended; Bowel sounds present  EXTREMITIES:  2+ Peripheral Pulses, No clubbing, cyanosis, or edema  SKIN: No rashes or lesions    Care Discussed with Consultants/Other Providers [ x] YES  [ ] NO
Date of service  1/3/22    chief complaint: weakness    extended hpi: 76 year old female with history of CAD s/p KIERSTEN to the RCA and LCx in 2020, HTN, HLD, DM, last EF normal from echocardiogram in March of 2021, prior history of DVT in the setting of COVID who is being seen for management of CAD.     S: no chest pain or sob; ros otherwise negative.     Review of Systems:   Constitutional: [ ] fevers, [ ] chills.   Skin: [ ] dry skin. [ ] rashes.  Psychiatric: [ ] depression, [ ] anxiety.   Gastrointestinal: [ ] BRBPR, [ ] melena.   Neurological: [ ] confusion. [ ] seizures. [ ] shuffling gait.   Ears,Nose,Mouth and Throat: [ ] ear pain [ ] sore throat.   Eyes: [ ] diplopia.   Respiratory: [ ] hemoptysis. [ ] shortness of breath  Cardiovascular: See HPI above  Hematologic/Lymphatic: [ ] anemia. [ ] painful nodes. [ ] prolonged bleeding.   Genitourinary: [ ] hematuria. [ ] flank pain.   Endocrine: [ ] significant change in weight. [ ] intolerance to heat and cold.     Review of systems [x ] otherwise negative, [ ] otherwise unable to obtain    FH: no family history of sudden cardiac death in first degree relatives    SH: [ ] tobacco, [ ] alcohol, [ ] drugs    acetaminophen     Tablet .. 650 milliGRAM(s) Oral every 6 hours PRN  allopurinol 100 milliGRAM(s) Oral daily  aspirin enteric coated 81 milliGRAM(s) Oral daily  atorvastatin 40 milliGRAM(s) Oral at bedtime  carvedilol 25 milliGRAM(s) Oral every 12 hours  dextrose 40% Gel 15 Gram(s) Oral once  dextrose 5%. 1000 milliLiter(s) IV Continuous <Continuous>  dextrose 5%. 1000 milliLiter(s) IV Continuous <Continuous>  dextrose 50% Injectable 25 Gram(s) IV Push once  dextrose 50% Injectable 12.5 Gram(s) IV Push once  dextrose 50% Injectable 25 Gram(s) IV Push once  glucagon  Injectable 1 milliGRAM(s) IntraMuscular once  heparin   Injectable 5000 Unit(s) SubCutaneous every 8 hours  hydrALAZINE 100 milliGRAM(s) Oral three times a day  influenza  Vaccine (HIGH DOSE) 0.7 milliLiter(s) IntraMuscular once  insulin lispro (ADMELOG) corrective regimen sliding scale   SubCutaneous three times a day before meals  insulin lispro (ADMELOG) corrective regimen sliding scale   SubCutaneous at bedtime  multivitamin 1 Tablet(s) Oral daily  risperiDONE   Tablet 1 milliGRAM(s) Oral daily                            8.2    8.52  )-----------( 292      ( 03 Jan 2022 07:19 )             25.9       01-03    130<L>  |  93<L>  |  47<H>  ----------------------------<  189<H>  4.4   |  25  |  1.68<H>    Ca    9.4      03 Jan 2022 07:19  Phos  4.0     01-03  Mg     2.20     01-03    TPro  6.8  /  Alb  3.5  /  TBili  <0.2  /  DBili  x   /  AST  21  /  ALT  23  /  AlkPhos  65  01-03            T(C): 36.8 (01-03-22 @ 09:26), Max: 37 (01-02-22 @ 13:00)  HR: 70 (01-03-22 @ 09:26) (69 - 83)  BP: 176/75 (01-03-22 @ 09:26) (158/65 - 193/77)  RR: 18 (01-03-22 @ 09:26) (17 - 18)  SpO2: 100% (01-03-22 @ 09:26) (100% - 100%)  Wt(kg): --    I&O's Summary    HEENT:   Normal oral mucosa, PERRL, EOMI	  Lymphatic: No lymphadenopathy , no edema  Cardiovascular: Normal S1 S2, No JVD, No murmurs , Peripheral pulses palpable 2+ bilaterally  Respiratory: Lungs clear to auscultation, normal effort 	  Gastrointestinal:  Soft, Non-tender, + BS	  Skin: No rashes, No ecchymoses, No cyanosis, warm to touch  Musculoskeletal: Normal range of motion, normal strength    tele- no tele    < from: Cardiac Cath Lab - Adult (06.30.20 @ 14:11) >  CORONARY VESSELS: Dominance was not assessed.  LM:   --  LM: This vessel was not injected, but was visualized during a  prior cardiac catheterization.  LAD:   --  LAD: This vessel was not injected, but was visualized during a  prior cardiac catheterization.  CX:   --  Proximal circumflex: There was a tubular 95 % stenosis.  RCA:   --  RCA: This vessel was not injected, but was visualized during a  prior cardiac catheterization.  COMPLICATIONS: There were no complications.  INTERVENTIONAL RECOMMENDATIONS: S/p successful KIERSTEN to the circumflex  artery. The patient should continue with dual antiplatelet therapy.  Prepared and signed by  Donis Jaquez M.D.    < from: CT Head No Cont (12.30.21 @ 12:40) >  IMPRESSION: No acute hemorrhage mass or mass effect.    < end of copied text >        ASSESSMENT/PLAN: 76 year old female with history of CAD s/p KIERSTEN to the RCA and LCx in 2020, HTN, HLD, DM, last EF normal from echocardiogram in March of 2021, prior history of DVT in the setting of COVID who is being seen for management of CAD.     -pt. with no chest pain and anginal symptoms   -recommend medical management of known CAD   -given NSTEMI and PCI > 1 year ago, can continue with sapt (asa 81mg PO daily)  -CTH negative, AMS resolving  -treatment of COVID per primary team  -no further inpatient cardiac workup anticipated at this time  -hydralazine increased for better BP control - monitor BP and adjust anti-hypertensive medications as needed / tolerated    Donis Jaquez MD        
Date of service  12/30/21    chief complaint: weakness    extended hpi: 76 year old female with history of CAD s/p KIERSTEN to the RCA and LCx in 2020, HTN, HLD, DM, last EF normal from echocardiogram in March of 2021, prior history of DVT in the setting of COVID who is being seen for management of CAD.     no chest pain or SOB    Review of Systems:   Constitutional: [ ] fevers, [ ] chills.   Skin: [ ] dry skin. [ ] rashes.  Psychiatric: [ ] depression, [ ] anxiety.   Gastrointestinal: [ ] BRBPR, [ ] melena.   Neurological: [ ] confusion. [ ] seizures. [ ] shuffling gait.   Ears,Nose,Mouth and Throat: [ ] ear pain [ ] sore throat.   Eyes: [ ] diplopia.   Respiratory: [ ] hemoptysis. [ ] shortness of breath  Cardiovascular: See HPI above  Hematologic/Lymphatic: [ ] anemia. [ ] painful nodes. [ ] prolonged bleeding.   Genitourinary: [ ] hematuria. [ ] flank pain.   Endocrine: [ ] significant change in weight. [ ] intolerance to heat and cold.     Review of systems [x ] otherwise negative, [ ] otherwise unable to obtain    FH: no family history of sudden cardiac death in first degree relatives    SH: [ ] tobacco, [ ] alcohol, [ ] drugs    acetaminophen     Tablet .. 650 milliGRAM(s) Oral every 6 hours PRN  allopurinol 100 milliGRAM(s) Oral daily  aspirin enteric coated 81 milliGRAM(s) Oral daily  atorvastatin 40 milliGRAM(s) Oral at bedtime  carvedilol 25 milliGRAM(s) Oral every 12 hours  dextrose 40% Gel 15 Gram(s) Oral once  dextrose 5%. 1000 milliLiter(s) IV Continuous <Continuous>  dextrose 5%. 1000 milliLiter(s) IV Continuous <Continuous>  dextrose 50% Injectable 25 Gram(s) IV Push once  dextrose 50% Injectable 12.5 Gram(s) IV Push once  dextrose 50% Injectable 25 Gram(s) IV Push once  glucagon  Injectable 1 milliGRAM(s) IntraMuscular once  heparin   Injectable 5000 Unit(s) SubCutaneous every 8 hours  hydrALAZINE 75 milliGRAM(s) Oral three times a day  influenza  Vaccine (HIGH DOSE) 0.7 milliLiter(s) IntraMuscular once  insulin lispro (ADMELOG) corrective regimen sliding scale   SubCutaneous three times a day before meals  insulin lispro (ADMELOG) corrective regimen sliding scale   SubCutaneous at bedtime  multivitamin 1 Tablet(s) Oral daily  risperiDONE   Tablet 1 milliGRAM(s) Oral daily                      8.7    6.52  )-----------( 264      ( 31 Dec 2021 07:09 )             26.8       128<L>  |  90<L>  |  62<H>  ----------------------------<  156<H>  3.8   |  26  |  1.90<H>    Ca    9.1      31 Dec 2021 07:09  Phos  3.7     12-31  Mg     1.90     12-31    TPro  6.8  /  Alb  3.6  /  TBili  <0.2  /  DBili  x   /  AST  25  /  ALT  19  /  AlkPhos  58  12-31      T(C): 37 (12-31-21 @ 09:00), Max: 37.2 (12-30-21 @ 17:00)  HR: 75 (12-31-21 @ 09:00) (70 - 80)  BP: 164/50 (12-31-21 @ 09:00) (131/78 - 170/82)  RR: 18 (12-31-21 @ 09:00) (16 - 18)  SpO2: 100% (12-31-21 @ 09:00) (99% - 100%)    HEENT:   Normal oral mucosa, PERRL, EOMI	  Lymphatic: No lymphadenopathy , no edema  Cardiovascular: Normal S1 S2, No JVD, No murmurs , Peripheral pulses palpable 2+ bilaterally  Respiratory: Lungs clear to auscultation, normal effort 	  Gastrointestinal:  Soft, Non-tender, + BS	  Skin: No rashes, No ecchymoses, No cyanosis, warm to touch  Musculoskeletal: Normal range of motion, normal strength    tele- no tele    < from: Cardiac Cath Lab - Adult (06.30.20 @ 14:11) >  CORONARY VESSELS: Dominance was not assessed.  LM:   --  LM: This vessel was not injected, but was visualized during a  prior cardiac catheterization.  LAD:   --  LAD: This vessel was not injected, but was visualized during a  prior cardiac catheterization.  CX:   --  Proximal circumflex: There was a tubular 95 % stenosis.  RCA:   --  RCA: This vessel was not injected, but was visualized during a  prior cardiac catheterization.  COMPLICATIONS: There were no complications.  INTERVENTIONAL RECOMMENDATIONS: S/p successful KIERSTEN to the circumflex  artery. The patient should continue with dual antiplatelet therapy.  Prepared and signed by  Donis Jaquez M.D.    < from: CT Head No Cont (12.30.21 @ 12:40) >  IMPRESSION: No acute hemorrhage mass or mass effect.    < end of copied text >        ASSESSMENT/PLAN: 76 year old female with history of CAD s/p KIERSTEN to the RCA and LCx in 2020, HTN, HLD, DM, last EF normal from echocardiogram in March of 2021, prior history of DVT in the setting of COVID who is being seen for management of CAD.     -pt. with no chest pain and anginal symptoms   -recommend medical management of known CAD   -given NSTEMI and PCI > 1 year ago, can continue with sapt (asa 81mg PO daily)  -CTH negative, AMS resolving  -treatment of COVID per primary team       
Date of Service  : 01-03-22 @ 17:08    INTERVAL HPI/OVERNIGHT EVENTS: I feel fine.   Vital Signs Last 24 Hrs  T(C): 36.8 (03 Jan 2022 15:13), Max: 36.8 (03 Jan 2022 05:26)  T(F): 98.3 (03 Jan 2022 15:13), Max: 98.3 (03 Jan 2022 15:13)  HR: 73 (03 Jan 2022 15:13) (69 - 82)  BP: 153/64 (03 Jan 2022 15:13) (153/64 - 193/77)  BP(mean): --  RR: 18 (03 Jan 2022 15:13) (17 - 19)  SpO2: 100% (03 Jan 2022 15:13) (100% - 100%)  I&O's Summary    MEDICATIONS  (STANDING):  allopurinol 100 milliGRAM(s) Oral daily  amLODIPine   Tablet 5 milliGRAM(s) Oral daily  aspirin enteric coated 81 milliGRAM(s) Oral daily  atorvastatin 40 milliGRAM(s) Oral at bedtime  carvedilol 25 milliGRAM(s) Oral every 12 hours  dextrose 40% Gel 15 Gram(s) Oral once  dextrose 5%. 1000 milliLiter(s) (50 mL/Hr) IV Continuous <Continuous>  dextrose 5%. 1000 milliLiter(s) (100 mL/Hr) IV Continuous <Continuous>  dextrose 50% Injectable 25 Gram(s) IV Push once  dextrose 50% Injectable 12.5 Gram(s) IV Push once  dextrose 50% Injectable 25 Gram(s) IV Push once  glucagon  Injectable 1 milliGRAM(s) IntraMuscular once  heparin   Injectable 5000 Unit(s) SubCutaneous every 8 hours  hydrALAZINE 100 milliGRAM(s) Oral three times a day  influenza  Vaccine (HIGH DOSE) 0.7 milliLiter(s) IntraMuscular once  insulin lispro (ADMELOG) corrective regimen sliding scale   SubCutaneous three times a day before meals  insulin lispro (ADMELOG) corrective regimen sliding scale   SubCutaneous at bedtime  multivitamin 1 Tablet(s) Oral daily  risperiDONE   Tablet 1 milliGRAM(s) Oral daily    MEDICATIONS  (PRN):  acetaminophen     Tablet .. 650 milliGRAM(s) Oral every 6 hours PRN Temp greater or equal to 38C (100.4F), Mild Pain (1 - 3)    LABS:                        8.2    8.52  )-----------( 292      ( 03 Jan 2022 07:19 )             25.9     01-03    130<L>  |  93<L>  |  47<H>  ----------------------------<  189<H>  4.4   |  25  |  1.68<H>    Ca    9.4      03 Jan 2022 07:19  Phos  4.0     01-03  Mg     2.20     01-03    TPro  6.8  /  Alb  3.5  /  TBili  <0.2  /  DBili  x   /  AST  21  /  ALT  23  /  AlkPhos  65  01-03        CAPILLARY BLOOD GLUCOSE      POCT Blood Glucose.: 330 mg/dL (03 Jan 2022 12:32)  POCT Blood Glucose.: 211 mg/dL (03 Jan 2022 09:26)  POCT Blood Glucose.: 269 mg/dL (02 Jan 2022 21:09)  POCT Blood Glucose.: 226 mg/dL (02 Jan 2022 17:45)          REVIEW OF SYSTEMS:  CONSTITUTIONAL: No fever, weight loss, or fatigue  EYES: No eye pain, visual disturbances, or discharge  ENMT:  No difficulty hearing, tinnitus, vertigo; No sinus or throat pain  NECK: No pain or stiffness  RESPIRATORY: No cough, wheezing, chills or hemoptysis; No shortness of breath  CARDIOVASCULAR: No chest pain, palpitations, dizziness, or leg swelling  GASTROINTESTINAL: No abdominal or epigastric pain. No nausea, vomiting, or hematemesis; No diarrhea or constipation. No melena or hematochezia.  GENITOURINARY: No dysuria, frequency, hematuria, or incontinence  NEUROLOGICAL: No headaches, memory loss, loss of strength, numbness, or tremors      Consultant(s) Notes Reviewed:  [x ] YES  [ ] NO    PHYSICAL EXAM:  GENERAL: NAD, well-groomed, well-developed,not in any distress ,  HEAD:  Atraumatic, Normocephalic  NECK: Supple, No JVD, Normal thyroid  NERVOUS SYSTEM:  Alert &  No focal deficit   CHEST/LUNG: Good air entry bilateral with no  rales, rhonchi, wheezing, or rubs  HEART: Regular rate and rhythm; No murmurs, rubs, or gallops  ABDOMEN: Soft, Nontender, Nondistended; Bowel sounds present  EXTREMITIES:  2+ Peripheral Pulses, No clubbing, cyanosis, or edema  SKIN: No rashes or lesions    Care Discussed with Consultants/Other Providers [ x] YES  [ ] NO
Date of service  1/5/22    chief complaint: weakness    extended hpi: 76 year old female with history of CAD s/p KIERSTEN to the RCA and LCx in 2020, HTN, HLD, DM, last EF normal from echocardiogram in March of 2021, prior history of DVT in the setting of COVID who is being seen for management of CAD.     S: no chest pain or sob; ros otherwise negative.     Review of Systems:   Constitutional: [ ] fevers, [ ] chills.   Skin: [ ] dry skin. [ ] rashes.  Psychiatric: [ ] depression, [ ] anxiety.   Gastrointestinal: [ ] BRBPR, [ ] melena.   Neurological: [ ] confusion. [ ] seizures. [ ] shuffling gait.   Ears,Nose,Mouth and Throat: [ ] ear pain [ ] sore throat.   Eyes: [ ] diplopia.   Respiratory: [ ] hemoptysis. [ ] shortness of breath  Cardiovascular: See HPI above  Hematologic/Lymphatic: [ ] anemia. [ ] painful nodes. [ ] prolonged bleeding.   Genitourinary: [ ] hematuria. [ ] flank pain.   Endocrine: [ ] significant change in weight. [ ] intolerance to heat and cold.     Review of systems [ x] otherwise negative, [ ] otherwise unable to obtain    FH: no family history of sudden cardiac death in first degree relatives    SH: [ ] tobacco, [ ] alcohol, [ ] drugs    acetaminophen     Tablet .. 650 milliGRAM(s) Oral every 6 hours PRN  allopurinol 100 milliGRAM(s) Oral daily  amLODIPine   Tablet 5 milliGRAM(s) Oral daily  aspirin enteric coated 81 milliGRAM(s) Oral daily  atorvastatin 40 milliGRAM(s) Oral at bedtime  carvedilol 25 milliGRAM(s) Oral every 12 hours  dextrose 40% Gel 15 Gram(s) Oral once  dextrose 5%. 1000 milliLiter(s) IV Continuous <Continuous>  dextrose 5%. 1000 milliLiter(s) IV Continuous <Continuous>  dextrose 50% Injectable 25 Gram(s) IV Push once  dextrose 50% Injectable 12.5 Gram(s) IV Push once  dextrose 50% Injectable 25 Gram(s) IV Push once  glucagon  Injectable 1 milliGRAM(s) IntraMuscular once  heparin   Injectable 5000 Unit(s) SubCutaneous every 8 hours  hydrALAZINE 100 milliGRAM(s) Oral three times a day  influenza  Vaccine (HIGH DOSE) 0.7 milliLiter(s) IntraMuscular once  insulin lispro (ADMELOG) corrective regimen sliding scale   SubCutaneous three times a day before meals  insulin lispro (ADMELOG) corrective regimen sliding scale   SubCutaneous at bedtime  multivitamin 1 Tablet(s) Oral daily  risperiDONE   Tablet 1 milliGRAM(s) Oral daily  sodium chloride 1 Gram(s) Oral three times a day                            9.0    7.38  )-----------( 280      ( 05 Jan 2022 07:19 )             31.2       01-05    129<L>  |  93<L>  |  46<H>  ----------------------------<  215<H>  5.2   |  19<L>  |  1.56<H>    Ca    9.5      05 Jan 2022 07:19  Phos  3.7     01-05  Mg     2.20     01-05      T(C): 37.2 (01-05-22 @ 12:00), Max: 37.2 (01-05-22 @ 05:50)  HR: 77 (01-05-22 @ 12:00) (71 - 78)  BP: 181/68 (01-05-22 @ 12:00) (134/62 - 181/68)  RR: 18 (01-05-22 @ 12:00) (18 - 18)  SpO2: 100% (01-05-22 @ 12:00) (98% - 100%)    HEENT:   Normal oral mucosa, PERRL, EOMI	  Lymphatic: No lymphadenopathy , no edema  Cardiovascular: Normal S1 S2, No JVD, No murmurs , Peripheral pulses palpable 2+ bilaterally  Respiratory: Lungs clear to auscultation, normal effort 	  Gastrointestinal:  Soft, Non-tender, + BS	  Skin: No rashes, No ecchymoses, No cyanosis, warm to touch  Musculoskeletal: Normal range of motion, normal strength    tele- no tele    < from: Cardiac Cath Lab - Adult (06.30.20 @ 14:11) >  CORONARY VESSELS: Dominance was not assessed.  LM:   --  LM: This vessel was not injected, but was visualized during a  prior cardiac catheterization.  LAD:   --  LAD: This vessel was not injected, but was visualized during a  prior cardiac catheterization.  CX:   --  Proximal circumflex: There was a tubular 95 % stenosis.  RCA:   --  RCA: This vessel was not injected, but was visualized during a  prior cardiac catheterization.  COMPLICATIONS: There were no complications.  INTERVENTIONAL RECOMMENDATIONS: S/p successful KIERSTEN to the circumflex  artery. The patient should continue with dual antiplatelet therapy.  Prepared and signed by  Donis Jaquez M.D.    < from: CT Head No Cont (12.30.21 @ 12:40) >  IMPRESSION: No acute hemorrhage mass or mass effect.    < end of copied text >        ASSESSMENT/PLAN: 76 year old female with history of CAD s/p KIERSTEN to the RCA and LCx in 2020, HTN, HLD, DM, last EF normal from echocardiogram in March of 2021, prior history of DVT in the setting of COVID who is being seen for management of CAD.     -pt. with no chest pain and anginal symptoms   -recommend medical management of known CAD   -given NSTEMI and PCI > 1 year ago, can continue with sapt (asa 81mg PO daily)  -CTH negative, AMS workup per primary team   -treatment of COVID per primary team  -no further inpatient cardiac workup anticipated at this time  -hydralazine increased, can consider changing Norvasc to Procardia XL 30 and uptitrate as tolerated for BP management          
Date of service  1/8/22    chief complaint: weakness    extended hpi: 76 year old female with history of CAD s/p KIERSTEN to the RCA and LCx in 2020, HTN, HLD, DM, last EF normal from echocardiogram in March of 2021, prior history of DVT in the setting of COVID who is being seen for management of CAD.     S: no chest pain or sob; ros otherwise negative.     Review of Systems:   Constitutional: [ ] fevers, [ ] chills.   Skin: [ ] dry skin. [ ] rashes.  Psychiatric: [ ] depression, [ ] anxiety.   Gastrointestinal: [ ] BRBPR, [ ] melena.   Neurological: [ ] confusion. [ ] seizures. [ ] shuffling gait.   Ears,Nose,Mouth and Throat: [ ] ear pain [ ] sore throat.   Eyes: [ ] diplopia.   Respiratory: [ ] hemoptysis. [ ] shortness of breath  Cardiovascular: See HPI above  Hematologic/Lymphatic: [ ] anemia. [ ] painful nodes. [ ] prolonged bleeding.   Genitourinary: [ ] hematuria. [ ] flank pain.   Endocrine: [ ] significant change in weight. [ ] intolerance to heat and cold.     Review of systems [x ] otherwise negative, [ ] otherwise unable to obtain    FH: no family history of sudden cardiac death in first degree relatives    SH: [ ] tobacco, [ ] alcohol, [ ] drugs    acetaminophen     Tablet .. 650 milliGRAM(s) Oral every 6 hours PRN  allopurinol 100 milliGRAM(s) Oral daily  amLODIPine   Tablet 10 milliGRAM(s) Oral daily  aspirin enteric coated 81 milliGRAM(s) Oral daily  atorvastatin 40 milliGRAM(s) Oral at bedtime  carvedilol 25 milliGRAM(s) Oral every 12 hours  dextrose 40% Gel 15 Gram(s) Oral once  dextrose 5%. 1000 milliLiter(s) IV Continuous <Continuous>  dextrose 5%. 1000 milliLiter(s) IV Continuous <Continuous>  dextrose 50% Injectable 25 Gram(s) IV Push once  dextrose 50% Injectable 12.5 Gram(s) IV Push once  dextrose 50% Injectable 25 Gram(s) IV Push once  glucagon  Injectable 1 milliGRAM(s) IntraMuscular once  heparin   Injectable 5000 Unit(s) SubCutaneous every 8 hours  hydrALAZINE 100 milliGRAM(s) Oral three times a day  influenza  Vaccine (HIGH DOSE) 0.7 milliLiter(s) IntraMuscular once  insulin glargine Injectable (LANTUS) 14 Unit(s) SubCutaneous at bedtime  insulin lispro (ADMELOG) corrective regimen sliding scale   SubCutaneous three times a day before meals  insulin lispro (ADMELOG) corrective regimen sliding scale   SubCutaneous at bedtime  insulin lispro Injectable (ADMELOG) 8 Unit(s) SubCutaneous three times a day before meals  multivitamin 1 Tablet(s) Oral daily  risperiDONE   Tablet 1 milliGRAM(s) Oral daily                            8.2    9.95  )-----------( 338      ( 07 Jan 2022 07:04 )             25.9     133<L>  |  99  |  52<H>  ----------------------------<  182<H>  4.4   |  24  |  1.72<H>    Ca    9.7      07 Jan 2022 07:04  Phos  4.0     01-07  Mg     2.20     01-07    T(C): 36.9 (01-08-22 @ 13:57), Max: 36.9 (01-08-22 @ 05:00)  HR: 76 (01-08-22 @ 13:57) (70 - 84)  BP: 140/61 (01-08-22 @ 13:57) (136/67 - 153/65)  RR: 18 (01-08-22 @ 13:57) (17 - 18)  SpO2: 100% (01-08-22 @ 13:57) (99% - 100%)    General: Well nourished in no acute distress.   Head: Normocephalic and atraumatic.   Neck: No JVD. No bruits. Supple. Does not appear to be enlarged.   Cardiovascular: + S1,S2 ; RRR Soft systolic murmur at the left lower sternal border. No rubs noted.    Lungs: CTA b/l. No rhonchi, rales or wheezes.   Abdomen: + BS, soft. Non tender. Non distended. No rebound. No guarding.   Extremities: No clubbing/cyanosis/edema.   Neurologic: Moves all four extremities. Full range of motion.   Skin: Warm and moist. The patient's skin has normal elasticity and good skin turgor.       tele- no tele    < from: Cardiac Cath Lab - Adult (06.30.20 @ 14:11) >  CORONARY VESSELS: Dominance was not assessed.  LM:   --  LM: This vessel was not injected, but was visualized during a  prior cardiac catheterization.  LAD:   --  LAD: This vessel was not injected, but was visualized during a  prior cardiac catheterization.  CX:   --  Proximal circumflex: There was a tubular 95 % stenosis.  RCA:   --  RCA: This vessel was not injected, but was visualized during a  prior cardiac catheterization.  COMPLICATIONS: There were no complications.  INTERVENTIONAL RECOMMENDATIONS: S/p successful KIERSTEN to the circumflex  artery. The patient should continue with dual antiplatelet therapy.  Prepared and signed by  Donis Jaquez M.D.    < from: CT Head No Cont (12.30.21 @ 12:40) >  IMPRESSION: No acute hemorrhage mass or mass effect.  < end of copied text >        ASSESSMENT/PLAN: 76 year old female with history of CAD s/p KIERSTEN to the RCA and LCx in 2020, HTN, HLD, DM, last EF normal from echocardiogram in March of 2021, prior history of DVT in the setting of COVID who is being seen for management of CAD.     -pt. with no chest pain and anginal symptoms   -recommend medical management of known CAD   -given NSTEMI and PCI > 1 year ago, can continue with sapt (asa 81mg PO daily) if no contraindications   -workup of anemia per primary team   -CTH negative, AMS workup per primary team   -DC planning to [facility]            
Date of service  1/9/22    chief complaint: weakness    extended hpi: 76 year old female with history of CAD s/p KIERSTEN to the RCA and LCx in 2020, HTN, HLD, DM, last EF normal from echocardiogram in March of 2021, prior history of DVT in the setting of COVID who is being seen for management of CAD.     S: no chest pain or sob; ros otherwise negative.     Review of Systems:   Constitutional: [ ] fevers, [ ] chills.   Skin: [ ] dry skin. [ ] rashes.  Psychiatric: [ ] depression, [ ] anxiety.   Gastrointestinal: [ ] BRBPR, [ ] melena.   Neurological: [ ] confusion. [ ] seizures. [ ] shuffling gait.   Ears,Nose,Mouth and Throat: [ ] ear pain [ ] sore throat.   Eyes: [ ] diplopia.   Respiratory: [ ] hemoptysis. [ ] shortness of breath  Cardiovascular: See HPI above  Hematologic/Lymphatic: [ ] anemia. [ ] painful nodes. [ ] prolonged bleeding.   Genitourinary: [ ] hematuria. [ ] flank pain.   Endocrine: [ ] significant change in weight. [ ] intolerance to heat and cold.     Review of systems [x ] otherwise negative, [ ] otherwise unable to obtain    FH: no family history of sudden cardiac death in first degree relatives    SH: [ ] tobacco, [ ] alcohol, [ ] drugs         acetaminophen     Tablet .. 650 milliGRAM(s) Oral every 6 hours PRN  allopurinol 100 milliGRAM(s) Oral daily  amLODIPine   Tablet 10 milliGRAM(s) Oral daily  aspirin enteric coated 81 milliGRAM(s) Oral daily  atorvastatin 40 milliGRAM(s) Oral at bedtime  carvedilol 25 milliGRAM(s) Oral every 12 hours  dextrose 40% Gel 15 Gram(s) Oral once  dextrose 5%. 1000 milliLiter(s) IV Continuous <Continuous>  dextrose 5%. 1000 milliLiter(s) IV Continuous <Continuous>  dextrose 50% Injectable 25 Gram(s) IV Push once  dextrose 50% Injectable 12.5 Gram(s) IV Push once  dextrose 50% Injectable 25 Gram(s) IV Push once  glucagon  Injectable 1 milliGRAM(s) IntraMuscular once  heparin   Injectable 5000 Unit(s) SubCutaneous every 8 hours  hydrALAZINE 100 milliGRAM(s) Oral three times a day  influenza  Vaccine (HIGH DOSE) 0.7 milliLiter(s) IntraMuscular once  insulin glargine Injectable (LANTUS) 14 Unit(s) SubCutaneous at bedtime  insulin lispro (ADMELOG) corrective regimen sliding scale   SubCutaneous three times a day before meals  insulin lispro (ADMELOG) corrective regimen sliding scale   SubCutaneous at bedtime  multivitamin 1 Tablet(s) Oral daily  risperiDONE   Tablet 1 milliGRAM(s) Oral daily          Hemoglobin: 8.2 g/dL (01-07 @ 07:04)  Hemoglobin: 7.9 g/dL (01-06 @ 15:08)  Hemoglobin: 7.7 g/dL (01-06 @ 07:19)  Hemoglobin: 9.0 g/dL (01-05 @ 07:19)            Creatinine Trend: 1.72<--, 1.87<--, 1.87<--, 1.56<--, 1.69<--, 1.68<--    COAGS:           T(C): 36.8 (01-09-22 @ 05:00), Max: 36.9 (01-08-22 @ 13:57)  HR: 78 (01-09-22 @ 05:00) (74 - 84)  BP: 148/65 (01-09-22 @ 05:00) (136/69 - 148/65)  RR: 18 (01-09-22 @ 05:00) (18 - 18)  SpO2: 98% (01-09-22 @ 05:00) (97% - 100%)  Wt(kg): --    I&O's Summary    General: Well nourished in no acute distress.   Head: Normocephalic and atraumatic.   Neck: No JVD. No bruits. Supple. Does not appear to be enlarged.   Cardiovascular: + S1,S2 ; RRR Soft systolic murmur at the left lower sternal border. No rubs noted.    Lungs: CTA b/l. No rhonchi, rales or wheezes.   Abdomen: + BS, soft. Non tender. Non distended. No rebound. No guarding.   Extremities: No clubbing/cyanosis/edema.   Neurologic: Moves all four extremities. Full range of motion.   Skin: Warm and moist. The patient's skin has normal elasticity and good skin turgor.       tele- no tele    < from: Cardiac Cath Lab - Adult (06.30.20 @ 14:11) >  CORONARY VESSELS: Dominance was not assessed.  LM:   --  LM: This vessel was not injected, but was visualized during a  prior cardiac catheterization.  LAD:   --  LAD: This vessel was not injected, but was visualized during a  prior cardiac catheterization.  CX:   --  Proximal circumflex: There was a tubular 95 % stenosis.  RCA:   --  RCA: This vessel was not injected, but was visualized during a  prior cardiac catheterization.  COMPLICATIONS: There were no complications.  INTERVENTIONAL RECOMMENDATIONS: S/p successful KIERSTEN to the circumflex  artery. The patient should continue with dual antiplatelet therapy.  Prepared and signed by  Donis Jaquez M.D.    < from: CT Head No Cont (12.30.21 @ 12:40) >  IMPRESSION: No acute hemorrhage mass or mass effect.  < end of copied text >        ASSESSMENT/PLAN: 76 year old female with history of CAD s/p KIERSTEN to the RCA and LCx in 2020, HTN, HLD, DM, last EF normal from echocardiogram in March of 2021, prior history of DVT in the setting of COVID who is being seen for management of CAD.     -pt. with no chest pain and anginal symptoms   -recommend medical management of known CAD   -given NSTEMI and PCI > 1 year ago, can continue with sapt (asa 81mg PO daily) if no contraindications   -workup of anemia per primary team   -CTH negative, AMS workup per primary team   -DC planning to [facility]            
Follow Up:  COVID    Interval History/ROS: Much more alert. Denies pain, cough, dyspnea, throat or nose issues, diarrhea or dysuria. Eating.     Allergies  peanuts (Short breath; Hives)  penicillin (Short breath; Hives)        ANTIMICROBIALS:      OTHER MEDS:  acetaminophen     Tablet .. 650 milliGRAM(s) Oral every 6 hours PRN  aspirin enteric coated 81 milliGRAM(s) Oral daily  carvedilol 25 milliGRAM(s) Oral every 12 hours  dextrose 40% Gel 15 Gram(s) Oral once  dextrose 5%. 1000 milliLiter(s) IV Continuous <Continuous>  dextrose 5%. 1000 milliLiter(s) IV Continuous <Continuous>  dextrose 50% Injectable 25 Gram(s) IV Push once  dextrose 50% Injectable 12.5 Gram(s) IV Push once  dextrose 50% Injectable 25 Gram(s) IV Push once  glucagon  Injectable 1 milliGRAM(s) IntraMuscular once  heparin   Injectable 5000 Unit(s) SubCutaneous every 8 hours  hydrALAZINE 50 milliGRAM(s) Oral three times a day  influenza  Vaccine (HIGH DOSE) 0.7 milliLiter(s) IntraMuscular once  insulin lispro (ADMELOG) corrective regimen sliding scale   SubCutaneous three times a day before meals  insulin lispro (ADMELOG) corrective regimen sliding scale   SubCutaneous at bedtime      Vital Signs Last 24 Hrs  T(C): 37.1 (30 Dec 2021 13:00), Max: 37.5 (29 Dec 2021 21:00)  T(F): 98.7 (30 Dec 2021 13:00), Max: 99.5 (29 Dec 2021 21:00)  HR: 77 (30 Dec 2021 13:00) (75 - 87)  BP: 166/61 (30 Dec 2021 13:00) (131/50 - 180/65)  BP(mean): --  RR: 18 (30 Dec 2021 13:00) (16 - 18)  SpO2: 100% (30 Dec 2021 13:00) (100% - 100%)    Physical Exam:  General: awake, alert, non toxic, talking and answering questions, waves   Head: atraumatic, normocephalic  Eye: normal sclera and conjunctiva  ENT: no cervical lymphadenopathy   Cardio: regular rate   Respiratory: nonlabored on room air  abd: soft, no tenderness  Skin: no rash  Neurologic: no focal deficit  psych: normal affect                          9.5    7.37  )-----------( 282      ( 30 Dec 2021 08:33 )             30.0           130<L>  |  90<L>  |  65<H>  ----------------------------<  157<H>  3.8   |  27  |  1.98<H>    Ca    9.4      30 Dec 2021 08:33  Phos  4.3       Mg     1.90         TPro  7.4  /  Alb  3.8  /  TBili  <0.2  /  DBili  x   /  AST  23  /  ALT  16  /  AlkPhos  59        Urinalysis Basic - ( 30 Dec 2021 02:56 )    Color: Yellow / Appearance: Slightly Turbid / S.015 / pH: x  Gluc: x / Ketone: Negative  / Bili: Negative / Urobili: <2 mg/dL   Blood: x / Protein: 100 mg/dL / Nitrite: Negative   Leuk Esterase: Large / RBC: 5-10 /HPF / WBC >10 /HPF   Sq Epi: x / Non Sq Epi: 2 /HPF / Bacteria: Many        MICROBIOLOGY:  Culture - Urine (collected 21 @ 12:00)  Source: Clean Catch Clean Catch (Midstream)  Final Report (21 @ 14:04):    No growth    RADIOLOGY:  Images below reviewed personally  CT Head No Cont (21 @ 12:40)   No acute hemorrhage mass or mass effect.    Xray Chest 1 View- PORTABLE-Urgent (21 @ 10:38)   Clear lungs.    US Kidney and Bladder (21 @ 11:04)   No hydronephrosis.
[facility]   MD HA STOUT MD, PA INJUNG KO NP    TEL:  OFFICE: [phone]  DR FLORES CELL: [phone]  DR. HODGES CELL: [phone]  WESTLEY DAVE CELL: [phone]  FIDEL STAPLES CELL :[phone]    From 5pm-7am Answering Service [phone]    -- RENAL FOLLOW UP NOTE ---Date of Service 01-03-22 @ 14:05    Patient is a 76y old  Female who presents with a chief complaint of weakness (03 Jan 2022 10:20)      Patient seen and examined at bedside. No chest pain/sob    VITALS:  T(F): 97.9 (01-03-22 @ 13:13), Max: 98.2 (01-03-22 @ 05:26)  HR: 82 (01-03-22 @ 13:13)  BP: 171/72 (01-03-22 @ 13:13)  RR: 19 (01-03-22 @ 13:13)  SpO2: 100% (01-03-22 @ 13:13)  Wt(kg): --        PHYSICAL EXAM:  Constitutional: NAD  Neck: No JVD  Respiratory: CTAB, no wheezes, rales or rhonchi  Cardiovascular: S1, S2, RRR  Gastrointestinal: BS+, soft, NT/ND  Extremities: No peripheral edema    Hospital Medications:   MEDICATIONS  (STANDING):  allopurinol 100 milliGRAM(s) Oral daily  aspirin enteric coated 81 milliGRAM(s) Oral daily  atorvastatin 40 milliGRAM(s) Oral at bedtime  carvedilol 25 milliGRAM(s) Oral every 12 hours  dextrose 40% Gel 15 Gram(s) Oral once  dextrose 5%. 1000 milliLiter(s) (50 mL/Hr) IV Continuous <Continuous>  dextrose 5%. 1000 milliLiter(s) (100 mL/Hr) IV Continuous <Continuous>  dextrose 50% Injectable 25 Gram(s) IV Push once  dextrose 50% Injectable 12.5 Gram(s) IV Push once  dextrose 50% Injectable 25 Gram(s) IV Push once  glucagon  Injectable 1 milliGRAM(s) IntraMuscular once  heparin   Injectable 5000 Unit(s) SubCutaneous every 8 hours  hydrALAZINE 100 milliGRAM(s) Oral three times a day  influenza  Vaccine (HIGH DOSE) 0.7 milliLiter(s) IntraMuscular once  insulin lispro (ADMELOG) corrective regimen sliding scale   SubCutaneous three times a day before meals  insulin lispro (ADMELOG) corrective regimen sliding scale   SubCutaneous at bedtime  multivitamin 1 Tablet(s) Oral daily  risperiDONE   Tablet 1 milliGRAM(s) Oral daily      LABS:  01-03    130<L>  |  93<L>  |  47<H>  ----------------------------<  189<H>  4.4   |  25  |  1.68<H>    Ca    9.4      03 Jan 2022 07:19  Phos  4.0     01-03  Mg     2.20     01-03    TPro  6.8  /  Alb  3.5  /  TBili  <0.2  /  DBili      /  AST  21  /  ALT  23  /  AlkPhos  65  01-03    Creatinine Trend: 1.68 <--, 1.68 <--, 1.88 <--, 1.90 <--, 1.98 <--, 2.33 <--, 2.86 <--    Albumin, Serum: 3.5 g/dL (01-03 @ 07:19)  Phosphorus Level, Serum: 4.0 mg/dL (01-03 @ 07:19)                            8.2    8.52  )-----------( 292      ( 03 Jan 2022 07:19 )             25.9     Urine Studies:  Urinalysis - [12-30-21 @ 02:56]      Color Yellow / Appearance Slightly Turbid / SG 1.015 / pH 6.5      Gluc Trace / Ketone Negative  / Bili Negative / Urobili <2 mg/dL       Blood Small / Protein 100 mg/dL / Leuk Est Large / Nitrite Negative      RBC 5-10 / WBC >10 / Hyaline 0 / Gran  / Sq Epi  / Non Sq Epi 2 / Bacteria Many    Urine Creatinine 78      [12-30-21 @ 02:56]  Urine Sodium 52      [12-30-21 @ 02:56]  Urine Urea Nitrogen 535.2      [12-30-21 @ 02:56]  Urine Osmolality 386      [12-30-21 @ 02:56]    Ferritin 98      [01-02-22 @ 07:01]  HbA1c 8.8      [12-13-19 @ 06:00]  Lipid: chol 152, , HDL 30, LDL --      [12-29-21 @ 08:22]        RADIOLOGY & ADDITIONAL STUDIES:  
[facility] – [place], [place] [facility]   MD HA STOUT MD RUORU WONG, PA    TEL:  OFFICE: [phone]  DR FLORES CELL: [phone]  TOY DAVE CELL: [phone]  DR. HODGES CELL: [phone]      FROM 5 PM - 7 AM PLEASE CALL ANSWERING SERVICE: [phone]    RENAL FOLLOW UP NOTE--Date of Service 01-07-22 @ 08:51  --------------------------------------------------------------------------------  HPI:      Pt seen and examined at bedside.       PAST HISTORY  --------------------------------------------------------------------------------  No significant changes to PMH, PSH, FHx, SHx, unless otherwise noted    ALLERGIES & MEDICATIONS  --------------------------------------------------------------------------------  Allergies    peanuts (Short breath; Hives)  penicillin (Short breath; Hives)    Intolerances      Standing Inpatient Medications  allopurinol 100 milliGRAM(s) Oral daily  amLODIPine   Tablet 10 milliGRAM(s) Oral daily  aspirin enteric coated 81 milliGRAM(s) Oral daily  atorvastatin 40 milliGRAM(s) Oral at bedtime  carvedilol 25 milliGRAM(s) Oral every 12 hours  dextrose 40% Gel 15 Gram(s) Oral once  dextrose 5%. 1000 milliLiter(s) IV Continuous <Continuous>  dextrose 5%. 1000 milliLiter(s) IV Continuous <Continuous>  dextrose 50% Injectable 25 Gram(s) IV Push once  dextrose 50% Injectable 12.5 Gram(s) IV Push once  dextrose 50% Injectable 25 Gram(s) IV Push once  glucagon  Injectable 1 milliGRAM(s) IntraMuscular once  heparin   Injectable 5000 Unit(s) SubCutaneous every 8 hours  hydrALAZINE 100 milliGRAM(s) Oral three times a day  influenza  Vaccine (HIGH DOSE) 0.7 milliLiter(s) IntraMuscular once  insulin glargine Injectable (LANTUS) 10 Unit(s) SubCutaneous at bedtime  insulin lispro (ADMELOG) corrective regimen sliding scale   SubCutaneous three times a day before meals  insulin lispro (ADMELOG) corrective regimen sliding scale   SubCutaneous at bedtime  insulin lispro Injectable (ADMELOG) 5 Unit(s) SubCutaneous three times a day before meals  multivitamin 1 Tablet(s) Oral daily  risperiDONE   Tablet 1 milliGRAM(s) Oral daily    PRN Inpatient Medications  acetaminophen     Tablet .. 650 milliGRAM(s) Oral every 6 hours PRN      REVIEW OF SYSTEMS  --------------------------------------------------------------------------------  General: no fever  MSK: no edema     VITALS/PHYSICAL EXAM  --------------------------------------------------------------------------------  T(C): 36.8 (01-07-22 @ 05:47), Max: 37.1 (01-06-22 @ 21:45)  HR: 78 (01-07-22 @ 05:47) (72 - 82)  BP: 161/64 (01-07-22 @ 05:47) (151/68 - 161/64)  RR: 17 (01-07-22 @ 05:47) (17 - 18)  SpO2: 100% (01-07-22 @ 05:47) (100% - 100%)  Wt(kg): --        Physical Exam:  	Gen: NAD  	HEENT: MMM  	Pulm: CTA B/L  	CV: S1S2  	Abd: Soft, +BS  	Ext: No LE edema B/L                      Neuro: Awake   	Skin: Warm and Dry   	Vascular access: NO HD catheter             no michaels  LABS/STUDIES  --------------------------------------------------------------------------------              8.2    9.95  >-----------<  338      [01-07-22 @ 07:04]              25.9     133  |  99  |  52  ----------------------------<  182      [01-07-22 @ 07:04]  4.4   |  24  |  1.72        Ca     9.7     [01-07-22 @ 07:04]      Mg     2.20     [01-07-22 @ 07:04]      Phos  4.0     [01-07-22 @ 07:04]            Creatinine Trend:  SCr 1.72 [01-07 @ 07:04]  SCr 1.87 [01-06 @ 15:08]  SCr 1.87 [01-06 @ 07:19]  SCr 1.56 [01-05 @ 07:19]  SCr 1.69 [01-04 @ 07:18]    Urinalysis - [12-30-21 @ 02:56]      Color Yellow / Appearance Slightly Turbid / SG 1.015 / pH 6.5      Gluc Trace / Ketone Negative  / Bili Negative / Urobili <2 mg/dL       Blood Small / Protein 100 mg/dL / Leuk Est Large / Nitrite Negative      RBC 5-10 / WBC >10 / Hyaline 0 / Gran  / Sq Epi  / Non Sq Epi 2 / Bacteria Many      Iron 59, TIBC 242, %sat 24      [01-07-22 @ 07:04]  Ferritin 92      [01-07-22 @ 07:04]  HbA1c 8.8      [12-13-19 @ 06:00]  TSH 4.62      [01-07-22 @ 07:04]  Lipid: chol 152, , HDL 30, LDL --      [12-29-21 @ 08:22]    HIV Nonreact      [01-04-22 @ 07:18]    
Date of Service  : 01-04-22     INTERVAL HPI/OVERNIGHT EVENTS: i feel fine.   Vital Signs Last 24 Hrs  T(C): 36.7 (04 Jan 2022 13:00), Max: 37.1 (04 Jan 2022 05:03)  T(F): 98 (04 Jan 2022 13:00), Max: 98.7 (04 Jan 2022 05:03)  HR: 72 (04 Jan 2022 13:00) (72 - 89)  BP: 124/76 (04 Jan 2022 13:00) (124/76 - 175/70)  BP(mean): 87 (04 Jan 2022 13:00) (73 - 87)  RR: 18 (04 Jan 2022 13:00) (18 - 19)  SpO2: 100% (04 Jan 2022 13:00) (98% - 100%)  I&O's Summary    MEDICATIONS  (STANDING):  allopurinol 100 milliGRAM(s) Oral daily  amLODIPine   Tablet 5 milliGRAM(s) Oral daily  aspirin enteric coated 81 milliGRAM(s) Oral daily  atorvastatin 40 milliGRAM(s) Oral at bedtime  carvedilol 25 milliGRAM(s) Oral every 12 hours  dextrose 40% Gel 15 Gram(s) Oral once  dextrose 5%. 1000 milliLiter(s) (100 mL/Hr) IV Continuous <Continuous>  dextrose 5%. 1000 milliLiter(s) (50 mL/Hr) IV Continuous <Continuous>  dextrose 50% Injectable 25 Gram(s) IV Push once  dextrose 50% Injectable 12.5 Gram(s) IV Push once  dextrose 50% Injectable 25 Gram(s) IV Push once  glucagon  Injectable 1 milliGRAM(s) IntraMuscular once  heparin   Injectable 5000 Unit(s) SubCutaneous every 8 hours  hydrALAZINE 100 milliGRAM(s) Oral three times a day  influenza  Vaccine (HIGH DOSE) 0.7 milliLiter(s) IntraMuscular once  insulin lispro (ADMELOG) corrective regimen sliding scale   SubCutaneous at bedtime  insulin lispro (ADMELOG) corrective regimen sliding scale   SubCutaneous three times a day before meals  multivitamin 1 Tablet(s) Oral daily  risperiDONE   Tablet 1 milliGRAM(s) Oral daily  sodium chloride 1 Gram(s) Oral three times a day    MEDICATIONS  (PRN):  acetaminophen     Tablet .. 650 milliGRAM(s) Oral every 6 hours PRN Temp greater or equal to 38C (100.4F), Mild Pain (1 - 3)    LABS:                        8.6    8.48  )-----------( 312      ( 04 Jan 2022 07:18 )             26.4     01-04    127<L>  |  91<L>  |  46<H>  ----------------------------<  177<H>  4.4   |  26  |  1.69<H>    Ca    9.6      04 Jan 2022 07:18  Phos  4.0     01-03  Mg     2.20     01-03    TPro  6.8  /  Alb  3.5  /  TBili  <0.2  /  DBili  x   /  AST  21  /  ALT  23  /  AlkPhos  65  01-03        CAPILLARY BLOOD GLUCOSE      POCT Blood Glucose.: 320 mg/dL (04 Jan 2022 12:26)  POCT Blood Glucose.: 191 mg/dL (04 Jan 2022 09:10)  POCT Blood Glucose.: 248 mg/dL (03 Jan 2022 21:00)  POCT Blood Glucose.: 213 mg/dL (03 Jan 2022 18:00)    Consultant(s) Notes Reviewed:  [x ] YES  [ ] NO    PHYSICAL EXAM:  GENERAL: NAD, well-groomed, well-developed, not in any distress ,  HEAD:  Atraumatic, Normocephalic  NECK: Supple, No JVD, Normal thyroid  NERVOUS SYSTEM:  Alert & Oriented X3, No focal deficit   CHEST/LUNG: Good air entry bilateral with no  rales, rhonchi, wheezing, or rubs  HEART: Regular rate and rhythm; No murmurs, rubs, or gallops  ABDOMEN: Soft, Nontender, Nondistended; Bowel sounds present  EXTREMITIES:  2+ Peripheral Pulses, No clubbing, cyanosis, or edema    Care Discussed with Consultants/Other Providers [ x] YES  [ ] NO
Date of Service  : 01-05-22 @ 17:19    INTERVAL HPI/OVERNIGHT EVENTS: No new concerns.  Vital Signs Last 24 Hrs  T(C): 37.1 (05 Jan 2022 15:15), Max: 37.2 (05 Jan 2022 05:50)  T(F): 98.7 (05 Jan 2022 15:15), Max: 99 (05 Jan 2022 05:50)  HR: 76 (05 Jan 2022 15:15) (71 - 78)  BP: 150/56 (05 Jan 2022 15:15) (134/62 - 181/68)  BP(mean): --  RR: 18 (05 Jan 2022 15:15) (18 - 18)  SpO2: 100% (05 Jan 2022 15:15) (98% - 100%)  I&O's Summary    MEDICATIONS  (STANDING):  allopurinol 100 milliGRAM(s) Oral daily  amLODIPine   Tablet 5 milliGRAM(s) Oral daily  aspirin enteric coated 81 milliGRAM(s) Oral daily  atorvastatin 40 milliGRAM(s) Oral at bedtime  carvedilol 25 milliGRAM(s) Oral every 12 hours  dextrose 40% Gel 15 Gram(s) Oral once  dextrose 5%. 1000 milliLiter(s) (100 mL/Hr) IV Continuous <Continuous>  dextrose 5%. 1000 milliLiter(s) (50 mL/Hr) IV Continuous <Continuous>  dextrose 50% Injectable 25 Gram(s) IV Push once  dextrose 50% Injectable 12.5 Gram(s) IV Push once  dextrose 50% Injectable 25 Gram(s) IV Push once  glucagon  Injectable 1 milliGRAM(s) IntraMuscular once  heparin   Injectable 5000 Unit(s) SubCutaneous every 8 hours  hydrALAZINE 100 milliGRAM(s) Oral three times a day  influenza  Vaccine (HIGH DOSE) 0.7 milliLiter(s) IntraMuscular once  insulin lispro (ADMELOG) corrective regimen sliding scale   SubCutaneous three times a day before meals  insulin lispro (ADMELOG) corrective regimen sliding scale   SubCutaneous at bedtime  multivitamin 1 Tablet(s) Oral daily  risperiDONE   Tablet 1 milliGRAM(s) Oral daily  sodium chloride 1 Gram(s) Oral three times a day    MEDICATIONS  (PRN):  acetaminophen     Tablet .. 650 milliGRAM(s) Oral every 6 hours PRN Temp greater or equal to 38C (100.4F), Mild Pain (1 - 3)    LABS:                        9.0    7.38  )-----------( 280      ( 05 Jan 2022 07:19 )             31.2     01-05    129<L>  |  93<L>  |  46<H>  ----------------------------<  215<H>  5.2   |  19<L>  |  1.56<H>    Ca    9.5      05 Jan 2022 07:19  Phos  3.7     01-05  Mg     2.20     01-05          CAPILLARY BLOOD GLUCOSE      POCT Blood Glucose.: 302 mg/dL (05 Jan 2022 12:54)  POCT Blood Glucose.: 207 mg/dL (05 Jan 2022 09:23)  POCT Blood Glucose.: 357 mg/dL (04 Jan 2022 21:49)          REVIEW OF SYSTEMS:  CONSTITUTIONAL: No fever, weight loss, or fatigue  EYES: No eye pain, visual disturbances, or discharge  ENMT:  No difficulty hearing, tinnitus, vertigo; No sinus or throat pain  NECK: No pain or stiffness  RESPIRATORY: No cough, wheezing, chills or hemoptysis; No shortness of breath  CARDIOVASCULAR: No chest pain, palpitations, dizziness, or leg swelling  GASTROINTESTINAL: No abdominal or epigastric pain. No nausea, vomiting, or hematemesis; No diarrhea or constipation. No melena or hematochezia.      Consultant(s) Notes Reviewed:  [x ] YES  [ ] NO    PHYSICAL EXAM:  GENERAL: NAD, well-groomed, well-developed,not in any distress ,  HEAD:  Atraumatic, Normocephalic  EYES: EOMI, PERRLA, conjunctiva and sclera clear  ENMT: No tonsillar erythema, exudates, or enlargement; Moist mucous membranes, Good dentition, No lesions  NECK: Supple, No JVD, Normal thyroid  NERVOUS SYSTEM:  Alert & Oriented X3, No focal deficit   CHEST/LUNG: Good air entry bilateral with no  rales, rhonchi, wheezing, or rubs  HEART: Regular rate and rhythm; No murmurs, rubs, or gallops  ABDOMEN: Soft, Nontender, Nondistended; Bowel sounds present  EXTREMITIES:  2+ Peripheral Pulses, No clubbing, cyanosis, or edema  SKIN: No rashes or lesions    Care Discussed with Consultants/Other Providers [ x] YES  [ ] NO
Date of Service  : 01-06-22     INTERVAL HPI/OVERNIGHT EVENTS: No new concerns.   Vital Signs Last 24 Hrs  T(C): 36.8 (06 Jan 2022 18:24), Max: 36.8 (06 Jan 2022 18:24)  T(F): 98.2 (06 Jan 2022 18:24), Max: 98.2 (06 Jan 2022 18:24)  HR: 82 (06 Jan 2022 18:24) (61 - 82)  BP: 151/68 (06 Jan 2022 18:24) (151/68 - 165/86)  BP(mean): --  RR: 18 (06 Jan 2022 18:24) (18 - 18)  SpO2: 100% (06 Jan 2022 18:24) (100% - 100%)  I&O's Summary    05 Jan 2022 07:01  -  06 Jan 2022 07:00  --------------------------------------------------------  IN: 0 mL / OUT: 600 mL / NET: -600 mL      MEDICATIONS  (STANDING):  allopurinol 100 milliGRAM(s) Oral daily  aspirin enteric coated 81 milliGRAM(s) Oral daily  atorvastatin 40 milliGRAM(s) Oral at bedtime  carvedilol 25 milliGRAM(s) Oral every 12 hours  dextrose 40% Gel 15 Gram(s) Oral once  dextrose 5%. 1000 milliLiter(s) (50 mL/Hr) IV Continuous <Continuous>  dextrose 5%. 1000 milliLiter(s) (100 mL/Hr) IV Continuous <Continuous>  dextrose 50% Injectable 25 Gram(s) IV Push once  dextrose 50% Injectable 12.5 Gram(s) IV Push once  dextrose 50% Injectable 25 Gram(s) IV Push once  glucagon  Injectable 1 milliGRAM(s) IntraMuscular once  heparin   Injectable 5000 Unit(s) SubCutaneous every 8 hours  hydrALAZINE 100 milliGRAM(s) Oral three times a day  influenza  Vaccine (HIGH DOSE) 0.7 milliLiter(s) IntraMuscular once  insulin glargine Injectable (LANTUS) 10 Unit(s) SubCutaneous at bedtime  insulin lispro (ADMELOG) corrective regimen sliding scale   SubCutaneous three times a day before meals  insulin lispro (ADMELOG) corrective regimen sliding scale   SubCutaneous at bedtime  insulin lispro Injectable (ADMELOG) 5 Unit(s) SubCutaneous three times a day before meals  multivitamin 1 Tablet(s) Oral daily  risperiDONE   Tablet 1 milliGRAM(s) Oral daily    MEDICATIONS  (PRN):  acetaminophen     Tablet .. 650 milliGRAM(s) Oral every 6 hours PRN Temp greater or equal to 38C (100.4F), Mild Pain (1 - 3)    LABS:                        7.9    9.16  )-----------( 306      ( 06 Jan 2022 15:08 )             24.1     01-06    131<L>  |  96<L>  |  54<H>  ----------------------------<  267<H>  4.5   |  26  |  1.87<H>    Ca    9.1      06 Jan 2022 15:08  Phos  4.0     01-06  Mg     2.10     01-06          CAPILLARY BLOOD GLUCOSE      POCT Blood Glucose.: 249 mg/dL (06 Jan 2022 17:53)  POCT Blood Glucose.: 329 mg/dL (06 Jan 2022 12:34)  POCT Blood Glucose.: 231 mg/dL (06 Jan 2022 09:12)  POCT Blood Glucose.: 360 mg/dL (05 Jan 2022 21:31)          REVIEW OF SYSTEMS:  CONSTITUTIONAL: No fever, weight loss, or fatigue  EYES: No eye pain, visual disturbances, or discharge  ENMT:  No difficulty hearing, tinnitus, vertigo; No sinus or throat pain  NECK: No pain or stiffness  RESPIRATORY: No cough, wheezing, chills or hemoptysis; No shortness of breath  CARDIOVASCULAR: No chest pain, palpitations, dizziness, or leg swelling  GASTROINTESTINAL: No abdominal or epigastric pain. No nausea, vomiting, or hematemesis; No diarrhea or constipation. No melena or hematochezia.  GENITOURINARY: No dysuria, frequency, hematuria, or incontinence  NEUROLOGICAL: No headaches, memory loss, loss of strength, numbness, or tremors  SKIN: No itching, burning, rashes, or lesions   ENDOCRINE: No heat or cold intolerance; No hair loss  MUSCULOSKELETAL: No joint pain or swelling; No muscle, back, or extremity pain  PSYCHIATRIC: No depression, anxiety, mood swings, or difficulty sleeping  HEME/LYMPH: No easy bruising, or bleeding gums  ALLERY AND IMMUNOLOGIC: No hives or eczema    RADIOLOGY & ADDITIONAL TESTS:    Consultant(s) Notes Reviewed:  [x ] YES  [ ] NO    PHYSICAL EXAM:  GENERAL: NAD, well-groomed, well-developed,not in any distress ,  HEAD:  Atraumatic, Normocephalic  EYES: EOMI, PERRLA, conjunctiva and sclera clear  ENMT: No tonsillar erythema, exudates, or enlargement; Moist mucous membranes, Good dentition, No lesions  NECK: Supple, No JVD, Normal thyroid  NERVOUS SYSTEM:  Alert & Oriented X3, No focal deficit   CHEST/LUNG: Good air entry bilateral with no  rales, rhonchi, wheezing, or rubs  HEART: Regular rate and rhythm; No murmurs, rubs, or gallops  ABDOMEN: Soft, Nontender, Nondistended; Bowel sounds present  EXTREMITIES:  2+ Peripheral Pulses, No clubbing, cyanosis, or edema  SKIN: No rashes or lesions    Care Discussed with Consultants/Other Providers [ x] YES  [ ] NO
Date of Service  : 01-07-22     INTERVAL HPI/OVERNIGHT EVENTS: Seen and examined earlier . No new concerns.   Vital Signs Last 24 Hrs  T(C): 36.7 (07 Jan 2022 13:15), Max: 37.1 (06 Jan 2022 21:45)  T(F): 98 (07 Jan 2022 13:15), Max: 98.8 (06 Jan 2022 21:45)  HR: 81 (07 Jan 2022 13:15) (72 - 82)  BP: 147/90 (07 Jan 2022 13:15) (147/90 - 161/64)  BP(mean): --  RR: 17 (07 Jan 2022 13:15) (17 - 18)  SpO2: 100% (07 Jan 2022 13:15) (100% - 100%)  I&O's Summary    MEDICATIONS  (STANDING):  allopurinol 100 milliGRAM(s) Oral daily  amLODIPine   Tablet 10 milliGRAM(s) Oral daily  aspirin enteric coated 81 milliGRAM(s) Oral daily  atorvastatin 40 milliGRAM(s) Oral at bedtime  carvedilol 25 milliGRAM(s) Oral every 12 hours  dextrose 40% Gel 15 Gram(s) Oral once  dextrose 5%. 1000 milliLiter(s) (50 mL/Hr) IV Continuous <Continuous>  dextrose 5%. 1000 milliLiter(s) (100 mL/Hr) IV Continuous <Continuous>  dextrose 50% Injectable 25 Gram(s) IV Push once  dextrose 50% Injectable 12.5 Gram(s) IV Push once  dextrose 50% Injectable 25 Gram(s) IV Push once  glucagon  Injectable 1 milliGRAM(s) IntraMuscular once  heparin   Injectable 5000 Unit(s) SubCutaneous every 8 hours  hydrALAZINE 100 milliGRAM(s) Oral three times a day  influenza  Vaccine (HIGH DOSE) 0.7 milliLiter(s) IntraMuscular once  insulin glargine Injectable (LANTUS) 14 Unit(s) SubCutaneous at bedtime  insulin lispro (ADMELOG) corrective regimen sliding scale   SubCutaneous at bedtime  insulin lispro (ADMELOG) corrective regimen sliding scale   SubCutaneous three times a day before meals  insulin lispro Injectable (ADMELOG) 8 Unit(s) SubCutaneous three times a day before meals  multivitamin 1 Tablet(s) Oral daily  risperiDONE   Tablet 1 milliGRAM(s) Oral daily    MEDICATIONS  (PRN):  acetaminophen     Tablet .. 650 milliGRAM(s) Oral every 6 hours PRN Temp greater or equal to 38C (100.4F), Mild Pain (1 - 3)    LABS:                        8.2    9.95  )-----------( 338      ( 07 Jan 2022 07:04 )             25.9     01-07    133<L>  |  99  |  52<H>  ----------------------------<  182<H>  4.4   |  24  |  1.72<H>    Ca    9.7      07 Jan 2022 07:04  Phos  4.0     01-07  Mg     2.20     01-07          CAPILLARY BLOOD GLUCOSE      POCT Blood Glucose.: 265 mg/dL (07 Jan 2022 12:39)  POCT Blood Glucose.: 195 mg/dL (07 Jan 2022 09:18)  POCT Blood Glucose.: 246 mg/dL (06 Jan 2022 21:26)  POCT Blood Glucose.: 249 mg/dL (06 Jan 2022 17:53)          REVIEW OF SYSTEMS:  CONSTITUTIONAL: No fever, weight loss, or fatigue  EYES: No eye pain, visual disturbances, or discharge  ENMT:  No difficulty hearing, tinnitus, vertigo; No sinus or throat pain  NECK: No pain or stiffness  RESPIRATORY: No cough, wheezing, chills or hemoptysis; No shortness of breath  CARDIOVASCULAR: No chest pain, palpitations, dizziness, or leg swelling  GASTROINTESTINAL: No abdominal or epigastric pain. No nausea, vomiting, or hematemesis; No diarrhea or constipation. No melena or hematochezia.  GENITOURINARY: No dysuria, frequency, hematuria, or incontinence  NEUROLOGICAL: No headaches, memory loss, loss of strength, numbness, or tremors      Consultant(s) Notes Reviewed:  [x ] YES  [ ] NO    PHYSICAL EXAM:  GENERAL: NAD, well-groomed, well-developed,not in any distress ,  HEAD:  Atraumatic, Normocephalic  EYES: EOMI, PERRLA, conjunctiva and sclera clear  ENMT: No tonsillar erythema, exudates, or enlargement; Moist mucous membranes, Good dentition, No lesions  NECK: Supple, No JVD, Normal thyroid  NERVOUS SYSTEM:  Alert & Oriented X3, No focal deficit   CHEST/LUNG: Good air entry bilateral with no  rales, rhonchi, wheezing, or rubs  HEART: Regular rate and rhythm; No murmurs, rubs, or gallops  ABDOMEN: Soft, Nontender, Nondistended; Bowel sounds present  EXTREMITIES:  2+ Peripheral Pulses, No clubbing, cyanosis, or edema      Care Discussed with Consultants/Other Providers [ x] YES  [ ] NO
[facility] [place] [facility]   MD HA STOUT MD, PA INJUNG KO NP    TEL:  OFFICE: [phone]  DR FLORES CELL: [phone]  DR. HODGES CELL: [phone]  WESTLEY DAVE CELL: [phone]  FIDEL STAPLES CELL :[phone]    From 5pm-7am Answering Service [phone]    -- RENAL FOLLOW UP NOTE ---Date of Service 01-04-22 @ 15:26    Patient is a 76y old  Female who presents with a chief complaint of weakness (04 Jan 2022 14:16)      Patient seen and examined at bedside. No chest pain/sob    VITALS:  T(F): 98 (01-04-22 @ 13:00), Max: 98.7 (01-04-22 @ 05:03)  HR: 72 (01-04-22 @ 13:00)  BP: 124/76 (01-04-22 @ 13:00)  RR: 18 (01-04-22 @ 13:00)  SpO2: 100% (01-04-22 @ 13:00)  Wt(kg): --        PHYSICAL EXAM:  Constitutional: NAD  Neck: No JVD  Respiratory: CTAB, no wheezes, rales or rhonchi  Cardiovascular: S1, S2, RRR  Gastrointestinal: BS+, soft, NT/ND  Extremities: No peripheral edema    Hospital Medications:   MEDICATIONS  (STANDING):  allopurinol 100 milliGRAM(s) Oral daily  amLODIPine   Tablet 5 milliGRAM(s) Oral daily  aspirin enteric coated 81 milliGRAM(s) Oral daily  atorvastatin 40 milliGRAM(s) Oral at bedtime  carvedilol 25 milliGRAM(s) Oral every 12 hours  dextrose 40% Gel 15 Gram(s) Oral once  dextrose 5%. 1000 milliLiter(s) (100 mL/Hr) IV Continuous <Continuous>  dextrose 5%. 1000 milliLiter(s) (50 mL/Hr) IV Continuous <Continuous>  dextrose 50% Injectable 25 Gram(s) IV Push once  dextrose 50% Injectable 12.5 Gram(s) IV Push once  dextrose 50% Injectable 25 Gram(s) IV Push once  glucagon  Injectable 1 milliGRAM(s) IntraMuscular once  heparin   Injectable 5000 Unit(s) SubCutaneous every 8 hours  hydrALAZINE 100 milliGRAM(s) Oral three times a day  influenza  Vaccine (HIGH DOSE) 0.7 milliLiter(s) IntraMuscular once  insulin lispro (ADMELOG) corrective regimen sliding scale   SubCutaneous at bedtime  insulin lispro (ADMELOG) corrective regimen sliding scale   SubCutaneous three times a day before meals  multivitamin 1 Tablet(s) Oral daily  risperiDONE   Tablet 1 milliGRAM(s) Oral daily  sodium chloride 1 Gram(s) Oral three times a day      LABS:  01-04    127<L>  |  91<L>  |  46<H>  ----------------------------<  177<H>  4.4   |  26  |  1.69<H>    Ca    9.6      04 Jan 2022 07:18  Phos  4.0     01-03  Mg     2.20     01-03    TPro  6.8  /  Alb  3.5  /  TBili  <0.2  /  DBili      /  AST  21  /  ALT  23  /  AlkPhos  65  01-03    Creatinine Trend: 1.69 <--, 1.68 <--, 1.68 <--, 1.88 <--, 1.90 <--, 1.98 <--, 2.33 <--                                8.6    8.48  )-----------( 312      ( 04 Jan 2022 07:18 )             26.4     Urine Studies:  Urinalysis - [12-30-21 @ 02:56]      Color Yellow / Appearance Slightly Turbid / SG 1.015 / pH 6.5      Gluc Trace / Ketone Negative  / Bili Negative / Urobili <2 mg/dL       Blood Small / Protein 100 mg/dL / Leuk Est Large / Nitrite Negative      RBC 5-10 / WBC >10 / Hyaline 0 / Gran  / Sq Epi  / Non Sq Epi 2 / Bacteria Many    Urine Creatinine 78      [12-30-21 @ 02:56]  Urine Sodium 52      [12-30-21 @ 02:56]  Urine Urea Nitrogen 535.2      [12-30-21 @ 02:56]  Urine Osmolality 386      [12-30-21 @ 02:56]    Ferritin 98      [01-02-22 @ 07:01]  HbA1c 8.8      [12-13-19 @ 06:00]  Lipid: chol 152, , HDL 30, LDL --      [12-29-21 @ 08:22]    HIV Nonreact      [01-04-22 @ 07:18]      RADIOLOGY & ADDITIONAL STUDIES:  
Follow Up: COVID    Interval History/ROS: Afebrile, no pain, no cough, no diarrhea or dysuria.     Allergies  peanuts (Short breath; Hives)  penicillin (Short breath; Hives)        ANTIMICROBIALS:      OTHER MEDS:  acetaminophen     Tablet .. 650 milliGRAM(s) Oral every 6 hours PRN  allopurinol 100 milliGRAM(s) Oral daily  amLODIPine   Tablet 5 milliGRAM(s) Oral daily  aspirin enteric coated 81 milliGRAM(s) Oral daily  atorvastatin 40 milliGRAM(s) Oral at bedtime  carvedilol 25 milliGRAM(s) Oral every 12 hours  dextrose 40% Gel 15 Gram(s) Oral once  dextrose 5%. 1000 milliLiter(s) IV Continuous <Continuous>  dextrose 5%. 1000 milliLiter(s) IV Continuous <Continuous>  dextrose 50% Injectable 25 Gram(s) IV Push once  dextrose 50% Injectable 12.5 Gram(s) IV Push once  dextrose 50% Injectable 25 Gram(s) IV Push once  glucagon  Injectable 1 milliGRAM(s) IntraMuscular once  heparin   Injectable 5000 Unit(s) SubCutaneous every 8 hours  hydrALAZINE 100 milliGRAM(s) Oral three times a day  influenza  Vaccine (HIGH DOSE) 0.7 milliLiter(s) IntraMuscular once  insulin lispro (ADMELOG) corrective regimen sliding scale   SubCutaneous at bedtime  insulin lispro (ADMELOG) corrective regimen sliding scale   SubCutaneous three times a day before meals  multivitamin 1 Tablet(s) Oral daily  risperiDONE   Tablet 1 milliGRAM(s) Oral daily  sodium chloride 1 Gram(s) Oral three times a day      Vital Signs Last 24 Hrs  T(C): 36.7 (04 Jan 2022 13:00), Max: 37.1 (04 Jan 2022 05:03)  T(F): 98 (04 Jan 2022 13:00), Max: 98.7 (04 Jan 2022 05:03)  HR: 72 (04 Jan 2022 13:00) (72 - 89)  BP: 124/76 (04 Jan 2022 13:00) (124/76 - 175/70)  BP(mean): 87 (04 Jan 2022 13:00) (73 - 87)  RR: 18 (04 Jan 2022 13:00) (18 - 19)  SpO2: 100% (04 Jan 2022 13:00) (98% - 100%)    Physical Exam:  General: awake, alert, non toxic  Head: atraumatic, normocephalic  Eye: normal sclera and conjunctiva  ENT: no cervical lymphadenopathy   Cardio: regular rate   Respiratory: nonlabored on room air   abd: soft, no tenderness  Neurologic: no focal deficit  psych: normal affect                          8.6    8.48  )-----------( 312      ( 04 Jan 2022 07:18 )             26.4       01-04    127<L>  |  91<L>  |  46<H>  ----------------------------<  177<H>  4.4   |  26  |  1.69<H>    Ca    9.6      04 Jan 2022 07:18  Phos  4.0     01-03  Mg     2.20     01-03    TPro  6.8  /  Alb  3.5  /  TBili  <0.2  /  DBili  x   /  AST  21  /  ALT  23  /  AlkPhos  65  01-03          MICROBIOLOGY:  Culture - Urine (collected 12-30-21 @ 15:22)  Source: Clean Catch Clean Catch (Midstream)  Final Report (01-01-22 @ 11:43):    >100,000 CFU/ml Coag Negative Staphylococcus    Multiple Morphological Strains    "Susceptibilities not performed"    Culture - Blood (collected 12-29-21 @ 14:38)  Source: .Blood Blood-Venous  Final Report (01-03-22 @ 19:00):    No Growth Final    Culture - Blood (collected 12-29-21 @ 14:35)  Source: .Blood Blood-Peripheral  Final Report (01-03-22 @ 19:00):    No Growth Final    RADIOLOGY:  Images below reviewed personally  CT Head No Cont (12.30.21 @ 12:40)   No acute hemorrhage mass or mass effect.    Xray Chest 1 View- PORTABLE-Urgent (12.28.21 @ 10:38)   Clear lungs.    US Kidney and Bladder (12.29.21 @ 11:04)   No hydronephrosis.
JOSE SAGASTUME  76y  Patient is a 76y old  Female who presents with a chief complaint of weakness (02 Jan 2022 12:48)    HPI:  Seen and examined.  Followed for SUNIL on CKD.    HEALTH ISSUES - PROBLEM Dx:  Acute on chronic kidney failure    Hyperphosphatemia    Elevated troponin    Type 2 diabetes mellitus    Chronic diastolic congestive heart failure    Hypertensive emergency    Preventive measure    Medication management    2019 novel coronavirus disease (COVID-19)          MEDICATIONS  (STANDING):  allopurinol 100 milliGRAM(s) Oral daily  aspirin enteric coated 81 milliGRAM(s) Oral daily  atorvastatin 40 milliGRAM(s) Oral at bedtime  carvedilol 25 milliGRAM(s) Oral every 12 hours  dextrose 40% Gel 15 Gram(s) Oral once  dextrose 5%. 1000 milliLiter(s) (50 mL/Hr) IV Continuous <Continuous>  dextrose 5%. 1000 milliLiter(s) (100 mL/Hr) IV Continuous <Continuous>  dextrose 50% Injectable 25 Gram(s) IV Push once  dextrose 50% Injectable 12.5 Gram(s) IV Push once  dextrose 50% Injectable 25 Gram(s) IV Push once  glucagon  Injectable 1 milliGRAM(s) IntraMuscular once  heparin   Injectable 5000 Unit(s) SubCutaneous every 8 hours  hydrALAZINE 100 milliGRAM(s) Oral three times a day  influenza  Vaccine (HIGH DOSE) 0.7 milliLiter(s) IntraMuscular once  insulin lispro (ADMELOG) corrective regimen sliding scale   SubCutaneous three times a day before meals  insulin lispro (ADMELOG) corrective regimen sliding scale   SubCutaneous at bedtime  multivitamin 1 Tablet(s) Oral daily  risperiDONE   Tablet 1 milliGRAM(s) Oral daily    MEDICATIONS  (PRN):  acetaminophen     Tablet .. 650 milliGRAM(s) Oral every 6 hours PRN Temp greater or equal to 38C (100.4F), Mild Pain (1 - 3)    Vital Signs Last 24 Hrs  T(C): 37 (02 Jan 2022 13:00), Max: 37.2 (02 Jan 2022 05:11)  T(F): 98.6 (02 Jan 2022 13:00), Max: 99 (02 Jan 2022 05:11)  HR: 79 (02 Jan 2022 13:00) (71 - 80)  BP: 188/74 (02 Jan 2022 13:00) (150/66 - 208/78)  BP(mean): --  RR: 17 (02 Jan 2022 13:00) (17 - 18)  SpO2: 100% (02 Jan 2022 13:00) (100% - 100%)  Daily     Daily     PHYSICAL EXAM:  Constitutional:  She appears comfortable and not distressed. Not diaphoretic.    Neck:  The thyroid is normal. Trachea is midline.     Respiratory: The lungs are clear to auscultation. No dullness and expansion is normal.    Cardiovascular: S1 and S2 are normal. No mummurs, rubs or gallops are present.    Gastrointestinal: The abdomen is soft. No tenderness is present. No masses are present. Bowel sounds are normal.    Genitourinary: The bladder is not distended. No CVA tenderness is present.    Extremities: No edema is noted. No deformities are present.    Neurological: Cognition is normal. Tone, power and sensation are normal.     Skin: No lesions are seen  or palpated.                            8.3    8.50  )-----------( 272      ( 02 Jan 2022 07:01 )             25.7     01-02    127<L>  |  90<L>  |  47<H>  ----------------------------<  173<H>  4.2   |  24  |  1.68<H>    Ca    9.2      02 Jan 2022 07:01  Phos  3.7     01-02  Mg     2.10     01-02    TPro  6.9  /  Alb  3.5  /  TBili  <0.2  /  DBili  x   /  AST  22  /  ALT  18  /  AlkPhos  61  01-02    Osmolality, Random Urine: 386 mosm/kg (12.30.21 @ 02:56) 
[facility] [place] [facility]   MD HA STOUT MD RUORU WONG, PA    TEL:  OFFICE: [phone]  DR FLORES CELL: [phone]  TOY DAVE CELL: [phone]  DR. HODGES CELL: [phone]    RENAL FOLLOW UP NOTE-- Date of Service ;; 01-09-22 @ 07:00  --------------------------------------------------------------------------------  HPI:  Pt seen and examined at bedside  Denies SOB        PAST HISTORY  --------------------------------------------------------------------------------  No significant changes to PMH, PSH, FHx, SHx, unless otherwise noted    ALLERGIES & MEDICATIONS  --------------------------------------------------------------------------------  Allergies    peanuts (Short breath; Hives)  penicillin (Short breath; Hives)    Intolerances      Standing Inpatient Medications  allopurinol 100 milliGRAM(s) Oral daily  amLODIPine   Tablet 10 milliGRAM(s) Oral daily  aspirin enteric coated 81 milliGRAM(s) Oral daily  atorvastatin 40 milliGRAM(s) Oral at bedtime  carvedilol 25 milliGRAM(s) Oral every 12 hours  dextrose 40% Gel 15 Gram(s) Oral once  dextrose 5%. 1000 milliLiter(s) IV Continuous <Continuous>  dextrose 5%. 1000 milliLiter(s) IV Continuous <Continuous>  dextrose 50% Injectable 25 Gram(s) IV Push once  dextrose 50% Injectable 12.5 Gram(s) IV Push once  dextrose 50% Injectable 25 Gram(s) IV Push once  glucagon  Injectable 1 milliGRAM(s) IntraMuscular once  heparin   Injectable 5000 Unit(s) SubCutaneous every 8 hours  hydrALAZINE 100 milliGRAM(s) Oral three times a day  influenza  Vaccine (HIGH DOSE) 0.7 milliLiter(s) IntraMuscular once  insulin glargine Injectable (LANTUS) 14 Unit(s) SubCutaneous at bedtime  insulin lispro (ADMELOG) corrective regimen sliding scale   SubCutaneous at bedtime  insulin lispro (ADMELOG) corrective regimen sliding scale   SubCutaneous three times a day before meals  multivitamin 1 Tablet(s) Oral daily  risperiDONE   Tablet 1 milliGRAM(s) Oral daily    PRN Inpatient Medications  acetaminophen     Tablet .. 650 milliGRAM(s) Oral every 6 hours PRN      REVIEW OF SYSTEMS  --------------------------------------------------------------------------------  General: no fever  CVS: no chest pain  RESP: no sob, no cough  ABD: no abdominal pain  : no dysuria,  MSK: no edema     VITALS/PHYSICAL EXAM  --------------------------------------------------------------------------------  T(C): 36.9 (01-09-22 @ 10:00), Max: 36.9 (01-08-22 @ 13:57)  HR: 82 (01-09-22 @ 10:00) (74 - 84)  BP: 151/58 (01-09-22 @ 10:00) (136/69 - 151/58)  RR: 18 (01-09-22 @ 10:00) (18 - 18)  SpO2: 100% (01-09-22 @ 10:00) (97% - 100%)  Wt(kg): --        Physical Exam:  	Gen: NAD  	HEENT: MMM  	Pulm: CTA B/L.  Poor inspiration  	CV: S1S2  	Abd: Soft, +BS  	Ext: No LE edema B/L                      Neuro: Awake  	Skin: Warm and Dry   	Vascular access:            LABS/STUDIES  --------------------------------------------------------------------------------                Creatinine Trend:  SCr 1.72 [01-07 @ 07:04]  SCr 1.87 [01-06 @ 15:08]  SCr 1.87 [01-06 @ 07:19]  SCr 1.56 [01-05 @ 07:19]  SCr 1.69 [01-04 @ 07:18]    Urinalysis - [12-30-21 @ 02:56]      Color Yellow / Appearance Slightly Turbid / SG 1.015 / pH 6.5      Gluc Trace / Ketone Negative  / Bili Negative / Urobili <2 mg/dL       Blood Small / Protein 100 mg/dL / Leuk Est Large / Nitrite Negative      RBC 5-10 / WBC >10 / Hyaline 0 / Gran  / Sq Epi  / Non Sq Epi 2 / Bacteria Many      Iron 59, TIBC 242, %sat 24      [01-07-22 @ 07:04]  Ferritin 92      [01-07-22 @ 07:04]  HbA1c 8.8      [12-13-19 @ 06:00]  TSH 4.62      [01-07-22 @ 07:04]  Lipid: chol 152, , HDL 30, LDL --      [12-29-21 @ 08:22]    HIV Nonreact      [01-04-22 @ 07:18]    Immunofixation Serum:   No Monoclonal Band Identified. LUISA Alejo M.D.    Reference Range: None Detected      [01-06-22 @ 07:19]  
[facility] [place] [facility]   MD HA STOUT MD, PA INJUNG KO NP    TEL:  OFFICE: [phone]  DR FLORES CELL: [phone]  DR. HODGES CELL: [phone]  WESTLEY DAVE CELL: [phone]  FIDEL STAPLES CELL :[phone]    From 5pm-7am Answering Service [phone]    -- RENAL FOLLOW UP NOTE ---Date of Service 01-08-22 @ 12:05    Patient is a 76y old  Female who presents with a chief complaint of weakness (07 Jan 2022 15:13)    Patient seen and examined at bedside.    VITALS:  T(F): 98.4 (01-08-22 @ 05:00), Max: 98.4 (01-08-22 @ 05:00)  HR: 70 (01-08-22 @ 05:00)  BP: 136/67 (01-08-22 @ 05:00)  RR: 18 (01-08-22 @ 05:00)  SpO2: 99% (01-08-22 @ 05:00)  Wt(kg): --      PHYSICAL EXAM:  Constitutional: NAD  Neck: No JVD  Respiratory: CTAB, no wheezes, rales or rhonchi  Cardiovascular: S1, S2, RRR  Gastrointestinal: BS+, soft, NT/ND  Extremities: No peripheral edema    Hospital Medications:   MEDICATIONS  (STANDING):  allopurinol 100 milliGRAM(s) Oral daily  amLODIPine   Tablet 10 milliGRAM(s) Oral daily  aspirin enteric coated 81 milliGRAM(s) Oral daily  atorvastatin 40 milliGRAM(s) Oral at bedtime  carvedilol 25 milliGRAM(s) Oral every 12 hours  dextrose 40% Gel 15 Gram(s) Oral once  dextrose 5%. 1000 milliLiter(s) (50 mL/Hr) IV Continuous <Continuous>  dextrose 5%. 1000 milliLiter(s) (100 mL/Hr) IV Continuous <Continuous>  dextrose 50% Injectable 25 Gram(s) IV Push once  dextrose 50% Injectable 12.5 Gram(s) IV Push once  dextrose 50% Injectable 25 Gram(s) IV Push once  glucagon  Injectable 1 milliGRAM(s) IntraMuscular once  heparin   Injectable 5000 Unit(s) SubCutaneous every 8 hours  hydrALAZINE 100 milliGRAM(s) Oral three times a day  influenza  Vaccine (HIGH DOSE) 0.7 milliLiter(s) IntraMuscular once  insulin glargine Injectable (LANTUS) 14 Unit(s) SubCutaneous at bedtime  insulin lispro (ADMELOG) corrective regimen sliding scale   SubCutaneous three times a day before meals  insulin lispro (ADMELOG) corrective regimen sliding scale   SubCutaneous at bedtime  insulin lispro Injectable (ADMELOG) 8 Unit(s) SubCutaneous three times a day before meals  multivitamin 1 Tablet(s) Oral daily  risperiDONE   Tablet 1 milliGRAM(s) Oral daily      LABS:  01-07    133<L>  |  99  |  52<H>  ----------------------------<  182<H>  4.4   |  24  |  1.72<H>    Ca    9.7      07 Jan 2022 07:04  Phos  4.0     01-07  Mg     2.20     01-07      Creatinine Trend: 1.72 <--, 1.87 <--, 1.87 <--, 1.56 <--, 1.69 <--, 1.68 <--, 1.68 <--                                8.2    9.95  )-----------( 338      ( 07 Jan 2022 07:04 )             25.9     Urine Studies:  Urinalysis - [12-30-21 @ 02:56]      Color Yellow / Appearance Slightly Turbid / SG 1.015 / pH 6.5      Gluc Trace / Ketone Negative  / Bili Negative / Urobili <2 mg/dL       Blood Small / Protein 100 mg/dL / Leuk Est Large / Nitrite Negative      RBC 5-10 / WBC >10 / Hyaline 0 / Gran  / Sq Epi  / Non Sq Epi 2 / Bacteria Many      Iron 59, TIBC 242, %sat 24      [01-07-22 @ 07:04]  Ferritin 92      [01-07-22 @ 07:04]  HbA1c 8.8      [12-13-19 @ 06:00]  TSH 4.62      [01-07-22 @ 07:04]  Lipid: chol 152, , HDL 30, LDL --      [12-29-21 @ 08:22]    HIV Nonreact      [01-04-22 @ 07:18]    Immunofixation Serum:   No Monoclonal Band Identified. LUISA Alejo M.D.    Reference Range: None Detected      [01-06-22 @ 07:19]    RADIOLOGY & ADDITIONAL STUDIES:  
[facility]   MD HA STOUT MD, PA INJUNG KO NP    TEL:  OFFICE: [phone]  DR FLORES CELL: [phone]  DR. HODGES CELL: [phone]  WESTLEY DAVE CELL: [phone]  FIDEL STAPLES CELL :[phone]    From 5pm-7am Answering Service [phone]    -- RENAL FOLLOW UP NOTE ---Date of Service 01-05-22 @ 14:28    Patient is a 76y old  Female who presents with a chief complaint of weakness (04 Jan 2022 15:52)    Patient seen and examined at bedside. No chest pain/sob    VITALS:  T(F): 99 (01-05-22 @ 12:00), Max: 99 (01-05-22 @ 05:50)  HR: 77 (01-05-22 @ 12:00)  BP: 181/68 (01-05-22 @ 12:00)  RR: 18 (01-05-22 @ 12:00)  SpO2: 100% (01-05-22 @ 12:00)  Wt(kg): --        PHYSICAL EXAM:  Constitutional: NAD  Neck: No JVD  Respiratory: CTAB, no wheezes, rales or rhonchi  Cardiovascular: S1, S2, RRR  Gastrointestinal: BS+, soft, NT/ND  Extremities: No peripheral edema    Hospital Medications:   MEDICATIONS  (STANDING):  allopurinol 100 milliGRAM(s) Oral daily  amLODIPine   Tablet 5 milliGRAM(s) Oral daily  aspirin enteric coated 81 milliGRAM(s) Oral daily  atorvastatin 40 milliGRAM(s) Oral at bedtime  carvedilol 25 milliGRAM(s) Oral every 12 hours  dextrose 40% Gel 15 Gram(s) Oral once  dextrose 5%. 1000 milliLiter(s) (50 mL/Hr) IV Continuous <Continuous>  dextrose 5%. 1000 milliLiter(s) (100 mL/Hr) IV Continuous <Continuous>  dextrose 50% Injectable 25 Gram(s) IV Push once  dextrose 50% Injectable 12.5 Gram(s) IV Push once  dextrose 50% Injectable 25 Gram(s) IV Push once  glucagon  Injectable 1 milliGRAM(s) IntraMuscular once  heparin   Injectable 5000 Unit(s) SubCutaneous every 8 hours  hydrALAZINE 100 milliGRAM(s) Oral three times a day  influenza  Vaccine (HIGH DOSE) 0.7 milliLiter(s) IntraMuscular once  insulin lispro (ADMELOG) corrective regimen sliding scale   SubCutaneous three times a day before meals  insulin lispro (ADMELOG) corrective regimen sliding scale   SubCutaneous at bedtime  multivitamin 1 Tablet(s) Oral daily  risperiDONE   Tablet 1 milliGRAM(s) Oral daily  sodium chloride 1 Gram(s) Oral three times a day      LABS:  01-05    129<L>  |  93<L>  |  46<H>  ----------------------------<  215<H>  5.2   |  19<L>  |  1.56<H>    Ca    9.5      05 Jan 2022 07:19  Phos  3.7     01-05  Mg     2.20     01-05      Creatinine Trend: 1.56 <--, 1.69 <--, 1.68 <--, 1.68 <--, 1.88 <--, 1.90 <--, 1.98 <--    Phosphorus Level, Serum: 3.7 mg/dL (01-05 @ 07:19)  Ferritin, Serum: 394 ng/mL (01-05 @ 07:19)                              9.0    7.38  )-----------( 280      ( 05 Jan 2022 07:19 )             31.2     Urine Studies:  Urinalysis - [12-30-21 @ 02:56]      Color Yellow / Appearance Slightly Turbid / SG 1.015 / pH 6.5      Gluc Trace / Ketone Negative  / Bili Negative / Urobili <2 mg/dL       Blood Small / Protein 100 mg/dL / Leuk Est Large / Nitrite Negative      RBC 5-10 / WBC >10 / Hyaline 0 / Gran  / Sq Epi  / Non Sq Epi 2 / Bacteria Many    Urine Creatinine 78      [12-30-21 @ 02:56]  Urine Sodium 52      [12-30-21 @ 02:56]  Urine Urea Nitrogen 535.2      [12-30-21 @ 02:56]  Urine Osmolality 386      [12-30-21 @ 02:56]    Ferritin 394      [01-05-22 @ 07:19]  HbA1c 8.8      [12-13-19 @ 06:00]  Lipid: chol 152, , HDL 30, LDL --      [12-29-21 @ 08:22]    HIV Nonreact      [01-04-22 @ 07:18]      RADIOLOGY & ADDITIONAL STUDIES:  
  Endocrinology Attending Covering for Dr. Smith      Chief complaint  Patient is a 76y old  Female who presents with a chief complaint of weakness (08 Jan 2022 14:49)   Review of systems  Patient in bed, looks comfortable, no fever,  had no hypoglycemia.    Labs and Fingersticks  CAPILLARY BLOOD GLUCOSE      POCT Blood Glucose.: 264 mg/dL (08 Jan 2022 13:06)  POCT Blood Glucose.: 140 mg/dL (08 Jan 2022 09:53)  POCT Blood Glucose.: 236 mg/dL (07 Jan 2022 20:57)  POCT Blood Glucose.: 224 mg/dL (07 Jan 2022 17:56)      Anion Gap, Serum: 10 (01-07 @ 07:04)      Calcium, Total Serum: 9.7 (01-07 @ 07:04)          01-07    133<L>  |  99  |  52<H>  ----------------------------<  182<H>  4.4   |  24  |  1.72<H>    Ca    9.7      07 Jan 2022 07:04  Phos  4.0     01-07  Mg     2.20     01-07                          8.2    9.95  )-----------( 338      ( 07 Jan 2022 07:04 )             25.9     Medications  MEDICATIONS  (STANDING):  allopurinol 100 milliGRAM(s) Oral daily  amLODIPine   Tablet 10 milliGRAM(s) Oral daily  aspirin enteric coated 81 milliGRAM(s) Oral daily  atorvastatin 40 milliGRAM(s) Oral at bedtime  carvedilol 25 milliGRAM(s) Oral every 12 hours  dextrose 40% Gel 15 Gram(s) Oral once  dextrose 5%. 1000 milliLiter(s) (50 mL/Hr) IV Continuous <Continuous>  dextrose 5%. 1000 milliLiter(s) (100 mL/Hr) IV Continuous <Continuous>  dextrose 50% Injectable 25 Gram(s) IV Push once  dextrose 50% Injectable 12.5 Gram(s) IV Push once  dextrose 50% Injectable 25 Gram(s) IV Push once  glucagon  Injectable 1 milliGRAM(s) IntraMuscular once  heparin   Injectable 5000 Unit(s) SubCutaneous every 8 hours  hydrALAZINE 100 milliGRAM(s) Oral three times a day  influenza  Vaccine (HIGH DOSE) 0.7 milliLiter(s) IntraMuscular once  insulin glargine Injectable (LANTUS) 14 Unit(s) SubCutaneous at bedtime  insulin lispro (ADMELOG) corrective regimen sliding scale   SubCutaneous three times a day before meals  insulin lispro (ADMELOG) corrective regimen sliding scale   SubCutaneous at bedtime  insulin lispro Injectable (ADMELOG) 8 Unit(s) SubCutaneous three times a day before meals  multivitamin 1 Tablet(s) Oral daily  risperiDONE   Tablet 1 milliGRAM(s) Oral daily      Physical Exam  General: Patient comfortable in bed  Vital Signs Last 12 Hrs  T(F): 98.5 (01-08-22 @ 13:57), Max: 98.5 (01-08-22 @ 13:57)  HR: 76 (01-08-22 @ 13:57) (76 - 76)  BP: 140/61 (01-08-22 @ 13:57) (140/61 - 140/61)  BP(mean): --  RR: 18 (01-08-22 @ 13:57) (18 - 18)  SpO2: 100% (01-08-22 @ 13:57) (100% - 100%)  Neck: No palpable thyroid nodules.  CVS: S1S2, No murmurs  Respiratory: No wheezing, no crepitations  GI: Abdomen soft, bowel sounds positive  Musculoskeletal:  edema lower extremities.   Skin: No skin rashes, no ecchymosis    Diagnostics          
JOSE SAGASTUME  76y  Patient is a 76y old  Female who presents with a chief complaint of weakness (31 Dec 2021 12:11)    HPI:  Followed for SUNIL after admission for weakness and diarrhea.    HEALTH ISSUES - PROBLEM Dx:  Acute on chronic kidney failure    Hyperphosphatemia    Elevated troponin    Type 2 diabetes mellitus    Chronic diastolic congestive heart failure    Hypertensive emergency    Preventive measure    Medication management    2019 novel coronavirus disease (COVID-19)          MEDICATIONS  (STANDING):  allopurinol 100 milliGRAM(s) Oral daily  aspirin enteric coated 81 milliGRAM(s) Oral daily  atorvastatin 40 milliGRAM(s) Oral at bedtime  carvedilol 25 milliGRAM(s) Oral every 12 hours  dextrose 40% Gel 15 Gram(s) Oral once  dextrose 5%. 1000 milliLiter(s) (50 mL/Hr) IV Continuous <Continuous>  dextrose 5%. 1000 milliLiter(s) (100 mL/Hr) IV Continuous <Continuous>  dextrose 50% Injectable 25 Gram(s) IV Push once  dextrose 50% Injectable 12.5 Gram(s) IV Push once  dextrose 50% Injectable 25 Gram(s) IV Push once  glucagon  Injectable 1 milliGRAM(s) IntraMuscular once  heparin   Injectable 5000 Unit(s) SubCutaneous every 8 hours  hydrALAZINE 75 milliGRAM(s) Oral three times a day  influenza  Vaccine (HIGH DOSE) 0.7 milliLiter(s) IntraMuscular once  insulin lispro (ADMELOG) corrective regimen sliding scale   SubCutaneous three times a day before meals  insulin lispro (ADMELOG) corrective regimen sliding scale   SubCutaneous at bedtime  multivitamin 1 Tablet(s) Oral daily  risperiDONE   Tablet 1 milliGRAM(s) Oral daily    MEDICATIONS  (PRN):  acetaminophen     Tablet .. 650 milliGRAM(s) Oral every 6 hours PRN Temp greater or equal to 38C (100.4F), Mild Pain (1 - 3)    Vital Signs Last 24 Hrs  T(C): 36.8 (31 Dec 2021 13:00), Max: 37.2 (30 Dec 2021 17:00)  T(F): 98.2 (31 Dec 2021 13:00), Max: 98.9 (30 Dec 2021 17:00)  HR: 87 (31 Dec 2021 13:00) (70 - 87)  BP: 162/61 (31 Dec 2021 13:00) (131/78 - 170/82)  BP(mean): --  RR: 18 (31 Dec 2021 13:00) (16 - 18)  SpO2: 100% (31 Dec 2021 13:00) (99% - 100%)  Daily     Daily     PHYSICAL EXAM:  Constitutional: She  appears comfortable and not distressed. Not diaphoretic.    Neck:  The thyroid is normal. Trachea is midline.     Respiratory: The lungs are clear to auscultation. No dullness and expansion is normal.    Cardiovascular: S1 and S2 are normal. No mummurs, rubs or gallops are present.    Gastrointestinal: The abdomen is soft. No tenderness is present. No masses are present. Bowel sounds are normal.    Genitourinary: The bladder is not distended. No CVA tenderness is present.    Extremities: No edema is noted. No deformities are present.    Neurological: Cognition is normal. Tone, power and sensation are normal.     Skin: No leasions are seen  or palpated.    Lymph Nodes: No lymphadenopathy is present.                            8.7    6.52  )-----------( 264      ( 31 Dec 2021 07:09 )             26.8         128<L>  |  90<L>  |  62<H>  ----------------------------<  156<H>  3.8   |  26  |  1.90<H>    Ca    9.1      31 Dec 2021 07:09  Phos  3.7       Mg     1.90         TPro  6.8  /  Alb  3.6  /  TBili  <0.2  /  DBili  x   /  AST  25  /  ALT  19  /  AlkPhos  58      Urinalysis Basic - ( 30 Dec 2021 02:56 )    Color: Yellow / Appearance: Slightly Turbid / S.015 / pH: x  Gluc: x / Ketone: Negative  / Bili: Negative / Urobili: <2 mg/dL   Blood: x / Protein: 100 mg/dL / Nitrite: Negative   Leuk Esterase: Large / RBC: 5-10 /HPF / WBC >10 /HPF   Sq Epi: x / Non Sq Epi: 2 /HPF / Bacteria: Many    
Patient is a 76y old  Female who presents with a chief complaint of weakness (30 Dec 2021 14:41)      DATE OF SERVICE: 21 @ 16:39    SUBJECTIVE / OVERNIGHT EVENTS: overnight events noted    ROS:  Resp: No cough no sputum production  CVS: No chest pain no palpitations no orthopnea  GI: no N/V/D  : no dysuria, no hematuria          MEDICATIONS  (STANDING):  aspirin enteric coated 81 milliGRAM(s) Oral daily  carvedilol 25 milliGRAM(s) Oral every 12 hours  dextrose 40% Gel 15 Gram(s) Oral once  dextrose 5%. 1000 milliLiter(s) (50 mL/Hr) IV Continuous <Continuous>  dextrose 5%. 1000 milliLiter(s) (100 mL/Hr) IV Continuous <Continuous>  dextrose 50% Injectable 25 Gram(s) IV Push once  dextrose 50% Injectable 12.5 Gram(s) IV Push once  dextrose 50% Injectable 25 Gram(s) IV Push once  glucagon  Injectable 1 milliGRAM(s) IntraMuscular once  heparin   Injectable 5000 Unit(s) SubCutaneous every 8 hours  hydrALAZINE 50 milliGRAM(s) Oral three times a day  influenza  Vaccine (HIGH DOSE) 0.7 milliLiter(s) IntraMuscular once  insulin lispro (ADMELOG) corrective regimen sliding scale   SubCutaneous three times a day before meals  insulin lispro (ADMELOG) corrective regimen sliding scale   SubCutaneous at bedtime    MEDICATIONS  (PRN):  acetaminophen     Tablet .. 650 milliGRAM(s) Oral every 6 hours PRN Temp greater or equal to 38C (100.4F), Mild Pain (1 - 3)        CAPILLARY BLOOD GLUCOSE      POCT Blood Glucose.: 284 mg/dL (30 Dec 2021 13:00)  POCT Blood Glucose.: 186 mg/dL (30 Dec 2021 09:18)  POCT Blood Glucose.: 211 mg/dL (29 Dec 2021 20:57)  POCT Blood Glucose.: 266 mg/dL (29 Dec 2021 18:00)  POCT Blood Glucose.: 284 mg/dL (29 Dec 2021 17:58)  POCT Blood Glucose.: 272 mg/dL (29 Dec 2021 17:56)    I&O's Summary      Vital Signs Last 24 Hrs  T(C): 37.1 (30 Dec 2021 13:00), Max: 37.5 (29 Dec 2021 21:00)  T(F): 98.7 (30 Dec 2021 13:00), Max: 99.5 (29 Dec 2021 21:00)  HR: 77 (30 Dec 2021 13:00) (77 - 87)  BP: 166/61 (30 Dec 2021 13:00) (131/50 - 180/65)  BP(mean): --  RR: 18 (30 Dec 2021 13:00) (16 - 18)  SpO2: 100% (30 Dec 2021 13:00) (100% - 100%)    PHYSICAL EXAM:  EYES: EOMI, PERRLA, sclera clear  NECK: Supple, No JVD  CHEST/LUNG: clear   HEART: S1 S2; no murmurs   ABDOMEN: Soft, Nontender  EXTREMITIES:   no edema  NEUROLOGY: AO x 3   SKIN: No rashes or lesions    LABS:                        9.5    7.37  )-----------( 282      ( 30 Dec 2021 08:33 )             30.0     12-30    130<L>  |  90<L>  |  65<H>  ----------------------------<  157<H>  3.8   |  27  |  1.98<H>    Ca    9.4      30 Dec 2021 08:33  Phos  4.3     12-30  Mg     1.90     12-30    TPro  7.4  /  Alb  3.8  /  TBili  <0.2  /  DBili  x   /  AST  23  /  ALT  16  /  AlkPhos  59  12-30          Urinalysis Basic - ( 30 Dec 2021 02:56 )    Color: Yellow / Appearance: Slightly Turbid / S.015 / pH: x  Gluc: x / Ketone: Negative  / Bili: Negative / Urobili: <2 mg/dL   Blood: x / Protein: 100 mg/dL / Nitrite: Negative   Leuk Esterase: Large / RBC: 5-10 /HPF / WBC >10 /HPF   Sq Epi: x / Non Sq Epi: 2 /HPF / Bacteria: Many          All consultant(s) notes reviewed and care discussed with other providers        Contact Number, Dr Saldana [phone]
    Chief complaint  Patient is a 76y old  Female who presents with a chief complaint of weakness (07 Jan 2022 08:51)   Review of systems  Patient in bed, looks comfortable, no hypoglycemic episodes.    Labs and Fingersticks  CAPILLARY BLOOD GLUCOSE      POCT Blood Glucose.: 195 mg/dL (07 Jan 2022 09:18)  POCT Blood Glucose.: 246 mg/dL (06 Jan 2022 21:26)  POCT Blood Glucose.: 249 mg/dL (06 Jan 2022 17:53)  POCT Blood Glucose.: 329 mg/dL (06 Jan 2022 12:34)      Anion Gap, Serum: 10 (01-07 @ 07:04)  Anion Gap, Serum: 9 (01-06 @ 15:08)  Anion Gap, Serum: 11 (01-06 @ 07:19)      Calcium, Total Serum: 9.7 (01-07 @ 07:04)  Calcium, Total Serum: 9.1 (01-06 @ 15:08)  Calcium, Total Serum: 9.2 (01-06 @ 07:19)          01-07    133<L>  |  99  |  52<H>  ----------------------------<  182<H>  4.4   |  24  |  1.72<H>    Ca    9.7      07 Jan 2022 07:04  Phos  4.0     01-07  Mg     2.20     01-07                          8.2    9.95  )-----------( 338      ( 07 Jan 2022 07:04 )             25.9     Medications  MEDICATIONS  (STANDING):  allopurinol 100 milliGRAM(s) Oral daily  amLODIPine   Tablet 10 milliGRAM(s) Oral daily  aspirin enteric coated 81 milliGRAM(s) Oral daily  atorvastatin 40 milliGRAM(s) Oral at bedtime  carvedilol 25 milliGRAM(s) Oral every 12 hours  dextrose 40% Gel 15 Gram(s) Oral once  dextrose 5%. 1000 milliLiter(s) (50 mL/Hr) IV Continuous <Continuous>  dextrose 5%. 1000 milliLiter(s) (100 mL/Hr) IV Continuous <Continuous>  dextrose 50% Injectable 25 Gram(s) IV Push once  dextrose 50% Injectable 12.5 Gram(s) IV Push once  dextrose 50% Injectable 25 Gram(s) IV Push once  glucagon  Injectable 1 milliGRAM(s) IntraMuscular once  heparin   Injectable 5000 Unit(s) SubCutaneous every 8 hours  hydrALAZINE 100 milliGRAM(s) Oral three times a day  influenza  Vaccine (HIGH DOSE) 0.7 milliLiter(s) IntraMuscular once  insulin glargine Injectable (LANTUS) 12 Unit(s) SubCutaneous at bedtime  insulin lispro (ADMELOG) corrective regimen sliding scale   SubCutaneous three times a day before meals  insulin lispro (ADMELOG) corrective regimen sliding scale   SubCutaneous at bedtime  insulin lispro Injectable (ADMELOG) 7 Unit(s) SubCutaneous three times a day before meals  multivitamin 1 Tablet(s) Oral daily  risperiDONE   Tablet 1 milliGRAM(s) Oral daily      Physical Exam  General: Patient comfortable in bed  Vital Signs Last 12 Hrs  T(F): 98.2 (01-07-22 @ 05:47), Max: 98.2 (01-07-22 @ 05:47)  HR: 78 (01-07-22 @ 05:47) (78 - 78)  BP: 161/64 (01-07-22 @ 05:47) (161/64 - 161/64)  BP(mean): --  RR: 17 (01-07-22 @ 05:47) (17 - 17)  SpO2: 100% (01-07-22 @ 05:47) (100% - 100%)  Neck: No palpable thyroid nodules.  CVS: S1S2, No murmurs  Respiratory: No wheezing, no crepitations  GI: Abdomen soft, bowel sounds positive  Musculoskeletal:  edema lower extremities.   Skin: No skin rashes, no ecchymosis    Diagnostics    Free Thyroxine, Serum: AM Sched. Collection: 07-Jan-2022 06:00 (01-06 @ 09:35)  Thyroid Stimulating Hormone, Serum: AM Sched. Collection: 07-Jan-2022 06:00 (01-06 @ 09:35)        
Patient is a 76y old  Female who presents with a chief complaint of weakness (31 Dec 2021 14:38)      DATE OF SERVICE: 21 @ 15:02    SUBJECTIVE / OVERNIGHT EVENTS: overnight events noted    ROS:  Resp: No cough no sputum production  CVS: No chest pain no palpitations no orthopnea  GI: no N/V/D  : no dysuria, no hematuria  Neuro: no weakness no paresthesias  Heme: No petechiae no easy bruising  Msk: No joint pain no swelling  Skin: No rash no itching        MEDICATIONS  (STANDING):  allopurinol 100 milliGRAM(s) Oral daily  aspirin enteric coated 81 milliGRAM(s) Oral daily  atorvastatin 40 milliGRAM(s) Oral at bedtime  carvedilol 25 milliGRAM(s) Oral every 12 hours  dextrose 40% Gel 15 Gram(s) Oral once  dextrose 5%. 1000 milliLiter(s) (50 mL/Hr) IV Continuous <Continuous>  dextrose 5%. 1000 milliLiter(s) (100 mL/Hr) IV Continuous <Continuous>  dextrose 50% Injectable 25 Gram(s) IV Push once  dextrose 50% Injectable 12.5 Gram(s) IV Push once  dextrose 50% Injectable 25 Gram(s) IV Push once  glucagon  Injectable 1 milliGRAM(s) IntraMuscular once  heparin   Injectable 5000 Unit(s) SubCutaneous every 8 hours  hydrALAZINE 75 milliGRAM(s) Oral three times a day  influenza  Vaccine (HIGH DOSE) 0.7 milliLiter(s) IntraMuscular once  insulin lispro (ADMELOG) corrective regimen sliding scale   SubCutaneous three times a day before meals  insulin lispro (ADMELOG) corrective regimen sliding scale   SubCutaneous at bedtime  multivitamin 1 Tablet(s) Oral daily  risperiDONE   Tablet 1 milliGRAM(s) Oral daily    MEDICATIONS  (PRN):  acetaminophen     Tablet .. 650 milliGRAM(s) Oral every 6 hours PRN Temp greater or equal to 38C (100.4F), Mild Pain (1 - 3)        CAPILLARY BLOOD GLUCOSE      POCT Blood Glucose.: 333 mg/dL (31 Dec 2021 13:04)  POCT Blood Glucose.: 310 mg/dL (31 Dec 2021 13:03)  POCT Blood Glucose.: 177 mg/dL (31 Dec 2021 08:51)  POCT Blood Glucose.: 284 mg/dL (30 Dec 2021 21:19)  POCT Blood Glucose.: 193 mg/dL (30 Dec 2021 17:54)    I&O's Summary      Vital Signs Last 24 Hrs  T(C): 36.8 (31 Dec 2021 13:00), Max: 37.2 (30 Dec 2021 17:00)  T(F): 98.2 (31 Dec 2021 13:00), Max: 98.9 (30 Dec 2021 17:00)  HR: 87 (31 Dec 2021 13:00) (70 - 87)  BP: 162/61 (31 Dec 2021 13:00) (131/78 - 170/82)  BP(mean): --  RR: 18 (31 Dec 2021 13:00) (16 - 18)  SpO2: 100% (31 Dec 2021 13:00) (99% - 100%)    PHYSICAL EXAM:  NECK: Supple, No JVD  CHEST/LUNG: clear   HEART: S1 S2; no murmurs   ABDOMEN: Soft, Nontender  EXTREMITIES:   no edema  NEUROLOGY: AO x 3   SKIN: No rashes or lesions    LABS:                        8.7    6.52  )-----------( 264      ( 31 Dec 2021 07:09 )             26.8     12-    128<L>  |  90<L>  |  62<H>  ----------------------------<  156<H>  3.8   |  26  |  1.90<H>    Ca    9.1      31 Dec 2021 07:09  Phos  3.7     12-  Mg     1.90         TPro  6.8  /  Alb  3.6  /  TBili  <0.2  /  DBili  x   /  AST  25  /  ALT  19  /  AlkPhos  58  12-          Urinalysis Basic - ( 30 Dec 2021 02:56 )    Color: Yellow / Appearance: Slightly Turbid / S.015 / pH: x  Gluc: x / Ketone: Negative  / Bili: Negative / Urobili: <2 mg/dL   Blood: x / Protein: 100 mg/dL / Nitrite: Negative   Leuk Esterase: Large / RBC: 5-10 /HPF / WBC >10 /HPF   Sq Epi: x / Non Sq Epi: 2 /HPF / Bacteria: Many          All consultant(s) notes reviewed and care discussed with other providers        Contact Number, Dr Saldana [phone]
Patient is a 76y old  Female who presents with a chief complaint of weakness (29 Dec 2021 13:29)  patient not known to me, assigned this AM to assume care  chart reviewed and events thus far noted  admitted overnight by faculty hospitalist     DATE OF SERVICE: 21 @ 14:29    SUBJECTIVE / OVERNIGHT EVENTS: overnight events noted  patient lethargic but arousable    ROS:  Resp: No cough no sputum production  CVS: No chest pain no palpitations no orthopnea  GI: no N/V/D  : no dysuria, no hematuria  per RN        MEDICATIONS  (STANDING):  aspirin enteric coated 81 milliGRAM(s) Oral daily  carvedilol 12.5 milliGRAM(s) Oral every 12 hours  dextrose 40% Gel 15 Gram(s) Oral once  dextrose 5%. 1000 milliLiter(s) (50 mL/Hr) IV Continuous <Continuous>  dextrose 5%. 1000 milliLiter(s) (100 mL/Hr) IV Continuous <Continuous>  dextrose 50% Injectable 25 Gram(s) IV Push once  dextrose 50% Injectable 12.5 Gram(s) IV Push once  dextrose 50% Injectable 25 Gram(s) IV Push once  glucagon  Injectable 1 milliGRAM(s) IntraMuscular once  heparin   Injectable 5000 Unit(s) SubCutaneous every 8 hours  hydrALAZINE 50 milliGRAM(s) Oral three times a day  influenza  Vaccine (HIGH DOSE) 0.7 milliLiter(s) IntraMuscular once  insulin lispro (ADMELOG) corrective regimen sliding scale   SubCutaneous three times a day before meals  insulin lispro (ADMELOG) corrective regimen sliding scale   SubCutaneous at bedtime    MEDICATIONS  (PRN):  acetaminophen     Tablet .. 650 milliGRAM(s) Oral every 6 hours PRN Temp greater or equal to 38C (100.4F), Mild Pain (1 - 3)        CAPILLARY BLOOD GLUCOSE      POCT Blood Glucose.: 141 mg/dL (29 Dec 2021 12:07)  POCT Blood Glucose.: 159 mg/dL (29 Dec 2021 09:06)  POCT Blood Glucose.: 257 mg/dL (28 Dec 2021 22:42)  POCT Blood Glucose.: 231 mg/dL (28 Dec 2021 20:08)  POCT Blood Glucose.: 189 mg/dL (28 Dec 2021 17:57)    I&O's Summary      Vital Signs Last 24 Hrs  T(C): 38.1 (29 Dec 2021 11:45), Max: 38.1 (29 Dec 2021 11:45)  T(F): 100.5 (29 Dec 2021 11:45), Max: 100.5 (29 Dec 2021 11:45)  HR: 87 (29 Dec 2021 11:45) (83 - 93)  BP: 155/54 (29 Dec 2021 11:45) (155/54 - 203/89)  BP(mean): --  RR: 18 (29 Dec 2021 11:45) (18 - 19)  SpO2: 100% (29 Dec 2021 11:45) (99% - 100%)    PHYSICAL EXAM:  EYES: EOMI, PERRLA, sclera clear  NECK: Supple, No JVD  CHEST/LUNG: clear b/l, no wheeze  HEART: S1 S2; no murmurs   ABDOMEN: Soft, Nontender, Bowel sounds present  EXTREMITIES:  No clubbing or cyanosis,  no edema  NEUROLOGY: sleeping but arousable grossly  non-focal  SKIN: No rashes or lesions    LABS:                        10.2   5.88  )-----------( 280      ( 29 Dec 2021 08:22 )             31.0         132<L>  |  92<L>  |  72<H>  ----------------------------<  140<H>  3.7   |  26  |  2.33<H>    Ca    9.6      29 Dec 2021 08:22  Phos  4.9       Mg     1.90         TPro  7.6  /  Alb  3.8  /  TBili  0.2  /  DBili  x   /  AST  25  /  ALT  17  /  AlkPhos  61            Urinalysis Basic - ( 28 Dec 2021 13:07 )    Color: Light Yellow / Appearance: Clear / S.013 / pH: x  Gluc: x / Ketone: Negative  / Bili: Negative / Urobili: <2 mg/dL   Blood: x / Protein: 300 mg/dL / Nitrite: Negative   Leuk Esterase: Negative / RBC: 2 /HPF / WBC 1 /HPF   Sq Epi: x / Non Sq Epi: 1 /HPF / Bacteria: Negative          All consultant(s) notes reviewed and care discussed with other providers        Contact Number, Dr Saldana [phone]

## 2022-01-09 NOTE — PROGRESS NOTE ADULT - ASSESSMENT
a 77 yo female with PMH of HTN, HLD, DM2 (metformin, glipizide), depression, CHF (40 lasix daily, recent increase of unknown medication on monday/thursday ?metolazone), X2 KIERSTEN in 2020 for severe stenosis, DVT S/P anticoagulation in setting of COVID in 2020, presenting with diarrhea, weakness, inability to ambulate. Consulted nephrology for SUNIL      A/P    SUNIL on CKD   Etiology? likely due to diarrhea.   Renal ultrasound shows no hydronephrosis   Scr baseline (~1.5)  - no labs today   - optimize glucose level    - Stat BMP. Monitor U/O  avoid further nephrotoxic medication     Proteinuria:  Likely due to DM/HTN  Vasculitis work up negative in office  HIV negative  serum immunofixation negative     Hyponatremia   no labs today   UA suggestive of SIADH   s/p salt tablet 1g tidx 2days (01/04/22)  - Continue fluid restriction   - glucose is high, please optimize glucose level   - Follow up BMP stat today    HTN  Acceptablel   continue current meds   Low salt diet   Monitor closely

## 2022-01-09 NOTE — PROGRESS NOTE ADULT - ATTENDING COMMENTS
seen and agree w/ PA. no changes made today.
[id number] up today's lab
Hyponatremia: Na improving, continue salt tab
Assessment and plan discussed with ACP

## 2022-01-09 NOTE — PROGRESS NOTE ADULT - REASON FOR ADMISSION
weakness

## 2022-01-10 PROBLEM — Z00.00 ENCOUNTER FOR PREVENTIVE HEALTH EXAMINATION: Status: ACTIVE | Noted: 2022-01-10

## 2022-03-28 NOTE — DIETITIAN INITIAL EVALUATION ADULT. - PROBLEM/PLAN-7
Return OB Office Visit    CC:   Chief Complaint   Patient presents with    Routine Prenatal Visit     KERWIN: 31w 2D  is having is with constipation, hadnt did Miralax in over a month.        Assessment/Plan:  30 y.o.  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  Established with MFM   Continue ASA, heparin throughout pregnancy            31 weeks gestation of pregnancy    Overview     Estimated Date of Delivery: 22 established by LMP, 8w US consistent    First Trimester  - Prenatal Battery: WNL  - UDS: neg  - Pap Smear: NILM, HPV not performed  - GC/C: neg  - Prophylactic ASA: indicated  -NIPT: negative    Second Trimester  - Quad: normal risk  - Anatomy Ultrasound: performed with MFM, no abnormalities, repeat at 28w with MFM    Third Trimester  - 28 wk labs: WNL  - Rh Status: B neg  - 1Hr GCT: passed  - GC/C:   - GBS:     Vaccines  - Influenza: discussed  - TDAP: given 3/28  - COVID: discussed    Contraception:             Relevant Orders    POCT URINALYSIS W/O SCOPE (Completed)          HPI:  Pt seen and examined.  No concerns/complaints.  Denies VB, LOF, ctx.  +FM    Objective:  /60   Wt 63.2 kg (139 lb 4 oz)   LMP 2021 Comment: 5 days  BMI 25.47 kg/m²   Gen: AO, NAD  Abd: Soft, NT, gravid measuring 31w  Ext: Bilateral trace LE edema.  No calf tenderness. No erythema.  OMM: Increased lumbar lordosis    
DISPLAY PLAN FREE TEXT

## 2022-06-30 NOTE — DISCHARGE NOTE PROVIDER - NSDCFUADDAPPT_GEN_ALL_CORE_FT
[facility]  [address] [facility] [address] [address], [address], [place] [address]  [phone]   Please call to schedule appointment with TELEHEALTH

## 2023-01-10 NOTE — PHYSICAL THERAPY INITIAL EVALUATION ADULT - GAIT DISTANCE, PT EVAL
Occupational Therapy      Occupational Therapy Daily Treatment Note    Date:  1/10/2023    Patient Name:  Raymond Jules     :  1994  MRN: [id number]    Restrictions/Precautions:    Medical/Treatment Diagnosis Information:  Diagnosis: S52.591A (ICD-10-CM) - Other closed fracture of distal end of right radius, initial encounter  Treatment Diagnosis: decreased ROM and strength of R hand and wrist  Insurance/Certification information:   [facility]  Physician Information:   NGHIA Aleman - CNP  Plan of care signed (Y/N):  Y  Visit# / total visits:     Pain level: 0/10 at rest and up to 4/10 with use     G-Code (if applicable):      Date / Visit # G-Code Applied:  /   QuickDASH Total Score: 28 (23 1509)       QuickDASH Disability/Symptom Score : 38.64 % (23 150)    Progress Note: []  Yes  [x]  No  Next due by: Visit #10      Subjective: Pt reports no pain at rest. Pt reports completing HEP 2-3x a week. Objective Measures:  Eval 23    Social History:   Social History  Lives With: Spouse (nephew)  Type of Home: House  Functional Status:  Functional Status  Prior level of function: Independent  Occupation: Full time employment  Type of Occupation: [employer]   Job Duties: Moderate lifting  ADL Assistance: Independent  Ambulation Assistance: Independent  Transfer Assistance: Independent  Active : Yes (not since injury)  Objective:     Activity Tolerance  Activity Tolerance: Patient tolerated evaluation without incident  Cognition  Overall Cognitive Status: WFL  LUE AROM (degrees)  LUE AROM : WFL  Left Hand AROM (degrees)  Left Hand AROM: WFL  RUE AROM (degrees)  R Forearm Pron 0-90: 10  R Forearm Supination  0-90: 10  R Wrist Flexion 0-80: 25  R Wrist Extension 0-70: 0  R Wrist Radial Deviation 0-20: 0  R Wrist Ulnar Deviation 0-45: 0  Right Hand AROM (degrees)  Right Hand General AROM: unable to complete full fist ~7 cm of 3rd digit to palm  LUE Strength  Gross LUE Strength: WFL  L Wrist Flex: 5/5  L Wrist Ext: 5/5  L Hand General: 5/5  RUE Strength  Gross RUE Strength: [facility]  R Wrist Flex: 2-/5  R Wrist Ext: 2-/5  R Hand General: 2-/5  Hand Dominance  Hand Dominance: Left  Left Hand Strength -  (lbs)  Handle Setting 2: NA  Right Hand Strength -  (lbs)  Handle Setting 2: NA                                Therapeutic Exercise:   Exercise/Equipment  Resistance/Repetitions   Other comments   AROM   X10 reps    Elbow:   Flex  Ext    Forearm:  Pronation/supination    Wrist:  Flex/ext  Ulnar/radial deviation  Circumduction    Hand:  Grasp/release      Stretches   X10-15 reps; x30 second hold    Wrist:  Flex/ext  Ulnar/radial deviation    Hand/Finger:  Flex/ext:  Full palm  Individual finger:  MCP  PIP  DIP             Home Exercise Program:    Pt issued handout for AROM and stretches    Manual Treatments:      Modalities:  x10 minutes Paraffin to R wrist, hand, and fingers; fingers with passive stretch into fist during paraffin    Timed Code Treatment Minutes:  40 minutes     Total Treatment Minutes:  45 minutes ([id number])    Charges:  X1 Paraffin   X2 Ther Ex    Treatment/Activity Tolerance:     [x]  Patient tolerated treatment well []  Patient limited by fatigue    []  Patient limited by pain []  Patient limited by other medical complications   []  Other:     Assessment: Pt is a 33 y.o female who is s/p OPEN REDUCTION INTERNAL FIXATION RIGHT DISTAL RADIUS, 12/5/2022. Pt was placed in a [employer] Abreu Titan Wrist Long Forearm Brace. Pt now presents to OP OT with complaints of ongoing R wrist deficits. Pt demonstrates significant decreased ROM including R wrist and hand/fingers. Pt with minimal noted wrist or finger AROM. Pt noted to have decreased R wrist strength and unable to test  strength due to inability to complete fist. Pt denies any sensation deficits. Pt reports 0/10 pain at rest and up to 6/10 pain with use of R hand.  Pt reports not wearing brace \"much\" and is not present for evaluation. Despite ongoing deficits, pt reports completing ADLs Ind with increased difficulty. Pt continues to be significantly limited this date due to ongoing stiffness in R hand and fingers. Pt tolerates paraffin to R wrist and hand well to allow for increased pressure with stretching. Pt demonstrates ability to complete self PROM well with min cues. Continue with POC.      Prognosis: [x]  Good [x]  Fair  []  Poor    Goals:      Patient Goals       Patient goals  increase use of R hand/wrist   Short Term Goals      Time Frame for Short Term Goals 4-6 weeks   Short Term Goal 1 Pt will be Ind with HEP and report completing daily   STG 1 Current Status: --   STG Goal 1 Status: --   Short Term Goal 2 Pt will decrease QuickDASH score by 5 points for increased ability to complete ADLs/IADLs   STG 2 Current Status: --   STG Goal 2 Status: --   Short Term Goal 3 Pt will increase R finger ROM in order to be able to complete light fist and test  strength   STG 3 Current Status: --   STG Goal 3 Status: --   Short Term Goal 4 Pt will increase R wrist flexion by 15 degrees for increased ability to complete ADLs   STG 4 Current Status: --   STG Goal 4 Status: --   Short Term Goal 5 Pt will incease R wrist extension by 15 degrees for increased ability to complete work tasks   STG 5 Current Status: --   STG Goal 5 Status:        Patient Requires Follow-up:  [x]  Yes  []  No    Plan: [x]  Continue per plan of care []  Alter current plan (see comments)   []  Plan of care initiated []  Hold pending MD visit []  Discharge    Plan for Next Session:      Electronically signed by:  Latisha De La Cruz OT,OTR/L 15 ft

## 2023-04-21 NOTE — PROGRESS NOTE ADULT - ASSESSMENT
Patient is a 73 yo woman with uncontrolled T2DM, HTN, HLD here for syncopal episode. Endocrine asked to consult for type 2 diabetes    1. DM 2 with hyperglycemia without long term current use of insulin   -Patient's A1c is 8.8%. Though we are not aiming for tight glycemic control in elderly patients, the A1c goal is 7-8% without hypoglycemic or hyperglycemic excursions  -BG target while inpatient approximately 100-180 mg/dl, currently above target. Patient now off dextrose IVF,  however BG still elevated in spite of insulin increases yesterday  -Will increase Lantus to 10 units SQ qHS  -Increase Humalog to 4 units SQ TID before meals (Hold if NPO/not eating meal)   -Continue Humalog LOW dose correctional scale before meals, Humalog LOW dose correction at bedtime   -Continue consistent carbohydrate diet; registered dietitian consult done  -Check BG before meals and bedtime     Discharge Plan:  -Anticipate resuming Metformin 1000 mg PO BID (if GFR/LFT stable), and resume Januvia 25 mg PO daily. STOP Glimepiride. Can consider adding Prandin 0.5-1 mg PO TID with meals (based on fingersticks)   -Ensure patient has prescription for glucometer (per insurance), glucose test strips, lancets and alcohol swabs  -Daughter and son request follow up with [facility], [address] [place] [address], [address], [facility] [id number], [phone]. Will email office to schedule next available appt. 4 = No assist / stand by assistance

## 2023-05-26 NOTE — CHART NOTE - NSCHARTNOTEFT_GEN_A_CORE
Spoke to Dr Hahn in regards to patients sugars/fingerstick/am labs and management. Dr Hahn has notified Dr Zheng from Pulmonary and Dr Schulz from renal to consult on patient for further recommendations. Would like for ID to be consulted to see if patient would benefit from addition treatment from their standpoint. Will consult house ID. As per Dr Hahn, no intervention at moment, will await recommendation from specialist for further management as moment. Try to call pt again today no answer

## 2024-05-14 NOTE — DISCHARGE NOTE NURSING/CASE MANAGEMENT/SOCIAL WORK - NSSCCARECORD_GEN_ALL_CORE
Recommendations:  Check pathology.  Will notify patient with results when they are available.  Repeat colonoscopy in 5-10 years for surveillance versus screening.  Follow up as needed.     
Home Care Agency/Community Resource

## 2024-12-05 NOTE — PROGRESS NOTE ADULT - PROBLEM SELECTOR PLAN 4
Caller: Jacinta Garza    [address] [phone] [facility] [phone] CST            monitor cr.

## 2025-01-10 NOTE — DISCHARGE NOTE PROVIDER - NSDCACTIVITY_GEN_ALL_CORE
64 y.o. year-old female who presented with left hip OA. Patient is now s/p elective Left Total Hip Arthroplasty on 1/10/25 by Dr. Razo. Underwent successful Left Total Hip Arthroplasty. Patient had uneventful hospital course. On the day of surgery, patient was identified in the pre-operative holding area and agreeable to proceed with surgery. Written consent was obtained. Please see operative note for further details of this procedure. Patient received 24 hours of raheel-operative antibiotics. Patient recovered in the PACU before transfer to a regular nursing floor. Patient was started on multimodal pain control and ASA 81 mg bid for DVT prophylaxis. Pain was appropriately controlled. Diet was advanced as tolerated. Physical therapy was consulted and recommended continued recovery at home with continued physical therapy and wound care. On the day of discharge, patient was afebrile with stable vital signs. Patient was neurovascularly intact at time of discharge. Patient was discharged with prescription of ASA 81 mg bid for DVT prophylaxis for 4 weeks.    
Return to Work/School allowed

## 2025-05-11 NOTE — PROGRESS NOTE ADULT - PROBLEM/PLAN-9
BMI: BMI (kg/m2): 27.4 (05-10-25 @ 08:55)  HbA1c:   Glucose: POCT Blood Glucose.: 82 mg/dL (05-10-25 @ 11:40)    BP: 118/72 (05-10-25 @ 08:55) (118/72 - 118/72)Vital Signs Last 24 Hrs  T(C): --  T(F): --  HR: --  BP: --  BP(mean): --  RR: --  SpO2: --      Lipid Panel:  DISPLAY PLAN FREE TEXT